# Patient Record
Sex: FEMALE | Race: BLACK OR AFRICAN AMERICAN | NOT HISPANIC OR LATINO | Employment: UNEMPLOYED | URBAN - METROPOLITAN AREA
[De-identification: names, ages, dates, MRNs, and addresses within clinical notes are randomized per-mention and may not be internally consistent; named-entity substitution may affect disease eponyms.]

---

## 2017-10-12 ENCOUNTER — HOSPITAL ENCOUNTER (EMERGENCY)
Facility: HOSPITAL | Age: 37
End: 2017-10-12
Attending: EMERGENCY MEDICINE | Admitting: EMERGENCY MEDICINE
Payer: COMMERCIAL

## 2017-10-12 VITALS
TEMPERATURE: 97.6 F | DIASTOLIC BLOOD PRESSURE: 77 MMHG | OXYGEN SATURATION: 100 % | SYSTOLIC BLOOD PRESSURE: 113 MMHG | RESPIRATION RATE: 16 BRPM | HEART RATE: 105 BPM

## 2017-10-12 DIAGNOSIS — F14.10 COCAINE ABUSE (HCC): Primary | ICD-10-CM

## 2017-10-12 LAB
AMPHETAMINES SERPL QL SCN: NEGATIVE
BARBITURATES UR QL: NEGATIVE
BENZODIAZ UR QL: NEGATIVE
COCAINE UR QL: POSITIVE
ETHANOL EXG-MCNC: 0 MG/DL
EXT PREG TEST URINE: NEGATIVE
METHADONE UR QL: NEGATIVE
OPIATES UR QL SCN: NEGATIVE
PCP UR QL: NEGATIVE
THC UR QL: NEGATIVE

## 2017-10-12 PROCEDURE — 81025 URINE PREGNANCY TEST: CPT | Performed by: EMERGENCY MEDICINE

## 2017-10-12 PROCEDURE — 80307 DRUG TEST PRSMV CHEM ANLYZR: CPT | Performed by: EMERGENCY MEDICINE

## 2017-10-12 PROCEDURE — 99285 EMERGENCY DEPT VISIT HI MDM: CPT

## 2017-10-12 PROCEDURE — 82075 ASSAY OF BREATH ETHANOL: CPT | Performed by: EMERGENCY MEDICINE

## 2017-10-12 RX ORDER — LORAZEPAM 1 MG/1
1 TABLET ORAL ONCE
Status: COMPLETED | OUTPATIENT
Start: 2017-10-12 | End: 2017-10-12

## 2017-10-12 RX ADMIN — LORAZEPAM 1 MG: 1 TABLET ORAL at 17:45

## 2017-10-12 NOTE — LETTER
[facility] [address]  [address] [address]  Dept: [phone]      EMTALA TRANSFER CONSENT    NAME David Reagan                                         1980                              MRN [id number]    I have been informed of my rights regarding examination, treatment, and transfer   by Dr Ricardo Arriaza     Benefits: Specialized equipment and/or services available at the receiving facility (Include comment)________________________    Risks: Potential for delay in receiving treatment      Consent for Transfer:  I acknowledge that my medical condition has been evaluated and explained to me by the emergency department physician or other qualified medical person and/or my attending physician, who has recommended that I be transferred to the service of  Accepting Physician:  Dr Reuben Dutta at [address] [place] [address] Name, [address] : [place], [place]  The above potential benefits of such transfer, the potential risks associated with such transfer, and the probable risks of not being transferred have been explained to me, and I fully understand them  The doctor has explained that, in my case, the benefits of transfer outweigh the risks  I agree to be transferred  I authorize the performance of emergency medical procedures and treatments upon me in both transit and upon arrival at the receiving facility  Additionally, I authorize the release of any and all medical records to the receiving facility and request they be transported with me, if possible  I understand that the safest mode of transportation during a medical emergency is an ambulance and that the Hospital advocates the use of this mode of transport  Risks of traveling to the receiving facility by car, including absence of medical control, life sustaining equipment, such as oxygen, and medical personnel has been explained to me and I fully understand them      ([place] BELOW)  [ X ]  I consent to the stated transfer and to be transported by ambulance/helicopter  [  ]  I consent to the stated transfer, but refuse transportation by ambulance and accept full responsibility for my transportation by car  I understand the risks of non-ambulance transfers and I exonerate the Hospital and its staff from any deterioration in my condition that results from this refusal     X___________________________________________    DATE  10/12/17  TIME________  Signature of patient or legally responsible individual signing on patient behalf           RELATIONSHIP TO PATIENT_________________________          Provider Certification    NAME Harish Amaya                                         1980                              MRN [id number]    A medical screening exam was performed on the above named patient  Based on the examination:    Condition Necessitating Transfer The encounter diagnosis was Cocaine abuse  Patient Condition: The patient has been stabilized such that within reasonable medical probability, no material deterioration of the patient condition or the condition of the unborn child(nasima) is likely to result from the transfer    Reason for Transfer: Level of Care needed not available at this facility    Transfer Requirements: [address] [place] [address]   · Space available and qualified personnel available for treatment as acknowledged by    · Agreed to accept transfer and to provide appropriate medical treatment as acknowledged by        Dr Sawyer Blancas  · Appropriate medical records of the examination and treatment of the patient are provided at the time of transfer   [facility], [address] __NI_____  · Transfer will be performed by qualified personnel from    and appropriate transfer equipment as required, including the use of necessary and appropriate life support measures      Provider Certification: I have examined the patient and explained the following risks and benefits of being transferred/refusing transfer to the patient/family:  General risk, such as traffic hazards, adverse weather conditions, rough terrain or turbulence, possible failure of equipment (including vehicle or aircraft), or consequences of actions of persons outside the control of the transport personnel      Based on these reasonable risks and benefits to the patient and/or the unborn child(nasima), and based upon the information available at the time of the patients examination, I certify that the medical benefits reasonably to be expected from the provision of appropriate medical treatments at another medical facility outweigh the increasing risks, if any, to the individuals medical condition, and in the case of labor to the unborn child, from effecting the transfer      X____________________________________________ DATE 10/12/17        TIME_______      ORIGINAL - SEND TO MEDICAL RECORDS   COPY - SEND WITH PATIENT DURING TRANSFER

## 2017-10-12 NOTE — ED NOTES
Patient changed into blue paper scrubs and all belongings placed in locker  Patient provided urine sample and completed breathalyzer       [address] RN  10/12/17 5087

## 2017-10-12 NOTE — ED NOTES
Patient seems very irritable and is pacing  She continues to repeat that she just wants rehab and no psychiatric treatment  [facility] with a plan and was having auditory hallucinations  Patient is now denying having any hallucinations whatsoever and is being very vague about whether or not she is suicidal  She will only tell me now that she is mad at herself and will give me no further information       Chuyita Bingham RN  10/12/17 9423

## 2017-10-12 NOTE — ED NOTES
Aroused Pt for registration to gather insurance information  Pt states she does have insurance but unable to remember what it is and she left her card at home  Had to be verbally aroused several times during the conversation  Followed commands and able to sit up in bed to talk  Let Pt go back to sleep, lights turned down, 3 warmed blankets given d/t cool temp in room       Fredy Mccracken RN  10/12/17 1930

## 2017-10-12 NOTE — ED PROVIDER NOTES
History  Chief Complaint   Patient presents with    Psychiatric Evaluation     Pt states she needs rehab from "crack"  PT states she last used this am  Pt states she is "extremely suicidal", wtih plan on cutting  States she started yesterday but "was interrupted"  Denies HI at this time but states she sometimes does  PT states she is having auditory and visual hallucinations  Pt states she's here for rehab for crack  "I do a lot "  She states she is angry at herself, but denies SI (however triage note says she's suicidal)  I brought up that she told our nurse she's feeling suicidal and pt denies this and just keeps saying she's angry at herself for doing drugs, and wants to be with her kids  She also is telling me has no AH/VH, however the triage note says pt reported this  History provided by:  Patient   used: No    Psychiatric Evaluation   Presenting symptoms: no agitation, no hallucinations, no self-mutilation, no suicidal thoughts and no suicidal threats    Onset quality:  Unable to specify  Context: drug abuse (Crack)    Relieved by:  None tried  Worsened by:  Nothing  Ineffective treatments:  None tried  Associated symptoms: no anxiety and no headaches    Risk factors: hx of mental illness        None       Past Medical History:   Diagnosis Date    Anemia     [facility] [address] )     Schizo-affective type schizophrenia, chronic state [facility] )        History reviewed  No pertinent surgical history  History reviewed  No pertinent family history  I have reviewed and agree with the history as documented  Social History   Substance Use Topics    Smoking status: Smoker, Current Status Unknown     Packs/day: 1 00    Smokeless tobacco: Never Used    Alcohol use No        Review of Systems   Constitutional: Negative for chills and fever  Eyes: Negative for visual disturbance  Gastrointestinal: Negative for diarrhea, nausea and vomiting     Skin: Negative for rash    Neurological: Negative for tremors, facial asymmetry, speech difficulty, weakness, numbness and headaches  Psychiatric/Behavioral: Negative for agitation, behavioral problems, confusion, decreased concentration, dysphoric mood, hallucinations, self-injury, sleep disturbance and suicidal ideas  The patient is not nervous/anxious and is not hyperactive  All other systems reviewed and are negative  Physical Exam  ED Triage Vitals   Temperature Pulse Respirations Blood Pressure SpO2   10/12/17 1632 10/12/17 1632 10/12/17 1632 10/12/17 1632 10/12/17 1632   97 6 °F (36 4 °C) 103 15 126/82 100 %      Temp Source Heart Rate Source Patient Position - Orthostatic VS BP Location FiO2 (%)   10/12/17 1632 10/12/17 1632 10/12/17 2053 10/12/17 2053 --   Tympanic Monitor Sitting Right arm       Pain Score       10/12/17 1632       No Pain           Physical Exam   Constitutional: She is oriented to person, place, and time  She appears well-developed and well-nourished  No distress  HENT:   Head: Normocephalic  Eyes: EOM are normal    Neck: Normal range of motion  Neck supple  Cardiovascular: Normal rate, regular rhythm, normal heart sounds and intact distal pulses  Exam reveals no gallop and no friction rub  No murmur heard  Pulmonary/Chest: Effort normal and breath sounds normal  No respiratory distress  She has no wheezes  She has no rales  Abdominal: Soft  Bowel sounds are normal  She exhibits no distension  There is no tenderness  There is no rebound and no guarding  Neurological: She is alert and oriented to person, place, and time  Skin: Skin is warm and dry  No pallor  Psychiatric: Her mood appears anxious  She expresses no homicidal and no suicidal ideation  She expresses no suicidal plans and no homicidal plans  Nursing note and vitals reviewed        ED Medications  Medications   LORazepam (ATIVAN) tablet 1 mg (1 mg Oral Given 10/12/17 1745)       Diagnostic Studies  Labs Reviewed RAPID DRUG SCREEN, URINE - Abnormal        Result Value Ref Range Status    Cocaine Urine Positive (*) Negative Final    Amph/Meth UR Negative  Negative Final    Barbiturate Ur Negative  Negative Final    Benzodiazepine Urine Negative  Negative Final    Methadone Urine Negative  Negative Final    Opiate Urine Negative  Negative Final    PCP Ur Negative  Negative Final    THC Urine Negative  Negative Final    Narrative:     Presumptive report  If requested, specimen will be sent to reference lab for confirmation  FOR MEDICAL PURPOSES ONLY  IF CONFIRMATION NEEDED PLEASE CONTACT THE LAB WITHIN 5 DAYS  Drug Screen Cutoff Levels:  AMPHETAMINE/METHAMPHETAMINES  1000 ng/mL  BARBITURATES     200 ng/mL  BENZODIAZEPINES     200 ng/mL  COCAINE      300 ng/mL  METHADONE      300 ng/mL  OPIATES      300 ng/mL  PHENCYCLIDINE     25 ng/mL  THC       50 ng/mL   POCT ALCOHOL BREATH TEST - Normal    EXTBreath Alcohol 0 000   Final   POCT PREGNANCY, URINE - Normal    EXT PREG TEST UR (Ref: Negative) negative   Final       No orders to display       Procedures  Procedures      Phone Contacts  ED Phone Contact    ED Course  ED Course as of Oct 12 2307   Thu Oct 12, 2017   1713 Pt now saying we're locking her up and she's having a panic attack  Will give Ativan  MDM  Number of Diagnoses or Management Options  Diagnosis management comments: Request for rehab - Will c/s crisis         Amount and/or Complexity of Data Reviewed  Tests in the medicine section of CPT®: reviewed and ordered      CritCare Time    Disposition  Final diagnoses:   Cocaine abuse     ED Disposition     ED Disposition Condition Comment    Transfer to [facility] [address] Most Recent Value   Patient Condition  The patient has been stabilized such that within reasonable medical probability, no material deterioration of the patient condition or the condition of the unborn child(nasima) is likely to result from the transfer   Reason for Transfer  Level of Care needed not available at this facility   Benefits of Transfer  Specialized equipment and/or services available at the receiving facility (Include comment)________________________   Risks of Transfer  Potential for delay in receiving treatment   Accepting Physician   Dr Yisel Myers Name, Nathan Crenshaw [place]   Sending MD Dr Judith Maya   Provider Certification  General risk, such as traffic hazards, adverse weather conditions, rough terrain or turbulence, possible failure of equipment (including vehicle or aircraft), or consequences of actions of persons outside the control of the transport personnel      RN Documentation    [address] Gutierrez, Nasim Hall      Follow-up Information    None       There are no discharge medications for this patient  No discharge procedures on file      ED Provider  Electronically Signed by       Ricardo Arriaza 24, DO  10/12/17 4860

## 2017-10-12 NOTE — ED NOTES
[facility]ax[facility] ([phone]) [facility] ([phone])

## 2017-10-12 NOTE — LETTER
Section I - General Information    Name of Patient: Juanpablo Lange                 : 1980    Medicare #:____________________  Transport Date: 10/12/17 (PCS is valid for round trips on this date and for all repetitive trips in the 60-day range as noted below )  Origin: [address] [address]                                                         Destination:___[place] [place] [address] Alyson LANDIS___________  Is the pt's stay covered under Medicare Part A (PPS/DRG)     (_) YES  (X) NO  Closest appropriate facility?  (_) YES  (X) NO  If no, why is transport to more distant facility required?___201_________  If hosp-hosp transfer, describe services needed at 2nd facility not available at 1st facility? _________________________________  If hospice pt, is this transport related to pt's terminal illness? (_) YES (_) NO Describe____________________________________    Section II - Medical Necessity Questionnaire  Ambulance transportation is medically necessary only if other means of transport are contraindicated or would be potentially harmful to the patient  To meet this requirement, the patient must either be "bed confined" or suffer from a condition such that transport by means other than ambulance is contraindicated by the patient's condition   The following questions must be answered by the medical professional signing below for this form to be valid:    1)  Describe the MEDICAL CONDITION (physical and/or mental) of this patient AT [address] [place] [address] that requires the patient to be transported in an ambulance and why transport by other means is contraindicated by the patient's condition:__Behavioral Health Transport_________________________________________________________________    2) Is the patient "bed confined" as defined below?     (_) YES  (X) NO  To be "be confined" the patient must satisfy all three of the following conditions: (1) unable to get up from bed without Assistance; AND (2) unable to ambulate; AND (3) unable to sit in a chair or wheelchair  3) Can this patient safely be transported by car or wheelchair [place] (i e , seated during transport without a medical attendant or monitoring)?   (_) YES  (X) NO    4) In addition to completing questions 1-3 above, please check any of the following conditions that apply*:  *Note: supporting documentation for any boxes checked must be maintained in the patient's medical records  (_)Contractures   (_)Non-Healed Fractures  (_)Patient is confused (_)Patient is comatose (_)Moderate/severe pain on movement (X)Danger to self/others  (_)IV meds/fluids required (_)Patient is combative(_)Need or possible need for restraints (_)DVT requires elevation of lower extremity  (_)Medical attendant required (_)Requires oxygen-unable to self administer (_)Special handling/isolation/infection control precautions required (_)Unable to tolerate seated position for time needed to transport (_)Hemodynamic monitoring required en route (_)Unable to sit in a chair or wheelchair due to decubitus ulcers or other wounds (_)Cardiac monitoring required en route (_)Morbid obesity requires additional personnel/equipment to safely handle patient (_)Orthopedic device (backboard, halo, pins, traction, brace, wedge, etc,) requiring special handling during transport (_)Other(specify)_______________________________________________    Section III - Signature of Physician or Healthcare Professional  I certify that the above information is true and correct based on my evaluation of this patient, and represent that the patient requires transport by ambulance and that other forms of transport are contraindicated   I understand that this information will be used by the Centers for Medicare and Medicaid Services (CMS) to support the determination of medical necessity for ambulance services, and I represent that I have personal knowledge of the patient's condition at time of transport  (_) If this box is checked, I also certify that the patient is physically or mentally incapable of signing the ambulance service's claim and that the institution with which I am affiliated has furnished care, services, or assistance to the patient  My signature below is made on behalf of the patient pursuant to 42 CFR §424 36(b)(4)  In accordance with 42 CFR §424 37, the specific reason(s) that the patient is physically or mentally incapable of signing the claim form is as follows: _________________________________________________________________________________________________________      Signature of Physician* or Healthcare Professional______________________________________________________________  Signature Date 10/12/17 (For scheduled repetitive transports, this form is not valid for transports performed more than 60 days after this date)    Printed Name & Credentials of Physician or Healthcare Professional (MD, DO, RN, etc )________________________________  *Form must be signed by patient's attending physician for scheduled, repetitive transports   For non-repetitive, unscheduled ambulance transports, if unable to obtain the signature of the attending physician, any of the following may sign (choose appropriate option below)  (_) Physician Assistant (_)  Clinical Nurse Specialist (_)  Registered Nurse  (_)  Nurse Practitioner  (_) Discharge Planner

## 2017-10-12 NOTE — ED NOTES
Patient presented to the ED via [facility] ([facility] AT [place] transporting from [facility]  Patient said was told she needed to be treated for her Mental Health "issues" before she can receive rehab services at at [facility]  [employer] called the ED prior to arrival and stated the patient was suicidal with a plan  Upon Crisis Worker intake patient stated she does not have any suicidal thoughts and does not want to hurt anyone else, she is just paranoid  Patient stated that she needs rehab and that will help her mental health  Patient stated she does not want to be locked up because she isn't crazy she just does drugs and wants to stop  Patient did state that she is diagnosed Schizoaffective with Paranoia  Patient stated she felt like others were going to get her earler today and she had a panic attack  Due to her previous diagnosis and history of drug use patient agreed for dual treatment to get the help she is seeking

## 2017-10-13 NOTE — ED NOTES
Pt asleep in bed  Respirations noted  Pt appears comfortable   Two railings up     Brady Morales RN  10/12/17 2015

## 2017-10-13 NOTE — ED NOTES
[facility] will transport to [facility],  [facility] at 10:45pm  Krystle Sylvester from [facility] was notified of the ETA

## 2017-10-13 NOTE — ED NOTES
Faxed updated vitals to [facility], per request of Quiana 0949  Waiting for call back

## 2017-10-13 NOTE — EMTALA/ACUTE CARE TRANSFER
12 [facility]   [address] [place] [address]  Dept: [phone]      EMTALA TRANSFER CONSENT    NAME Maria Del Carmen Knapp                                         1980                              MRN [id number]    I have been informed of my rights regarding examination, treatment, and transfer   by Dr Lainey Zavala DO    Benefits: Specialized equipment and/or services available at the receiving facility (Include comment)________________________    Risks: Potential for delay in receiving treatment      Transfer Request   I acknowledge that my medical condition has been evaluated and explained to me by the emergency department physician or other qualified medical person and/or my attending physician who has recommended and offered to me further medical examination and treatment  I understand the Hospital's obligation with respect to the treatment and stabilization of my emergency medical condition  I nevertheless request to be transferred  I release the Hospital, the doctor, and any other persons caring for me from all responsibility or liability for any injury or ill effects that may result from my transfer and agree to accept all responsibility for the consequences of my choice to transfer, rather than receive stabilizing treatment at the Hospital  I understand that because the transfer is my request, my insurance may not provide reimbursement for the services  The Hospital will assist and direct me and my family in how to make arrangements for transfer, but the hospital is not liable for any fees charged by the transport service  In spite of this understanding, I refuse to consent to further medical examination and treatment which has been offered to me, and request transfer to    I authorize the performance of emergency medical procedures and treatments upon me in both transit and upon arrival at the receiving facility    Additionally, I authorize the release of any and all medical records to the receiving facility and request they be transported with me, if possible  I authorize the performance of emergency medical procedures and treatments upon me in both transit and upon arrival at the receiving facility  Additionally, I authorize the release of any and all medical records to the receiving facility and request they be transported with me, if possible  I understand that the safest mode of transportation during a medical emergency is an ambulance and that the Hospital advocates the use of this mode of transport  Risks of traveling to the receiving facility by car, including absence of medical control, life sustaining equipment, such as oxygen, and medical personnel has been explained to me and I fully understand them  (BERNABE CORRECT BOX BELOW)  [  ]  I consent to the stated transfer and to be transported by ambulance/helicopter  [  ]  I consent to the stated transfer, but refuse transportation by ambulance and accept full responsibility for my transportation by car  I understand the risks of non-ambulance transfers and I exonerate the Hospital and its staff from any deterioration in my condition that results from this refusal     X___________________________________________    DATE  10/12/17  TIME________  Signature of patient or legally responsible individual signing on patient behalf           RELATIONSHIP TO PATIENT_________________________          Provider Certification    NAME Zuleyka Hernandez                                         1980                              MRN [id number]    A medical screening exam was performed on the above named patient  Based on the examination:    Condition Necessitating Transfer The encounter diagnosis was Cocaine abuse      Patient Condition: The patient has been stabilized such that within reasonable medical probability, no material deterioration of the patient condition or the condition of the unborn child(nasima) is likely to result from the transfer    Reason for Transfer: Level of Care needed not available at this facility    Transfer Requirements: Facility     · Space available and qualified personnel available for treatment as acknowledged by    · Agreed to accept transfer and to provide appropriate medical treatment as acknowledged by          · Appropriate medical records of the examination and treatment of the patient are provided at the time of transfer   Conc[address] _______  · Transfer will be performed by qualified personnel from    and appropriate transfer equipment as required, including the use of necessary and appropriate life support measures  Provider Certification: I have examined the patient and explained the following risks and benefits of being transferred/refusing transfer to the patient/family:  General risk, such as traffic hazards, adverse weather conditions, rough terrain or turbulence, possible failure of equipment (including vehicle or aircraft), or consequences of actions of persons outside the control of the transport personnel      Based on these reasonable risks and benefits to the patient and/or the unborn child(nasima), and based upon the information available at the time of the patients examination, I certify that the medical benefits reasonably to be expected from the provision of appropriate medical treatments at another medical facility outweigh the increasing risks, if any, to the individuals medical condition, and in the case of labor to the unborn child, from effecting the transfer      X____________________________________________ DATE 10/12/17        TIME_______      ORIGINAL - SEND TO MEDICAL RECORDS   COPY - SEND WITH PATIENT DURING TRANSFER

## 2017-10-13 NOTE — ED NOTES
Bakari called back with concern for her elevated heart rate  They needed to further discuss with their doctor  Upon waiting for their second call back Becky from [facility] admissions called to accept patient  Accepting doctor is Dr Hermina Mcburney

## 2018-03-11 ENCOUNTER — HOSPITAL ENCOUNTER (EMERGENCY)
Facility: HOSPITAL | Age: 38
Discharge: HOME | End: 2018-03-11
Attending: EMERGENCY MEDICINE
Payer: COMMERCIAL

## 2018-03-11 VITALS
TEMPERATURE: 98 F | WEIGHT: 127 LBS | OXYGEN SATURATION: 100 % | DIASTOLIC BLOOD PRESSURE: 79 MMHG | RESPIRATION RATE: 18 BRPM | SYSTOLIC BLOOD PRESSURE: 128 MMHG | HEART RATE: 96 BPM

## 2018-03-11 DIAGNOSIS — F41.9 ANXIETY: Primary | ICD-10-CM

## 2018-03-11 DIAGNOSIS — F19.10 DRUG ABUSE (HCC): ICD-10-CM

## 2018-03-11 PROCEDURE — 99283 EMERGENCY DEPT VISIT LOW MDM: CPT

## 2018-03-11 NOTE — ED NOTES
After triage complete patient states she is leaving and we cannot make her stay   Security at door throughout triage to assist  Patient is not aggressive at this time but appears to have increased paranoia about staff     Lavena Rubinstein, RN  03/11/18 4149

## 2018-03-11 NOTE — ED NOTES
Pt states she has a safe place to go and that she does not feel unsafe       Mishel Veliz, MARIA ELENA  03/11/18 1600

## 2018-03-11 NOTE — DISCHARGE INSTRUCTIONS
Anxiety   WHAT YOU NEED TO KNOW:   Anxiety is a condition that causes you to feel extremely worried or nervous  The feelings are so strong that they can cause problems with your daily activities or sleep  Anxiety may be triggered by something you fear, or it may happen without a cause  Family or work stress, smoking, caffeine, and alcohol can increase your risk for anxiety  Certain medicines or health conditions can also increase your risk  Anxiety can become a long-term condition if it is not managed or treated  DISCHARGE INSTRUCTIONS:   Call 911 if:   · You have chest pain, tightness, or heaviness that may spread to your shoulders, arms, jaw, neck, or back  · You feel like hurting yourself or someone else  Contact your healthcare provider if:   · Your symptoms get worse or do not get better with treatment  · Your anxiety keeps you from doing your regular daily activities  · You have new symptoms since your last visit  · You have questions or concerns about your condition or care  Medicines:   · Medicines  may be given to help you feel more calm and relaxed, and decrease your symptoms  · Take your medicine as directed  Contact your healthcare provider if you think your medicine is not helping or if you have side effects  Tell him of her if you are allergic to any medicine  Keep a list of the medicines, vitamins, and herbs you take  Include the amounts, and when and why you take them  Bring the list or the pill bottles to follow-up visits  Carry your medicine list with you in case of an emergency  Follow up with your healthcare provider within 2 weeks or as directed:  Write down your questions so you remember to ask them during your visits  Manage anxiety:   · Talk to someone about your anxiety  Your healthcare provider may suggest counseling  Cognitive behavioral therapy can help you understand and change how you react to events that trigger your symptoms   You might feel more comfortable talking with a friend or family member about your anxiety  Choose someone you know will be supportive and encouraging  · Find ways to relax  Activities such as exercise, meditation, or listening to music can help you relax  Spend time with friends, or do things you enjoy  · Practice deep breathing  Deep breathing can help you relax when you feel anxious  Focus on taking slow, deep breaths several times a day, or during an anxiety attack  Breathe in through your nose and out through your mouth  · Create a regular sleep routine  Regular sleep can help you feel calmer during the day  Go to sleep and wake up at the same times every day  Do not watch television or use the computer right before bed  Your room should be comfortable, dark, and quiet  · Eat a variety of healthy foods  Healthy foods include fruits, vegetables, low-fat dairy products, lean meats, fish, whole-grain breads, and cooked beans  Healthy foods can help you feel less anxious and have more energy  · Exercise regularly  Exercise can increase your energy level  Exercise may also lift your mood and help you sleep better  Your healthcare provider can help you create an exercise plan  · Do not smoke  Nicotine and other chemicals in cigarettes and cigars can increase anxiety  Ask your healthcare provider for information if you currently smoke and need help to quit  E-cigarettes or smokeless tobacco still contain nicotine  Talk to your healthcare provider before you use these products  · Do not have caffeine  Caffeine can make your symptoms worse  Do not have foods or drinks that are meant to increase your energy level  · Limit or do not drink alcohol  Ask your healthcare provider if alcohol is safe for you  You may not be able to drink alcohol if you take certain anxiety or depression medicines  Limit alcohol to 1 drink per day if you are a woman  Limit alcohol to 2 drinks per day if you are a man   A drink of alcohol is 12 ounces of beer, 5 ounces of wine, or 1½ ounces of liquor  · Do not use drugs  Drugs can make your anxiety worse  It can also make anxiety hard to manage  Talk to your healthcare provider if you use drugs and want help to quit  © 2017 [address] Information is for End User's use only and may not be sold, redistributed or otherwise used for commercial purposes  All illustrations and images included in CareNotes® are the copyrighted property of A D A M , Denise  or Shravan Palacios  The above information is an  only  It is not intended as medical advice for individual conditions or treatments  Talk to your doctor, nurse or pharmacist before following any medical regimen to see if it is safe and effective for you

## 2018-03-11 NOTE — ED PROVIDER NOTES
History  Chief Complaint   Patient presents with    Psychiatric Evaluation     Patient reports she was at [facility] 1 month ago and D/C'd  Patient has been off of her meds for 1 week  Patient reports taking Abilify  Patient reports taking other meds but not sure what  Patient denies Suicidal ideations/homocidal ideations  Denies AH/VH  Patient keeps stating that she just wants to speak to the crisis worker  Patient is very aggitated in triage and arguing with significant other, stating she is not staying here and "they cannot keep me "     31 y/o female, hx of paranoia, schizoaffective disorder, and drug abuse, presents to the ED for anxiety  Patient states that she had a recent hospitalization at [facility] and was d/c 1 month ago- states that she has been out of her medications for the last week  She reports that she has had increased anxiety over the last week 2/2 argument with her boyfriend, who kicked her out of his house today  She states that she came here for medication refills- does not know the meds that she is on  Denies any SI/HI/hallucinations  States that she does not want to see crisis  States she has a safe place to go- will be staying with her son at his house  Admits to smoking crack but does not want any help with drug addiction  History provided by:  Patient  Psychiatric Evaluation   Presenting symptoms: no aggressive behavior, no agitation, no hallucinations, no homicidal ideas, no suicidal thoughts, no suicidal threats and no suicide attempt    Patient accompanied by: boyfriend  Degree of incapacity (severity): Moderate  Onset quality:  Gradual  Timing:  Constant  Progression:  Unchanged  Chronicity:  New  Context: drug abuse and noncompliance    Context: not alcohol use    Compliance with total regimen: has not been taking for 1 week    Relieved by:  None tried  Worsened by:  Drugs and family interactions  Ineffective treatments:  None tried  Associated symptoms: anxiety and poor judgment    Associated symptoms: no abdominal pain, no chest pain and no headaches    Risk factors: hx of mental illness        None       Past Medical History:   Diagnosis Date    Anemia     Paranoia (psychosis) ([facility] )     Schizo-affective type schizophrenia, chronic state ([facility] )        History reviewed  No pertinent surgical history  History reviewed  No pertinent family history  I have reviewed and agree with the history as documented  Social History   Substance Use Topics    Smoking status: Smoker, Current Status Unknown     Packs/day: 1 00    Smokeless tobacco: Never Used    Alcohol use No        Review of Systems   Constitutional: Negative for chills and fever  HENT: Negative for congestion, ear pain and sore throat  Eyes: Negative for pain and visual disturbance  Respiratory: Negative for cough, shortness of breath and wheezing  Cardiovascular: Negative for chest pain and leg swelling  Gastrointestinal: Negative for abdominal pain, diarrhea, nausea and vomiting  Genitourinary: Negative for dysuria, frequency, hematuria and urgency  Musculoskeletal: Negative for neck pain and neck stiffness  Skin: Negative for rash and wound  Neurological: Negative for weakness, numbness and headaches  Psychiatric/Behavioral: Negative for agitation, confusion, hallucinations, homicidal ideas and suicidal ideas  The patient is nervous/anxious  All other systems reviewed and are negative  Physical Exam  ED Triage Vitals [03/11/18 1521]   Temperature Pulse Respirations Blood Pressure SpO2   98 °F (36 7 °C) 96 18 128/79 100 %      Temp Source Heart Rate Source Patient Position - Orthostatic VS BP Location FiO2 (%)   Oral Monitor Lying Right arm --      Pain Score       No Pain           Orthostatic Vital Signs  Vitals:    03/11/18 1521   BP: 128/79   Pulse: 96   Patient Position - Orthostatic VS: Lying       Physical Exam   Constitutional: She is oriented to person, place, and time  She appears well-developed and well-nourished  HENT:   Head: Normocephalic and atraumatic  Eyes: EOM are normal  Pupils are equal, round, and reactive to light  Neck: Normal range of motion  Neck supple  Cardiovascular: Normal rate and regular rhythm  Pulmonary/Chest: Effort normal and breath sounds normal    Abdominal: Soft  Bowel sounds are normal    Musculoskeletal: Normal range of motion  Neurological: She is alert and oriented to person, place, and time  No focal deficits   Skin: Skin is warm and dry  Psychiatric: Her speech is normal  Judgment and thought content normal  Her mood appears anxious  She is agitated  She is not actively hallucinating  Cognition and memory are normal  She expresses no homicidal and no suicidal ideation  She expresses no suicidal plans and no homicidal plans  She is attentive  Nursing note and vitals reviewed  ED Medications  Medications - No data to display    Diagnostic Studies  Results Reviewed     None                 No orders to display         Procedures  Procedures      Phone Consults  ED Phone Contact    ED Course  ED Course                                MDM  Number of Diagnoses or Management Options  Anxiety: new and requires workup  Drug abuse: new and requires workup  Diagnosis management comments: Patient here for medication refill- unable to fill 2/2 unsure doses and meds  Patient denies SI/HI/hallucinations  Does not want to talk to crisis and would like to leave  Will d/c patient home          Amount and/or Complexity of Data Reviewed  Clinical lab tests: ordered and reviewed  Tests in the radiology section of CPT®: ordered and reviewed  Tests in the medicine section of CPT®: ordered and reviewed  Discussion of test results with the performing providers: yes  Decide to obtain previous medical records or to obtain history from someone other than the patient: yes  Obtain history from someone other than the patient: yes  Review and summarize past medical records: yes  Discuss the patient with other providers: yes  Independent visualization of images, tracings, or specimens: yes    Patient Progress  Patient progress: improved    CritCare Time    Disposition  Final diagnoses:   Anxiety   Drug abuse     Time reflects when diagnosis was documented in both MDM as applicable and the Disposition within this note     Time User Action Codes Description Comment    3/11/2018  3:57 PM Nithin Finnegan Add [F41 9] Anxiety     3/11/2018  3:57 PM Milly Corral Add [F19 10] Drug abuse       ED Disposition     ED Disposition Condition Comment    Discharge  Tye Pacheco discharge to home/self care  Condition at discharge: Good        Follow-up Information     Follow up With Specialties Details Why Contact Info Additional Information    Infolink  Call in 1 day to establish a family doctor within the next 2-3 days if you do not have one of your own  [facility] Emergency Medicine Go to immediately for any new or worsening symptoms  Aki Lezama [address] [facility] ED, [facility] , [facility], [address] [place] [address], [id number]        There are no discharge medications for this patient  No discharge procedures on file  ED Provider  Attending physically available and evaluated Tye Pacheco I managed the patient along with the ED Attending      Electronically Signed by         Alexa White DO  03/14/18 1331

## 2018-03-11 NOTE — ED NOTES
The pt states that her boyfriend is stating she is suicidal because he is controlling  She adamantly denies any SI, HI  Dr Sukumar Jhaveri has spoken to the pt  Pt's boyfriend is being escorted off of the premises       Alex García RN  03/11/18 7525

## 2018-03-11 NOTE — ED NOTES
Pt states that she merely came here for a refill of abilify to treat her anxiety which she acknowledges she has  She states that her boyfriend brought her in and he wants her admitted  She states that they are in the process of breaking up and that she was to be moving in with her son  She denies SI,HI, hallucinations and presents her thoughts clearly  She states that she would just like to leave at this point or simply eat  Resident is currently speaking with her boyfriend       Livia Pantoja RN  03/11/18 4825

## 2018-03-11 NOTE — ED NOTES
The pt's boyfriend is clear of the premises as per security  Pt was escorted to the [address] [place] [address] with security and myself and will be making a phone call to get to home safely       Zoya Del Castillo RN  03/11/18 0077

## 2018-03-19 NOTE — ED ATTENDING ATTESTATION
August Gill MD, saw and evaluated the patient  I have discussed the patient with the resident/non-physician practitioner and agree with the resident's/non-physician practitioner's findings, Plan of Care, and MDM as documented in the resident's/non-physician practitioner's note, except where noted  All available labs and Radiology studies were reviewed  At this point I agree with the current assessment done in the Emergency Department  I have conducted an independent evaluation of this patient a history and physical is as follows:    61-year-old female with history of mental illness and drug abuse presents to the ED for evaluation of anxiety/agitation  The patient was recently admitted to the inpatient psychiatric alex at [address] [place] [address] and was discharged about a month ago  She has been out of her medications for a few days now  She is here primarily at the behest of her significant other, with whom she was seen to be arguing with a in triage  The patient states that she has had some increased anxiety secondary to an argument that the two had about a week ago  Her boyfriend then kicked her out of his house today  The patient states that she would like refills on her medications but does not know what medications she is taking  She denies any suicidal ideations, homicidal ideations, auditory or visual hallucinations  She admits that she does abuse crack cocaine but states she is not interested in rehab her information about outpatient resources  She does not wish to speak to the emergency department crisis worker  She states that she would like to be discharged the and that she will go to live with her son  Physical exam:  GCS is 15, nonfocal   Patient does appear somewhat anxious but answers questions appropriately and is not agitated or aggressive with staff    Sclerae nonicteric, conjunctiva normal  Moist mucous membranes, regular rate and rhythm, clear to auscultation bilaterally, abdomen is soft and nontender  Impression plan:  57-year-old female with anxiety  Patient is seeking refills on her psychiatric medications, but unfortunately she does not know what medication she has been taking  She denies any suicidal or homicidal thoughts  She denies any hallucinations  She is not interested in rehabilitation or inpatient psychiatric treatment  There are no grounds to hold her in the emergency department  She will be discharged      Critical Care Time  CritCare Time    Procedures

## 2018-06-13 ENCOUNTER — HOSPITAL ENCOUNTER (EMERGENCY)
Facility: HOSPITAL | Age: 38
End: 2018-06-13
Attending: EMERGENCY MEDICINE | Admitting: EMERGENCY MEDICINE
Payer: COMMERCIAL

## 2018-06-13 VITALS
SYSTOLIC BLOOD PRESSURE: 121 MMHG | TEMPERATURE: 98.7 F | RESPIRATION RATE: 16 BRPM | OXYGEN SATURATION: 97 % | DIASTOLIC BLOOD PRESSURE: 94 MMHG | HEART RATE: 99 BPM | WEIGHT: 140 LBS

## 2018-06-13 DIAGNOSIS — F41.9 ANXIETY: ICD-10-CM

## 2018-06-13 DIAGNOSIS — F14.10 COCAINE ABUSE (HCC): ICD-10-CM

## 2018-06-13 DIAGNOSIS — F25.9 SCHIZOAFFECTIVE DISORDER (HCC): ICD-10-CM

## 2018-06-13 DIAGNOSIS — F22 PARANOIA (HCC): ICD-10-CM

## 2018-06-13 DIAGNOSIS — F32.A DEPRESSION: Primary | ICD-10-CM

## 2018-06-13 LAB
AMPHETAMINES SERPL QL SCN: NEGATIVE
BACTERIA UR QL AUTO: ABNORMAL /HPF
BARBITURATES UR QL: NEGATIVE
BENZODIAZ UR QL: NEGATIVE
BILIRUB UR QL STRIP: ABNORMAL
CLARITY UR: ABNORMAL
COCAINE UR QL: POSITIVE
COLOR UR: ABNORMAL
ETHANOL EXG-MCNC: 0 MG/DL
EXT PREG TEST URINE: NEGATIVE
GLUCOSE UR STRIP-MCNC: NEGATIVE MG/DL
HGB UR QL STRIP.AUTO: ABNORMAL
KETONES UR STRIP-MCNC: NEGATIVE MG/DL
LEUKOCYTE ESTERASE UR QL STRIP: ABNORMAL
METHADONE UR QL: NEGATIVE
MUCOUS THREADS UR QL AUTO: ABNORMAL
NITRITE UR QL STRIP: NEGATIVE
NON-SQ EPI CELLS URNS QL MICRO: ABNORMAL /HPF
OPIATES UR QL SCN: NEGATIVE
PCP UR QL: NEGATIVE
PH UR STRIP.AUTO: 5.5 [PH] (ref 4.5–8)
PROT UR STRIP-MCNC: ABNORMAL MG/DL
RBC #/AREA URNS AUTO: ABNORMAL /HPF
SP GR UR STRIP.AUTO: >=1.03 (ref 1–1.03)
THC UR QL: NEGATIVE
UROBILINOGEN UR QL STRIP.AUTO: 0.2 E.U./DL
WBC #/AREA URNS AUTO: ABNORMAL /HPF

## 2018-06-13 PROCEDURE — 82075 ASSAY OF BREATH ETHANOL: CPT | Performed by: EMERGENCY MEDICINE

## 2018-06-13 PROCEDURE — 81001 URINALYSIS AUTO W/SCOPE: CPT

## 2018-06-13 PROCEDURE — 81025 URINE PREGNANCY TEST: CPT | Performed by: EMERGENCY MEDICINE

## 2018-06-13 PROCEDURE — 80307 DRUG TEST PRSMV CHEM ANLYZR: CPT | Performed by: EMERGENCY MEDICINE

## 2018-06-13 PROCEDURE — 96372 THER/PROPH/DIAG INJ SC/IM: CPT

## 2018-06-13 PROCEDURE — 99285 EMERGENCY DEPT VISIT HI MDM: CPT

## 2018-06-13 RX ORDER — NICOTINE 21 MG/24HR
14 PATCH, TRANSDERMAL 24 HOURS TRANSDERMAL ONCE
Status: DISCONTINUED | OUTPATIENT
Start: 2018-06-13 | End: 2018-06-13 | Stop reason: HOSPADM

## 2018-06-13 RX ORDER — LORAZEPAM 2 MG/ML
2 INJECTION INTRAMUSCULAR ONCE
Status: COMPLETED | OUTPATIENT
Start: 2018-06-13 | End: 2018-06-13

## 2018-06-13 RX ORDER — ACETAMINOPHEN 325 MG/1
650 TABLET ORAL ONCE
Status: COMPLETED | OUTPATIENT
Start: 2018-06-13 | End: 2018-06-13

## 2018-06-13 RX ADMIN — ACETAMINOPHEN 650 MG: 325 TABLET ORAL at 00:54

## 2018-06-13 RX ADMIN — LORAZEPAM 2 MG: 2 INJECTION INTRAMUSCULAR; INTRAVENOUS at 00:54

## 2018-06-13 RX ADMIN — NICOTINE 14 MG: 14 PATCH TRANSDERMAL at 00:54

## 2018-06-13 NOTE — ED NOTES
Their are no in network beds at this time  CW called [facility], [address], [facility], [facility], and  [facility] at this time  [facility] didn't answer the phone  [facility] an AM bed and is willing to review the pt  CW faxed over the pt chart and 201 for review

## 2018-06-13 NOTE — ED PROVIDER NOTES
History  Chief Complaint   Patient presents with    Psychiatric Evaluation     Pt reports off medications for 2 months, reports " I need my medication to get well, i am not welll emotionally"  Pt reports SI, " I am having thoughts of everything I really need help"  Denies HI  Reports AH, reports "they are yelling at me"  Reports VH- " I see it sometimes not all the time"  Reports paranoid and anxiety has gotten out of hand, " I am really scared"  Pt reports relapse on crack cocaine last used 2 days ago  29 yo female with schizoaffective, anxiety/depression and paranoia history who presents with worsening paranoia, anxiety, auditory hallucinations and depression with suicidal thoughts but no plan which began a few months ago after stopping her antipschotics and have worsened  She used crack cocaine 2 days ago in an attempt to self medicate but did not help  History provided by:  Patient   used: No    Psychiatric Evaluation   Presenting symptoms: bizarre behavior, depression, hallucinations, paranoid behavior and suicidal thoughts    Degree of incapacity (severity): Moderate  Onset quality:  Gradual  Duration:  2 months  Timing:  Constant  Progression:  Worsening  Chronicity:  Recurrent  Context: drug abuse and noncompliance (for > 2 mo with antipsychotics)    Treatment compliance:  Untreated  Time since last psychoactive medication taken:  3 months  Relieved by:  Nothing  Worsened by:  Drugs  Ineffective treatments:  None tried  Associated symptoms: anhedonia, anxiety, feelings of worthlessness, insomnia, irritability and poor judgment    Associated symptoms: no abdominal pain, no appetite change, no chest pain, no fatigue and no headaches    Risk factors: hx of mental illness        Prior to Admission Medications   Prescriptions Last Dose Informant Patient Reported? Taking?    ARIPiprazole (ABILIFY) 9 75 mg/1 3 mL injection   Yes Yes   Sig: Inject into the shoulder, thigh, or buttocks      Facility-Administered Medications: None       Past Medical History:   Diagnosis Date    Anemia     Anxiety     Multiple personalities     [facility]     Schizo-affective type schizophrenia, chronic state ([facility] )        History reviewed  No pertinent surgical history  History reviewed  No pertinent family history  I have reviewed and agree with the history as documented  Social History   Substance Use Topics    Smoking status: Smoker, Current Status Unknown     Packs/day: 1 00    Smokeless tobacco: Never Used    Alcohol use No        Review of Systems   Constitutional: Positive for irritability  Negative for appetite change, chills, fatigue and fever  HENT: Negative for sore throat  Eyes: Negative for visual disturbance  Respiratory: Negative for shortness of breath  Cardiovascular: Negative for chest pain  Gastrointestinal: Negative for abdominal pain, diarrhea, nausea and vomiting  Genitourinary: Negative for dysuria, frequency, vaginal bleeding and vaginal discharge  Musculoskeletal: Negative for back pain, neck pain and neck stiffness  Skin: Negative for pallor and rash  Allergic/Immunologic: Negative for immunocompromised state  Neurological: Negative for light-headedness and headaches  Psychiatric/Behavioral: Positive for behavioral problems, decreased concentration, hallucinations, paranoia and suicidal ideas  Negative for confusion  The patient is nervous/anxious and has insomnia  All other systems reviewed and are negative  Physical Exam  Physical Exam   Constitutional: She is oriented to person, place, and time  She appears well-developed and well-nourished  restless   HENT:   Head: Normocephalic and atraumatic  Mouth/Throat: Oropharynx is clear and moist    Eyes: EOM are normal  Pupils are equal, round, and reactive to light  Neck: Normal range of motion  Neck supple  Cardiovascular: Normal rate and regular rhythm      No murmur heard  Pulmonary/Chest: Effort normal and breath sounds normal  No respiratory distress  Abdominal: Soft  Bowel sounds are normal    Musculoskeletal: Normal range of motion  Neurological: She is alert and oriented to person, place, and time  She displays normal reflexes  Skin: Skin is warm  Capillary refill takes less than 2 seconds  No rash noted  No pallor  Psychiatric:   Anxious, restless, + SI without plan, + AH telling her to run, making her anxious   Nursing note and vitals reviewed  Vital Signs  ED Triage Vitals [06/13/18 0029]   Temperature Pulse Respirations Blood Pressure SpO2   98 3 °F (36 8 °C) 100 18 114/83 98 %      Temp Source Heart Rate Source Patient Position - Orthostatic VS BP Location FiO2 (%)   Oral Monitor Sitting Right arm --      Pain Score       8           Vitals:    06/13/18 0029 06/13/18 0049   BP: 114/83 114/93   Pulse: 100 100   Patient Position - Orthostatic VS: Sitting        Visual Acuity      ED Medications  Medications   nicotine (NICODERM CQ) 14 mg/24hr TD 24 hr patch 14 mg (14 mg Transdermal Medication Applied 6/13/18 0054)   LORazepam (ATIVAN) 2 mg/mL injection 2 mg (2 mg Intramuscular Given 6/13/18 0054)   acetaminophen (TYLENOL) tablet 650 mg (650 mg Oral Given 6/13/18 0054)       Diagnostic Studies  Results Reviewed     Procedure Component Value Units Date/Time    Rapid drug screen, urine [[id number]]  (Abnormal) Collected:  06/13/18 0109    Lab Status:  Final result Specimen:  Urine from Urine, Other Updated:  06/13/18 0127     Amph/Meth UR Negative     Barbiturate Ur Negative     Benzodiazepine Urine Negative     Cocaine Urine Positive (A)     Methadone Urine Negative     Opiate Urine Negative     PCP Ur Negative     THC Urine Negative    Narrative:         Presumptive report  If requested, specimen will be sent to reference lab for confirmation  FOR MEDICAL PURPOSES ONLY  IF CONFIRMATION NEEDED PLEASE CONTACT THE LAB WITHIN 5 DAYS      Drug Screen Cutoff Levels:  AMPHETAMINE/METHAMPHETAMINES  1000 ng/mL  BARBITURATES     200 ng/mL  BENZODIAZEPINES     200 ng/mL  COCAINE      300 ng/mL  METHADONE      300 ng/mL  OPIATES      300 ng/mL  PHENCYCLIDINE     25 ng/mL  THC       50 ng/mL    Urine Microscopic [[id number]]  (Abnormal) Collected:  06/13/18 0112    Lab Status:  Final result Specimen:  Urine from Urine, Other Updated:  06/13/18 0122     RBC, UA 0-1 (A) /hpf      WBC, UA 1-2 (A) /hpf      Epithelial Cells Moderate (A) /hpf      Bacteria, UA Occasional /hpf      MUCOUS THREADS Occasional (A)    POCT pregnancy, urine [[id number]]  (Normal) Resulted:  06/13/18 0111    Lab Status:  Final result Updated:  06/13/18 0111     EXT PREG TEST UR (Ref: Negative) negative    POCT urinalysis dipstick [[id number]]  (Abnormal) Resulted:  06/13/18 0111    Lab Status:  Final result Specimen:  Urine Updated:  06/13/18 0111    ED Urine Macroscopic [[id number]]  (Abnormal) Collected:  06/13/18 0112    Lab Status:  Final result Specimen:  Urine Updated:  06/13/18 0108     Color, UA Sammie     Clarity, UA Cloudy     pH, UA 5 5     Leukocytes, UA Trace (A)     Nitrite, UA Negative     Protein, UA 30 (1+) (A) mg/dl      Glucose, UA Negative mg/dl      Ketones, UA Negative mg/dl      Urobilinogen, UA 0 2 E U /dl      Bilirubin, UA Interference- unable to analyze (A)     Blood, UA Small (A)     Specific Gravity, UA >=1 030    Narrative:       CLINITEK RESULT    POCT alcohol breath test [[id number]]  (Normal) Resulted:  06/13/18 0044    Lab Status:  Final result Updated:  06/13/18 0044     EXTBreath Alcohol 0 000                 No orders to display              Procedures  Procedures       Phone Contacts  ED Phone Contact    ED Course  ED Course as of Jun 13 0312 Wed Jun 13, 2018   0045 Pt seen and examined   31 yo female with schizoaffective, anxiety/depression and paranoia history who presents with worsening paranoia, anxiety, auditory hallucinations and depression with suicidal thoughts but no plan which began a few months ago after stopping her antipschotics and have worsened  She used crack cocaine 2 days ago in an attempt to self medicate but did not help  Will have ED crisis eval, give ativan IM     0059 201 signed, pt given ativan and resting comfortably  MDM  CritCare Time    Disposition  Final diagnoses:   Depression   Anxiety   Schizoaffective disorder ([facility] )   Paranoia ([facility] )   Cocaine abuse     Time reflects when diagnosis was documented in both MDM as applicable and the Disposition within this note     Time User Action Codes Description Comment    6/13/2018 12:59 AM Verlon Gibbons Add [F32 9] Depression     6/13/2018 12:59 AM Verlon Gibbons Add [F41 9] Anxiety     6/13/2018 12:59 AM Raebessy Mg M Add [F25 9] Schizoaffective disorder ([facility] )     6/13/2018  1:00 AM Raebessy EDWARDS Add [F22] Paranoia ([facility] )     6/13/2018  1:00 AM Verlon Gibbons Add [F14 10] Cocaine abuse       ED Disposition     ED Disposition Condition Comment    Transfer to Behavioral Health        MD Documentation      Most Recent Value   Sending MD Dr Wellington      Follow-up Information    None         Patient's Medications   Discharge Prescriptions    No medications on file     No discharge procedures on file      ED Provider  Electronically Signed by           Trey Gordon DO  06/13/18 1949

## 2018-06-13 NOTE — ED NOTES
Crisis worker at bedside     Adriana Velazquez, [address] [facility]  06/13/18 0099

## 2018-06-13 NOTE — ED NOTES
Patient is accepted at [facility]  Patient is accepted by Wendy [facility] per admission Julee Desai  Transportation is arranged with [facility] is scheduled for 800am  Patient may go to the floor once she arrives at the facility         Nurse report is not required prior to patient transfer        Pt bed at [facility] will not be ready before 900AM  The pt can't arrive at their facility before 900AM

## 2018-06-13 NOTE — ED NOTES
p/u time rescheduled for 10am  Call made to [facility] and made aware        Christi Abbott RN  06/13/18 0824

## 2018-06-13 NOTE — EMTALA/ACUTE CARE TRANSFER
[facility] [place] [address]  Dept: [phone]      EMTALA TRANSFER CONSENT    NAME Radha Lemus                                         1980                              MRN [id number]    I have been informed of my rights regarding examination, treatment, and transfer   by Dr Samir Brooks DO    Benefits: Specialized equipment and/or services available at the receiving facility (Include comment)________________________ (psychiatric care)    Risks: Possible worsening of condition or death during transfer, Increased discomfort during transfer      Transfer Request   I acknowledge that my medical condition has been evaluated and explained to me by the emergency department physician or other qualified medical person and/or my attending physician who has recommended and offered to me further medical examination and treatment  I understand the Hospital's obligation with respect to the treatment and stabilization of my emergency medical condition  I nevertheless request to be transferred  I release the Hospital, the doctor, and any other persons caring for me from all responsibility or liability for any injury or ill effects that may result from my transfer and agree to accept all responsibility for the consequences of my choice to transfer, rather than receive stabilizing treatment at the Hospital  I understand that because the transfer is my request, my insurance may not provide reimbursement for the services  The Hospital will assist and direct me and my family in how to make arrangements for transfer, but the hospital is not liable for any fees charged by the transport service  In spite of this understanding, I refuse to consent to further medical examination and treatment which has been offered to me, and request transfer to [address] [place] [address] Rd Name, [facility] : Dilcia Lin   I authorize the performance of emergency medical procedures and treatments upon me in both transit and upon arrival at the receiving facility  Additionally, I authorize the release of any and all medical records to the receiving facility and request they be transported with me, if possible  I authorize the performance of emergency medical procedures and treatments upon me in both transit and upon arrival at the receiving facility  Additionally, I authorize the release of any and all medical records to the receiving facility and request they be transported with me, if possible  I understand that the safest mode of transportation during a medical emergency is an ambulance and that the Hospital advocates the use of this mode of transport  Risks of traveling to the receiving facility by car, including absence of medical control, life sustaining equipment, such as oxygen, and medical personnel has been explained to me and I fully understand them  (BERNABE CORRECT BOX BELOW)  [ x]  I consent to the stated transfer and to be transported by ambulance/helicopter  [  ]  I consent to the stated transfer, but refuse transportation by ambulance and accept full responsibility for my transportation by car  I understand the risks of non-ambulance transfers and I exonerate the Hospital and its staff from any deterioration in my condition that results from this refusal     X___________________________________________    DATE  18  TIME________  Signature of patient or legally responsible individual signing on patient behalf           RELATIONSHIP TO PATIENT_________________________          Provider Certification    NAME Miri Banegas                                        [facility] 1980                              MRN [id number]    A medical screening exam was performed on the above named patient  Based on the examination:    Condition Necessitating Transfer The primary encounter diagnosis was Depression   Diagnoses of Anxiety, Schizoaffective disorder ([address] ), Paranoia ([facility] ), and Cocaine abuse were also pertinent to this visit  Patient Condition: The patient has been stabilized such that within reasonable medical probability, no material deterioration of the patient condition or the condition of the unborn child(nasima) is likely to result from the transfer    Reason for Transfer: Level of Care needed not available at this facility    Transfer Requirements: [address]   · Space available and qualified personnel available for treatment as acknowledged by    · Agreed to accept transfer and to provide appropriate medical treatment as acknowledged by       [place]  · Appropriate medical records of the examination and treatment of the patient are provided at the time of transfer   [address] _______  · Transfer will be performed by qualified personnel from [facility]   and appropriate transfer equipment as required, including the use of necessary and appropriate life support measures  Provider Certification: I have examined the patient and explained the following risks and benefits of being transferred/refusing transfer to the patient/family:  General risk, such as traffic hazards, adverse weather conditions, rough terrain or turbulence, possible failure of equipment (including vehicle or aircraft), or consequences of actions of persons outside the control of the transport personnel      Based on these reasonable risks and benefits to the patient and/or the unborn child(nasima), and based upon the information available at the time of the patients examination, I certify that the medical benefits reasonably to be expected from the provision of appropriate medical treatments at another medical facility outweigh the increasing risks, if any, to the individuals medical condition, and in the case of labor to the unborn child, from effecting the transfer      X____________________________________________ DATE 06/13/18        TIME_______      ORIGINAL - SEND TO MEDICAL RECORDS   COPY - SEND WITH PATIENT DURING TRANSFER

## 2018-06-13 NOTE — ED NOTES
Pt reports has been off her medication for 2 months  Does not remember what medications she is supposed to take just remembers she used to get abilify injections       Chiki Yarbrough RN  06/13/18 6144

## 2018-06-13 NOTE — ED NOTES
Insurance Authorization:Primary   Phone call placed to [facility]  Phone number: [phone]  Spoke to Hua Matias  3 days approved    Level of care: IP [place]   Review on-pending admission  Authorization #-accepting facility needs to call once is accepted to get AUTH#

## 2018-06-13 NOTE — ED NOTES
Called received from Pablo from [facility]  Transportation to be delayed for pt  Will call for another p/u time        Maggie Bar RN  06/13/18 1717

## 2018-06-13 NOTE — ED NOTES
Pt came to the ED  The pt is very manic  The pt states that she has multiply personalities  The pt is not able to sit still and is moving back and forth talking back to her AH  The pt is able to answer questions  The pt states that she has been off her psychotropic medications for the past 2 months  The pt states that she is having AH- that has gotten worse since being off her medications  The pt states that the past month the pt AH have been yelling at her  This is making the pt SI without a plan but the pt states that she feels like giving up because she doesn't want to live like this anymore  The pt relapsed on crack cocaine and xanax about 2 day ago  The pt states that she only used a little bit of crack cocaine and took 1 pill of xanax  The pt states that she was self medicating to make the AH stop  The pt states that it didn't work  The pt hasn't used since she relapsed 2 days ago  The pt is states that she came to the hospital to get help before she hurts herself  The pt denies HI/VH  The pt was agreeable to  Rodríguez Olivares  The pt was offered and signed a 201  Dr Wellington in agreement

## 2018-08-15 ENCOUNTER — HOSPITAL ENCOUNTER (EMERGENCY)
Facility: HOSPITAL | Age: 38
Discharge: HOME | End: 2018-08-15
Attending: EMERGENCY MEDICINE
Payer: COMMERCIAL

## 2018-08-15 VITALS
OXYGEN SATURATION: 96 % | WEIGHT: 136 LBS | RESPIRATION RATE: 16 BRPM | TEMPERATURE: 97.5 F | SYSTOLIC BLOOD PRESSURE: 117 MMHG | DIASTOLIC BLOOD PRESSURE: 83 MMHG | HEART RATE: 101 BPM

## 2018-08-15 DIAGNOSIS — Z76.0 MEDICATION REFILL: Primary | ICD-10-CM

## 2018-08-15 PROCEDURE — 99281 EMR DPT VST MAYX REQ PHY/QHP: CPT

## 2018-08-15 RX ORDER — ARIPIPRAZOLE 10 MG/1
10 TABLET, ORALLY DISINTEGRATING ORAL DAILY
Qty: 7 TABLET | Refills: 0 | Status: SHIPPED | OUTPATIENT
Start: 2018-08-15 | End: 2018-10-16

## 2018-08-15 RX ORDER — GABAPENTIN 300 MG/1
300 CAPSULE ORAL 3 TIMES DAILY
Qty: 21 CAPSULE | Refills: 0 | Status: SHIPPED | OUTPATIENT
Start: 2018-08-15 | End: 2018-10-16

## 2018-08-15 NOTE — ED PROVIDER NOTES
History  Chief Complaint   Patient presents with    Medication Refill     pt requesting refill of abilify and neurontin  last dose 1 week ago     HPI    Prior to Admission Medications   Prescriptions Last Dose Informant Patient Reported? Taking? ARIPiprazole (ABILIFY) 9 75 mg/1 3 mL injection   Yes No   Sig: Inject into the shoulder, thigh, or buttocks      Facility-Administered Medications: None       Past Medical History:   Diagnosis Date    Anemia     Anxiety     Multiple personalities     [facility]     Schizo-affective type schizophrenia, chronic state ([facility] )        Past Surgical History:   Procedure Laterality Date     SECTION         History reviewed  No pertinent family history  I have reviewed and agree with the history as documented      Social History   Substance Use Topics    Smoking status: Current Every Day Smoker     Packs/day: 0 50    Smokeless tobacco: Never Used    Alcohol use No        Review of Systems    Physical Exam  Physical Exam    Vital Signs  ED Triage Vitals [08/15/18 1435]   Temperature Pulse Respirations Blood Pressure SpO2   97 5 °F (36 4 °C) 101 16 117/83 96 %      Temp src Heart Rate Source Patient Position - Orthostatic VS BP Location FiO2 (%)   -- -- -- -- --      Pain Score       --           Vitals:    08/15/18 1435   BP: 117/83   Pulse: 101       Visual Acuity      ED Medications  Medications - No data to display    Diagnostic Studies  Results Reviewed     None                 No orders to display              Procedures  Procedures       Phone Contacts  ED Phone Contact    ED Course                               MDM  CritCare Time    Disposition  Final diagnoses:   Medication refill     Time reflects when diagnosis was documented in both MDM as applicable and the Disposition within this note     Time User Action Codes Description Comment    8/15/2018  2:44 PM Andree Valderrama Add [Z76 0] Medication refill       ED Disposition     ED Disposition Condition Comment    Discharge  Tuyet Sanchez discharge to home/self care  Condition at discharge: Stable        Follow-up Information     Follow up With Specialties Details Why Contact Info    [address]:  [address], Elías Hernandez [address] [facility]             Discharge Medication List as of 8/15/2018  2:46 PM      START taking these medications    Details   ARIPiprazole (ABILIFY DISCMELT) 10 mg dispersible tablet Take 1 tablet (10 mg total) by mouth daily for 7 days, Starting Wed 8/15/2018, Until Wed 8/22/2018, Print      gabapentin (NEURONTIN) 300 mg capsule Take 1 capsule (300 mg total) by mouth 3 (three) times a day for 7 days For post-herpetic neuralgia: Take 1 tablet on day 1,  Then take 2 tablets on day 2, Then take 3 tablets on day 3 and every day after that as instructed by your doctor , Starting Wed  8/15/2018, Until Wed 8/22/2018, Print         CONTINUE these medications which have NOT CHANGED    Details   ARIPiprazole (ABILIFY) 9 75 mg/1 3 mL injection Inject into the shoulder, thigh, or buttocks, Historical Med           No discharge procedures on file      ED Provider  Electronically Signed by           Vanessa Walsh PA-C  08/15/18 3443

## 2018-08-15 NOTE — ED PROVIDER NOTES
History  Chief Complaint   Patient presents with    Medication Refill     pt requesting refill of abilify and neurontin  last dose 1 week ago     Patient is a 51-year-old female with a history of schizophrenia who presents requesting for medication refill x1 week  Patient states that she is out of her medication for about 1 week including Neurontin and Abilify, patient does not know the doses  Patient states that she just cannot the dual diagnosis program about 2 weeks ago and was only a weeks worth of medication  Does not have a psychiatrist or therapist follow up with  States that she was not given any follow-up information from her dual diagnosis program   Patient denies any suicidal homicidal ideations  No hallucinations  Just here for medication  Being without medications makes it worse medications and having the medications make this better  Prior to Admission Medications   Prescriptions Last Dose Informant Patient Reported? Taking? ARIPiprazole (ABILIFY) 9 75 mg/1 3 mL injection   Yes No   Sig: Inject into the shoulder, thigh, or buttocks      Facility-Administered Medications: None       Past Medical History:   Diagnosis Date    Anemia     Anxiety     Multiple personalities     [facility]     Schizo-affective type schizophrenia, chronic state ([facility] )        Past Surgical History:   Procedure Laterality Date     SECTION         History reviewed  No pertinent family history  I have reviewed and agree with the history as documented  Social History   Substance Use Topics    Smoking status: Current Every Day Smoker     Packs/day: 0 50    Smokeless tobacco: Never Used    Alcohol use No        Review of Systems   Constitutional: Negative  HENT: Negative  Eyes: Negative  Respiratory: Negative  Cardiovascular: Negative  Gastrointestinal: Negative  Endocrine: Negative  Genitourinary: Negative  Musculoskeletal: Negative      Skin: Negative  Allergic/Immunologic: Negative  Neurological: Negative  Hematological: Negative  Psychiatric/Behavioral: Positive for agitation  Negative for hallucinations and suicidal ideas  All other systems reviewed and are negative  Physical Exam  Physical Exam   Constitutional: She is oriented to person, place, and time  She appears well-nourished  HENT:   Head: Normocephalic  Eyes: Conjunctivae are normal  Pupils are equal, round, and reactive to light  Neck: Normal range of motion  Neck supple  Cardiovascular: Normal rate, regular rhythm, normal heart sounds and intact distal pulses  Pulmonary/Chest: Effort normal and breath sounds normal    Abdominal: Soft  Bowel sounds are normal    Musculoskeletal: Normal range of motion  Neurological: She is alert and oriented to person, place, and time  Skin: Skin is warm and dry  Psychiatric: She has a normal mood and affect  Her behavior is normal  Judgment and thought content normal    Nursing note and vitals reviewed  Vital Signs  ED Triage Vitals [08/15/18 1435]   Temperature Pulse Respirations Blood Pressure SpO2   97 5 °F (36 4 °C) 101 16 117/83 96 %      Temp src Heart Rate Source Patient Position - Orthostatic VS BP Location FiO2 (%)   -- -- -- -- --      Pain Score       --           Vitals:    08/15/18 1435   BP: 117/83   Pulse: 101       Visual Acuity      ED Medications  Medications - No data to display    Diagnostic Studies  Results Reviewed     None                 No orders to display              Procedures  Procedures       Phone Contacts  ED Phone Contact    ED Course                               MDM  Number of Diagnoses or Management Options  Diagnosis management comments: Patient is a 79-year-old female with the schizophrenic who is here requesting medication refill  Only question Neurontin and Abilify will give 1 weeks worth of medications  On follow-up information with [facility] 0    Return to the ER for any concerns  Amount and/or Complexity of Data Reviewed  Review and summarize past medical records: yes      CritCare Time    Disposition  Final diagnoses:   Medication refill     Time reflects when diagnosis was documented in both MDM as applicable and the Disposition within this note     Time User Action Codes Description Comment    8/15/2018  2:44 PM Gilles Hendricks Add [Z76 0] Medication refill       ED Disposition     ED Disposition Condition Comment    Discharge  [facility] discharge to home/self care  Condition at discharge: Stable        Follow-up Information     Follow up With Specialties Details Why Contact Info    Meredithsandy KRISTEN  [address]   MARYANN:  [address], [place] [address]            Patient's Medications   Discharge Prescriptions    ARIPIPRAZOLE (ABILIFY DISCMELT) 10 MG DISPERSIBLE TABLET    Take 1 tablet (10 mg total) by mouth daily for 7 days       Start Date: 8/15/2018 End Date: 8/22/2018       Order Dose: 10 mg       Quantity: 7 tablet    Refills: 0    GABAPENTIN (NEURONTIN) 300 MG CAPSULE    Take 1 capsule (300 mg total) by mouth 3 (three) times a day for 7 days For post-herpetic neuralgia: Take 1 tablet on day 1,  Then take 2 tablets on day 2, Then take 3 tablets on day 3 and every day after that as instructed by your doctor  Start Date: 8/15/2018 End Date: 8/22/2018       Order Dose: 300 mg       Quantity: 21 capsule    Refills: 0     No discharge procedures on file      ED Provider  Electronically Signed by           King Ector MD  08/15/18 7639

## 2018-08-15 NOTE — ED NOTES
Pt out of room asking for a urine cup  This RN asked pt what she needs a urine cup for bc she is being discharged and there is no urine specimen order  Pt states she has been clean for a month and she wants us to do a urine drug on her so it can be documented in her chart that she is clean and it will look good for child and youth   Dionna Fish made aware of pts request     Dawn Sevilla RN  08/15/18 7086

## 2018-10-16 ENCOUNTER — HOSPITAL ENCOUNTER (INPATIENT)
Facility: HOSPITAL | Age: 38
LOS: 8 days | Discharge: HOME | DRG: 753 | End: 2018-10-24
Attending: EMERGENCY MEDICINE | Admitting: PSYCHIATRY & NEUROLOGY
Payer: COMMERCIAL

## 2018-10-16 DIAGNOSIS — D50.9 IRON DEFICIENCY ANEMIA: ICD-10-CM

## 2018-10-16 DIAGNOSIS — Z79.899 MEDICATION MANAGEMENT: ICD-10-CM

## 2018-10-16 DIAGNOSIS — F41.9 ANXIETY: ICD-10-CM

## 2018-10-16 DIAGNOSIS — F31.4 BIPOLAR 1 DISORDER, DEPRESSED, SEVERE (HCC): Primary | ICD-10-CM

## 2018-10-16 DIAGNOSIS — D50.9 MICROCYTIC ANEMIA: ICD-10-CM

## 2018-10-16 PROBLEM — R82.81 PYURIA: Status: ACTIVE | Noted: 2018-10-16

## 2018-10-16 PROBLEM — Z72.0 TOBACCO ABUSE: Status: ACTIVE | Noted: 2018-10-16

## 2018-10-16 PROBLEM — F32.A DEPRESSION: Status: ACTIVE | Noted: 2018-10-16

## 2018-10-16 PROBLEM — R79.89 ELEVATED PLATELET COUNT: Status: ACTIVE | Noted: 2018-10-16

## 2018-10-16 LAB
ALBUMIN SERPL BCP-MCNC: 4.6 G/DL (ref 3–5.2)
ALP SERPL-CCNC: 40 U/L (ref 43–122)
ALT SERPL W P-5'-P-CCNC: 18 U/L (ref 9–52)
AMPHETAMINES SERPL QL SCN: NEGATIVE
ANION GAP SERPL CALCULATED.3IONS-SCNC: 8 MMOL/L (ref 5–14)
ANISOCYTOSIS BLD QL SMEAR: PRESENT
ANISOCYTOSIS BLD QL SMEAR: PRESENT
APAP SERPL-MCNC: <10 UG/ML (ref 10–30)
AST SERPL W P-5'-P-CCNC: 17 U/L (ref 14–36)
BACTERIA UR QL AUTO: ABNORMAL /HPF
BARBITURATES UR QL: NEGATIVE
BASOPHILS # BLD AUTO: 0 THOUSANDS/ΜL (ref 0–0.1)
BASOPHILS # BLD AUTO: 0.1 THOUSANDS/ΜL (ref 0–0.1)
BASOPHILS NFR BLD AUTO: 1 % (ref 0–1)
BASOPHILS NFR BLD AUTO: 1 % (ref 0–1)
BENZODIAZ UR QL: NEGATIVE
BILIRUB SERPL-MCNC: 0.7 MG/DL
BILIRUB UR QL STRIP: NEGATIVE
BUN SERPL-MCNC: 7 MG/DL (ref 5–25)
CALCIUM SERPL-MCNC: 10.1 MG/DL (ref 8.4–10.2)
CHLORIDE SERPL-SCNC: 102 MMOL/L (ref 97–108)
CLARITY UR: ABNORMAL
CO2 SERPL-SCNC: 30 MMOL/L (ref 22–30)
COCAINE UR QL: NEGATIVE
COLOR UR: ABNORMAL
CREAT SERPL-MCNC: 0.71 MG/DL (ref 0.6–1.2)
EOSINOPHIL # BLD AUTO: 0.1 THOUSAND/ΜL (ref 0–0.4)
EOSINOPHIL # BLD AUTO: 0.1 THOUSAND/ΜL (ref 0–0.4)
EOSINOPHIL NFR BLD AUTO: 1 % (ref 0–6)
EOSINOPHIL NFR BLD AUTO: 1 % (ref 0–6)
ERYTHROCYTE [DISTWIDTH] IN BLOOD BY AUTOMATED COUNT: 21.3 %
ERYTHROCYTE [DISTWIDTH] IN BLOOD BY AUTOMATED COUNT: 21.9 %
ETHANOL SERPL-MCNC: <10 MG/DL (ref 0–10)
EXT PREG TEST URINE: NORMAL
GFR SERPL CREATININE-BSD FRML MDRD: 128 ML/MIN/1.73SQ M
GLUCOSE SERPL-MCNC: 60 MG/DL (ref 70–99)
GLUCOSE SERPL-MCNC: 92 MG/DL (ref 70–99)
GLUCOSE UR STRIP-MCNC: NEGATIVE MG/DL
HCT VFR BLD AUTO: 25.3 % (ref 36–46)
HCT VFR BLD AUTO: 28.5 % (ref 36–46)
HGB BLD-MCNC: 6.8 G/DL (ref 12–16)
HGB BLD-MCNC: 7.6 G/DL (ref 12–16)
HGB UR QL STRIP.AUTO: 10
HYPERCHROMIA BLD QL SMEAR: PRESENT
HYPERCHROMIA BLD QL SMEAR: PRESENT
KETONES UR STRIP-MCNC: NEGATIVE MG/DL
LEUKOCYTE ESTERASE UR QL STRIP: NEGATIVE
LG PLATELETS BLD QL SMEAR: PRESENT
LG PLATELETS BLD QL SMEAR: PRESENT
LYMPHOCYTES # BLD AUTO: 2.9 THOUSANDS/ΜL (ref 0.5–4)
LYMPHOCYTES # BLD AUTO: 2.9 THOUSANDS/ΜL (ref 0.5–4)
LYMPHOCYTES NFR BLD AUTO: 50 % (ref 20–50)
LYMPHOCYTES NFR BLD AUTO: 51 % (ref 20–50)
MAGNESIUM SERPL-MCNC: 2 MG/DL (ref 1.6–2.3)
MCH RBC QN AUTO: 13.8 PG (ref 26–34)
MCH RBC QN AUTO: 14 PG (ref 26–34)
MCHC RBC AUTO-ENTMCNC: 26.7 G/DL (ref 31–36)
MCHC RBC AUTO-ENTMCNC: 26.8 G/DL (ref 31–36)
MCV RBC AUTO: 52 FL (ref 80–100)
MCV RBC AUTO: 52 FL (ref 80–100)
METHADONE UR QL: NEGATIVE
MONOCYTES # BLD AUTO: 0.4 THOUSAND/ΜL (ref 0.2–0.9)
MONOCYTES # BLD AUTO: 0.4 THOUSAND/ΜL (ref 0.2–0.9)
MONOCYTES NFR BLD AUTO: 8 % (ref 1–10)
MONOCYTES NFR BLD AUTO: 8 % (ref 1–10)
NEUTROPHILS # BLD AUTO: 2.3 THOUSANDS/ΜL (ref 1.8–7.8)
NEUTROPHILS # BLD AUTO: 2.3 THOUSANDS/ΜL (ref 1.8–7.8)
NEUTS SEG NFR BLD AUTO: 40 % (ref 45–65)
NEUTS SEG NFR BLD AUTO: 40 % (ref 45–65)
NITRITE UR QL STRIP: NEGATIVE
NON-SQ EPI CELLS URNS QL MICRO: ABNORMAL /HPF
OPIATES UR QL SCN: NEGATIVE
OVALOCYTES BLD QL SMEAR: PRESENT
OVALOCYTES BLD QL SMEAR: PRESENT
PCP UR QL: NEGATIVE
PH UR STRIP.AUTO: 6 [PH] (ref 4.5–8)
PLATELET # BLD AUTO: 490 THOUSANDS/UL (ref 150–450)
PLATELET # BLD AUTO: 545 THOUSANDS/UL (ref 150–450)
PLATELET BLD QL SMEAR: ABNORMAL
PLATELET BLD QL SMEAR: ABNORMAL
PMV BLD AUTO: 8.3 FL (ref 8.9–12.7)
PMV BLD AUTO: 8.3 FL (ref 8.9–12.7)
POIKILOCYTOSIS BLD QL SMEAR: PRESENT
POIKILOCYTOSIS BLD QL SMEAR: PRESENT
POTASSIUM SERPL-SCNC: 4 MMOL/L (ref 3.6–5)
PROT SERPL-MCNC: 7.6 G/DL (ref 5.9–8.4)
PROT UR STRIP-MCNC: >=500 MG/DL
RBC # BLD AUTO: 4.91 MILLION/UL (ref 4–5.2)
RBC # BLD AUTO: 5.47 MILLION/UL (ref 4–5.2)
RBC #/AREA URNS AUTO: ABNORMAL /HPF
RBC MORPH BLD: ABNORMAL
RBC MORPH BLD: ABNORMAL
SALICYLATES SERPL-MCNC: <1 MG/DL (ref 10–30)
SODIUM SERPL-SCNC: 140 MMOL/L (ref 137–147)
SP GR UR STRIP.AUTO: 1.02 (ref 1–1.04)
THC UR QL: NEGATIVE
UROBILINOGEN UA: NEGATIVE MG/DL
WBC # BLD AUTO: 5.7 THOUSAND/UL (ref 4.5–11)
WBC # BLD AUTO: 5.8 THOUSAND/UL (ref 4.5–11)
WBC #/AREA URNS AUTO: ABNORMAL /HPF

## 2018-10-16 PROCEDURE — 36415 COLL VENOUS BLD VENIPUNCTURE: CPT | Performed by: EMERGENCY MEDICINE

## 2018-10-16 PROCEDURE — 80053 COMPREHEN METABOLIC PANEL: CPT | Performed by: EMERGENCY MEDICINE

## 2018-10-16 PROCEDURE — 99285 EMERGENCY DEPT VISIT HI MDM: CPT

## 2018-10-16 PROCEDURE — 82948 REAGENT STRIP/BLOOD GLUCOSE: CPT

## 2018-10-16 PROCEDURE — 81001 URINALYSIS AUTO W/SCOPE: CPT | Performed by: EMERGENCY MEDICINE

## 2018-10-16 PROCEDURE — 83735 ASSAY OF MAGNESIUM: CPT | Performed by: EMERGENCY MEDICINE

## 2018-10-16 PROCEDURE — 99253 IP/OBS CNSLTJ NEW/EST LOW 45: CPT | Performed by: INTERNAL MEDICINE

## 2018-10-16 PROCEDURE — 80320 DRUG SCREEN QUANTALCOHOLS: CPT | Performed by: EMERGENCY MEDICINE

## 2018-10-16 PROCEDURE — 80329 ANALGESICS NON-OPIOID 1 OR 2: CPT | Performed by: EMERGENCY MEDICINE

## 2018-10-16 PROCEDURE — 80307 DRUG TEST PRSMV CHEM ANLYZR: CPT | Performed by: EMERGENCY MEDICINE

## 2018-10-16 PROCEDURE — 85025 COMPLETE CBC W/AUTO DIFF WBC: CPT | Performed by: EMERGENCY MEDICINE

## 2018-10-16 PROCEDURE — 81025 URINE PREGNANCY TEST: CPT | Performed by: EMERGENCY MEDICINE

## 2018-10-16 RX ORDER — ACETAMINOPHEN 325 MG/1
650 TABLET ORAL EVERY 4 HOURS PRN
Status: DISCONTINUED | OUTPATIENT
Start: 2018-10-16 | End: 2018-10-16

## 2018-10-16 RX ORDER — OLANZAPINE 5 MG/1
5 TABLET ORAL EVERY 4 HOURS PRN
Status: DISCONTINUED | OUTPATIENT
Start: 2018-10-16 | End: 2018-10-17

## 2018-10-16 RX ORDER — BENZTROPINE MESYLATE 1 MG/ML
1 INJECTION INTRAMUSCULAR; INTRAVENOUS EVERY 6 HOURS PRN
Status: DISCONTINUED | OUTPATIENT
Start: 2018-10-16 | End: 2018-10-24 | Stop reason: HOSPADM

## 2018-10-16 RX ORDER — LORAZEPAM 2 MG/ML
1 INJECTION INTRAMUSCULAR EVERY 4 HOURS PRN
Status: DISCONTINUED | OUTPATIENT
Start: 2018-10-16 | End: 2018-10-24 | Stop reason: HOSPADM

## 2018-10-16 RX ORDER — ACETAMINOPHEN 325 MG/1
650 TABLET ORAL EVERY 6 HOURS PRN
Status: DISCONTINUED | OUTPATIENT
Start: 2018-10-16 | End: 2018-10-16

## 2018-10-16 RX ORDER — FERROUS SULFATE 325(65) MG
325 TABLET ORAL 2 TIMES DAILY WITH MEALS
Status: DISCONTINUED | OUTPATIENT
Start: 2018-10-16 | End: 2018-10-16

## 2018-10-16 RX ORDER — ACETAMINOPHEN 325 MG/1
650 TABLET ORAL EVERY 6 HOURS PRN
Status: DISCONTINUED | OUTPATIENT
Start: 2018-10-16 | End: 2018-10-24 | Stop reason: HOSPADM

## 2018-10-16 RX ORDER — OLANZAPINE 10 MG/1
5 INJECTION, POWDER, LYOPHILIZED, FOR SOLUTION INTRAMUSCULAR EVERY 4 HOURS PRN
Status: DISCONTINUED | OUTPATIENT
Start: 2018-10-16 | End: 2018-10-17

## 2018-10-16 RX ORDER — FERROUS SULFATE 325(65) MG
325 TABLET ORAL 2 TIMES DAILY WITH MEALS
Status: DISCONTINUED | OUTPATIENT
Start: 2018-10-17 | End: 2018-10-24 | Stop reason: HOSPADM

## 2018-10-16 RX ORDER — BENZTROPINE MESYLATE 1 MG/1
1 TABLET ORAL EVERY 6 HOURS PRN
Status: DISCONTINUED | OUTPATIENT
Start: 2018-10-16 | End: 2018-10-24 | Stop reason: HOSPADM

## 2018-10-16 RX ORDER — LORAZEPAM 0.5 MG/1
2 TABLET ORAL ONCE
Status: COMPLETED | OUTPATIENT
Start: 2018-10-16 | End: 2018-10-16

## 2018-10-16 RX ORDER — LORAZEPAM 0.5 MG/1
TABLET ORAL
Status: COMPLETED
Start: 2018-10-16 | End: 2018-10-16

## 2018-10-16 RX ORDER — LORAZEPAM 1 MG/1
1 TABLET ORAL EVERY 4 HOURS PRN
Status: DISCONTINUED | OUTPATIENT
Start: 2018-10-16 | End: 2018-10-24 | Stop reason: HOSPADM

## 2018-10-16 RX ORDER — IRON POLYSACCHARIDE COMPLEX 150 MG
150 CAPSULE ORAL DAILY
Status: DISCONTINUED | OUTPATIENT
Start: 2018-10-17 | End: 2018-10-16

## 2018-10-16 RX ORDER — MAGNESIUM HYDROXIDE/ALUMINUM HYDROXICE/SIMETHICONE 120; 1200; 1200 MG/30ML; MG/30ML; MG/30ML
30 SUSPENSION ORAL EVERY 4 HOURS PRN
Status: DISCONTINUED | OUTPATIENT
Start: 2018-10-16 | End: 2018-10-24 | Stop reason: HOSPADM

## 2018-10-16 RX ORDER — TRAZODONE HYDROCHLORIDE 50 MG/1
50 TABLET ORAL
Status: DISCONTINUED | OUTPATIENT
Start: 2018-10-16 | End: 2018-10-17

## 2018-10-16 RX ORDER — HYDROXYZINE HYDROCHLORIDE 25 MG/1
25 TABLET, FILM COATED ORAL EVERY 4 HOURS PRN
Status: DISCONTINUED | OUTPATIENT
Start: 2018-10-16 | End: 2018-10-17

## 2018-10-16 RX ADMIN — LORAZEPAM 2 MG: 0.5 TABLET ORAL at 12:49

## 2018-10-16 RX ADMIN — FERROUS SULFATE TAB 325 MG (65 MG ELEMENTAL FE) 325 MG: 325 (65 FE) TAB at 14:29

## 2018-10-16 NOTE — ED PROVIDER NOTES
+  History  Chief Complaint   Patient presents with    Psychiatric Evaluation     Pt reports feeling anxious and having a panic attack, denies SI/HI/VH, reports (+) AH, states "they talk to me all the time"  Pt reports hx of depression, currently untreated  Patient is a 30-year-old female here with family  Patient states he has been out of her medications for several weeks  She states that she has been increasing anxiety and depression  She states that she missed her outpatient appointment house is too depressed night could get out of bed  Patient states she has suicidal thoughts in the past but none currently over the past week to today  He has attempted to kill herself before by overdosing on pills  She denies taking any extra pills today  She has no homicidal ideations, paranoia, auditory or visual hallucinations  Patient states she I hear my kids"   History provided by:  Patient   used: No    Psychiatric Evaluation   Presenting symptoms: depression, hallucinations, suicidal thoughts and suicidal threats    Presenting symptoms: no aggressive behavior, no agitation, no bizarre behavior, no delusions, no disorganized speech, no disorganized thought process, no homicidal ideas, no paranoid behavior, no self-mutilation and no suicide attempt    Patient accompanied by:  Caregiver  Degree of incapacity (severity):   Moderate  Onset quality:  Gradual  Timing:  Constant  Progression:  Unchanged  Chronicity:  Recurrent  Context: noncompliance    Context: not alcohol use, not drug abuse, not medication, not recent medication change and not stressful life event    Treatment compliance:  Untreated  Relieved by:  None tried  Worsened by:  Nothing  Ineffective treatments:  None tried  Associated symptoms: anxiety, insomnia, irritability and poor judgment    Associated symptoms: no abdominal pain, no anhedonia, no appetite change, no chest pain, no decreased need for sleep, not distractible, no euphoric mood, no fatigue, no feelings of worthlessness, no headaches, no hypersomnia, no hyperventilation, no school problems and no weight change    Risk factors: hx of mental illness and hx of suicide attempts    Risk factors: no family hx of mental illness, no family violence, no neurological disease and no recent psychiatric admission        Prior to Admission Medications   Prescriptions Last Dose Informant Patient Reported? Taking? ARIPiprazole (ABILIFY) 9 75 mg/1 3 mL injection Not Taking at Unknown time  Yes No   Sig: Inject into the shoulder, thigh, or buttocks      Facility-Administered Medications: None       Past Medical History:   Diagnosis Date    Anemia     Anxiety     Multiple personalities     [facility]     Schizo-affective type schizophrenia, chronic state ([facility] )        Past Surgical History:   Procedure Laterality Date     SECTION         No family history on file  I have reviewed and agree with the history as documented  Social History   Substance Use Topics    Smoking status: Current Every Day Smoker     Packs/day: 0 50    Smokeless tobacco: Never Used    Alcohol use No        Review of Systems   Constitutional: Positive for irritability  Negative for appetite change, diaphoresis, fatigue and fever  HENT: Negative for ear pain and sore throat  Eyes: Negative for visual disturbance  Respiratory: Negative for chest tightness and shortness of breath  Cardiovascular: Negative for chest pain and palpitations  Gastrointestinal: Negative for abdominal pain, nausea and vomiting  Genitourinary: Negative for difficulty urinating and dysuria  Musculoskeletal: Negative for back pain and neck pain  Skin: Negative for rash  Neurological: Negative for weakness and headaches  Psychiatric/Behavioral: Positive for hallucinations and suicidal ideas  Negative for agitation, confusion, homicidal ideas, paranoia and self-injury   The patient is nervous/anxious and has insomnia  All other systems reviewed and are negative  Physical Exam  Physical Exam   Constitutional: She is oriented to person, place, and time  She appears well-developed and well-nourished  No distress  HENT:   Head: Normocephalic and atraumatic  Mouth/Throat: Oropharynx is clear and moist    Eyes: Pupils are equal, round, and reactive to light  Conjunctivae and EOM are normal    Neck: Normal range of motion  Neck supple  Cardiovascular: Normal rate, regular rhythm, normal heart sounds and intact distal pulses  No murmur heard  Pulmonary/Chest: Effort normal and breath sounds normal  No stridor  No respiratory distress  Abdominal: Soft  Bowel sounds are normal  She exhibits no distension  There is no tenderness  Musculoskeletal: Normal range of motion  She exhibits no edema  Neurological: She is alert and oriented to person, place, and time  No cranial nerve deficit  Skin: Skin is warm  Capillary refill takes less than 2 seconds  She is not diaphoretic  Nursing note and vitals reviewed        Vital Signs  ED Triage Vitals [10/16/18 1132]   Temperature Pulse Respirations Blood Pressure SpO2   98 7 °F (37 1 °C) (!) 118 20 152/76 99 %      Temp Source Heart Rate Source Patient Position - Orthostatic VS BP Location FiO2 (%)   Tympanic Monitor Sitting Left arm --      Pain Score       --           Vitals:    10/16/18 1132   BP: 152/76   Pulse: (!) 118   Patient Position - Orthostatic VS: Sitting       Visual Acuity      ED Medications  Medications   ferrous sulfate tablet 325 mg (not administered)   LORazepam (ATIVAN) tablet 2 mg (2 mg Oral Given 10/16/18 1249)       Diagnostic Studies  Results Reviewed     Procedure Component Value Units Date/Time    CBC and differential [[id number]]  (Abnormal) Collected:  10/16/18 1341    Lab Status:  Final result Specimen:  Blood from Arm, Right Updated:  10/16/18 1355     WBC 5 70 Thousand/uL      RBC 4 91 Million/uL Hemoglobin 6 8 (LL) g/dL      Hematocrit 25 3 (L) %      MCV 52 (L) fL      MCH 13 8 (L) pg      MCHC 26 7 (L) g/dL      RDW 21 9 (H) %      MPV 8 3 (L) fL      Platelets 857 (H) Thousands/uL      Neutrophils Relative 40 (L) %      Lymphocytes Relative 50 %      Monocytes Relative 8 %      Eosinophils Relative 1 %      Basophils Relative 1 %      Neutrophils Absolute 2 30 Thousands/µL      Lymphocytes Absolute 2 90 Thousands/µL      Monocytes Absolute 0 40 Thousand/µL      Eosinophils Absolute 0 10 Thousand/µL      Basophils Absolute 0 10 Thousands/µL     Urine culture [[id number]]     Lab Status:  No result Specimen:  Urine from Urine, Clean Catch     Urine Microscopic [[id number]]  (Abnormal) Collected:  10/16/18 1216    Lab Status:  Final result Specimen:  Urine from Urine, Clean Catch Updated:  10/16/18 1255     RBC, UA 0-1 (A) /hpf      WBC, UA 0-1 (A) /hpf      Epithelial Cells Moderate (A) /hpf      Bacteria, UA Innumerable (A) /hpf     UA w Reflex to Microscopic [[id number]]  (Abnormal) Collected:  10/16/18 1216    Lab Status:  Final result Specimen:  Urine from Urine, Clean Catch Updated:  10/16/18 1240     Color, UA Sammie (A)     Clarity, UA Cloudy (A)     Specific Gravity, UA 1 020     pH, UA 6 0     Leukocytes, UA Negative     Nitrite, UA Negative     Protein, UA >=500 (A) mg/dl      Glucose, UA Negative mg/dl      Ketones, UA Negative mg/dl      Bilirubin, UA Negative     Blood, UA 10 0 (A)     UROBILINOGEN UA Negative mg/dL     Ethanol [[id number]]  (Normal) Collected:  10/16/18 1215    Lab Status:  Final result Specimen:  Blood from Arm, Right Updated:  10/16/18 1239     Ethanol Lvl <56 mg/dL     Salicylate level [[id number]]  (Abnormal) Collected:  10/16/18 1215    Lab Status:  Final result Specimen:  Blood from Arm, Right Updated:  71/56/35 2876     Salicylate Lvl <2 7 (L) mg/dL     Magnesium [[id number]]  (Normal) Collected:  10/16/18 1215    Lab Status:  Final result Specimen:  Blood from Arm, Right Updated: 10/16/18 1239     Magnesium 2 0 mg/dL     Acetaminophen level [[id number]]  (Abnormal) Collected:  10/16/18 1215    Lab Status:  Final result Specimen:  Blood from Arm, Right Updated:  10/16/18 1239     Acetaminophen Level <10 (L) ug/mL     Comprehensive metabolic panel [[id number]]  (Abnormal) Collected:  10/16/18 1215    Lab Status:  Final result Specimen:  Blood from Arm, Right Updated:  10/16/18 1239     Sodium 140 mmol/L      Potassium 4 0 mmol/L      Chloride 102 mmol/L      CO2 30 mmol/L      ANION GAP 8 mmol/L      BUN 7 mg/dL      Creatinine 0 71 mg/dL      Glucose 60 (L) mg/dL      Calcium 10 1 mg/dL      AST 17 U/L      ALT 18 U/L      Alkaline Phosphatase 40 (L) U/L      Total Protein 7 6 g/dL      Albumin 4 6 g/dL      Total Bilirubin 0 70 mg/dL      eGFR 128 ml/min/1 73sq m     Narrative:         National Kidney Disease Education Program recommendations are as follows:  GFR calculation is accurate only with a steady state creatinine  Chronic Kidney disease less than 60 ml/min/1 73 sq  meters  Kidney failure less than 15 ml/min/1 73 sq  meters      CBC and differential [[id number]]  (Abnormal) Collected:  10/16/18 1215    Lab Status:  Final result Specimen:  Blood from Arm, Right Updated:  10/16/18 1229     WBC 5 80 Thousand/uL      RBC 5 47 (H) Million/uL      Hemoglobin 7 6 (L) g/dL      Hematocrit 28 5 (L) %      MCV 52 (L) fL      MCH 14 0 (L) pg      MCHC 26 8 (L) g/dL      RDW 21 3 (H) %      MPV 8 3 (L) fL      Platelets 390 (H) Thousands/uL      Neutrophils Relative 40 (L) %      Lymphocytes Relative 51 (H) %      Monocytes Relative 8 %      Eosinophils Relative 1 %      Basophils Relative 1 %      Neutrophils Absolute 2 30 Thousands/µL      Lymphocytes Absolute 2 90 Thousands/µL      Monocytes Absolute 0 40 Thousand/µL      Eosinophils Absolute 0 10 Thousand/µL      Basophils Absolute 0 00 Thousands/µL     POCT pregnancy, urine [[id number]]  (Normal) Resulted:  10/16/18 1221    Lab Status:  Final result Updated:  10/16/18 1221     EXT PREG TEST UR (Ref: Negative) preg test negative                 No orders to display              Procedures  Procedures       Phone Contacts  ED Phone Contact    ED Course                               MDM  Number of Diagnoses or Management Options  Anxiety:   Iron deficiency anemia:   Diagnosis management comments: Patient is a 16-year-old female coming in today with increased anxiety and depression  Patient appears very anxious on exam   She is rocking back and forth  She states she does not have pain she is just anxious and depressed  Will obtain crisis evaluation as well as labs and urine  12:32 PM  Patient with an hemoglobin of 7 5  On my initial exam patient denied any physical complaints  Will repeat hemoglobin  1:13 PM  Patient resting in bed  She is more, after medication  Discussed with patient regarding her hemoglobin  She states she has chronic anemia but does not take iron or any medication for this  She cannot tell me the last time I had checked  According to [facility] records patient's last hemoglobin was approximately H  However, this was in 2013  She denies any dizziness, weakness, fatigue, hematuria, hematemesis, hemoptysis, melena, bright red blood per rectum or abnormal vaginal bleeding  She states this is her baseline typically  Patient is medically stable at this time for crisis evaluation  2:02 PM  Patient with continued anemia with low MCV  While pending Crisis evaluation, will start Iron    3:45 PM  Patient sleeping  Pending crisis evaluation  Signed out to Dr Jaquan Vickers    Portions of the record may have been created with voice recognition software  Occasional wrong word or "sound a like" substitutions may have occurred due to the inherent limitations of voice recognition software  Read the chart carefully and recognize, using context, where substitutions have occurred           Amount and/or Complexity of Data Reviewed  Clinical lab tests: ordered and reviewed  Tests in the medicine section of CPT®: ordered and reviewed      CritCare Time    Disposition  Final diagnoses:   Anxiety   Iron deficiency anemia     Time reflects when diagnosis was documented in both MDM as applicable and the Disposition within this note     Time User Action Codes Description Comment    10/16/2018  2:02 PM Justice Pierre Add [F41 9] Anxiety     10/16/2018  2:02 PM Carlos Christian Add [D50 9] Iron deficiency anemia       ED Disposition     None      Follow-up Information    None         Patient's Medications   Discharge Prescriptions    No medications on file     No discharge procedures on file      ED Provider  Electronically Signed by           Aiyana Fonseca DO  10/16/18 5654

## 2018-10-16 NOTE — ED NOTES
Insurance Authorization:   Phone call placed to [employer]  Phone number: [phone]  Spoke to [facility]      3 days approved  Level of care: inpatient mental health  LCD/RVW: 10/18/2018  Authorization # *[id number]

## 2018-10-16 NOTE — ED NOTES
Patient is accepted at [facility]  Patient is accepted by Dr Vladimir Gaona per Jennifer Montiel PA-C  Patient may go to the floor at anytime after 6:40pm           Nurse report is to be called to [phone] prior to patient transfer

## 2018-10-16 NOTE — ED NOTES
Patient is resting comfortably  respirations adequate        Davin Santacruz, MARIA ELENA  10/16/18 4423

## 2018-10-16 NOTE — ED NOTES
Patient presented to the emergency department stating that she feels anxious, having panic attacks, and stating "they talk to me all the time!" On approach, patient was irritable, restless, poor eye contact, soft speech, and offering minimal conversation  Patient stated that she has been hospitalized multiple times whether that be inpatient or ED patient would not confirm  Per the system, patient has been in the ED 6/2018, 8/2018, and [facility] ED in 09/2018  Patient admits to stopping antipsychotics as she was previously on Neurontin and Abilify  Patient admits to using cocaine in the past but would not say when she last used  Patient's UDS is still pending  Patient states she has been previously dx with multiple personality disorder and that those are the people that are constantly talking to her  Patient states that the depression started several months again and has been progressively getting worst   Patient states that she has suicidal ideations and if she was home she would "take pills to OD or take a knife to cut" her self  It was noted by staff that she has previous superficial cuts but nothing recent or unhealed  Patient states that she will sign her self into treatment

## 2018-10-17 PROBLEM — F31.4 BIPOLAR 1 DISORDER, DEPRESSED, SEVERE (HCC): Status: ACTIVE | Noted: 2018-10-17

## 2018-10-17 LAB
25(OH)D3 SERPL-MCNC: 11.2 NG/ML (ref 30–100)
ALBUMIN SERPL BCP-MCNC: 4 G/DL (ref 3–5.2)
ALP SERPL-CCNC: 39 U/L (ref 43–122)
ALT SERPL W P-5'-P-CCNC: 16 U/L (ref 9–52)
ANION GAP SERPL CALCULATED.3IONS-SCNC: 6 MMOL/L (ref 5–14)
ANISOCYTOSIS BLD QL SMEAR: PRESENT
AST SERPL W P-5'-P-CCNC: 21 U/L (ref 14–36)
B-HCG SERPL-ACNC: <3 MIU/ML
BILIRUB SERPL-MCNC: 0.4 MG/DL
BUN SERPL-MCNC: 9 MG/DL (ref 5–25)
CALCIUM SERPL-MCNC: 9.1 MG/DL (ref 8.4–10.2)
CHLORIDE SERPL-SCNC: 104 MMOL/L (ref 97–108)
CHOLEST SERPL-MCNC: 144 MG/DL
CK SERPL-CCNC: 40 U/L (ref 30–135)
CO2 SERPL-SCNC: 29 MMOL/L (ref 22–30)
CREAT SERPL-MCNC: 0.65 MG/DL (ref 0.6–1.2)
ERYTHROCYTE [DISTWIDTH] IN BLOOD BY AUTOMATED COUNT: 22.1 %
FERRITIN SERPL-MCNC: 2 NG/ML (ref 8–388)
FOLATE SERPL-MCNC: 7.7 NG/ML (ref 3.1–17.5)
GFR SERPL CREATININE-BSD FRML MDRD: 133 ML/MIN/1.73SQ M
GLUCOSE SERPL-MCNC: 60 MG/DL (ref 70–99)
HCT VFR BLD AUTO: 28.1 % (ref 36–46)
HDLC SERPL-MCNC: 63 MG/DL (ref 40–59)
HGB BLD-MCNC: 7.3 G/DL (ref 12–16)
HYPERCHROMIA BLD QL SMEAR: PRESENT
IRON SATN MFR SERPL: 4 %
IRON SERPL-MCNC: 13 UG/DL (ref 50–170)
LACTATE SERPL-SCNC: 2 MMOL/L (ref 0.7–2)
LDLC SERPL CALC-MCNC: 67 MG/DL
LYMPHOCYTES # BLD AUTO: 1.2 THOUSAND/UL (ref 0.5–4)
LYMPHOCYTES # BLD AUTO: 19 % (ref 20–50)
MAGNESIUM SERPL-MCNC: 1.9 MG/DL (ref 1.6–2.3)
MCH RBC QN AUTO: 13.7 PG (ref 26–34)
MCHC RBC AUTO-ENTMCNC: 26.2 G/DL (ref 31–36)
MCV RBC AUTO: 52 FL (ref 80–100)
MONOCYTES # BLD AUTO: 0.32 THOUSAND/UL (ref 0.2–0.9)
MONOCYTES NFR BLD AUTO: 5 % (ref 1–10)
NEUTS SEG # BLD: 4.79 THOUSAND/UL (ref 1.8–7.8)
NEUTS SEG NFR BLD AUTO: 76 %
NONHDLC SERPL-MCNC: 81 MG/DL
PHOSPHATE SERPL-MCNC: 3.4 MG/DL (ref 2.5–4.8)
PLATELET # BLD AUTO: 491 THOUSANDS/UL (ref 150–450)
PLATELET BLD QL SMEAR: ABNORMAL
PMV BLD AUTO: 8.7 FL (ref 8.9–12.7)
POIKILOCYTOSIS BLD QL SMEAR: PRESENT
POTASSIUM SERPL-SCNC: 4 MMOL/L (ref 3.6–5)
PROT SERPL-MCNC: 6.9 G/DL (ref 5.9–8.4)
RBC # BLD AUTO: 5.37 MILLION/UL (ref 4–5.2)
RBC MORPH BLD: ABNORMAL
SODIUM SERPL-SCNC: 139 MMOL/L (ref 137–147)
TIBC SERPL-MCNC: 342 UG/DL (ref 250–450)
TOTAL CELLS COUNTED SPEC: 100
TRIGL SERPL-MCNC: 68 MG/DL
TSH SERPL DL<=0.05 MIU/L-ACNC: 1.48 UIU/ML (ref 0.47–4.68)
VIT B12 SERPL-MCNC: 738 PG/ML (ref 100–900)
WBC # BLD AUTO: 6.3 THOUSAND/UL (ref 4.5–11)

## 2018-10-17 PROCEDURE — 80061 LIPID PANEL: CPT | Performed by: PHYSICIAN ASSISTANT

## 2018-10-17 PROCEDURE — 83735 ASSAY OF MAGNESIUM: CPT | Performed by: INTERNAL MEDICINE

## 2018-10-17 PROCEDURE — 85007 BL SMEAR W/DIFF WBC COUNT: CPT | Performed by: PHYSICIAN ASSISTANT

## 2018-10-17 PROCEDURE — 82746 ASSAY OF FOLIC ACID SERUM: CPT | Performed by: INTERNAL MEDICINE

## 2018-10-17 PROCEDURE — 82728 ASSAY OF FERRITIN: CPT | Performed by: INTERNAL MEDICINE

## 2018-10-17 PROCEDURE — 80074 ACUTE HEPATITIS PANEL: CPT | Performed by: INTERNAL MEDICINE

## 2018-10-17 PROCEDURE — 85027 COMPLETE CBC AUTOMATED: CPT | Performed by: PHYSICIAN ASSISTANT

## 2018-10-17 PROCEDURE — 82550 ASSAY OF CK (CPK): CPT | Performed by: INTERNAL MEDICINE

## 2018-10-17 PROCEDURE — 82306 VITAMIN D 25 HYDROXY: CPT | Performed by: INTERNAL MEDICINE

## 2018-10-17 PROCEDURE — 84100 ASSAY OF PHOSPHORUS: CPT | Performed by: INTERNAL MEDICINE

## 2018-10-17 PROCEDURE — 83540 ASSAY OF IRON: CPT | Performed by: INTERNAL MEDICINE

## 2018-10-17 PROCEDURE — 80053 COMPREHEN METABOLIC PANEL: CPT | Performed by: INTERNAL MEDICINE

## 2018-10-17 PROCEDURE — 82607 VITAMIN B-12: CPT | Performed by: INTERNAL MEDICINE

## 2018-10-17 PROCEDURE — 87389 HIV-1 AG W/HIV-1&-2 AB AG IA: CPT | Performed by: INTERNAL MEDICINE

## 2018-10-17 PROCEDURE — 83605 ASSAY OF LACTIC ACID: CPT | Performed by: INTERNAL MEDICINE

## 2018-10-17 PROCEDURE — 83550 IRON BINDING TEST: CPT | Performed by: INTERNAL MEDICINE

## 2018-10-17 PROCEDURE — 84443 ASSAY THYROID STIM HORMONE: CPT | Performed by: INTERNAL MEDICINE

## 2018-10-17 PROCEDURE — 84702 CHORIONIC GONADOTROPIN TEST: CPT | Performed by: INTERNAL MEDICINE

## 2018-10-17 RX ORDER — HYDROXYZINE 50 MG/1
50 TABLET, FILM COATED ORAL EVERY 4 HOURS PRN
Status: DISCONTINUED | OUTPATIENT
Start: 2018-10-17 | End: 2018-10-24 | Stop reason: HOSPADM

## 2018-10-17 RX ORDER — OLANZAPINE 10 MG/1
10 INJECTION, POWDER, LYOPHILIZED, FOR SOLUTION INTRAMUSCULAR EVERY 4 HOURS PRN
Status: DISCONTINUED | OUTPATIENT
Start: 2018-10-17 | End: 2018-10-24 | Stop reason: HOSPADM

## 2018-10-17 RX ORDER — ASCORBIC ACID 500 MG
500 TABLET ORAL DAILY
Status: DISCONTINUED | OUTPATIENT
Start: 2018-10-17 | End: 2018-10-24 | Stop reason: HOSPADM

## 2018-10-17 RX ORDER — TRAZODONE HYDROCHLORIDE 100 MG/1
100 TABLET ORAL
Status: DISCONTINUED | OUTPATIENT
Start: 2018-10-17 | End: 2018-10-24 | Stop reason: HOSPADM

## 2018-10-17 RX ORDER — OLANZAPINE 10 MG/1
10 TABLET ORAL EVERY 4 HOURS PRN
Status: DISCONTINUED | OUTPATIENT
Start: 2018-10-17 | End: 2018-10-24 | Stop reason: HOSPADM

## 2018-10-17 RX ORDER — ARIPIPRAZOLE 5 MG/1
5 TABLET ORAL 2 TIMES DAILY
Status: DISCONTINUED | OUTPATIENT
Start: 2018-10-17 | End: 2018-10-21

## 2018-10-17 RX ADMIN — IRON SUCROSE 200 MG: 20 INJECTION, SOLUTION INTRAVENOUS at 14:48

## 2018-10-17 RX ADMIN — FERROUS SULFATE TAB 325 MG (65 MG ELEMENTAL FE) 325 MG: 325 (65 FE) TAB at 17:03

## 2018-10-17 RX ADMIN — ARIPIPRAZOLE 5 MG: 5 TABLET ORAL at 17:03

## 2018-10-17 RX ADMIN — OXYCODONE HYDROCHLORIDE AND ACETAMINOPHEN 500 MG: 500 TABLET ORAL at 11:53

## 2018-10-17 RX ADMIN — ARIPIPRAZOLE 5 MG: 5 TABLET ORAL at 11:40

## 2018-10-17 RX ADMIN — FERROUS SULFATE TAB 325 MG (65 MG ELEMENTAL FE) 325 MG: 325 (65 FE) TAB at 08:43

## 2018-10-17 NOTE — SOCIAL WORK
Worker attempted to meet with patient and complete psycho social assessment  Patient's HB is low and is scheduled to get an infusion  Patient was found in her room, rocking back and forth, and appeared pale  Patient was unable to stay awake for worker to ask assessment questions  Patient is known to the unit as she has previous inpatient psychiatric hospitalizations at  in February 2018 and in 2017  Worker will meet with patient at a later date to complete psycho social assessment

## 2018-10-17 NOTE — PROGRESS NOTES
Patient was seen by TONI Carvajal, who expressed concerns about her Hb level of 6 8, and why she was not sent to medical for a transfusion prior to admission  He ordered a number of tests for the morning, and stated if her level was still below 7, she would need to be transferred to a medical floor for a blood transfusion   Will continue to monitor

## 2018-10-17 NOTE — PROGRESS NOTES
Patient continued on fall precautions, and on q15 rounding for patient safety  Patient slept through the night and did not awaken to request any PRN's, or report any issues or distress  Patient was observed sleeping, with eyes closed, chest movements noted, and breathing heard during sleep period  Will continue to monitor and provide therapeutic support

## 2018-10-17 NOTE — NURSING NOTE
Nursing staff attempted to draw patient's blood this morning for ordered labs, includling STAT labs  After much encouragement, patient continued to refuse to allow labs to be completed  Will continue attempts to complete labs

## 2018-10-17 NOTE — ASSESSMENT & PLAN NOTE
· History of blood transfusions in the past in history of IV iron infusions in the past  · Possibly secondary to a heavy menstrual cycle  · Ferrous sulfate 325 milligrams p o  b i d  for iron supplementation  · Check an iron panel, vitamin B12 level, and folate level  · Recheck CBC in the morning on 10/17/2018  · Transfuse for a hemoglobin less than 7  · Consult Hematology  · Check the patient's stool for occult blood x 3 specimens  · She will need an outpatient Gastroenterology evaluation including a possible EGD and colonoscopy

## 2018-10-17 NOTE — CONSULTS
Consult- Celsa García 1980, 30 y o  female MRN: [id number]    Unit/Bed#: [facility] 374-02 Encounter: [id number]    Primary Care Provider: Ana Walker MD   Date and time admitted to hospital: 10/16/2018 11:26 AM      Consults    * Microcytic anemia   Assessment & Plan    · History of blood transfusions in the past in history of IV iron infusions in the past  · Possibly secondary to a heavy menstrual cycle  · Possible Thalassemia with a low MCV  · Ferrous sulfate 325 milligrams p o  b i d  for iron supplementation  · Check an iron panel, vitamin B12 level, and folate level  · Recheck CBC in the morning on 10/17/2018  · Transfuse for a hemoglobin less than 7  · Consult Hematology  · Check the patient's stool for occult blood x 3 specimens  · She will need an outpatient Gastroenterology evaluation including a possible EGD and colonoscopy     Pyuria   Assessment & Plan    · Check a urine culture     Elevated platelet count   Assessment & Plan    · Likely secondary to iron deficiency anemia  · Follow the platelet count  · Consult Hematology     Tobacco abuse   Assessment & Plan    · The patient declines a nicotine patch at this time  · Smoking cessation counseling     Depression   Assessment & Plan    · Management per Psychiatry           VTE Prophylaxis:  None with the patient being low risk for VTE and in the setting of anemia  / reason for no mechanical VTE prophylaxis Low risk for the VTE     Recommendations for Discharge:  · Outpatient follow-up with her PCP, Gastroenterology, and Hematology for the anemia  · Outpatient blood work to follow the hemoglobin/hematocrit levels and platelet count    Counseling / Coordination of Care Time: 30 minutes  Greater than 50% of total time spent on patient counseling and coordination of care  Collaboration of Care:  Were Recommendations Directly Discussed with Primary Treatment Team? - No     History of Present Illness:    Celsa García is a 30 y o  female who is originally admitted to the Psychiatry service for an inpatient psychiatric hospitalization via a 201 for worsening depression and anxiety  We are consulted for medical management  The patient has been out of her psychiatric medications for several weeks  She has developed worsening anxiety and depression over the last few weeks  She developed increasing depression to the point that she has not been able to leave her home  She admits to suicidal ideations recently  She has a history of anemia requiring blood transfusions in the past as well as IV iron infusions in the past   She admits to heavy menstrual cycles which last for approximately 4-6 days per month  No melena or hematochezia  No other obvious signs of hemorrhage  No chest pain  Review of Systems:    Review of Systems:  Per HPI, all other systems have been reviewed and were negative      Past Medical and Surgical History:     Past Medical History:   Diagnosis Date    Anemia     Anxiety     Multiple personalities     Paranoia (psychosis) ([address] )     Schizo-affective type schizophrenia, chronic state ([facility] )        Past Surgical History:   Procedure Laterality Date     SECTION         Meds/Allergies:    all medications and allergies reviewed    Allergies: No Known Allergies    Social History:     Marital Status: Single    Substance Use History:   History   Alcohol Use No     History   Smoking Status    Current Every Day Smoker    Packs/day: 0 50   Smokeless Tobacco    Never Used     History   Drug Use No     Comment: crack, none x1 month       Family History:    non-contributory    Physical Exam:     Vitals:   Blood Pressure: 91/50 (10/16/18 1910)  Pulse: 65 (10/16/18 1910)  Temperature: 98 8 °F (37 1 °C) (10/16/18 1910)  Temp Source: Temporal (10/16/18 1910)  Respirations: 18 (10/16/18 1910)  Height: 5' 2" (157 5 cm) (10/16/18 1910)  Weight - Scale: 54 4 kg (120 lb) (10/16/18 1910)  SpO2: 97 % (10/16/18 1910)    Physical Exam  General:  NAD, awake, alert, oriented x 3  HEENT:  NC/AT, mucous membranes moist  Neck:  Supple, No JVP elevation  CV:  + S1, + S2, RRR  Pulm:  Lung fields are CTA bilaterally  Abd:  Soft, Non-tender, Non-distended  Ext:  No clubbing/cyanosis/edema  Skin:  No rashes      Additional Data:     Lab Results: I have personally reviewed pertinent reports  Results from last 7 days  Lab Units 10/16/18  1341   WBC Thousand/uL 5 70   HEMOGLOBIN g/dL 6 8*   HEMATOCRIT % 25 3*   PLATELETS Thousands/uL 490*   NEUTROS PCT % 40*   LYMPHS PCT % 50   MONOS PCT % 8   EOS PCT % 1       Results from last 7 days  Lab Units 10/16/18  1215   SODIUM mmol/L 140   POTASSIUM mmol/L 4 0   CHLORIDE mmol/L 102   CO2 mmol/L 30   BUN mg/dL 7   CREATININE mg/dL 0 71   ANION GAP mmol/L 8   CALCIUM mg/dL 10 1   ALBUMIN g/dL 4 6   TOTAL BILIRUBIN mg/dL 0 70   ALK PHOS U/L 40*   ALT U/L 18   AST U/L 17             No results found for: HGBA1C            Imaging: I have personally reviewed pertinent reports  No orders to display         ** Please Note: This note has been constructed using a voice recognition system   **

## 2018-10-17 NOTE — NURSING NOTE
Venofer infusion complete  Tolerated wiithout side effect or incident  Pt IV removed and retured to [facility]U via w/c with MHT

## 2018-10-17 NOTE — PLAN OF CARE
Alteration in Thoughts and Perception     Verbalize thoughts and feelings Not Progressing     Agree to be compliant with medication regime, as prescribed and report medication side effects Not Progressing     Attend and participate in unit activities, including therapeutic, recreational, and educational groups Not Progressing     Complete daily ADLs, including personal hygiene independently, as able Not Progressing        Anxiety     Anxiety is at manageable level Not Progressing        Depression     Treatment Goal: Demonstrate behavioral control of depressive symptoms, verbalize feelings of improved mood/affect, and adopt new coping skills prior to discharge Not [address] Discharge to home or other facility with appropriate resources Not Progressing        Risk for Self Injury/Neglect     Refrain from harming self Not Progressing

## 2018-10-17 NOTE — PROGRESS NOTES
I have seen patient on floor in icu starting venofer infusion  Sitting in chair rocking back and forth just stating she is hungry  Asked if fatigued she states yes, but when asked if dizziness , sob or chest pain just states she is hungry  She does has history of heavy period currently does not have it   Will transfuse venofer on floor x3 days    Check cbc in am keep hb >7 if below will need prbc

## 2018-10-17 NOTE — DISCHARGE INSTR - OTHER ORDERS
If you are experiencing a mental health emergency, you may call the [address] [facility] 24 hours a day, 7 days per week at [phone]  In [facility], call [phone]  When you need someone to listen, the Gudelia Mckay is available for 16 hours a day, 7 days a week, from the time of 7-10am and 2pm-2am   It is not available from the hours of 2am-6am and 10am-2pm  A representative can be reached at [phone]

## 2018-10-17 NOTE — PROGRESS NOTES
Author put in consult for hematology electronically for Dr Ja Ernst  Also called and spoke to answering service ([facility]) for Dr Matias Pace office at [phone], and left pertinent information (pt name, room #, MRN#, labs in question, unit and phone #) for consult today   Dr  will be notified and will call unit today, as per Crystal

## 2018-10-17 NOTE — H&P
Initial Psychiatric Evaluation    Medical Record Number: [id number]  Encounter: [id number]      History:     Gladis Walker is an 30 y o , female, admitted to the psychiatric unit under a 201 status due to depression, anxiety, and auditory hallucinations  Patient presented to the emergency department with her family  Patient been without her medications for several weeks  The patient's family reported that she had missed her outpatient appointment could she was depressed and could not get out of bed  Patient had also been reporting that she was having auditory hallucinations of voices that were talking to her all the time  Family reported that the patient does have a history of suicidality in history suicide attempt by overdose in the past however had not expressed any suicidality prior to being brought to the hospital   Patient was found to have a hemoglobin of 6 8 while in the emergency department  Patient has a history of microcytic anemia and her baseline hemoglobin is 7 0  Communication between the emergency department physician and staff on the unit was that patient takes iron and that nothing else would be further done on a medical admission at this time  The as a result the deemed the patient medically stable for transfer to the adult psychiatric unit for inpatient psychiatric treatment  Patient was alert and oriented x3 during the day as attempt at assessment  Patient was found resting in her hospital bed  Patient was visibly restless  Patient was reporting fatigue  Patient was pale  Patient was refusing to have stat labs drawn this completed however was reporting that when she got some rest she would allow her blood to be taken later  Patient was reporting that she was feeling tired and did not want to speak for the assessment        Past Medical History:   Diagnosis Date    Anemia     Anxiety     Multiple personalities     Paranoia (psychosis) ([address] )     Schizo-affective type schizophrenia, chronic state [facility])        Past surgical history:  Past Surgical History:   Procedure Laterality Date     SECTION         Family history:  No family history on file      Current medications:    Current Facility-Administered Medications:     acetaminophen (TYLENOL) tablet 650 mg, 650 mg, Oral, Q6H PRN, Brandon Ochoa DO    aluminum-magnesium hydroxide-simethicone (MYLANTA) 200-200-20 mg/5 mL oral suspension 30 mL, 30 mL, Oral, Q4H PRN, Meek Mckenzie PA-C    ARIPiprazole (ABILIFY) tablet 5 mg, 5 mg, Oral, BID, Alma Rosa Wasserman PA-C    benztropine (COGENTIN) injection 1 mg, 1 mg, Intramuscular, Q6H PRN, Meek Mckenzie PA-C    benztropine (COGENTIN) tablet 1 mg, 1 mg, Oral, Q6H PRN, Meek Mckenzie PA-C    ferrous sulfate tablet 325 mg, 325 mg, Oral, BID With Meals, Brandon Ochoa DO, 325 mg at 10/17/18 0843    hydrOXYzine HCL (ATARAX) tablet 50 mg, 50 mg, Oral, Q4H PRN, Alma Rosa Wasserman PA-C    influenza vaccine, quadrivalent (FLULAVAL) IM injection 0 5 mL, 0 5 mL, Intramuscular, Prior to discharge, Meek Mckenzie PA-C    LORazepam (ATIVAN) 2 mg/mL injection 1 mg, 1 mg, Intramuscular, Q4H PRN, Meek Mckenzie PA-C    LORazepam (ATIVAN) tablet 1 mg, 1 mg, Oral, Q4H PRN, Meek Mckenzie PA-C    magnesium hydroxide (MILK OF MAGNESIA) 400 mg/5 mL oral suspension 20 mL, 20 mL, Oral, Daily PRN, Meek Mckenzie PA-C    nicotine polacrilex (NICORETTE) gum 2 mg, 2 mg, Oral, Q2H PRN, Meek Mckenzie PA-C    OLANZapine (ZyPREXA) IM injection 10 mg, 10 mg, Intramuscular, Q4H PRN, Alma Rosa Wasserman PA-C    OLANZapine (ZyPREXA) tablet 10 mg, 10 mg, Oral, Q4H PRN, Alma Rosa Wasserman PA-C    traZODone (DESYREL) tablet 100 mg, 100 mg, Oral, HS PRN, Alma Rosa Wasserman PA-C      Allergies:  No Known Allergies    Social History:  Social History     Social History    Marital status: Single     Spouse name: N/A    Number of children: N/A    Years of education: N/A     Occupational History    Not on file  Social History Main Topics    Smoking status: Current Every Day Smoker     Packs/day: 0 50    Smokeless tobacco: Never Used    Alcohol use No    Drug use: No      Comment: crack, none x1 month    Sexual activity: Not on file     Other Topics Concern    Not on file     Social History Narrative    No narrative on file         Physical Examination:     Vital Signs:  Vitals:    10/16/18 1132 10/16/18 1537 10/16/18 1910   BP: 152/76  91/50   BP Location: Left arm  Left arm   Pulse: (!) 118 82 65   Resp: 20 16 18   Temp: 98 7 °F (37 1 °C)  98 8 °F (37 1 °C)   TempSrc: Tympanic  Temporal   SpO2: 99% 100% 97%   Weight: 54 7 kg (120 lb 9 5 oz)  54 4 kg (120 lb)   Height:   5' 2" (1 575 m)         Appearance:  age appropriate, casually dressed and disheveled   Behavior:  restless and fidgety   Speech:  soft   Mood:  depressed and irritable   Affect:  labile   Thought Process:  normal   Thought Content:  normal   Perceptual Disturbances:  Auditory hallucinations without commands   Risk Potential: none   Sensorium:  person, place and time   Cognition:  intact   Consciousness:  alert and awake    Attention: attention span and concentration were age appropriate   Intellect: average   Insight:  limited   Judgment: limited      Motor Activity: no abnormal movements           Diagnostic Studies:     Recent Labs:  Results Reviewed     Procedure Component Value Units Date/Time    Fingerstick Glucose (POCT) [[id number]]  (Normal) Collected:  10/16/18 1306    Lab Status:  Final result Updated:  10/16/18 2303     POC Glucose 92 mg/dl     Rapid drug screen, urine [[id number]]  (Normal) Collected:  10/16/18 1653    Lab Status:  Final result Specimen:  Urine from Urine, Clean Catch Updated:  10/16/18 7006     Amph/Meth UR Negative     Barbiturate Ur Negative     Benzodiazepine Urine Negative     Cocaine Urine Negative     Methadone Urine Negative     Opiate Urine Negative     PCP Ur Negative     THC Urine Negative    Narrative:         FOR MEDICAL PURPOSES ONLY  IF CONFIRMATION NEEDED PLEASE CONTACT THE LAB WITHIN 5 DAYS      Drug Screen Cutoff Levels:  AMPHETAMINE/METHAMPHETAMINES  1000 ng/mL  BARBITURATES     200 ng/mL  BENZODIAZEPINES     200 ng/mL  COCAINE      300 ng/mL  METHADONE      300 ng/mL  OPIATES      300 ng/mL  PHENCYCLIDINE     25 ng/mL  THC       50 ng/mL    Rapid drug screen, urine [[id number]]     Lab Status:  No result Specimen:  Urine     CBC and differential [[id number]]  (Abnormal) Collected:  10/16/18 1341    Lab Status:  Final result Specimen:  Blood from Arm, Right Updated:  10/16/18 1355     WBC 5 70 Thousand/uL      RBC 4 91 Million/uL      Hemoglobin 6 8 (LL) g/dL      Hematocrit 25 3 (L) %      MCV 52 (L) fL      MCH 13 8 (L) pg      MCHC 26 7 (L) g/dL      RDW 21 9 (H) %      MPV 8 3 (L) fL      Platelets 165 (H) Thousands/uL      Neutrophils Relative 40 (L) %      Lymphocytes Relative 50 %      Monocytes Relative 8 %      Eosinophils Relative 1 %      Basophils Relative 1 %      Neutrophils Absolute 2 30 Thousands/µL      Lymphocytes Absolute 2 90 Thousands/µL      Monocytes Absolute 0 40 Thousand/µL      Eosinophils Absolute 0 10 Thousand/µL      Basophils Absolute 0 10 Thousands/µL     Urine Microscopic [[id number]]  (Abnormal) Collected:  10/16/18 1216    Lab Status:  Final result Specimen:  Urine from Urine, Clean Catch Updated:  10/16/18 1255     RBC, UA 0-1 (A) /hpf      WBC, UA 0-1 (A) /hpf      Epithelial Cells Moderate (A) /hpf      Bacteria, UA Innumerable (A) /hpf     UA w Reflex to Microscopic [[id number]]  (Abnormal) Collected:  10/16/18 1216    Lab Status:  Final result Specimen:  Urine from Urine, Clean Catch Updated:  10/16/18 1240     Color, UA Sammie (A)     Clarity, UA Cloudy (A)     Specific Gravity, UA 1 020     pH, UA 6 0     Leukocytes, UA Negative     Nitrite, UA Negative     Protein, UA >=500 (A) mg/dl      Glucose, UA Negative mg/dl      Ketones, UA Negative mg/dl      Bilirubin, UA Negative     Blood, UA 10 0 (A)     UROBILINOGEN UA Negative mg/dL     Ethanol [[id number]]  (Normal) Collected:  10/16/18 1215    Lab Status:  Final result Specimen:  Blood from Arm, Right Updated:  10/16/18 1239     Ethanol Lvl <13 mg/dL     Salicylate level [[id number]]  (Abnormal) Collected:  10/16/18 1215    Lab Status:  Final result Specimen:  Blood from Arm, Right Updated:  99/99/86 7318     Salicylate Lvl <0 8 (L) mg/dL     Magnesium [[id number]]  (Normal) Collected:  10/16/18 1215    Lab Status:  Final result Specimen:  Blood from Arm, Right Updated:  10/16/18 1239     Magnesium 2 0 mg/dL     Acetaminophen level [[id number]]  (Abnormal) Collected:  10/16/18 1215    Lab Status:  Final result Specimen:  Blood from Arm, Right Updated:  10/16/18 1239     Acetaminophen Level <10 (L) ug/mL     Comprehensive metabolic panel [[id number]]  (Abnormal) Collected:  10/16/18 1215    Lab Status:  Final result Specimen:  Blood from Arm, Right Updated:  10/16/18 1239     Sodium 140 mmol/L      Potassium 4 0 mmol/L      Chloride 102 mmol/L      CO2 30 mmol/L      ANION GAP 8 mmol/L      BUN 7 mg/dL      Creatinine 0 71 mg/dL      Glucose 60 (L) mg/dL      Calcium 10 1 mg/dL      AST 17 U/L      ALT 18 U/L      Alkaline Phosphatase 40 (L) U/L      Total Protein 7 6 g/dL      Albumin 4 6 g/dL      Total Bilirubin 0 70 mg/dL      eGFR 128 ml/min/1 73sq m     Narrative:         National Kidney Disease Education Program recommendations are as follows:  GFR calculation is accurate only with a steady state creatinine  Chronic Kidney disease less than 60 ml/min/1 73 sq  meters  Kidney failure less than 15 ml/min/1 73 sq  meters      CBC and differential [[id number]]  (Abnormal) Collected:  10/16/18 1215    Lab Status:  Final result Specimen:  Blood from Arm, Right Updated:  10/16/18 1229     WBC 5 80 Thousand/uL      RBC 5 47 (H) Million/uL      Hemoglobin 7 6 (L) g/dL      Hematocrit 28 5 (L) %      MCV 52 (L) fL      MCH 14 0 (L) pg      MCHC 26 8 (L) g/dL      RDW 21 3 (H) %      MPV 8 3 (L) fL      Platelets 379 (H) Thousands/uL      Neutrophils Relative 40 (L) %      Lymphocytes Relative 51 (H) %      Monocytes Relative 8 %      Eosinophils Relative 1 %      Basophils Relative 1 %      Neutrophils Absolute 2 30 Thousands/µL      Lymphocytes Absolute 2 90 Thousands/µL      Monocytes Absolute 0 40 Thousand/µL      Eosinophils Absolute 0 10 Thousand/µL      Basophils Absolute 0 00 Thousands/µL     POCT pregnancy, urine [[id number]]  (Normal) Resulted:  10/16/18 1221    Lab Status:  Final result Updated:  10/16/18 1221     EXT PREG TEST UR (Ref: Negative) preg test negative          I/O Past 24 hours:  No intake/output data recorded  No intake/output data recorded  Impression / Plan:       Recommended Treatment:      Medications  1) at this time awaiting repeat labs to be completed this morning to further evaluate need for transfer to medical and potential blood transfusion  Hematology consult also pending  reinitiate Abilify 5 mg twice daily  Continue to monitor on suicide and fall precautions  Non-pharmacological treatments  1) Continue with group therapy, milieu therapy and occupational therapy  2) Medical will be consulted to help manage comorbid conditions    Safety  1) Safety/communication plan established targeting dynamic risk factors above  Counseling / Coordination of Care    Total floor / unit time spent today 50 minutes  Greater than 50% of total time was spent with the patient and / or family counseling and / or coordination of care  A description of the counseling / coordination of care  Patient's Rights, confidentiality and exceptions to confidentiality, use of automated medical record, [facility]hav staff access to medical record, and consent to treatment reviewed        Jason Nina PA-C

## 2018-10-17 NOTE — ASSESSMENT & PLAN NOTE
· History of blood transfusions in the past in history of IV iron infusions in the past  · Possibly secondary to a heavy menstrual cycle  · Possible Thalassemia with a low MCV  · Ferrous sulfate 325 milligrams p o  b i d  for iron supplementation  · Check an iron panel, vitamin B12 level, and folate level  · Recheck CBC in the morning on 10/17/2018  · Transfuse for a hemoglobin less than 7  · Consult Hematology  · Check the patient's stool for occult blood x 3 specimens  · She will need an outpatient Gastroenterology evaluation including a possible EGD and colonoscopy

## 2018-10-17 NOTE — PROGRESS NOTES
Admission Note: 30year old  female admitted on 61 51 81 from [facility] ED by wheelchair at 1900  Patient is self admitted for increased anxiety and depression, panic attacks, medication non compliance, and SI with thoughts to OD or cut herself  SLIM/Dr  Susan Torres are physicians  Patient is known to  as she has been a patient previously on this unit (see previous hospital documentation) and has psych history of anxiety, depression and schizoaffective disorder  Pt  is AAOx4, but lethargic and sedated as she was administered 2mg of Ativan in the ED, and was falling asleep on author as he performed the admission process  Patient was cooperative with [facility] and body assessment, and but kept asking to go to her room to sleep  Patient is a poor historian as she could not remember if she had a flu shot yet this year, and it took some prompting for her to remember her boyfriends last name  Patient made poor eye contact as her eyes were closed for much of interview, and has a blunted and depressed affect  Patient is reporting 2/4 anxiety and 6/10 depression  Patient is denying any SI, HI, A/V hallucinations, or pain at this time  Patient has past medical history of microcytic anemia, pyuria, and elevated platelet count  Patient is denying any CP, abdominal pain, SOB, N/V, dizziness, or syncope  Pt  was educated on medication compliance, proper hydration, and when to come to staff if anxiety or depression becomes overwhelming  Allergies (none) and meds have been reviewed and updated, and patient has been seen by medical (Dr Ej Rosen)  Patient is familiar with unit, and stated she did not need to be oriented when asked  Patient is currently sleeping comfortably in her room  Will continue to monitor and provide therapeutic support

## 2018-10-17 NOTE — PROGRESS NOTES
Patient is in her room all day Patient 's h&h is low because of low iron Patient is going to have three days of infusions Patient is also extremely depressed at this time Patient had blood work done today also Will encourage patient to attend groups when she is feeling better

## 2018-10-18 PROBLEM — E55.9 VITAMIN D DEFICIENCY: Status: ACTIVE | Noted: 2018-10-18

## 2018-10-18 LAB
ANISOCYTOSIS BLD QL SMEAR: PRESENT
EOSINOPHIL # BLD AUTO: 0.07 THOUSAND/UL (ref 0–0.4)
EOSINOPHIL NFR BLD MANUAL: 1 % (ref 0–6)
ERYTHROCYTE [DISTWIDTH] IN BLOOD BY AUTOMATED COUNT: 22.1 %
GIANT PLATELETS BLD QL SMEAR: PRESENT
HAV IGM SER QL: NORMAL
HBV CORE IGM SER QL: NORMAL
HBV SURFACE AG SER QL: NORMAL
HCT VFR BLD AUTO: 27.6 % (ref 36–46)
HCV AB SER QL: NORMAL
HGB BLD-MCNC: 7.3 G/DL (ref 12–16)
HYPERCHROMIA BLD QL SMEAR: PRESENT
LYMPHOCYTES # BLD AUTO: 1.72 THOUSAND/UL (ref 0.5–4)
LYMPHOCYTES # BLD AUTO: 26 % (ref 20–50)
MCH RBC QN AUTO: 13.9 PG (ref 26–34)
MCHC RBC AUTO-ENTMCNC: 26.5 G/DL (ref 31–36)
MCV RBC AUTO: 52 FL (ref 80–100)
MONOCYTES # BLD AUTO: 0.66 THOUSAND/UL (ref 0.2–0.9)
MONOCYTES NFR BLD AUTO: 10 % (ref 1–10)
NEUTS SEG # BLD: 4.16 THOUSAND/UL (ref 1.8–7.8)
NEUTS SEG NFR BLD AUTO: 63 %
OVALOCYTES BLD QL SMEAR: PRESENT
PLATELET # BLD AUTO: 415 THOUSANDS/UL (ref 150–450)
PLATELET BLD QL SMEAR: ABNORMAL
PMV BLD AUTO: 8.4 FL (ref 8.9–12.7)
POLYCHROMASIA BLD QL SMEAR: PRESENT
RBC # BLD AUTO: 5.27 MILLION/UL (ref 4–5.2)
RBC MORPH BLD: ABNORMAL
TOTAL CELLS COUNTED SPEC: 100
WBC # BLD AUTO: 6.6 THOUSAND/UL (ref 4.5–11)

## 2018-10-18 PROCEDURE — 85007 BL SMEAR W/DIFF WBC COUNT: CPT | Performed by: INTERNAL MEDICINE

## 2018-10-18 PROCEDURE — 85027 COMPLETE CBC AUTOMATED: CPT | Performed by: INTERNAL MEDICINE

## 2018-10-18 RX ORDER — MELATONIN
2000 DAILY
Status: DISCONTINUED | OUTPATIENT
Start: 2018-10-18 | End: 2018-10-24 | Stop reason: HOSPADM

## 2018-10-18 RX ADMIN — ARIPIPRAZOLE 5 MG: 5 TABLET ORAL at 17:07

## 2018-10-18 RX ADMIN — FERROUS SULFATE TAB 325 MG (65 MG ELEMENTAL FE) 325 MG: 325 (65 FE) TAB at 17:07

## 2018-10-18 RX ADMIN — OXYCODONE HYDROCHLORIDE AND ACETAMINOPHEN 500 MG: 500 TABLET ORAL at 08:02

## 2018-10-18 RX ADMIN — FERROUS SULFATE TAB 325 MG (65 MG ELEMENTAL FE) 325 MG: 325 (65 FE) TAB at 08:01

## 2018-10-18 RX ADMIN — IRON SUCROSE 200 MG: 20 INJECTION, SOLUTION INTRAVENOUS at 20:22

## 2018-10-18 RX ADMIN — ARIPIPRAZOLE 5 MG: 5 TABLET ORAL at 08:02

## 2018-10-18 RX ADMIN — VITAMIN D, TAB 1000IU (100/BT) 2000 UNITS: 25 TAB at 08:02

## 2018-10-18 NOTE — NURSING NOTE
Patient allowed RN to draw blood this am and went back to sleep afterwards   Cooperative and pleasant upon approach  Reports no issues related to sleep  Patient in bed at this time and appears to be sleeping  Eyes closed and chest movements noted  Offered no complaints  Patient did not wake up to request any PRN medications during the night

## 2018-10-18 NOTE — PROGRESS NOTES
Psychiatry Progress Note    Subjective: Interval History     Patient isolative to her room  Patient continues to report that she is feeling fatigued  Patient is still restless and anxious during assessment  Patient with disheveled appearance  Patient continues to report ongoing depression and passive suicidal thoughts  Patient denies any thoughts to harm herself in the hospital at this time  Patient reports that she still continues to hear auditory hallucinations however guarded and evasive about the content  Patient denying any visual hallucinations or homicidal ideations  Patient yesterday had 1st venofer infusion due to low hemoglobin  Patient is scheduled for 2nd infusion today  Plan is for 3 infusions  Repeat CBC is being completed this morning  Also call blood culture and urinalysis are pending      Current medications:    Current Facility-Administered Medications:     acetaminophen (TYLENOL) tablet 650 mg, 650 mg, Oral, Q6H PRN, [employer] DO    aluminum-magnesium hydroxide-simethicone (MYLANTA) 200-200-20 mg/5 mL oral suspension 30 mL, 30 mL, Oral, Q4H PRN, Tracy Ascencio PA-C    ARIPiprazole (ABILIFY) tablet 5 mg, 5 mg, Oral, BID, Lo Moreno PA-C, 5 mg at 10/17/18 1703    ascorbic acid (VITAMIN C) tablet 500 mg, 500 mg, Oral, Daily, Andre Castellanos MD, 500 mg at 10/17/18 1153    benztropine (COGENTIN) injection 1 mg, 1 mg, Intramuscular, Q6H PRN, Tracy Ascencio PA-C    benztropine (COGENTIN) tablet 1 mg, 1 mg, Oral, Q6H PRN, Tracy Ascencio PA-C    cholecalciferol (VITAMIN D3) tablet 2,000 Units, 2,000 Units, Oral, Daily, [employer], DO    ferrous sulfate tablet 325 mg, 325 mg, Oral, BID With Meals, [employer], DO, 325 mg at 10/17/18 1703    hydrOXYzine HCL (ATARAX) tablet 50 mg, 50 mg, Oral, Q4H PRN, Lo Moreno PA-C    influenza vaccine, quadrivalent (FLULAVAL) IM injection 0 5 mL, 0 5 mL, Intramuscular, Prior to discharge, Priscilla Jimenes Dianne Anderson PA-C    iron sucrose (VENOFER) 200 mg in sodium chloride 0 9 % 100 mL IVPB, 200 mg, Intravenous, Daily, Balwinder Torres MD, Last Rate: 110 mL/hr at 10/17/18 1448, 200 mg at 10/17/18 1448    LORazepam (ATIVAN) 2 mg/mL injection 1 mg, 1 mg, Intramuscular, Q4H PRN, Dominique Silvershayith PA-C    LORazepam (ATIVAN) tablet 1 mg, 1 mg, Oral, Q4H PRN, Dominique SilversmSABIHA cabello-ZEYAD    magnesium hydroxide (MILK OF MAGNESIA) 400 mg/5 mL oral suspension 20 mL, 20 mL, Oral, Daily PRN, Dominique SilverSABIHA potts-ZEYAD    nicotine polacrilex (NICORETTE) gum 2 mg, 2 mg, Oral, Q2H PRN, SABIHA Guzmán-C    OLANZapine (ZyPREXA) IM injection 10 mg, 10 mg, Intramuscular, Q4H PRN, Sunita Delaney PA-C    OLANZapine (ZyPREXA) tablet 10 mg, 10 mg, Oral, Q4H PRN, SABIHA Sanchez-ZEYAD    traZODone (DESYREL) tablet 100 mg, 100 mg, Oral, HS PRN, Sunita Delaney PA-C      Objective:     Vital Signs:  Vitals:    10/16/18 1910 10/17/18 1400 10/17/18 1440 10/17/18 1600   BP: 91/50  106/68 110/64   BP Location: Left arm  Left arm Left arm   Pulse: 65  84    Resp: 18  18    Temp: 98 8 °F (37 1 °C) 99 7 °F (37 6 °C) 99 7 °F (37 6 °C)    TempSrc: Temporal  Temporal    SpO2: 97%  97%    Weight: 54 4 kg (120 lb)      Height: 5' 2" (1 575 m)            Appearance:  age appropriate, casually dressed and disheveled   Behavior:  restless and fidgety   Speech:  soft   Mood:  anxious, depressed and irritable   Affect:  flat   Thought Process:  normal   Thought Content:  normal   Perceptual Disturbances:  Auditory hallucinations without commands   Risk Potential: Suicidal Ideations without plan   Sensorium:  person, place, situation and time   Cognition:  intact   Consciousness:  alert and awake    Attention: attention span and concentration were age appropriate   Intellect: average   Insight:  limited   Judgment: limited      Motor Activity: no abnormal movements           Recent Labs:  Results Reviewed     Procedure Component Value Units Date/Time    Fingerstick Glucose (POCT) [[id number]]  (Normal) Collected:  10/16/18 1306    Lab Status:  Final result Updated:  10/16/18 2303     POC Glucose 92 mg/dl     Rapid drug screen, urine [[id number]]  (Normal) Collected:  10/16/18 1653    Lab Status:  Final result Specimen:  Urine from Urine, Clean Catch Updated:  10/16/18 1749     Amph/Meth UR Negative     Barbiturate Ur Negative     Benzodiazepine Urine Negative     Cocaine Urine Negative     Methadone Urine Negative     Opiate Urine Negative     PCP Ur Negative     THC Urine Negative    Narrative:         FOR MEDICAL PURPOSES ONLY  IF CONFIRMATION NEEDED PLEASE CONTACT THE LAB WITHIN 5 DAYS      Drug Screen Cutoff Levels:  AMPHETAMINE/METHAMPHETAMINES  1000 ng/mL  BARBITURATES     200 ng/mL  BENZODIAZEPINES     200 ng/mL  COCAINE      300 ng/mL  METHADONE      300 ng/mL  OPIATES      300 ng/mL  PHENCYCLIDINE     25 ng/mL  THC       50 ng/mL    Rapid drug screen, urine [[id number]]     Lab Status:  No result Specimen:  Urine     CBC and differential [[id number]]  (Abnormal) Collected:  10/16/18 1341    Lab Status:  Final result Specimen:  Blood from Arm, Right Updated:  10/16/18 1355     WBC 5 70 Thousand/uL      RBC 4 91 Million/uL      Hemoglobin 6 8 (LL) g/dL      Hematocrit 25 3 (L) %      MCV 52 (L) fL      MCH 13 8 (L) pg      MCHC 26 7 (L) g/dL      RDW 21 9 (H) %      MPV 8 3 (L) fL      Platelets 871 (H) Thousands/uL      Neutrophils Relative 40 (L) %      Lymphocytes Relative 50 %      Monocytes Relative 8 %      Eosinophils Relative 1 %      Basophils Relative 1 %      Neutrophils Absolute 2 30 Thousands/µL      Lymphocytes Absolute 2 90 Thousands/µL      Monocytes Absolute 0 40 Thousand/µL      Eosinophils Absolute 0 10 Thousand/µL      Basophils Absolute 0 10 Thousands/µL     Urine Microscopic [[id number]]  (Abnormal) Collected:  10/16/18 1216    Lab Status:  Final result Specimen:  Urine from Urine, Clean Catch Updated:  10/16/18 1255     RBC, UA 0-1 (A) /hpf      WBC, UA 0-1 (A) /hpf      Epithelial Cells Moderate (A) /hpf      Bacteria, UA Innumerable (A) /hpf     UA w Reflex to Microscopic [[id number]]  (Abnormal) Collected:  10/16/18 1216    Lab Status:  Final result Specimen:  Urine from Urine, Clean Catch Updated:  10/16/18 1240     Color, UA Sammie (A)     Clarity, UA Cloudy (A)     Specific Gravity, UA 1 020     pH, UA 6 0     Leukocytes, UA Negative     Nitrite, UA Negative     Protein, UA >=500 (A) mg/dl      Glucose, UA Negative mg/dl      Ketones, UA Negative mg/dl      Bilirubin, UA Negative     Blood, UA 10 0 (A)     UROBILINOGEN UA Negative mg/dL     Ethanol [[id number]]  (Normal) Collected:  10/16/18 1215    Lab Status:  Final result Specimen:  Blood from Arm, Right Updated:  10/16/18 1239     Ethanol Lvl <77 mg/dL     Salicylate level [[id number]]  (Abnormal) Collected:  10/16/18 1215    Lab Status:  Final result Specimen:  Blood from Arm, Right Updated:  63/80/51 4244     Salicylate Lvl <0 0 (L) mg/dL     Magnesium [[id number]]  (Normal) Collected:  10/16/18 1215    Lab Status:  Final result Specimen:  Blood from Arm, Right Updated:  10/16/18 1239     Magnesium 2 0 mg/dL     Acetaminophen level [[id number]]  (Abnormal) Collected:  10/16/18 1215    Lab Status:  Final result Specimen:  Blood from Arm, Right Updated:  10/16/18 1239     Acetaminophen Level <10 (L) ug/mL     Comprehensive metabolic panel [[id number]]  (Abnormal) Collected:  10/16/18 1215    Lab Status:  Final result Specimen:  Blood from Arm, Right Updated:  10/16/18 1239     Sodium 140 mmol/L      Potassium 4 0 mmol/L      Chloride 102 mmol/L      CO2 30 mmol/L      ANION GAP 8 mmol/L      BUN 7 mg/dL      Creatinine 0 71 mg/dL      Glucose 60 (L) mg/dL      Calcium 10 1 mg/dL      AST 17 U/L      ALT 18 U/L      Alkaline Phosphatase 40 (L) U/L      Total Protein 7 6 g/dL      Albumin 4 6 g/dL      Total Bilirubin 0 70 mg/dL      eGFR 128 ml/min/1 73sq m     Narrative:         National Kidney Disease Education Program recommendations are as follows:  GFR calculation is accurate only with a steady state creatinine  Chronic Kidney disease less than 60 ml/min/1 73 sq  meters  Kidney failure less than 15 ml/min/1 73 sq  meters  CBC and differential [[id number]]  (Abnormal) Collected:  10/16/18 1215    Lab Status:  Final result Specimen:  Blood from Arm, Right Updated:  10/16/18 1229     WBC 5 80 Thousand/uL      RBC 5 47 (H) Million/uL      Hemoglobin 7 6 (L) g/dL      Hematocrit 28 5 (L) %      MCV 52 (L) fL      MCH 14 0 (L) pg      MCHC 26 8 (L) g/dL      RDW 21 3 (H) %      MPV 8 3 (L) fL      Platelets 477 (H) Thousands/uL      Neutrophils Relative 40 (L) %      Lymphocytes Relative 51 (H) %      Monocytes Relative 8 %      Eosinophils Relative 1 %      Basophils Relative 1 %      Neutrophils Absolute 2 30 Thousands/µL      Lymphocytes Absolute 2 90 Thousands/µL      Monocytes Absolute 0 40 Thousand/µL      Eosinophils Absolute 0 10 Thousand/µL      Basophils Absolute 0 00 Thousands/µL     POCT pregnancy, urine [[id number]]  (Normal) Resulted:  10/16/18 1221    Lab Status:  Final result Updated:  10/16/18 1221     EXT PREG TEST UR (Ref: Negative) preg test negative          I/O Past 24 hours:  No intake/output data recorded  No intake/output data recorded  Assessment / Plan:     Bipolar 1 disorder, depressed, severe ([facility] [address] )    Recommended Treatment:      Medication changes:  1) continue to monitor with initiation of Abilify  Continue suicide precautions  Patient will have 2nd venofer infusion today    Non-pharmacological treatments  1) Continue with group therapy, milieu therapy and occupational therapy  Safety  1) Safety/communication plan established targeting dynamic risk factors above  2) Risks, benefits, and possible side effects of medications explained to patient and patient verbalizes understanding        Counseling / Coordination of Care    Total floor / unit time spent today 20 minutes  Greater than 50% of total time was spent with the patient and / or family counseling and / or coordination of care  A description of the counseling / coordination of care  Patient's Rights, confidentiality and exceptions to confidentiality, use of automated medical record, [facility] staff access to medical record, and consent to treatment reviewed      Bereket Mckeon PA-C

## 2018-10-18 NOTE — PLAN OF CARE
Problem: Alteration in Thoughts and Perception  Goal: Verbalize thoughts and feelings  Interventions:  - Promote a nonjudgmental and trusting relationship with the patient through active listening and therapeutic communication  - Assess patient's level of functioning, behavior and potential for risk  - Engage patient in 1 on 1 interactions for a minimum of 15 minutes each session  - Encourage patient to express fears, feelings, frustrations, and discuss symptoms    - Hull patient to reality, help patient recognize reality-based thinking   - Administer medications as ordered and assess for potential side effects  - Provide the patient education related to the signs and symptoms of the illness and desired effects of prescribed medications   Outcome: Progressing    Goal: Agree to be compliant with medication regime, as prescribed and report medication side effects  Interventions:  - Offer appropriate PRN medication and supervise ingestion; conduct aims, as needed    Outcome: Progressing    Goal: Attend and participate in unit activities, including therapeutic, recreational, and educational groups  Interventions:  - Provide therapeutic and educational activities daily, encourage attendance and participation, and document same in the medical record    Outcome: Not Progressing    Goal: Complete daily ADLs, including personal hygiene independently, as able  Interventions:  - Observe, teach, and assist patient with ADLS  - Monitor and promote a balance of rest/activity, with adequate nutrition and elimination    Outcome: Not Progressing      Problem: Risk for Self Injury/Neglect  Goal: Refrain from harming self  Interventions:  - Monitor patient closely, per order  - Develop a trusting relationship  - Supervise medication ingestion, monitor effects and side effects    Outcome: Progressing      Problem: Depression  Goal: Treatment Goal: Demonstrate behavioral control of depressive symptoms, verbalize feelings of improved mood/affect, and adopt new coping skills prior to discharge  Outcome: Progressing      Comments: Patient uncooperative with morning assessment stating, "I am sleeping" Patient did take medications as ordered without incidence  Med and meal compliant  Will continue to monitor and provide therapeutic support

## 2018-10-18 NOTE — PROGRESS NOTES
Patient is napping at this time  Patient woke up for lunch and went back to bed after eating  Patient was calm and cooperative on unit at this time

## 2018-10-19 LAB
ANISOCYTOSIS BLD QL SMEAR: PRESENT
ATRIAL RATE: 73 BPM
BASOPHILS # BLD AUTO: 0.06 THOUSAND/UL (ref 0–0.1)
BASOPHILS NFR MAR MANUAL: 1 % (ref 0–1)
EOSINOPHIL # BLD AUTO: 0.06 THOUSAND/UL (ref 0–0.4)
EOSINOPHIL NFR BLD MANUAL: 1 % (ref 0–6)
ERYTHROCYTE [DISTWIDTH] IN BLOOD BY AUTOMATED COUNT: 22.2 %
GIANT PLATELETS BLD QL SMEAR: PRESENT
HCT VFR BLD AUTO: 28.1 % (ref 36–46)
HGB BLD-MCNC: 7.4 G/DL (ref 12–16)
HYPERCHROMIA BLD QL SMEAR: PRESENT
LYMPHOCYTES # BLD AUTO: 1.55 THOUSAND/UL (ref 0.5–4)
LYMPHOCYTES # BLD AUTO: 25 % (ref 20–50)
MCH RBC QN AUTO: 14 PG (ref 26–34)
MCHC RBC AUTO-ENTMCNC: 26.3 G/DL (ref 31–36)
MCV RBC AUTO: 54 FL (ref 80–100)
MONOCYTES # BLD AUTO: 0.81 THOUSAND/UL (ref 0.2–0.9)
MONOCYTES NFR BLD AUTO: 13 % (ref 1–10)
NEUTS SEG # BLD: 3.72 THOUSAND/UL (ref 1.8–7.8)
NEUTS SEG NFR BLD AUTO: 60 %
OVALOCYTES BLD QL SMEAR: PRESENT
P AXIS: 26 DEGREES
PLATELET # BLD AUTO: 366 THOUSANDS/UL (ref 150–450)
PLATELET BLD QL SMEAR: ADEQUATE
PMV BLD AUTO: 8.7 FL (ref 8.9–12.7)
POIKILOCYTOSIS BLD QL SMEAR: PRESENT
PR INTERVAL: 150 MS
QRS AXIS: 45 DEGREES
QRSD INTERVAL: 82 MS
QT INTERVAL: 396 MS
QTC INTERVAL: 436 MS
RBC # BLD AUTO: 5.25 MILLION/UL (ref 4–5.2)
RBC MORPH BLD: ABNORMAL
T WAVE AXIS: 56 DEGREES
TOTAL CELLS COUNTED SPEC: 100
VENTRICULAR RATE: 73 BPM
WBC # BLD AUTO: 6.2 THOUSAND/UL (ref 4.5–11)

## 2018-10-19 PROCEDURE — 93005 ELECTROCARDIOGRAM TRACING: CPT

## 2018-10-19 PROCEDURE — 85027 COMPLETE CBC AUTOMATED: CPT | Performed by: INTERNAL MEDICINE

## 2018-10-19 PROCEDURE — 85007 BL SMEAR W/DIFF WBC COUNT: CPT | Performed by: INTERNAL MEDICINE

## 2018-10-19 PROCEDURE — 93010 ELECTROCARDIOGRAM REPORT: CPT | Performed by: INTERNAL MEDICINE

## 2018-10-19 PROCEDURE — 87086 URINE CULTURE/COLONY COUNT: CPT | Performed by: INTERNAL MEDICINE

## 2018-10-19 RX ORDER — DOCUSATE SODIUM 100 MG/1
100 CAPSULE, LIQUID FILLED ORAL 2 TIMES DAILY
Status: DISCONTINUED | OUTPATIENT
Start: 2018-10-19 | End: 2018-10-24 | Stop reason: HOSPADM

## 2018-10-19 RX ORDER — POLYETHYLENE GLYCOL 3350 17 G/17G
17 POWDER, FOR SOLUTION ORAL 2 TIMES DAILY PRN
Status: DISCONTINUED | OUTPATIENT
Start: 2018-10-19 | End: 2018-10-24 | Stop reason: HOSPADM

## 2018-10-19 RX ADMIN — OXYCODONE HYDROCHLORIDE AND ACETAMINOPHEN 500 MG: 500 TABLET ORAL at 09:17

## 2018-10-19 RX ADMIN — TRAZODONE HYDROCHLORIDE 100 MG: 100 TABLET ORAL at 21:13

## 2018-10-19 RX ADMIN — ARIPIPRAZOLE 5 MG: 5 TABLET ORAL at 09:17

## 2018-10-19 RX ADMIN — LORAZEPAM 1 MG: 1 TABLET ORAL at 20:38

## 2018-10-19 RX ADMIN — ARIPIPRAZOLE 5 MG: 5 TABLET ORAL at 18:02

## 2018-10-19 RX ADMIN — VITAMIN D, TAB 1000IU (100/BT) 2000 UNITS: 25 TAB at 09:17

## 2018-10-19 RX ADMIN — IRON SUCROSE 200 MG: 20 INJECTION, SOLUTION INTRAVENOUS at 14:14

## 2018-10-19 RX ADMIN — FERROUS SULFATE TAB 325 MG (65 MG ELEMENTAL FE) 325 MG: 325 (65 FE) TAB at 18:02

## 2018-10-19 RX ADMIN — FERROUS SULFATE TAB 325 MG (65 MG ELEMENTAL FE) 325 MG: 325 (65 FE) TAB at 09:18

## 2018-10-19 RX ADMIN — DOCUSATE SODIUM 100 MG: 100 CAPSULE, LIQUID FILLED ORAL at 09:17

## 2018-10-19 RX ADMIN — DOCUSATE SODIUM 100 MG: 100 CAPSULE, LIQUID FILLED ORAL at 18:02

## 2018-10-19 NOTE — PROGRESS NOTES
Patient continued on fall and suicide precautions, and on q15 rounding for patient safety  Patient slept through the night and did not awaken to request any PRN's, or report any thoughts of suicide or self harm  Patient was observed sleeping, with eyes closed, chest movements noted, and breathing heard during sleep period  Will continue to monitor and provide therapeutic support

## 2018-10-19 NOTE — PROGRESS NOTES
Psychiatry Progress Note    Subjective: Interval History     Pt received second venofer infusion last evening  Scheduled for 3rd infusion this morning  Patient has continued to report ongoing fatigue that is suspected to be secondary to her anemia  Patient still with and disheveled appearance and blunted affect  Patient reports that she continues to have depression and anxiety  Patient expressing passive death wishes during assessment however denies any plan or intent to harm herself in the hospital at this time  Patient reporting decrease in her auditory hallucinations  Patient reporting that the voices are still speaking to her throughout the day however they are not as loud  Patient denying any demanding her derogatory content this morning however this will continue be evaluated as patient remains evasive about her hallucinations  Patient denying any homicidal ideations  Other than fatigue patient with no other somatic complaints today  Patient was not reported to have a bowel movement yesterday  Due to patient receiving iron transfusion and being on oral iron tablets will initiate Colace to ensure appropriate bowel movements and avoid constipation      Current medications:    Current Facility-Administered Medications:     acetaminophen (TYLENOL) tablet 650 mg, 650 mg, Oral, Q6H PRN, Libra Nguyen DO    aluminum-magnesium hydroxide-simethicone (MYLANTA) 200-200-20 mg/5 mL oral suspension 30 mL, 30 mL, Oral, Q4H PRN, Froylan Brar PA-C    ARIPiprazole (ABILIFY) tablet 5 mg, 5 mg, Oral, BID, Megan Dimas PA-C, 5 mg at 10/18/18 1707    ascorbic acid (VITAMIN C) tablet 500 mg, 500 mg, Oral, Daily, Chin Sierra MD, 500 mg at 10/18/18 0802    benztropine (COGENTIN) injection 1 mg, 1 mg, Intramuscular, Q6H PRN, Froylan Brar PA-C    benztropine (COGENTIN) tablet 1 mg, 1 mg, Oral, Q6H PRN, Froylan Brar PA-C    cholecalciferol (VITAMIN D3) tablet 2,000 Units, 2,000 Units, Oral, Daily, Neema Lockhart DO, 2,000 Units at 10/18/18 0802    ferrous sulfate tablet 325 mg, 325 mg, Oral, BID With Meals, Neema Lockhart DO, 325 mg at 10/18/18 1707    hydrOXYzine HCL (ATARAX) tablet 50 mg, 50 mg, Oral, Q4H PRN, Max Milan PA-C    influenza vaccine, quadrivalent (FLULAVAL) IM injection 0 5 mL, 0 5 mL, Intramuscular, Prior to discharge, Quentin Meeks PA-C    iron sucrose (VENOFER) 200 mg in sodium chloride 0 9 % 100 mL IVPB, 200 mg, Intravenous, Daily, Manjeet Bond MD, Stopped at 10/18/18 2129    LORazepam (ATIVAN) 2 mg/mL injection 1 mg, 1 mg, Intramuscular, Q4H PRN, Quentin Meeks PA-C    LORazepam (ATIVAN) tablet 1 mg, 1 mg, Oral, Q4H PRN, Quentin Meeks PA-C    magnesium hydroxide (MILK OF MAGNESIA) 400 mg/5 mL oral suspension 20 mL, 20 mL, Oral, Daily PRN, Quentin Meeks PA-C    nicotine polacrilex (NICORETTE) gum 2 mg, 2 mg, Oral, Q2H PRN, Quentin Meeks PA-C    OLANZapine (ZyPREXA) IM injection 10 mg, 10 mg, Intramuscular, Q4H PRN, Max Milan PA-C    OLANZapine (ZyPREXA) tablet 10 mg, 10 mg, Oral, Q4H PRN, Max Milan PA-C    traZODone (DESYREL) tablet 100 mg, 100 mg, Oral, HS PRN, Max Milan PA-C      Objective:     Vital Signs:  Vitals:    10/17/18 1600 10/18/18 0816 10/18/18 0817 10/18/18 2000   BP: 110/64 112/77 105/76 117/83   BP Location: Left arm Left arm Left arm Left arm   Pulse:  102 (!) 115 105   Resp:  16     Temp:  98 5 °F (36 9 °C)     TempSrc:  Temporal     SpO2:       Weight:       Height:             Appearance:  age appropriate, casually dressed and disheveled   Behavior:  restless and fidgety   Speech:  soft   Mood:  anxious, depressed and irritable   Affect:  flat   Thought Process:  normal   Thought Content:  normal   Perceptual Disturbances:  Auditory hallucinations without commands   Risk Potential: Suicidal Ideations without plan   Sensorium:  person, place, situation and time   Cognition: intact   Consciousness:  alert and awake    Attention: attention span and concentration were age appropriate   Intellect: average   Insight:  limited   Judgment: limited      Motor Activity: no abnormal movements           Recent Labs:  Results Reviewed     Procedure Component Value Units Date/Time    Fingerstick Glucose (POCT) [[id number]]  (Normal) Collected:  10/16/18 1306    Lab Status:  Final result Updated:  10/16/18 2303     POC Glucose 92 mg/dl     Rapid drug screen, urine [[id number]]  (Normal) Collected:  10/16/18 1653    Lab Status:  Final result Specimen:  Urine from Urine, Clean Catch Updated:  10/16/18 1749     Amph/Meth UR Negative     Barbiturate Ur Negative     Benzodiazepine Urine Negative     Cocaine Urine Negative     Methadone Urine Negative     Opiate Urine Negative     PCP Ur Negative     THC Urine Negative    Narrative:         FOR MEDICAL PURPOSES ONLY  IF CONFIRMATION NEEDED PLEASE CONTACT THE LAB WITHIN 5 DAYS      Drug Screen Cutoff Levels:  AMPHETAMINE/METHAMPHETAMINES  1000 ng/mL  BARBITURATES     200 ng/mL  BENZODIAZEPINES     200 ng/mL  COCAINE      300 ng/mL  METHADONE      300 ng/mL  OPIATES      300 ng/mL  PHENCYCLIDINE     25 ng/mL  THC       50 ng/mL    Rapid drug screen, urine [[id number]]     Lab Status:  No result Specimen:  Urine     CBC and differential [[id number]]  (Abnormal) Collected:  10/16/18 1341    Lab Status:  Final result Specimen:  Blood from Arm, Right Updated:  10/16/18 1355     WBC 5 70 Thousand/uL      RBC 4 91 Million/uL      Hemoglobin 6 8 (LL) g/dL      Hematocrit 25 3 (L) %      MCV 52 (L) fL      MCH 13 8 (L) pg      MCHC 26 7 (L) g/dL      RDW 21 9 (H) %      MPV 8 3 (L) fL      Platelets 030 (H) Thousands/uL      Neutrophils Relative 40 (L) %      Lymphocytes Relative 50 %      Monocytes Relative 8 %      Eosinophils Relative 1 %      Basophils Relative 1 %      Neutrophils Absolute 2 30 Thousands/µL      Lymphocytes Absolute 2 90 Thousands/µL      Monocytes Absolute 0 40 Thousand/µL      Eosinophils Absolute 0 10 Thousand/µL      Basophils Absolute 0 10 Thousands/µL     Urine Microscopic [[id number]]  (Abnormal) Collected:  10/16/18 1216    Lab Status:  Final result Specimen:  Urine from Urine, Clean Catch Updated:  10/16/18 1255     RBC, UA 0-1 (A) /hpf      WBC, UA 0-1 (A) /hpf      Epithelial Cells Moderate (A) /hpf      Bacteria, UA Innumerable (A) /hpf     UA w Reflex to Microscopic [[id number]]  (Abnormal) Collected:  10/16/18 1216    Lab Status:  Final result Specimen:  Urine from Urine, Clean Catch Updated:  10/16/18 1240     Color, UA Sammie (A)     Clarity, UA Cloudy (A)     Specific Gravity, UA 1 020     pH, UA 6 0     Leukocytes, UA Negative     Nitrite, UA Negative     Protein, UA >=500 (A) mg/dl      Glucose, UA Negative mg/dl      Ketones, UA Negative mg/dl      Bilirubin, UA Negative     Blood, UA 10 0 (A)     UROBILINOGEN UA Negative mg/dL     Ethanol [[id number]]  (Normal) Collected:  10/16/18 1215    Lab Status:  Final result Specimen:  Blood from Arm, Right Updated:  10/16/18 1239     Ethanol Lvl <34 mg/dL     Salicylate level [[id number]]  (Abnormal) Collected:  10/16/18 1215    Lab Status:  Final result Specimen:  Blood from Arm, Right Updated:  26/56/87 7474     Salicylate Lvl <4 7 (L) mg/dL     Magnesium [[id number]]  (Normal) Collected:  10/16/18 1215    Lab Status:  Final result Specimen:  Blood from Arm, Right Updated:  10/16/18 1239     Magnesium 2 0 mg/dL     Acetaminophen level [[id number]]  (Abnormal) Collected:  10/16/18 1215    Lab Status:  Final result Specimen:  Blood from Arm, Right Updated:  10/16/18 1239     Acetaminophen Level <10 (L) ug/mL     Comprehensive metabolic panel [[id number]]  (Abnormal) Collected:  10/16/18 1215    Lab Status:  Final result Specimen:  Blood from Arm, Right Updated:  10/16/18 1239     Sodium 140 mmol/L      Potassium 4 0 mmol/L      Chloride 102 mmol/L      CO2 30 mmol/L      ANION GAP 8 mmol/L      BUN 7 mg/dL Creatinine 0 71 mg/dL      Glucose 60 (L) mg/dL      Calcium 10 1 mg/dL      AST 17 U/L      ALT 18 U/L      Alkaline Phosphatase 40 (L) U/L      Total Protein 7 6 g/dL      Albumin 4 6 g/dL      Total Bilirubin 0 70 mg/dL      eGFR 128 ml/min/1 73sq m     Narrative:         National Kidney Disease Education Program recommendations are as follows:  GFR calculation is accurate only with a steady state creatinine  Chronic Kidney disease less than 60 ml/min/1 73 sq  meters  Kidney failure less than 15 ml/min/1 73 sq  meters  CBC and differential [[id number]]  (Abnormal) Collected:  10/16/18 1215    Lab Status:  Final result Specimen:  Blood from Arm, Right Updated:  10/16/18 1229     WBC 5 80 Thousand/uL      RBC 5 47 (H) Million/uL      Hemoglobin 7 6 (L) g/dL      Hematocrit 28 5 (L) %      MCV 52 (L) fL      MCH 14 0 (L) pg      MCHC 26 8 (L) g/dL      RDW 21 3 (H) %      MPV 8 3 (L) fL      Platelets 852 (H) Thousands/uL      Neutrophils Relative 40 (L) %      Lymphocytes Relative 51 (H) %      Monocytes Relative 8 %      Eosinophils Relative 1 %      Basophils Relative 1 %      Neutrophils Absolute 2 30 Thousands/µL      Lymphocytes Absolute 2 90 Thousands/µL      Monocytes Absolute 0 40 Thousand/µL      Eosinophils Absolute 0 10 Thousand/µL      Basophils Absolute 0 00 Thousands/µL     POCT pregnancy, urine [[id number]]  (Normal) Resulted:  10/16/18 1221    Lab Status:  Final result Updated:  10/16/18 1221     EXT PREG TEST UR (Ref: Negative) preg test negative          I/O Past 24 hours:  I/O last 3 completed shifts: In: 110 [IV Piggyback:110]  Out: -   No intake/output data recorded  Assessment / Plan:     Bipolar 1 disorder, depressed, severe ([facility] [address] )    Recommended Treatment:      Medication changes:  1) continue Abilify  Initiate Colace  Complete 3rd venofer infusion today  Non-pharmacological treatments  1) Continue with group therapy, milieu therapy and occupational therapy      Safety  1) Safety/communication plan established targeting dynamic risk factors above  2) Risks, benefits, and possible side effects of medications explained to patient and patient verbalizes understanding  Counseling / Coordination of Care    Total floor / unit time spent today 20 minutes  Greater than 50% of total time was spent with the patient and / or family counseling and / or coordination of care  A description of the counseling / coordination of care  Patient's Rights, confidentiality and exceptions to confidentiality, use of automated medical record, [facility] staff access to medical record, and consent to treatment reviewed      Juan Carlos Aaron PA-C

## 2018-10-19 NOTE — NURSING NOTE
Patient remains a SP1,S/F and voices that her depression is 4/10 and no anxiety  Stays in her room due to lack of energy,but did talk about returning to her boyfriends place

## 2018-10-19 NOTE — PLAN OF CARE
Alteration in Thoughts and Perception     Attend and participate in unit activities, including therapeutic, recreational, and educational groups Not Progressing     Complete daily ADLs, including personal hygiene independently, as able Not Progressing          Alteration in Thoughts and Perception     Verbalize thoughts and feelings Progressing     Agree to be compliant with medication regime, as prescribed and report medication side effects Progressing        Depression     Treatment Goal: Demonstrate behavioral control of depressive symptoms, verbalize feelings of improved mood/affect, and adopt new coping skills prior to discharge Progressing        Risk for Self Injury/Neglect     Refrain from harming self Progressing          Client is withdrawn to self/room  Appears to be sleeping in bed  Needs to be prompted for medications  Denies AH/VH/SI/HI and anxiety  6/10 depression  No complaints of constipation this morning  Will continue to monitor

## 2018-10-19 NOTE — NURSING NOTE
Pt to icu room 412 sitting in recliner chair accompanied by NA to stay with pt for venofer infusion

## 2018-10-19 NOTE — NURSING NOTE
Patient in bed stating she was too tires to sit up and talk  All answers were on word to the questions writer asked  Appeared very sleepy

## 2018-10-19 NOTE — NURSING NOTE
Pt has completed Venofer infusion  Tolerated same well  L  Hand IV removed  Pt escorted back to APU  Left 412 accompanied by MHT in NAD

## 2018-10-19 NOTE — PROGRESS NOTES
Patient received on fall and suicide precautions at 1900, and was in bed initially, but was awoken and transported to ICU @ 2000 for an ordered iron transfusion  Author spoke with her briefly when she returned for his [address] assessment, and she returned to sleep almost immediately following  Patient was tired and lethargic, but was pleasant with author  Patient remained isolative to her room and self, and did not come into milieu at all other than transported back and forth by wheelchair to ICU  Interactions with staff were appropriate  Patient denied SI, HI, A/V hallucinations, and pain   Patient reported 2/4 anxiety and 5/10 depression  No negative behaviors or distress noted/observed, and patient was encouraged to attend groups tomorrow  No PRN's were requested  Fall and suicide precautions, and q15 minute rounding, were continued for patient safety  Will continue to monitor and offer therapeutic support

## 2018-10-19 NOTE — NURSING NOTE
Infusion finished  Patient ambulated to wheelchair with no difficulty denied dizziness, no N/V no complaints of pain  Left unit via wheelchair with MHT   No visible distress

## 2018-10-19 NOTE — SOCIAL WORK
Worker spoke with patient regarding admission  Patient still very weak and offered minimal upon assessment  Patient did inform worker that she is not going to any outpatient psychiatric services at this time but did express interest   Patient signed MASOOD for MARY[facility]  Patient reports she is currently residing with her boyfriend at the [facility] address provided on her facesheet  Patient reports she does still have an active case open with [facility], her children were removed from her custody in 2017  Patient reports her only support system is her boyfriend but did not sign MASOOD for him  Patient is legally  but her  is currently incarcerated  Patient's 23year old son was incarcerated but he was reportedly released from intermediate  Patient does have guilt over her son being incarcerated since he was robbing with his stepfather  Patient's has 3 other children that are currently in foster care  Patient has had several inpatient psychiatric hospitalizations including [address] [place] [address] [address] in 2017, February 2018, [facility] in October 2017 and [facility] in June 2018  Patient has a hx of crack cocaine abuse, her UDS was negative upon admission  Patient not forthcoming regarding her use  Patient has been referred to various outpatient rehabs but has not gone  Worker did ask patient about case management services and at this time is agreeable  Worker ended assessment as patient was falling asleep, she is scheduled to get her last iron infusion today  Patient did report feeling better but does remain tired  Worker will complete Wellness Recovery Team referral to [facility] for both mental health and medical issues

## 2018-10-20 LAB
ANISOCYTOSIS BLD QL SMEAR: PRESENT
BACTERIA UR CULT: NORMAL
EOSINOPHIL # BLD AUTO: 0.06 THOUSAND/UL (ref 0–0.4)
EOSINOPHIL NFR BLD MANUAL: 1 % (ref 0–6)
ERYTHROCYTE [DISTWIDTH] IN BLOOD BY AUTOMATED COUNT: 22.2 %
HCT VFR BLD AUTO: 26.8 % (ref 36–46)
HGB BLD-MCNC: 7.1 G/DL (ref 12–16)
HIV 1+2 AB+HIV1 P24 AG SERPL QL IA: NORMAL
HYPERCHROMIA BLD QL SMEAR: PRESENT
LG PLATELETS BLD QL SMEAR: PRESENT
LYMPHOCYTES # BLD AUTO: 2.17 THOUSAND/UL (ref 0.5–4)
LYMPHOCYTES # BLD AUTO: 35 % (ref 20–50)
MCH RBC QN AUTO: 14.5 PG (ref 26–34)
MCHC RBC AUTO-ENTMCNC: 26.5 G/DL (ref 31–36)
MCV RBC AUTO: 55 FL (ref 80–100)
MONOCYTES # BLD AUTO: 0.68 THOUSAND/UL (ref 0.2–0.9)
MONOCYTES NFR BLD AUTO: 11 % (ref 1–10)
NEUTS SEG # BLD: 3.29 THOUSAND/UL (ref 1.8–7.8)
NEUTS SEG NFR BLD AUTO: 53 %
PLATELET # BLD AUTO: 302 THOUSANDS/UL (ref 150–450)
PLATELET BLD QL SMEAR: ADEQUATE
PMV BLD AUTO: 9 FL (ref 8.9–12.7)
POIKILOCYTOSIS BLD QL SMEAR: PRESENT
POLYCHROMASIA BLD QL SMEAR: PRESENT
RBC # BLD AUTO: 4.88 MILLION/UL (ref 4–5.2)
RBC MORPH BLD: ABNORMAL
TOTAL CELLS COUNTED SPEC: 100
WBC # BLD AUTO: 6.2 THOUSAND/UL (ref 4.5–11)

## 2018-10-20 PROCEDURE — 85007 BL SMEAR W/DIFF WBC COUNT: CPT | Performed by: INTERNAL MEDICINE

## 2018-10-20 PROCEDURE — 85027 COMPLETE CBC AUTOMATED: CPT | Performed by: INTERNAL MEDICINE

## 2018-10-20 RX ADMIN — VITAMIN D, TAB 1000IU (100/BT) 2000 UNITS: 25 TAB at 08:29

## 2018-10-20 RX ADMIN — DOCUSATE SODIUM 100 MG: 100 CAPSULE, LIQUID FILLED ORAL at 17:55

## 2018-10-20 RX ADMIN — TRAZODONE HYDROCHLORIDE 100 MG: 100 TABLET ORAL at 22:39

## 2018-10-20 RX ADMIN — DOCUSATE SODIUM 100 MG: 100 CAPSULE, LIQUID FILLED ORAL at 08:29

## 2018-10-20 RX ADMIN — FERROUS SULFATE TAB 325 MG (65 MG ELEMENTAL FE) 325 MG: 325 (65 FE) TAB at 08:29

## 2018-10-20 RX ADMIN — FERROUS SULFATE TAB 325 MG (65 MG ELEMENTAL FE) 325 MG: 325 (65 FE) TAB at 16:18

## 2018-10-20 RX ADMIN — LORAZEPAM 1 MG: 1 TABLET ORAL at 19:18

## 2018-10-20 RX ADMIN — ARIPIPRAZOLE 5 MG: 5 TABLET ORAL at 08:29

## 2018-10-20 RX ADMIN — OXYCODONE HYDROCHLORIDE AND ACETAMINOPHEN 500 MG: 500 TABLET ORAL at 08:29

## 2018-10-20 RX ADMIN — ARIPIPRAZOLE 5 MG: 5 TABLET ORAL at 17:55

## 2018-10-20 NOTE — NURSING NOTE
RN cece blood work this am and patient was very cooperative  Patient received Trazadone 100 mg po prn at 2113  Patient isolative to room and self all evening  Behaviors at controlled   Medication complaint  Reports no issues related to sleep  Patient in bed at this time and appears to be sleeping  Eyes closed and chest movements noted  Offered no complaints  Patient did not wake up to request any PRN medications during the night  Slept well

## 2018-10-20 NOTE — PLAN OF CARE
Problem: Alteration in Thoughts and Perception  Goal: Verbalize thoughts and feelings  Interventions:  - Promote a nonjudgmental and trusting relationship with the patient through active listening and therapeutic communication  - Assess patient's level of functioning, behavior and potential for risk  - Engage patient in 1 on 1 interactions for a minimum of 15 minutes each session  - Encourage patient to express fears, feelings, frustrations, and discuss symptoms    - [place] patient to reality, help patient recognize reality-based thinking   - Administer medications as ordered and assess for potential side effects  - Provide the patient education related to the signs and symptoms of the illness and desired effects of prescribed medications   Outcome: Progressing    Goal: Agree to be compliant with medication regime, as prescribed and report medication side effects  Interventions:  - Offer appropriate PRN medication and supervise ingestion; conduct aims, as needed    Outcome: Progressing    Goal: Attend and participate in unit activities, including therapeutic, recreational, and educational groups  Interventions:  - Provide therapeutic and educational activities daily, encourage attendance and participation, and document same in the medical record    Outcome: Not Progressing    Goal: Complete daily ADLs, including personal hygiene independently, as able  Interventions:  - Observe, teach, and assist patient with ADLS  - Monitor and promote a balance of rest/activity, with adequate nutrition and elimination    Outcome: Progressing      Problem: Risk for Self Injury/Neglect  Goal: Refrain from harming self  Interventions:  - Monitor patient closely, per order  - Develop a trusting relationship  - Supervise medication ingestion, monitor effects and side effects    Outcome: Progressing      Problem: Depression  Goal: Treatment Goal: Demonstrate behavioral control of depressive symptoms, verbalize feelings of improved mood/affect, and adopt new coping skills prior to discharge  Outcome: Progressing      Problem: Anxiety  Goal: Anxiety is at manageable level  Interventions:  - Assess and monitor patient's anxiety level  - Monitor for signs and symptoms of anxiety both physical and emotional (heart palpitations, chest pain, shortness of breath, headaches, nausea, feeling jumpy, restlessness, irritable, apprehensive)  - Collaborate with interdisciplinary team and initiate plan and interventions as ordered  - [place] patient to unit/surroundings  - Explain treatment plan  - Encourage participation in care  - Encourage verbalization of concerns/fears  - Identify coping mechanisms  - Assist in developing anxiety-reducing skills  - Administer/offer alternative therapies  - Limit or eliminate stimulants   Outcome: Progressing      Comments: Pt laying in bed and reported no improvement in depression or anxiety  Pt denies SI's, HI's, AH's, VH's at this time  Pt got out of bed for breakfast although returned afterward and is isolative to room  Pt with Hemoglobin of 7 1  Will page MD if lab falls below 7 0  Pt med/meal compliant  Will continue to monitor and provide therapeutic support

## 2018-10-20 NOTE — PROGRESS NOTES
Psychiatry Progress Note    Subjective: Interval History     Patient continues to be depressed, hopeless and helpless  She is still disheveled in appearance  She is with very low energy  Still with a blunted affect  She continues to endorse passive death wishes however she does not have any specific plan or intent at this time  She is still experiencing auditory hallucinations however reports they are not as intrusive and also less frequent than they were previously    Current medications:    Current Facility-Administered Medications:     acetaminophen (TYLENOL) tablet 650 mg, 650 mg, Oral, Q6H PRN, Honey Cheeks, DO    aluminum-magnesium hydroxide-simethicone (MYLANTA) 200-200-20 mg/5 mL oral suspension 30 mL, 30 mL, Oral, Q4H PRN, Anjana Mccall PA-C    ARIPiprazole (ABILIFY) tablet 5 mg, 5 mg, Oral, BID, Feli Chávez PA-C, 5 mg at 10/20/18 4954    ascorbic acid (VITAMIN C) tablet 500 mg, 500 mg, Oral, Daily, Cece Samayoa MD, 500 mg at 10/20/18 3594    benztropine (COGENTIN) injection 1 mg, 1 mg, Intramuscular, Q6H PRN, Anjana Mccall PA-C    benztropine (COGENTIN) tablet 1 mg, 1 mg, Oral, Q6H PRN, Anjana Mccall PA-C    cholecalciferol (VITAMIN D3) tablet 2,000 Units, 2,000 Units, Oral, Daily, Honey Cheeks, DO, 2,000 Units at 10/20/18 0829    docusate sodium (COLACE) capsule 100 mg, 100 mg, Oral, BID, Feli Chávez PA-C, 100 mg at 10/20/18 2462    ferrous sulfate tablet 325 mg, 325 mg, Oral, BID With Meals, Honey Cheeks, DO, 325 mg at 10/20/18 0354    hydrOXYzine HCL (ATARAX) tablet 50 mg, 50 mg, Oral, Q4H PRN, Feli Chávez PA-C    influenza vaccine, quadrivalent (FLULAVAL) IM injection 0 5 mL, 0 5 mL, Intramuscular, Prior to discharge, Anjana Mccall PA-C    LORazepam (ATIVAN) 2 mg/mL injection 1 mg, 1 mg, Intramuscular, Q4H PRN, Anjana Mccall PA-C    LORazepam (ATIVAN) tablet 1 mg, 1 mg, Oral, Q4H PRN, Anjana Mccall PA-C, 1 mg at 10/19/18 2038    magnesium hydroxide (MILK OF MAGNESIA) 400 mg/5 mL oral suspension 20 mL, 20 mL, Oral, Daily PRN, lUi Prakash PA-C    nicotine polacrilex (NICORETTE) gum 2 mg, 2 mg, Oral, Q2H PRN, Uli Prakash PA-C    OLANZapine (ZyPREXA) IM injection 10 mg, 10 mg, Intramuscular, Q4H PRN, Jaxson Contreras PA-C    OLANZapine (ZyPREXA) tablet 10 mg, 10 mg, Oral, Q4H PRN, Jaxson Contreras PA-C    polyethylene glycol (MIRALAX) packet 17 g, 17 g, Oral, BID PRN, Jaxson Contreras PA-C    traZODone (DESYREL) tablet 100 mg, 100 mg, Oral, HS PRN, Jaxson Contreras PA-C, 100 mg at 10/19/18 2113      Objective:     Vital Signs:  Vitals:    10/19/18 0701 10/19/18 1414 10/20/18 0810 10/20/18 0811   BP: 108/78 101/67 108/75 113/63   BP Location: Left arm Left arm Left arm Left arm   Pulse: 90 92 (!) 117 (!) 120   Resp:  16 16    Temp:  98 9 °F (37 2 °C) 98 4 °F (36 9 °C)    TempSrc:  Temporal Temporal    SpO2:  100%     Weight:       Height:             Appearance:  age appropriate, casually dressed and disheveled   Behavior:  restless and fidgety   Speech:  soft   Mood:  anxious, depressed and irritable   Affect:  flat   Thought Process:  normal   Thought Content:  normal   Perceptual Disturbances:  Auditory hallucinations without commands   Risk Potential: Suicidal Ideations without plan   Sensorium:  person, place, situation and time   Cognition:  intact   Consciousness:  alert and awake    Attention: attention span and concentration were age appropriate   Intellect: average   Insight:  limited   Judgment: limited      Motor Activity: no abnormal movements           Recent Labs:  Results Reviewed     Procedure Component Value Units Date/Time    Fingerstick Glucose (POCT) [[id number]]  (Normal) Collected:  10/16/18 1306    Lab Status:  Final result Updated:  10/16/18 2303     POC Glucose 92 mg/dl     Rapid drug screen, urine [[id number]]  (Normal) Collected:  10/16/18 1653    Lab Status:  Final result Specimen:  Urine from Urine, Clean Catch Updated:  10/16/18 1749     Amph/Meth UR Negative     Barbiturate Ur Negative     Benzodiazepine Urine Negative     Cocaine Urine Negative     Methadone Urine Negative     Opiate Urine Negative     PCP Ur Negative     THC Urine Negative    Narrative:         FOR MEDICAL PURPOSES ONLY  IF CONFIRMATION NEEDED PLEASE CONTACT THE LAB WITHIN 5 DAYS      Drug Screen Cutoff Levels:  AMPHETAMINE/METHAMPHETAMINES  1000 ng/mL  BARBITURATES     200 ng/mL  BENZODIAZEPINES     200 ng/mL  COCAINE      300 ng/mL  METHADONE      300 ng/mL  OPIATES      300 ng/mL  PHENCYCLIDINE     25 ng/mL  THC       50 ng/mL    CBC and differential [[id number]]  (Abnormal) Collected:  10/16/18 1341    Lab Status:  Final result Specimen:  Blood from Arm, Right Updated:  10/16/18 1355     WBC 5 70 Thousand/uL      RBC 4 91 Million/uL      Hemoglobin 6 8 (LL) g/dL      Hematocrit 25 3 (L) %      MCV 52 (L) fL      MCH 13 8 (L) pg      MCHC 26 7 (L) g/dL      RDW 21 9 (H) %      MPV 8 3 (L) fL      Platelets 296 (H) Thousands/uL      Neutrophils Relative 40 (L) %      Lymphocytes Relative 50 %      Monocytes Relative 8 %      Eosinophils Relative 1 %      Basophils Relative 1 %      Neutrophils Absolute 2 30 Thousands/µL      Lymphocytes Absolute 2 90 Thousands/µL      Monocytes Absolute 0 40 Thousand/µL      Eosinophils Absolute 0 10 Thousand/µL      Basophils Absolute 0 10 Thousands/µL     Urine Microscopic [[id number]]  (Abnormal) Collected:  10/16/18 1216    Lab Status:  Final result Specimen:  Urine from Urine, Clean Catch Updated:  10/16/18 1255     RBC, UA 0-1 (A) /hpf      WBC, UA 0-1 (A) /hpf      Epithelial Cells Moderate (A) /hpf      Bacteria, UA Innumerable (A) /hpf     UA w Reflex to Microscopic [[id number]]  (Abnormal) Collected:  10/16/18 1216    Lab Status:  Final result Specimen:  Urine from Urine, Clean Catch Updated:  10/16/18 1240     Color, UA [place] (A)     Clarity, UA Cloudy (A)     Specific [place], UA 1 020     pH, UA 6 0     Leukocytes, UA Negative     Nitrite, UA Negative     Protein, UA >=500 (A) mg/dl      Glucose, UA Negative mg/dl      Ketones, UA Negative mg/dl      Bilirubin, UA Negative     Blood, UA 10 0 (A)     UROBILINOGEN UA Negative mg/dL     Ethanol [[id number]]  (Normal) Collected:  10/16/18 1215    Lab Status:  Final result Specimen:  Blood from Arm, Right Updated:  10/16/18 1239     Ethanol Lvl <31 mg/dL     Salicylate level [[id number]]  (Abnormal) Collected:  10/16/18 1215    Lab Status:  Final result Specimen:  Blood from Arm, Right Updated:  60/20/57 5377     Salicylate Lvl <4 9 (L) mg/dL     Magnesium [[id number]]  (Normal) Collected:  10/16/18 1215    Lab Status:  Final result Specimen:  Blood from Arm, Right Updated:  10/16/18 1239     Magnesium 2 0 mg/dL     Acetaminophen level [[id number]]  (Abnormal) Collected:  10/16/18 1215    Lab Status:  Final result Specimen:  Blood from Arm, Right Updated:  10/16/18 1239     Acetaminophen Level <10 (L) ug/mL     Comprehensive metabolic panel [[id number]]  (Abnormal) Collected:  10/16/18 1215    Lab Status:  Final result Specimen:  Blood from Arm, Right Updated:  10/16/18 1239     Sodium 140 mmol/L      Potassium 4 0 mmol/L      Chloride 102 mmol/L      CO2 30 mmol/L      ANION GAP 8 mmol/L      BUN 7 mg/dL      Creatinine 0 71 mg/dL      Glucose 60 (L) mg/dL      Calcium 10 1 mg/dL      AST 17 U/L      ALT 18 U/L      Alkaline Phosphatase 40 (L) U/L      Total Protein 7 6 g/dL      Albumin 4 6 g/dL      Total Bilirubin 0 70 mg/dL      eGFR 128 ml/min/1 73sq m     Narrative:         National Kidney Disease Education Program recommendations are as follows:  GFR calculation is accurate only with a steady state creatinine  Chronic Kidney disease less than 60 ml/min/1 73 sq  meters  Kidney failure less than 15 ml/min/1 73 sq  meters      CBC and differential [[id number]]  (Abnormal) Collected:  10/16/18 1215    Lab Status:  Final result Specimen: Blood from Arm, Right Updated:  10/16/18 1229     WBC 5 80 Thousand/uL      RBC 5 47 (H) Million/uL      Hemoglobin 7 6 (L) g/dL      Hematocrit 28 5 (L) %      MCV 52 (L) fL      MCH 14 0 (L) pg      MCHC 26 8 (L) g/dL      RDW 21 3 (H) %      MPV 8 3 (L) fL      Platelets 802 (H) Thousands/uL      Neutrophils Relative 40 (L) %      Lymphocytes Relative 51 (H) %      Monocytes Relative 8 %      Eosinophils Relative 1 %      Basophils Relative 1 %      Neutrophils Absolute 2 30 Thousands/µL      Lymphocytes Absolute 2 90 Thousands/µL      Monocytes Absolute 0 40 Thousand/µL      Eosinophils Absolute 0 10 Thousand/µL      Basophils Absolute 0 00 Thousands/µL     POCT pregnancy, urine [[id number]]  (Normal) Resulted:  10/16/18 1221    Lab Status:  Final result Updated:  10/16/18 1221     EXT PREG TEST UR (Ref: Negative) preg test negative          I/O Past 24 hours:  I/O last 3 completed shifts: In: 250 [IV Piggyback:250]  Out: -   No intake/output data recorded  Assessment / Plan:     Bipolar 1 disorder, depressed, severe ([facility] [address] )    Recommended Treatment:      Medication changes:  1) continue current medications    Non-pharmacological treatments  1) Continue with group therapy, milieu therapy and occupational therapy  Safety  1) Safety/communication plan established targeting dynamic risk factors above  2) Risks, benefits, and possible side effects of medications explained to patient and patient verbalizes understanding  Counseling / Coordination of Care    Total floor / unit time spent today 20 minutes  Greater than 50% of total time was spent with the patient and / or family counseling and / or coordination of care  A description of the counseling / coordination of care  Patient's Rights, confidentiality and exceptions to confidentiality, use of automated medical record, [address] [place] [address] staff access to medical record, and consent to treatment reviewed      Hollie Ramirez PA-C

## 2018-10-21 LAB
ATRIAL RATE: 73 BPM
P AXIS: 26 DEGREES
PR INTERVAL: 150 MS
QRS AXIS: 45 DEGREES
QRSD INTERVAL: 82 MS
QT INTERVAL: 396 MS
QTC INTERVAL: 436 MS
T WAVE AXIS: 56 DEGREES
VENTRICULAR RATE: 73 BPM

## 2018-10-21 PROCEDURE — 93010 ELECTROCARDIOGRAM REPORT: CPT | Performed by: INTERNAL MEDICINE

## 2018-10-21 RX ORDER — ARIPIPRAZOLE 10 MG/1
10 TABLET ORAL 2 TIMES DAILY
Status: DISCONTINUED | OUTPATIENT
Start: 2018-10-21 | End: 2018-10-24

## 2018-10-21 RX ADMIN — VITAMIN D, TAB 1000IU (100/BT) 2000 UNITS: 25 TAB at 08:34

## 2018-10-21 RX ADMIN — TRAZODONE HYDROCHLORIDE 100 MG: 100 TABLET ORAL at 21:04

## 2018-10-21 RX ADMIN — FERROUS SULFATE TAB 325 MG (65 MG ELEMENTAL FE) 325 MG: 325 (65 FE) TAB at 08:34

## 2018-10-21 RX ADMIN — OXYCODONE HYDROCHLORIDE AND ACETAMINOPHEN 500 MG: 500 TABLET ORAL at 08:34

## 2018-10-21 RX ADMIN — FERROUS SULFATE TAB 325 MG (65 MG ELEMENTAL FE) 325 MG: 325 (65 FE) TAB at 17:14

## 2018-10-21 RX ADMIN — DOCUSATE SODIUM 100 MG: 100 CAPSULE, LIQUID FILLED ORAL at 17:14

## 2018-10-21 RX ADMIN — DOCUSATE SODIUM 100 MG: 100 CAPSULE, LIQUID FILLED ORAL at 08:34

## 2018-10-21 RX ADMIN — ARIPIPRAZOLE 10 MG: 10 TABLET ORAL at 17:14

## 2018-10-21 RX ADMIN — ARIPIPRAZOLE 5 MG: 5 TABLET ORAL at 08:34

## 2018-10-21 NOTE — NURSING NOTE
Patient requested Ativan 1 mg po prn at 1918 during visiting hours after reporting she had very high anxiety to the point where she felt as if she was having shortness of breath  Vitals taken and B/P was 108/50 and pulse was 87  No signs of distress noted  She reported it was not due to her visitor but took her Ativan and said goodbye and went to bed early  Behaviors controlled and  medication complaint this evening  Reports no issues related to sleep but did take her PRN Trazadone 100 mg at 2239 after she slept for 3 hours  Patient in bed at this time and appears to be sleeping  Eyes closed and chest movements noted  Offered no complaints  Patient did not wake up to request any PRN medications during the night

## 2018-10-21 NOTE — PLAN OF CARE
Problem: Alteration in Thoughts and Perception  Goal: Verbalize thoughts and feelings  Interventions:  - Promote a nonjudgmental and trusting relationship with the patient through active listening and therapeutic communication  - Assess patient's level of functioning, behavior and potential for risk  - Engage patient in 1 on 1 interactions for a minimum of 15 minutes each session  - Encourage patient to express fears, feelings, frustrations, and discuss symptoms    - [place] patient to reality, help patient recognize reality-based thinking   - Administer medications as ordered and assess for potential side effects  - Provide the patient education related to the signs and symptoms of the illness and desired effects of prescribed medications   Outcome: Progressing    Goal: Agree to be compliant with medication regime, as prescribed and report medication side effects  Interventions:  - Offer appropriate PRN medication and supervise ingestion; conduct aims, as needed    Outcome: Progressing    Goal: Attend and participate in unit activities, including therapeutic, recreational, and educational groups  Interventions:  - Provide therapeutic and educational activities daily, encourage attendance and participation, and document same in the medical record    Outcome: Progressing    Goal: Complete daily ADLs, including personal hygiene independently, as able  Interventions:  - Observe, teach, and assist patient with ADLS  - Monitor and promote a balance of rest/activity, with adequate nutrition and elimination    Outcome: Not Progressing      Problem: Risk for Self Injury/Neglect  Goal: Refrain from harming self  Interventions:  - Monitor patient closely, per order  - Develop a trusting relationship  - Supervise medication ingestion, monitor effects and side effects    Outcome: Progressing      Problem: Depression  Goal: Treatment Goal: Demonstrate behavioral control of depressive symptoms, verbalize feelings of improved mood/affect, and adopt new coping skills prior to discharge  Outcome: Not Progressing      Problem: Anxiety  Goal: Anxiety is at manageable level  Interventions:  - Assess and monitor patient's anxiety level  - Monitor for signs and symptoms of anxiety both physical and emotional (heart palpitations, chest pain, shortness of breath, headaches, nausea, feeling jumpy, restlessness, irritable, apprehensive)  - Collaborate with interdisciplinary team and initiate plan and interventions as ordered  - [place] patient to unit/surroundings  - Explain treatment plan  - Encourage participation in care  - Encourage verbalization of concerns/fears  - Identify coping mechanisms  - Assist in developing anxiety-reducing skills  - Administer/offer alternative therapies  - Limit or eliminate stimulants   Outcome: Progressing      Comments: Pt a&o x 4  Pt laying in bed and reporting moderate fatigue  Pt with depressed affect and mood  Pt denies SI's, HI's, AH's, VH's  Pt with disheveled appearance  Pt does come out of room for shower and medication and then goes back to bed  Will continue to monitor and provide therapeutic support

## 2018-10-21 NOTE — PROGRESS NOTES
Psychiatry Progress Note    Subjective: Interval History     Patient continues to be isolative withdrawn to her room  She continues to endorse feelings of depression  She remains disheveled in appearance  She continues to be fatigue and low energy  Her affect is blunted  Despite having low energy and fatigue she is requesting her anxiety medicines be increased  She continues to endorse auditory hallucinations at times    At this time we will increase Abilify to 10 mg daily  Current medications:    Current Facility-Administered Medications:     acetaminophen (TYLENOL) tablet 650 mg, 650 mg, Oral, Q6H PRN, Lelia Rizzo DO    aluminum-magnesium hydroxide-simethicone (MYLANTA) 200-200-20 mg/5 mL oral suspension 30 mL, 30 mL, Oral, Q4H PRN, Mary Carmen Granado PA-C    ARIPiprazole (ABILIFY) tablet 5 mg, 5 mg, Oral, BID, [place] PA-C, 5 mg at 10/21/18 3172    ascorbic acid (VITAMIN C) tablet 500 mg, 500 mg, Oral, Daily, Warren Barrios MD, 500 mg at 10/21/18 7902    benztropine (COGENTIN) injection 1 mg, 1 mg, Intramuscular, Q6H PRN, Mary Carmen Granado PA-C    benztropine (COGENTIN) tablet 1 mg, 1 mg, Oral, Q6H PRN, Mary Carmen Granado PA-C    cholecalciferol (VITAMIN D3) tablet 2,000 Units, 2,000 Units, Oral, Daily, Lelia Rizzo DO, 2,000 Units at 10/21/18 0834    docusate sodium (COLACE) capsule 100 mg, 100 mg, Oral, BID, [place] Park PA-C, 100 mg at 10/21/18 0854    ferrous sulfate tablet 325 mg, 325 mg, Oral, BID With Meals, Lelia Rizzo DO, 325 mg at 10/21/18 9506    hydrOXYzine HCL (ATARAX) tablet 50 mg, 50 mg, Oral, Q4H PRN, [place] Park PA-C    influenza vaccine, quadrivalent (FLULAVAL) IM injection 0 5 mL, 0 5 mL, Intramuscular, Prior to discharge, Mary Carmen Granado PA-C    LORazepam (ATIVAN) 2 mg/mL injection 1 mg, 1 mg, Intramuscular, Q4H PRN, Mary Carmen Granado PA-C    LORazepam (ATIVAN) tablet 1 mg, 1 mg, Oral, Q4H PRN, Mary Carmen Granado PA-C, 1 mg at 10/20/18 1918    magnesium hydroxide (MILK OF MAGNESIA) 400 mg/5 mL oral suspension 20 mL, 20 mL, Oral, Daily PRN, Malika Mcclure PA-C    nicotine polacrilex (NICORETTE) gum 2 mg, 2 mg, Oral, Q2H PRN, Malika Mcclure, JOSÉ MIGUEL    OLANZapine (ZyPREXA) IM injection 10 mg, 10 mg, Intramuscular, Q4H PRN, Edwinna Course, PA-C    OLANZapine (ZyPREXA) tablet 10 mg, 10 mg, Oral, Q4H PRN, Edwinna Course, PA-C    polyethylene glycol (MIRALAX) packet 17 g, 17 g, Oral, BID PRN, Edwinna Course, PA-ZEYAD    traZODone (DESYREL) tablet 100 mg, 100 mg, Oral, HS PRN, Edwinna Course, PA-C, 100 mg at 10/20/18 2232      Objective:     Vital Signs:  Vitals:    10/20/18 0810 10/20/18 0811 10/21/18 0912 10/21/18 0913   BP: 108/75 113/63 101/66 99/72   BP Location: Left arm Left arm Left arm Left arm   Pulse: (!) 117 (!) 120 90 (!) 106   Resp: 16  16    Temp: 98 4 °F (36 9 °C)  99 2 °F (37 3 °C)    TempSrc: Temporal  Temporal    SpO2:       Weight:    58 5 kg (129 lb)   Height:             Appearance:  age appropriate, casually dressed and disheveled   Behavior:  restless and fidgety   Speech:  soft   Mood:  anxious, depressed and irritable   Affect:  flat   Thought Process:  normal   Thought Content:  normal   Perceptual Disturbances:  Auditory hallucinations without commands   Risk Potential: Suicidal Ideations without plan   Sensorium:  person, place, situation and time   Cognition:  intact   Consciousness:  alert and awake    Attention: attention span and concentration were age appropriate   Intellect: average   Insight:  limited   Judgment: limited      Motor Activity: no abnormal movements           Recent Labs:  Results Reviewed     Procedure Component Value Units Date/Time    Fingerstick Glucose (POCT) [[id number]]  (Normal) Collected:  10/16/18 1306    Lab Status:  Final result Updated:  10/16/18 2303     POC Glucose 92 mg/dl     Rapid drug screen, urine [[id number]]  (Normal) Collected:  10/16/18 1653    Lab Status: Final result Specimen:  Urine from Urine, Clean Catch Updated:  10/16/18 1749     Amph/Meth UR Negative     Barbiturate Ur Negative     Benzodiazepine Urine Negative     Cocaine Urine Negative     Methadone Urine Negative     Opiate Urine Negative     PCP Ur Negative     THC Urine Negative    Narrative:         FOR MEDICAL PURPOSES ONLY  IF CONFIRMATION NEEDED PLEASE CONTACT THE LAB WITHIN 5 DAYS      Drug Screen Cutoff Levels:  AMPHETAMINE/METHAMPHETAMINES  1000 ng/mL  BARBITURATES     200 ng/mL  BENZODIAZEPINES     200 ng/mL  COCAINE      300 ng/mL  METHADONE      300 ng/mL  OPIATES      300 ng/mL  PHENCYCLIDINE     25 ng/mL  THC       50 ng/mL    CBC and differential [[id number]]  (Abnormal) Collected:  10/16/18 1341    Lab Status:  Final result Specimen:  Blood from Arm, Right Updated:  10/16/18 1355     WBC 5 70 Thousand/uL      RBC 4 91 Million/uL      Hemoglobin 6 8 (LL) g/dL      Hematocrit 25 3 (L) %      MCV 52 (L) fL      MCH 13 8 (L) pg      MCHC 26 7 (L) g/dL      RDW 21 9 (H) %      MPV 8 3 (L) fL      Platelets 244 (H) Thousands/uL      Neutrophils Relative 40 (L) %      Lymphocytes Relative 50 %      Monocytes Relative 8 %      Eosinophils Relative 1 %      Basophils Relative 1 %      Neutrophils Absolute 2 30 Thousands/µL      Lymphocytes Absolute 2 90 Thousands/µL      Monocytes Absolute 0 40 Thousand/µL      Eosinophils Absolute 0 10 Thousand/µL      Basophils Absolute 0 10 Thousands/µL     Urine Microscopic [[id number]]  (Abnormal) Collected:  10/16/18 1216    Lab Status:  Final result Specimen:  Urine from Urine, Clean Catch Updated:  10/16/18 1255     RBC, UA 0-1 (A) /hpf      WBC, UA 0-1 (A) /hpf      Epithelial Cells Moderate (A) /hpf      Bacteria, UA Innumerable (A) /hpf     UA w Reflex to Microscopic [[id number]]  (Abnormal) Collected:  10/16/18 1216    Lab Status:  Final result Specimen:  Urine from Urine, Clean Catch Updated:  10/16/18 1240     Color, UA Sammie (A)     Clarity, UA Cloudy (A) Specific [place], UA 1 020     pH, UA 6 0     Leukocytes, UA Negative     Nitrite, UA Negative     Protein, UA >=500 (A) mg/dl      Glucose, UA Negative mg/dl      Ketones, UA Negative mg/dl      Bilirubin, UA Negative     Blood, UA 10 0 (A)     UROBILINOGEN UA Negative mg/dL     Ethanol [[id number]]  (Normal) Collected:  10/16/18 1215    Lab Status:  Final result Specimen:  Blood from Arm, Right Updated:  10/16/18 1239     Ethanol Lvl <39 mg/dL     Salicylate level [[id number]]  (Abnormal) Collected:  10/16/18 1215    Lab Status:  Final result Specimen:  Blood from Arm, Right Updated:  90/99/80 3569     Salicylate Lvl <0 1 (L) mg/dL     Magnesium [[id number]]  (Normal) Collected:  10/16/18 1215    Lab Status:  Final result Specimen:  Blood from Arm, Right Updated:  10/16/18 1239     Magnesium 2 0 mg/dL     Acetaminophen level [[id number]]  (Abnormal) Collected:  10/16/18 1215    Lab Status:  Final result Specimen:  Blood from Arm, Right Updated:  10/16/18 1239     Acetaminophen Level <10 (L) ug/mL     Comprehensive metabolic panel [[id number]]  (Abnormal) Collected:  10/16/18 1215    Lab Status:  Final result Specimen:  Blood from Arm, Right Updated:  10/16/18 1239     Sodium 140 mmol/L      Potassium 4 0 mmol/L      Chloride 102 mmol/L      CO2 30 mmol/L      ANION GAP 8 mmol/L      BUN 7 mg/dL      Creatinine 0 71 mg/dL      Glucose 60 (L) mg/dL      Calcium 10 1 mg/dL      AST 17 U/L      ALT 18 U/L      Alkaline Phosphatase 40 (L) U/L      Total Protein 7 6 g/dL      Albumin 4 6 g/dL      Total Bilirubin 0 70 mg/dL      eGFR 128 ml/min/1 73sq m     Narrative:         National Kidney Disease Education Program recommendations are as follows:  GFR calculation is accurate only with a steady state creatinine  Chronic Kidney disease less than 60 ml/min/1 73 sq  meters  Kidney failure less than 15 ml/min/1 73 sq  meters      CBC and differential [[id number]]  (Abnormal) Collected:  10/16/18 1215    Lab Status:  Final result Specimen:  Blood from Arm, Right Updated:  10/16/18 1229     WBC 5 80 Thousand/uL      RBC 5 47 (H) Million/uL      Hemoglobin 7 6 (L) g/dL      Hematocrit 28 5 (L) %      MCV 52 (L) fL      MCH 14 0 (L) pg      MCHC 26 8 (L) g/dL      RDW 21 3 (H) %      MPV 8 3 (L) fL      Platelets 851 (H) Thousands/uL      Neutrophils Relative 40 (L) %      Lymphocytes Relative 51 (H) %      Monocytes Relative 8 %      Eosinophils Relative 1 %      Basophils Relative 1 %      Neutrophils Absolute 2 30 Thousands/µL      Lymphocytes Absolute 2 90 Thousands/µL      Monocytes Absolute 0 40 Thousand/µL      Eosinophils Absolute 0 10 Thousand/µL      Basophils Absolute 0 00 Thousands/µL     POCT pregnancy, urine [[id number]]  (Normal) Resulted:  10/16/18 1221    Lab Status:  Final result Updated:  10/16/18 1221     EXT PREG TEST UR (Ref: Negative) preg test negative          I/O Past 24 hours:  No intake/output data recorded  No intake/output data recorded  Assessment / Plan:     Bipolar 1 disorder, depressed, severe ([facility] [address] )    Recommended Treatment:      Medication changes:  1) increase Abilify to 10 mg daily    Non-pharmacological treatments  1) Continue with group therapy, milieu therapy and occupational therapy  Safety  1) Safety/communication plan established targeting dynamic risk factors above  2) Risks, benefits, and possible side effects of medications explained to patient and patient verbalizes understanding  Counseling / Coordination of Care    Total floor / unit time spent today 20 minutes  Greater than 50% of total time was spent with the patient and / or family counseling and / or coordination of care  A description of the counseling / coordination of care  Patient's Rights, confidentiality and exceptions to confidentiality, use of automated medical record, [facility] staff access to medical record, and consent to treatment reviewed      Azam Kearns PA-C

## 2018-10-22 LAB
ANISOCYTOSIS BLD QL SMEAR: PRESENT
BASOPHILS # BLD AUTO: 0.06 THOUSAND/UL (ref 0–0.1)
BASOPHILS # BLD AUTO: 0.1 THOUSANDS/ΜL (ref 0–0.1)
BASOPHILS NFR BLD AUTO: 1 % (ref 0–1)
BASOPHILS NFR MAR MANUAL: 1 % (ref 0–1)
EOSINOPHIL # BLD AUTO: 0.1 THOUSAND/ΜL (ref 0–0.4)
EOSINOPHIL NFR BLD AUTO: 1 % (ref 0–6)
ERYTHROCYTE [DISTWIDTH] IN BLOOD BY AUTOMATED COUNT: 22.6 %
HCT VFR BLD AUTO: 29.4 % (ref 36–46)
HGB BLD-MCNC: 8.3 G/DL (ref 12–16)
HYPERCHROMIA BLD QL SMEAR: PRESENT
LYMPHOCYTES # BLD AUTO: 1.45 THOUSAND/UL (ref 0.6–4.47)
LYMPHOCYTES # BLD AUTO: 23 %
LYMPHOCYTES # BLD AUTO: 3 THOUSANDS/ΜL (ref 0.5–4)
LYMPHOCYTES NFR BLD AUTO: 48 % (ref 20–50)
MCH RBC QN AUTO: 16.9 PG (ref 26–34)
MCHC RBC AUTO-ENTMCNC: 28.1 G/DL (ref 31–36)
MCV RBC AUTO: 60 FL (ref 80–100)
MICROCYTES BLD QL AUTO: PRESENT
MONOCYTES # BLD AUTO: 0.4 THOUSAND/ΜL (ref 0.2–0.9)
MONOCYTES NFR BLD AUTO: 6 % (ref 1–10)
NEUTROPHILS # BLD AUTO: 2.7 THOUSANDS/ΜL (ref 1.8–7.8)
NEUTS BAND NFR BLD MANUAL: 1 % (ref 0–8)
NEUTS SEG # BLD: 4.79 THOUSAND/UL (ref 1.81–6.82)
NEUTS SEG NFR BLD AUTO: 43 % (ref 45–65)
NEUTS SEG NFR BLD AUTO: 75 %
OVALOCYTES BLD QL SMEAR: PRESENT
PLATELET # BLD AUTO: 216 THOUSANDS/UL (ref 150–450)
PLATELET BLD QL SMEAR: ADEQUATE
PMV BLD AUTO: 8.7 FL (ref 8.9–12.7)
POIKILOCYTOSIS BLD QL SMEAR: PRESENT
POLYCHROMASIA BLD QL SMEAR: PRESENT
RBC # BLD AUTO: 4.88 MILLION/UL (ref 4–5.2)
RBC MORPH BLD: NORMAL
SCHISTOCYTES BLD QL SMEAR: PRESENT
SPHEROCYTES BLD QL SMEAR: PRESENT
TARGETS BLD QL SMEAR: PRESENT
TOTAL CELLS COUNTED SPEC: 100
WBC # BLD AUTO: 6.3 THOUSAND/UL (ref 4.5–11)

## 2018-10-22 PROCEDURE — 85007 BL SMEAR W/DIFF WBC COUNT: CPT | Performed by: PHYSICIAN ASSISTANT

## 2018-10-22 PROCEDURE — 85027 COMPLETE CBC AUTOMATED: CPT | Performed by: PHYSICIAN ASSISTANT

## 2018-10-22 PROCEDURE — 85025 COMPLETE CBC W/AUTO DIFF WBC: CPT | Performed by: PHYSICIAN ASSISTANT

## 2018-10-22 RX ORDER — ESCITALOPRAM OXALATE 10 MG/1
5 TABLET ORAL DAILY
Status: DISCONTINUED | OUTPATIENT
Start: 2018-10-22 | End: 2018-10-24 | Stop reason: HOSPADM

## 2018-10-22 RX ADMIN — FERROUS SULFATE TAB 325 MG (65 MG ELEMENTAL FE) 325 MG: 325 (65 FE) TAB at 08:57

## 2018-10-22 RX ADMIN — ESCITALOPRAM OXALATE 5 MG: 10 TABLET ORAL at 09:00

## 2018-10-22 RX ADMIN — DOCUSATE SODIUM 100 MG: 100 CAPSULE, LIQUID FILLED ORAL at 17:19

## 2018-10-22 RX ADMIN — LORAZEPAM 1 MG: 1 TABLET ORAL at 20:51

## 2018-10-22 RX ADMIN — ARIPIPRAZOLE 10 MG: 10 TABLET ORAL at 17:19

## 2018-10-22 RX ADMIN — VITAMIN D, TAB 1000IU (100/BT) 2000 UNITS: 25 TAB at 08:57

## 2018-10-22 RX ADMIN — OXYCODONE HYDROCHLORIDE AND ACETAMINOPHEN 500 MG: 500 TABLET ORAL at 08:58

## 2018-10-22 RX ADMIN — FERROUS SULFATE TAB 325 MG (65 MG ELEMENTAL FE) 325 MG: 325 (65 FE) TAB at 17:19

## 2018-10-22 RX ADMIN — TRAZODONE HYDROCHLORIDE 100 MG: 100 TABLET ORAL at 20:51

## 2018-10-22 RX ADMIN — DOCUSATE SODIUM 100 MG: 100 CAPSULE, LIQUID FILLED ORAL at 08:57

## 2018-10-22 RX ADMIN — ARIPIPRAZOLE 10 MG: 10 TABLET ORAL at 08:58

## 2018-10-22 NOTE — NURSING NOTE
Patient requested Trazadone 100 mg po prn at 2104  More visible on the phone and on the unit this shift  More pleasant than usual   Less isolative and appears to have much more energy each day  Behaviors controlled and appropriate all evening  Medication complaint  Reports no issues related to sleep  Patient in bed at this time and appears to be sleeping  Eyes closed and chest movements noted  Offered no complaints  Patient did not wake up to request any PRN medications during the night

## 2018-10-22 NOTE — PLAN OF CARE
Patient remains isolative  She comes out for meds and meals  She requires prompting for medication times  She was compliant with meds/meals  She has not made any verbal statements of self harm  She is cooperative but isolative  Staff will continue to monitor for safety and treatment plan responses

## 2018-10-22 NOTE — PROGRESS NOTES
Psychiatry Progress Note    Subjective: Interval History     Patient isolative to her room this morning  Patient reports that she is feeling tired  Patient reports that she is still having depression and anxiety have reporting improvement in her symptoms since her admission  Patient reports that her voices have decreased and that they just sound like they are background noise now  Patient denies derogatory or commanding content  Patient denying any suicidal ideations or homicidal ideations  Patient reports that she slept well last evening  Patient's hemoglobin improving up to 8 4 today  Staff reports the patient was more visible on the unit last evening        Current medications:    Current Facility-Administered Medications:     acetaminophen (TYLENOL) tablet 650 mg, 650 mg, Oral, Q6H PRN, Kari Delcid DO    aluminum-magnesium hydroxide-simethicone (MYLANTA) 200-200-20 mg/5 mL oral suspension 30 mL, 30 mL, Oral, Q4H PRN, Blayne Yuen PA-C    ARIPiprazole (ABILIFY) tablet 10 mg, 10 mg, Oral, BID, Blayne Yuen PA-C, 10 mg at 10/22/18 8743    ascorbic acid (VITAMIN C) tablet 500 mg, 500 mg, Oral, Daily, Roge Gonzalez MD, 500 mg at 10/22/18 0858    benztropine (COGENTIN) injection 1 mg, 1 mg, Intramuscular, Q6H PRN, Blayne Yuen PA-C    benztropine (COGENTIN) tablet 1 mg, 1 mg, Oral, Q6H PRN, Blayne Yuen PA-C    cholecalciferol (VITAMIN D3) tablet 2,000 Units, 2,000 Units, Oral, Daily, Kari Delcid DO, 2,000 Units at 10/22/18 0857    docusate sodium (COLACE) capsule 100 mg, 100 mg, Oral, BID, Coretta Irvin PA-C, 100 mg at 10/22/18 0857    escitalopram (LEXAPRO) tablet 5 mg, 5 mg, Oral, Daily, Coretta SABIHA Bryan-C, 5 mg at 10/22/18 0900    ferrous sulfate tablet 325 mg, 325 mg, Oral, BID With Meals, Kari Delcid DO, 325 mg at 10/22/18 0857    hydrOXYzine HCL (ATARAX) tablet 50 mg, 50 mg, Oral, Q4H PRN, [place] Millin, PA-C    influenza vaccine, quadrivalent (FLULAVAL) IM injection 0 5 mL, 0 5 mL, Intramuscular, Prior to discharge, [place] SABIHA Pandya-ZEYAD    LORazepam (ATIVAN) 2 mg/mL injection 1 mg, 1 mg, Intramuscular, Q4H PRN, [place] LaiSABIHA bojorquez-ZEYAD    LORazepam (ATIVAN) tablet 1 mg, 1 mg, Oral, Q4H PRN, [place] Laimaryellen, PA-C, 1 mg at 10/20/18 1918    magnesium hydroxide (MILK OF MAGNESIA) 400 mg/5 mL oral suspension 20 mL, 20 mL, Oral, Daily PRN, [place] JOSÉ MIGUEL Pandya    nicotine polacrilex (NICORETTE) gum 2 mg, 2 mg, Oral, Q2H PRN, [place] JOSÉ MIGUEL Pandya    OLANZapine (ZyPREXA) IM injection 10 mg, 10 mg, Intramuscular, Q4H PRN, Daily Erwin PA-C    OLANZapine (ZyPREXA) tablet 10 mg, 10 mg, Oral, Q4H PRN, Daily Erwin PA-C    polyethylene glycol (MIRALAX) packet 17 g, 17 g, Oral, BID PRN, Daily Erwin PA-C    traZODone (DESYREL) tablet 100 mg, 100 mg, Oral, HS PRN, Daily Erwin PA-C, 100 mg at 10/21/18 2104      Objective:     Vital Signs:  Vitals:    10/21/18 0912 10/21/18 0913 10/22/18 0816 10/22/18 0818   BP: 101/66 99/72 116/76 103/64   BP Location: Left arm Left arm Left arm Left arm   Pulse: 90 (!) 106 87 104   Resp: 16  16    Temp: 99 2 °F (37 3 °C)  (!) 97 °F (36 1 °C)    TempSrc: Temporal  Temporal    SpO2:   100%    Weight:  58 5 kg (129 lb)     Height:             Appearance:  age appropriate, casually dressed and disheveled   Behavior:  restless and fidgety   Speech:  soft   Mood:  anxious, depressed and irritable   Affect:  flat   Thought Process:  normal   Thought Content:  normal   Perceptual Disturbances:  Auditory hallucinations without commands   Risk Potential: Suicidal Ideations without plan   Sensorium:  person, place, situation and time   Cognition:  intact   Consciousness:  alert and awake    Attention: attention span and concentration were age appropriate   Intellect: average   Insight:  limited   Judgment: limited      Motor Activity: no abnormal movements           Recent Labs:  Results Reviewed     Procedure Component Value Units Date/Time    Fingerstick Glucose (POCT) [[id number]]  (Normal) Collected:  10/16/18 1306    Lab Status:  Final result Updated:  10/16/18 2303     POC Glucose 92 mg/dl     Rapid drug screen, urine [[id number]]  (Normal) Collected:  10/16/18 1653    Lab Status:  Final result Specimen:  Urine from Urine, Clean Catch Updated:  10/16/18 1749     Amph/Meth UR Negative     Barbiturate Ur Negative     Benzodiazepine Urine Negative     Cocaine Urine Negative     Methadone Urine Negative     Opiate Urine Negative     PCP Ur Negative     THC Urine Negative    Narrative:         FOR MEDICAL PURPOSES ONLY  IF CONFIRMATION NEEDED PLEASE CONTACT THE LAB WITHIN 5 DAYS      Drug Screen Cutoff Levels:  AMPHETAMINE/METHAMPHETAMINES  1000 ng/mL  BARBITURATES     200 ng/mL  BENZODIAZEPINES     200 ng/mL  COCAINE      300 ng/mL  METHADONE      300 ng/mL  OPIATES      300 ng/mL  PHENCYCLIDINE     25 ng/mL  THC       50 ng/mL    CBC and differential [[id number]]  (Abnormal) Collected:  10/16/18 1341    Lab Status:  Final result Specimen:  Blood from Arm, Right Updated:  10/16/18 1355     WBC 5 70 Thousand/uL      RBC 4 91 Million/uL      Hemoglobin 6 8 (LL) g/dL      Hematocrit 25 3 (L) %      MCV 52 (L) fL      MCH 13 8 (L) pg      MCHC 26 7 (L) g/dL      RDW 21 9 (H) %      MPV 8 3 (L) fL      Platelets 059 (H) Thousands/uL      Neutrophils Relative 40 (L) %      Lymphocytes Relative 50 %      Monocytes Relative 8 %      Eosinophils Relative 1 %      Basophils Relative 1 %      Neutrophils Absolute 2 30 Thousands/µL      Lymphocytes Absolute 2 90 Thousands/µL      Monocytes Absolute 0 40 Thousand/µL      Eosinophils Absolute 0 10 Thousand/µL      Basophils Absolute 0 10 Thousands/µL     Urine Microscopic [[id number]]  (Abnormal) Collected:  10/16/18 1216    Lab Status:  Final result Specimen:  Urine from Urine, Clean Catch Updated:  10/16/18 1255     RBC, UA 0-1 (A) /hpf      WBC, UA 0-1 (A) /hpf Epithelial Cells Moderate (A) /hpf      Bacteria, UA Innumerable (A) /hpf     UA w Reflex to Microscopic [[id number]]  (Abnormal) Collected:  10/16/18 1216    Lab Status:  Final result Specimen:  Urine from Urine, Clean Catch Updated:  10/16/18 1240     Color, UA Sammie (A)     Clarity, UA Cloudy (A)     Specific Gravity, UA 1 020     pH, UA 6 0     Leukocytes, UA Negative     Nitrite, UA Negative     Protein, UA >=500 (A) mg/dl      Glucose, UA Negative mg/dl      Ketones, UA Negative mg/dl      Bilirubin, UA Negative     Blood, UA 10 0 (A)     UROBILINOGEN UA Negative mg/dL     Ethanol [[id number]]  (Normal) Collected:  10/16/18 1215    Lab Status:  Final result Specimen:  Blood from Arm, Right Updated:  10/16/18 1239     Ethanol Lvl <41 mg/dL     Salicylate level [[id number]]  (Abnormal) Collected:  10/16/18 1215    Lab Status:  Final result Specimen:  Blood from Arm, Right Updated:  91/68/83 3211     Salicylate Lvl <4 3 (L) mg/dL     Magnesium [[id number]]  (Normal) Collected:  10/16/18 1215    Lab Status:  Final result Specimen:  Blood from Arm, Right Updated:  10/16/18 1239     Magnesium 2 0 mg/dL     Acetaminophen level [[id number]]  (Abnormal) Collected:  10/16/18 1215    Lab Status:  Final result Specimen:  Blood from Arm, Right Updated:  10/16/18 1239     Acetaminophen Level <10 (L) ug/mL     Comprehensive metabolic panel [[id number]]  (Abnormal) Collected:  10/16/18 1215    Lab Status:  Final result Specimen:  Blood from Arm, Right Updated:  10/16/18 1239     Sodium 140 mmol/L      Potassium 4 0 mmol/L      Chloride 102 mmol/L      CO2 30 mmol/L      ANION GAP 8 mmol/L      BUN 7 mg/dL      Creatinine 0 71 mg/dL      Glucose 60 (L) mg/dL      Calcium 10 1 mg/dL      AST 17 U/L      ALT 18 U/L      Alkaline Phosphatase 40 (L) U/L      Total Protein 7 6 g/dL      Albumin 4 6 g/dL      Total Bilirubin 0 70 mg/dL      eGFR 128 ml/min/1 73sq m     Narrative:         National Kidney Disease Education Program recommendations are as follows:  GFR calculation is accurate only with a steady state creatinine  Chronic Kidney disease less than 60 ml/min/1 73 sq  meters  Kidney failure less than 15 ml/min/1 73 sq  meters  CBC and differential [[id number]]  (Abnormal) Collected:  10/16/18 1215    Lab Status:  Final result Specimen:  Blood from Arm, Right Updated:  10/16/18 1229     WBC 5 80 Thousand/uL      RBC 5 47 (H) Million/uL      Hemoglobin 7 6 (L) g/dL      Hematocrit 28 5 (L) %      MCV 52 (L) fL      MCH 14 0 (L) pg      MCHC 26 8 (L) g/dL      RDW 21 3 (H) %      MPV 8 3 (L) fL      Platelets 056 (H) Thousands/uL      Neutrophils Relative 40 (L) %      Lymphocytes Relative 51 (H) %      Monocytes Relative 8 %      Eosinophils Relative 1 %      Basophils Relative 1 %      Neutrophils Absolute 2 30 Thousands/µL      Lymphocytes Absolute 2 90 Thousands/µL      Monocytes Absolute 0 40 Thousand/µL      Eosinophils Absolute 0 10 Thousand/µL      Basophils Absolute 0 00 Thousands/µL     POCT pregnancy, urine [[id number]]  (Normal) Resulted:  10/16/18 1221    Lab Status:  Final result Updated:  10/16/18 1221     EXT PREG TEST UR (Ref: Negative) preg test negative          I/O Past 24 hours:  No intake/output data recorded  No intake/output data recorded  Assessment / Plan:     Bipolar 1 disorder, depressed, severe ([facility] [address] )    Recommended Treatment:      Medication changes:  1) continue increased Abilify dosing  Trial addition of Lexapro 5 mg daily  Non-pharmacological treatments  1) Continue with group therapy, milieu therapy and occupational therapy  Safety  1) Safety/communication plan established targeting dynamic risk factors above  2) Risks, benefits, and possible side effects of medications explained to patient and patient verbalizes understanding  Counseling / Coordination of Care    Total floor / unit time spent today 20 minutes   Greater than 50% of total time was spent with the patient and / or family counseling and / or coordination of care  A description of the counseling / coordination of care  Patient's Rights, confidentiality and exceptions to confidentiality, use of automated medical record, [facility] staff access to medical record, and consent to treatment reviewed      V[place]a Park PA-C

## 2018-10-23 RX ADMIN — DOCUSATE SODIUM 100 MG: 100 CAPSULE, LIQUID FILLED ORAL at 08:50

## 2018-10-23 RX ADMIN — ESCITALOPRAM OXALATE 5 MG: 10 TABLET ORAL at 08:51

## 2018-10-23 RX ADMIN — FERROUS SULFATE TAB 325 MG (65 MG ELEMENTAL FE) 325 MG: 325 (65 FE) TAB at 08:50

## 2018-10-23 RX ADMIN — OXYCODONE HYDROCHLORIDE AND ACETAMINOPHEN 500 MG: 500 TABLET ORAL at 08:51

## 2018-10-23 RX ADMIN — ARIPIPRAZOLE 10 MG: 10 TABLET ORAL at 08:50

## 2018-10-23 RX ADMIN — TRAZODONE HYDROCHLORIDE 100 MG: 100 TABLET ORAL at 19:55

## 2018-10-23 RX ADMIN — LORAZEPAM 1 MG: 1 TABLET ORAL at 19:55

## 2018-10-23 RX ADMIN — ARIPIPRAZOLE 10 MG: 10 TABLET ORAL at 17:15

## 2018-10-23 RX ADMIN — FERROUS SULFATE TAB 325 MG (65 MG ELEMENTAL FE) 325 MG: 325 (65 FE) TAB at 17:15

## 2018-10-23 RX ADMIN — VITAMIN D, TAB 1000IU (100/BT) 2000 UNITS: 25 TAB at 08:51

## 2018-10-23 RX ADMIN — DOCUSATE SODIUM 100 MG: 100 CAPSULE, LIQUID FILLED ORAL at 17:15

## 2018-10-23 NOTE — PLAN OF CARE
Alteration in Thoughts and Perception     Verbalize thoughts and feelings Progressing     Agree to be compliant with medication regime, as prescribed and report medication side effects Progressing     Attend and participate in unit activities, including therapeutic, recreational, and educational groups Progressing     Complete daily ADLs, including personal hygiene independently, as able Progressing          Anxiety     Anxiety is at manageable level Progressing          Depression     Treatment Goal: Demonstrate behavioral control of depressive symptoms, verbalize feelings of improved mood/affect, and adopt new coping skills prior to discharge Progressing        Patient remains isolative in her room  She is visible for meals and phone calls only  She requires prompting to take her scheduled medications  She is med/meal compliant  She has not voiced or exhibited any signs of self harm  Staff will continue to monitor for safety and response to treatment plan

## 2018-10-23 NOTE — PROGRESS NOTES
Patient received on fall and suicide precautions, and was pleasant upon approach, but guarded in affect  Patient was cooperative with care and medication compliant  AAOx4  Patient was visible in milieu for short period of time for visitors, and snack pass, but socialization with peers was minimal, if any at all  Interactions with staff were appropriate  Patient denied SI, HI, A/V hallucinations, and pain  Patient reported 3/4 anxiety and 6/10 depression  No negative behaviors or distress noted/observed, and patient was encouraged to attend groups tomorrow  PRN Ativan 1 mg and Trazodone 100 mg were requested at 2051  Fall and suicide precautions, and q15 minute rounding, were continued for patient safety  Will continue to monitor and offer therapeutic support

## 2018-10-23 NOTE — PROGRESS NOTES
Psychiatry Progress Note    Subjective: Interval History     Patient remains isolative to her room throughout the day  Patient intermittently visible on the unit for meals  Patient reporting that she still continues to have fatigue which has been secondary to her anemia  Patient reports that her fatigue is a chronic issue  Patient reports that she still has some depression and anxiety however noting improvement since her admission  Patient reports that she is not having any thoughts of self-harm  Patient reports that she is no longer experiencing any auditory hallucinations  Patient with no mood lability or aggressive behaviors  Patient medication compliant        Current medications:    Current Facility-Administered Medications:     acetaminophen (TYLENOL) tablet 650 mg, 650 mg, Oral, Q6H PRN, Monique GonzalezrDO    aluminum-magnesium hydroxide-simethicone (MYLANTA) 200-200-20 mg/5 mL oral suspension 30 mL, 30 mL, Oral, Q4H PRN, Dominique Mijares PA-C    ARIPiprazole (ABILIFY) tablet 10 mg, 10 mg, Oral, BID, Dominique Silverkatlyn, PA-C, 10 mg at 10/23/18 0850    ascorbic acid (VITAMIN C) tablet 500 mg, 500 mg, Oral, Daily, Balwinder Torres MD, 500 mg at 10/23/18 0851    benztropine (COGENTIN) injection 1 mg, 1 mg, Intramuscular, Q6H PRN, Dominique Mijares PA-C    benztropine (COGENTIN) tablet 1 mg, 1 mg, Oral, Q6H PRN, Dominique Mijares PA-C    cholecalciferol (VITAMIN D3) tablet 2,000 Units, 2,000 Units, Oral, Daily, Monique Abbott DO, 2,000 Units at 10/23/18 0851    docusate sodium (COLACE) capsule 100 mg, 100 mg, Oral, BID, SABIHA Sanchez-ZEYAD, 100 mg at 10/23/18 0850    escitalopram (LEXAPRO) tablet 5 mg, 5 mg, Oral, Daily, SABIHA Sanchez-ZEYAD, 5 mg at 10/23/18 1948    ferrous sulfate tablet 325 mg, 325 mg, Oral, BID With Meals, Monique Abbott DO, 325 mg at 10/23/18 0850    hydrOXYzine HCL (ATARAX) tablet 50 mg, 50 mg, Oral, Q4H PRN, Sunita Delanye PA-C    LORazepam (ATIVAN) 2 mg/mL injection 1 mg, 1 mg, Intramuscular, Q4H PRN, Val Crowder PA-C    LORazepam (ATIVAN) tablet 1 mg, 1 mg, Oral, Q4H PRN, SABIHA Weller-ZEYAD, 1 mg at 10/22/18 2051    magnesium hydroxide (MILK OF MAGNESIA) 400 mg/5 mL oral suspension 20 mL, 20 mL, Oral, Daily PRN, Val Crowder PA-C    nicotine polacrilex (NICORETTE) gum 2 mg, 2 mg, Oral, Q2H PRN, Val Crowder PA-C    OLANZapine (ZyPREXA) IM injection 10 mg, 10 mg, Intramuscular, Q4H PRN, Jese Alcantara PA-C    OLANZapine (ZyPREXA) tablet 10 mg, 10 mg, Oral, Q4H PRN, SABIHA Meyers-ZEYAD    polyethylene glycol (MIRALAX) packet 17 g, 17 g, Oral, BID PRN, Jese Alcantara PA-C    traZODone (DESYREL) tablet 100 mg, 100 mg, Oral, HS PRN, SABIHA Meyers-ZEYAD, 100 mg at 10/22/18 2051      Objective:     Vital Signs:  Vitals:    10/22/18 0816 10/22/18 0818 10/23/18 0754 10/23/18 0755   BP: 116/76 103/64 100/65 101/70   BP Location: Left arm Left arm Left arm Left arm   Pulse: 87 104 102 (!) 108   Resp: 16  16    Temp: (!) 97 °F (36 1 °C)  (!) 97 °F (36 1 °C)    TempSrc: Temporal  Temporal    SpO2: 100%  100%    Weight:       Height:             Appearance:  age appropriate, casually dressed and disheveled   Behavior:  normal   Speech:  soft   Mood:  anxious and depressed   Affect:  flat   Thought Process:  normal   Thought Content:  normal   Perceptual Disturbances: None   Risk Potential: none   Sensorium:  person, place, situation and time   Cognition:  intact   Consciousness:  alert and awake    Attention: attention span and concentration were age appropriate   Intellect: average   Insight:  limited   Judgment: limited      Motor Activity: no abnormal movements           Recent Labs:  Results Reviewed     Procedure Component Value Units Date/Time    Fingerstick Glucose (POCT) [[id number]]  (Normal) Collected:  10/16/18 1306    Lab Status:  Final result Updated:  10/16/18 2308     POC Glucose 92 mg/dl     Rapid drug screen, urine [[id number]]  (Normal) Collected:  10/16/18 1653    Lab Status:  Final result Specimen:  Urine from Urine, Clean Catch Updated:  10/16/18 1749     Amph/Meth UR Negative     Barbiturate Ur Negative     Benzodiazepine Urine Negative     Cocaine Urine Negative     Methadone Urine Negative     Opiate Urine Negative     PCP Ur Negative     THC Urine Negative    Narrative:         FOR MEDICAL PURPOSES ONLY  IF CONFIRMATION NEEDED PLEASE CONTACT THE LAB WITHIN 5 DAYS      Drug Screen Cutoff Levels:  AMPHETAMINE/METHAMPHETAMINES  1000 ng/mL  BARBITURATES     200 ng/mL  BENZODIAZEPINES     200 ng/mL  COCAINE      300 ng/mL  METHADONE      300 ng/mL  OPIATES      300 ng/mL  PHENCYCLIDINE     25 ng/mL  THC       50 ng/mL    CBC and differential [[id number]]  (Abnormal) Collected:  10/16/18 1341    Lab Status:  Final result Specimen:  Blood from Arm, Right Updated:  10/16/18 1355     WBC 5 70 Thousand/uL      RBC 4 91 Million/uL      Hemoglobin 6 8 (LL) g/dL      Hematocrit 25 3 (L) %      MCV 52 (L) fL      MCH 13 8 (L) pg      MCHC 26 7 (L) g/dL      RDW 21 9 (H) %      MPV 8 3 (L) fL      Platelets 964 (H) Thousands/uL      Neutrophils Relative 40 (L) %      Lymphocytes Relative 50 %      Monocytes Relative 8 %      Eosinophils Relative 1 %      Basophils Relative 1 %      Neutrophils Absolute 2 30 Thousands/µL      Lymphocytes Absolute 2 90 Thousands/µL      Monocytes Absolute 0 40 Thousand/µL      Eosinophils Absolute 0 10 Thousand/µL      Basophils Absolute 0 10 Thousands/µL     Urine Microscopic [[id number]]  (Abnormal) Collected:  10/16/18 1216    Lab Status:  Final result Specimen:  Urine from Urine, Clean Catch Updated:  10/16/18 1255     RBC, UA 0-1 (A) /hpf      WBC, UA 0-1 (A) /hpf      Epithelial Cells Moderate (A) /hpf      Bacteria, UA Innumerable (A) /hpf     UA w Reflex to Microscopic [[id number]]  (Abnormal) Collected:  10/16/18 1216    Lab Status:  Final result Specimen:  Urine from Urine, Clean Catch Updated:  10/16/18 1240     Color, UA Samime (A)     Clarity, UA Cloudy (A)     Specific Gravity, UA 1 020     pH, UA 6 0     Leukocytes, UA Negative     Nitrite, UA Negative     Protein, UA >=500 (A) mg/dl      Glucose, UA Negative mg/dl      Ketones, UA Negative mg/dl      Bilirubin, UA Negative     Blood, UA 10 0 (A)     UROBILINOGEN UA Negative mg/dL     Ethanol [[id number]]  (Normal) Collected:  10/16/18 1215    Lab Status:  Final result Specimen:  Blood from Arm, Right Updated:  10/16/18 1239     Ethanol Lvl <25 mg/dL     Salicylate level [[id number]]  (Abnormal) Collected:  10/16/18 1215    Lab Status:  Final result Specimen:  Blood from Arm, Right Updated:  84/65/84 7863     Salicylate Lvl <7 6 (L) mg/dL     Magnesium [[id number]]  (Normal) Collected:  10/16/18 1215    Lab Status:  Final result Specimen:  Blood from Arm, Right Updated:  10/16/18 1239     Magnesium 2 0 mg/dL     Acetaminophen level [[id number]]  (Abnormal) Collected:  10/16/18 1215    Lab Status:  Final result Specimen:  Blood from Arm, Right Updated:  10/16/18 1239     Acetaminophen Level <10 (L) ug/mL     Comprehensive metabolic panel [[id number]]  (Abnormal) Collected:  10/16/18 1215    Lab Status:  Final result Specimen:  Blood from Arm, Right Updated:  10/16/18 1239     Sodium 140 mmol/L      Potassium 4 0 mmol/L      Chloride 102 mmol/L      CO2 30 mmol/L      ANION GAP 8 mmol/L      BUN 7 mg/dL      Creatinine 0 71 mg/dL      Glucose 60 (L) mg/dL      Calcium 10 1 mg/dL      AST 17 U/L      ALT 18 U/L      Alkaline Phosphatase 40 (L) U/L      Total Protein 7 6 g/dL      Albumin 4 6 g/dL      Total Bilirubin 0 70 mg/dL      eGFR 128 ml/min/1 73sq m     Narrative:         National Kidney Disease Education Program recommendations are as follows:  GFR calculation is accurate only with a steady state creatinine  Chronic Kidney disease less than 60 ml/min/1 73 sq  meters  Kidney failure less than 15 ml/min/1 73 sq  meters      CBC and differential [[id number]]  (Abnormal) Collected:  10/16/18 1215    Lab Status:  Final result Specimen:  Blood from Arm, Right Updated:  10/16/18 1229     WBC 5 80 Thousand/uL      RBC 5 47 (H) Million/uL      Hemoglobin 7 6 (L) g/dL      Hematocrit 28 5 (L) %      MCV 52 (L) fL      MCH 14 0 (L) pg      MCHC 26 8 (L) g/dL      RDW 21 3 (H) %      MPV 8 3 (L) fL      Platelets 658 (H) Thousands/uL      Neutrophils Relative 40 (L) %      Lymphocytes Relative 51 (H) %      Monocytes Relative 8 %      Eosinophils Relative 1 %      Basophils Relative 1 %      Neutrophils Absolute 2 30 Thousands/µL      Lymphocytes Absolute 2 90 Thousands/µL      Monocytes Absolute 0 40 Thousand/µL      Eosinophils Absolute 0 10 Thousand/µL      Basophils Absolute 0 00 Thousands/µL     POCT pregnancy, urine [[id number]]  (Normal) Resulted:  10/16/18 1221    Lab Status:  Final result Updated:  10/16/18 1221     EXT PREG TEST UR (Ref: Negative) preg test negative          I/O Past 24 hours:  No intake/output data recorded  No intake/output data recorded  Assessment / Plan:     Bipolar 1 disorder, depressed, severe ([facility] [address] )    Recommended Treatment:      Medication changes:  1) continue to monitor on increased Abilify dosing and initiation of Lexapro  Non-pharmacological treatments  1) Continue with group therapy, milieu therapy and occupational therapy  Safety  1) Safety/communication plan established targeting dynamic risk factors above  2) Risks, benefits, and possible side effects of medications explained to patient and patient verbalizes understanding  Counseling / Coordination of Care    Total floor / unit time spent today 20 minutes  Greater than 50% of total time was spent with the patient and / or family counseling and / or coordination of care  A description of the counseling / coordination of care       Patient's Rights, confidentiality and exceptions to confidentiality, use of automated medical record, [facility]ust[facility] staff access to medical record, and consent to treatment reviewed      Juan Granger PA-C

## 2018-10-24 VITALS
DIASTOLIC BLOOD PRESSURE: 62 MMHG | WEIGHT: 129 LBS | OXYGEN SATURATION: 100 % | BODY MASS INDEX: 23.74 KG/M2 | HEIGHT: 62 IN | HEART RATE: 80 BPM | SYSTOLIC BLOOD PRESSURE: 100 MMHG | TEMPERATURE: 97.8 F | RESPIRATION RATE: 18 BRPM

## 2018-10-24 RX ORDER — TRAZODONE HYDROCHLORIDE 100 MG/1
100 TABLET ORAL
Qty: 15 TABLET | Refills: 1 | OUTPATIENT
Start: 2018-10-24 | End: 2019-03-12

## 2018-10-24 RX ORDER — ARIPIPRAZOLE 20 MG/1
20 TABLET ORAL DAILY
Qty: 15 TABLET | Refills: 1 | OUTPATIENT
Start: 2018-10-24 | End: 2019-03-12

## 2018-10-24 RX ORDER — FERROUS SULFATE 325(65) MG
325 TABLET ORAL 2 TIMES DAILY WITH MEALS
Qty: 30 TABLET | Refills: 1 | OUTPATIENT
Start: 2018-10-24 | End: 2019-03-12

## 2018-10-24 RX ORDER — ARIPIPRAZOLE 10 MG/1
20 TABLET ORAL DAILY
Status: DISCONTINUED | OUTPATIENT
Start: 2018-10-24 | End: 2018-10-24 | Stop reason: HOSPADM

## 2018-10-24 RX ORDER — ESCITALOPRAM OXALATE 5 MG/1
5 TABLET ORAL DAILY
Qty: 15 TABLET | Refills: 1 | OUTPATIENT
Start: 2018-10-24 | End: 2019-03-12

## 2018-10-24 RX ORDER — ASCORBIC ACID 500 MG
500 TABLET ORAL DAILY
Qty: 15 TABLET | Refills: 1 | OUTPATIENT
Start: 2018-10-24 | End: 2019-03-12

## 2018-10-24 RX ADMIN — VITAMIN D, TAB 1000IU (100/BT) 2000 UNITS: 25 TAB at 08:04

## 2018-10-24 RX ADMIN — ARIPIPRAZOLE 20 MG: 10 TABLET ORAL at 08:03

## 2018-10-24 RX ADMIN — FERROUS SULFATE TAB 325 MG (65 MG ELEMENTAL FE) 325 MG: 325 (65 FE) TAB at 08:04

## 2018-10-24 RX ADMIN — ESCITALOPRAM OXALATE 5 MG: 10 TABLET ORAL at 08:04

## 2018-10-24 RX ADMIN — DOCUSATE SODIUM 100 MG: 100 CAPSULE, LIQUID FILLED ORAL at 08:04

## 2018-10-24 RX ADMIN — OXYCODONE HYDROCHLORIDE AND ACETAMINOPHEN 500 MG: 500 TABLET ORAL at 08:04

## 2018-10-24 NOTE — SOCIAL WORK
Worker called MARYANN to schedule outpatient follow-up on 10/29 at 10:00am   Patient was also referred to the Wellness Recovery Team through [facility] AT THE [facility]  Worker contacted [facility] hematology to schedule outpatient follow-up per recommendation by hematologist while inpatient    Patient has an appointment on 11/2 at 11:00am

## 2018-10-24 NOTE — NURSING NOTE
Pt anxious about leaving by 08:30 when bf can pick her up  Pt was notified that her discharge planning was not going to be completed by then but pt would be d/c'd as soon as it was complete  Pt verbalized understanding of same  Writer discussed discharge instructions with patient which included medication dose schedule, discharge instructions, suicide prevention hotline, tobacco cessation hotline  Pt verbalized understanding of d/c instructions  Pt given copy of instructions, was [facility], and given belongings  Pt escorted to exit by [facility] at 09:30

## 2018-10-24 NOTE — PROGRESS NOTES
Patient received on fall and suicide precautions, and was pleasant upon approach, and actually engaged with author for a few minutes, which is an improvement over past encounters with patient  Patient was cooperative with care and medication compliant  AAOx4  Patient was visible in milieu for short period of time for visitors, and snack pass, but patient did not socialize with any peers  Interactions with staff were appropriate  Patient denied SI, HI, A/V  A/V hallucinations, and pain  Patient reported 4/4 anxiety (pt  stated she was anxious due to pending D/C and "drama of this unit", and 6/10 depression  No negative behaviors or distress noted/observed, and patient was encouraged to attend groups tomorrow  PRN Ativan 1 mg and Trazodone 100 mg were requested at 1955  Fall and suicide precautions, and q15 minute rounding, were continued for patient safety  Will continue to monitor and offer therapeutic support

## 2018-10-24 NOTE — DISCHARGE SUMMARY
Psychiatric Discharge Summary    Medical Record Number: [id number]  Encounter: [id number]    Discharge diagnosis:  Bipolar 1 disorder, depressed, severe ([facility] [address] )    Secondary diagnoses:  Problem List     * (Principal)Bipolar 1 disorder, depressed, severe (HCC)    Microcytic anemia    Depression    Tobacco abuse    Elevated platelet count    Pyuria    Vitamin D deficiency          HPI:     Patient is a 63-year-old female with history of bipolar disorder who was admitted to the [address] [place] [address] on a 201 status due to depression and suicidal thoughts and auditory hallucinations  Patient not been receiving any outpatient psychiatric services  Patient was continuing to have exacerbation of her symptoms  Patient was reporting that she was hearing voices talking to her all the time and she can no longer tolerate it  Patient was seen medically stable was then transferred for inpatient psychiatric care  Brief Hospital Course: Following admission to the unit patient was found have a low hemoglobin level secondary to her microcytic anemia  Patient did undergo 3 venforer transfusions during her hospitalization with no complications  Patient was starting reports some decrease in her fatigue as result  Patient was initiated on Abilify which was up titrated to 20 mg daily with no complications  Patient was reporting that her auditory hallucinations had subsided however patient was still some depressive symptoms in isolative to her room  Patient was initiated on Lexapro 5 mg with no adverse effects  Patient's affect was during the brain during assessment and patient was reporting that her mood had improved  Patient was with no suicidal ideations or homicidal ideations  Patient was with good sleep and appetite  Patient signed a 72 hour notice which  on 2018  Patient was evaluated on this date and deemed appropriate for discharge to continue her care on an outpatient basis    Patient was with no report displayed any psychiatric symptoms  Patient is verbalizing readiness willingness for discharge on this date  Patient was assessed and deemed to be a low suicide risk  Patient consented that she had any exacerbation of her symptoms she would merely contact [facility] are return back    Condition on discharge:     Improved    Medications Upon Discharge:       Current Facility-Administered Medications: To the emergency department        ARIPiprazole (ABILIFY) tablet 20 mg, 20 mg, Oral, Daily, Al Mueller PA-C, 20 mg at 10/24/18 0803  JOSÉ MIGUEL freeman    benztropine (COGENTIN) tablet 1 mg, 1 mg, Oral, Q6H PRN, Floydene JOSÉ MIGUEL Lockett    cholecalciferol (VITAMIN D3) tablet 2,000 Units, 2,000 Units, Oral, Daily, Elizabet Lara DO, 2,000 Units at 10/24/18 [id number]    escitalopram (LEXAPRO) tablet 5 mg, 5 mg, Oral, Daily, Al Mueller PA-C, 5 mg at 10/24/18 0804    ferrous sulfate tablet 325 mg, 325 mg, Oral, BID With Meals, Elizabet Lara DO, 325 mg at 10/24/18 0804      Al Mueller PA-C

## 2018-10-24 NOTE — SOCIAL WORK
Patient is being discharged today, she signed a 72 hour notice that expires today  Patient is being escorted home by her boyfriend  Patient has an outpatient follow-up at [facility] THE [facility] on 10/29 at 10:00am   Patient was referred to [facility] Recovery Team   Patient has an outpatient follow-up with Hematology on 11/2 at 11:00am   Patient's prescriptions were filled at [facility]

## 2018-11-02 ENCOUNTER — TELEPHONE (OUTPATIENT)
Dept: HEMATOLOGY ONCOLOGY | Facility: CLINIC | Age: 38
End: 2018-11-02

## 2018-11-02 NOTE — TELEPHONE ENCOUNTER
Called new patient a second time  Patient answered, and rescheduled her apt  Confirmed apt time, date, and address of our location with patient  Patient put all info on her calendar  Patient stated she did not receive new patient paperwork  Told patient I will resend the new patient paperwork, along with an apt card

## 2018-11-02 NOTE — TELEPHONE ENCOUNTER
Patient missed new patient apt  Called patient, and phone went voicemail  Left message for patient to call the office to reschedule missed apt

## 2018-11-14 ENCOUNTER — TELEPHONE (OUTPATIENT)
Dept: HEMATOLOGY ONCOLOGY | Facility: CLINIC | Age: 38
End: 2018-11-14

## 2018-11-14 NOTE — TELEPHONE ENCOUNTER
Called patient a second time about missed apt today  Phone went to message saying subscriber is not in service  Could not leave a message

## 2018-11-14 NOTE — TELEPHONE ENCOUNTER
Called new patient a third time about missed apt today at 3:00 p m  Phone call went to automatic message that subscriber is not in service  Could not leave message  Patient is a no show

## 2018-11-14 NOTE — TELEPHONE ENCOUNTER
New patient did not show for her original apt that was scheduled on Friday 11/02/18, and that apt was rescheduled for today  New patient has missed her apt scheduled at 3:00 p m  Called patient to reschedule, but phone went to automatic message saying subscriber is not in service  Could not leave a message

## 2019-03-12 ENCOUNTER — HOSPITAL ENCOUNTER (EMERGENCY)
Facility: HOSPITAL | Age: 39
Discharge: HOME | End: 2019-03-12
Attending: EMERGENCY MEDICINE | Admitting: EMERGENCY MEDICINE
Payer: COMMERCIAL

## 2019-03-12 VITALS
OXYGEN SATURATION: 100 % | DIASTOLIC BLOOD PRESSURE: 96 MMHG | RESPIRATION RATE: 18 BRPM | TEMPERATURE: 98 F | HEART RATE: 118 BPM | SYSTOLIC BLOOD PRESSURE: 155 MMHG

## 2019-03-12 DIAGNOSIS — F14.90 COCAINE USE: Primary | ICD-10-CM

## 2019-03-12 PROCEDURE — 99282 EMERGENCY DEPT VISIT SF MDM: CPT

## 2019-03-12 NOTE — ED PROVIDER NOTES
History  Chief Complaint   Patient presents with    Psychiatric Evaluation     pt arrives disheveled carrying a large stuffed bear stating that she has been off of her meds since november  states she was on neurontin and abilify  denies SI/HI  when asked about seeing things pt states, "I have a multiple personality disorder " also states she just relapsed on crack cocaine and using about 1g per day  History provided by:  Patient   used: No    Medical Problem - Major   Location:  She states she is here to get placed back on her psychiatric medicines that she has not had since November so she can go to rehab for cocaine relapse  Severity:  Severe  Duration: Relapsed over the last 2 weeks but has not had her medications since November  Timing:  Constant  Progression:  Unchanged  Chronicity:  New  Relieved by:  Nothing  Worsened by:  Cocaine  Ineffective treatments:  Noncompliance with medications  Associated symptoms: no abdominal pain, no chest pain, no fever, no nausea, no shortness of breath and no vomiting     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 Taking?    ARIPiprazole (ABILIFY) 20 MG tablet   No No   Sig: Take 1 tablet (20 mg total) by mouth daily   ascorbic acid (VITAMIN C) 500 MG tablet   No No   Sig: Take 1 tablet (500 mg total) by mouth daily   cholecalciferol 2000 units TABS   No No   Sig: Take 1 tablet (2,000 Units total) by mouth daily   escitalopram (LEXAPRO) 5 mg tablet   No No   Sig: Take 1 tablet (5 mg total) by mouth daily   ferrous sulfate 325 (65 Fe) mg tablet   No No   Sig: Take 1 tablet (325 mg total) by mouth 2 (two) times a day with meals   traZODone (DESYREL) 100 mg tablet   No No   Sig: Take 1 tablet (100 mg total) by mouth daily at bedtime as needed for sleep      Facility-Administered Medications: None       Past Medical History:   Diagnosis Date    Anemia     Anxiety     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[facility] )     Paranoia (psychosis) ([facility] )     Schizo-affective type schizophrenia, chronic state ([place] [address] )        Past Surgical History:   Procedure Laterality Date     SECTION         History reviewed  No pertinent family history  I have reviewed and agree with the history as documented  Social History     Tobacco Use    Smoking status: Current Every Day Smoker     Packs/day: 0 50     Types: Cigarettes    Smokeless tobacco: Never Used   Substance Use Topics    Alcohol use: Yes    Drug use: Yes     Types: Cocaine     Comment: crack        Review of Systems   Constitutional: Negative for fever  Respiratory: Negative for shortness of breath  Cardiovascular: Negative for chest pain  Gastrointestinal: Negative for abdominal pain, nausea and vomiting  Psychiatric/Behavioral: Negative for hallucinations and suicidal ideas  The patient is nervous/anxious  All other systems reviewed and are negative  Physical Exam  Physical Exam   Constitutional: She appears well-developed and well-nourished  She is cooperative  Non-toxic appearance  She does not have a sickly appearance  She does not appear ill  No distress  HENT:   Head: Normocephalic and atraumatic  Right Ear: Hearing normal  No drainage or swelling  Left Ear: Hearing normal  No drainage or swelling  Mouth/Throat: Mucous membranes are normal    Eyes: Conjunctivae and lids are normal  Right eye exhibits no discharge  Left eye exhibits no discharge  Neck: Trachea normal and normal range of motion  No JVD present  Cardiovascular: Normal rate, regular rhythm, normal heart sounds, intact distal pulses and normal pulses  Exam reveals no gallop and no friction rub     No murmur heard   Pulmonary/Chest: Effort normal and breath sounds normal  No stridor  No respiratory distress  She has no wheezes  She has no rales  Abdominal: Soft  Normal appearance  She exhibits no ascites and no mass  There is no hepatosplenomegaly  There is no tenderness  There is no rebound, no guarding and no CVA tenderness  Musculoskeletal: Normal range of motion  She exhibits no edema or tenderness  Lymphadenopathy:        Right: No inguinal adenopathy present  Left: No inguinal adenopathy present  Neurological: She is alert  She has normal strength  No cranial nerve deficit or sensory deficit  She exhibits normal muscle tone  Coordination and gait normal  GCS eye subscore is 4  GCS verbal subscore is 5  GCS motor subscore is 6  Skin: Skin is warm, dry and intact  No rash noted  She is not diaphoretic  No pallor  Psychiatric: She has a normal mood and affect  Her speech is normal and behavior is normal  Judgment and thought content normal  Cognition and memory are normal    Nursing note and vitals reviewed        Vital Signs  ED Triage Vitals [03/12/19 1243]   Temperature Pulse Respirations Blood Pressure SpO2   98 °F (36 7 °C) (!) 118 18 155/96 100 %      Temp Source Heart Rate Source Patient Position - Orthostatic VS BP Location FiO2 (%)   Oral -- -- -- --      Pain Score       No Pain           Vitals:    03/12/19 1243   BP: 155/96   Pulse: (!) 118       qSOFA     Row Name 03/12/19 1243                Altered mental status GCS < 15  --        Respiratory Rate > / =62  0        Systolic BP < / =084  0        Q Sofa Score  0              Visual Acuity      ED Medications  Medications - No data to display    Diagnostic Studies  Results Reviewed     None                 No orders to display              Procedures  Procedures       Phone Contacts  ED Phone Contact    ED Course                               MDM  Number of Diagnoses or Management Options  Cocaine use:   Diagnosis management comments: Case was discussed with crisis to see her for host intervention  She is not suicidal or homicidal   Prior to crisis going to see the patient patient left  She is not suicidal or homicidal and looked to be in no acute distress  Disposition  Final diagnoses:   Cocaine use     Time reflects when diagnosis was documented in both MDM as applicable and the Disposition within this note     Time User Action Codes Description Comment    3/12/2019  1:13 PM Kenzie Mancia Add [F14 90] Cocaine use       ED Disposition     ED Disposition Condition Date/Time Comment    Left from Room after Provider Exam  Tue Mar 12, 2019  1:12 PM       Follow-up Information    None         Discharge Medication List as of 3/12/2019  1:17 PM      STOP taking these medications       ARIPiprazole (ABILIFY) 20 MG tablet Comments:   Reason for Stopping:         ascorbic acid (VITAMIN C) 500 MG tablet Comments:   Reason for Stopping:         cholecalciferol 2000 units TABS Comments:   Reason for Stopping:         escitalopram (LEXAPRO) 5 mg tablet Comments:   Reason for Stopping:         ferrous sulfate 325 (65 Fe) mg tablet Comments:   Reason for Stopping:         traZODone (DESYREL) 100 mg tablet Comments:   Reason for Stopping:             No discharge procedures on file      ED Provider  Electronically Signed by           Tommie Miramontes MD  03/12/19 6379

## 2019-03-18 ENCOUNTER — HOSPITAL ENCOUNTER (EMERGENCY)
Facility: HOSPITAL | Age: 39
End: 2019-03-18
Attending: EMERGENCY MEDICINE | Admitting: EMERGENCY MEDICINE
Payer: COMMERCIAL

## 2019-03-18 VITALS
RESPIRATION RATE: 14 BRPM | SYSTOLIC BLOOD PRESSURE: 119 MMHG | HEART RATE: 93 BPM | BODY MASS INDEX: 21.49 KG/M2 | TEMPERATURE: 99.8 F | WEIGHT: 117.5 LBS | DIASTOLIC BLOOD PRESSURE: 70 MMHG | OXYGEN SATURATION: 100 %

## 2019-03-18 DIAGNOSIS — F19.10 SUBSTANCE ABUSE (HCC): Primary | ICD-10-CM

## 2019-03-18 DIAGNOSIS — R45.851 SUICIDAL IDEATIONS: ICD-10-CM

## 2019-03-18 LAB
AMPHETAMINES SERPL QL SCN: NEGATIVE
BARBITURATES UR QL: NEGATIVE
BENZODIAZ UR QL: POSITIVE
COCAINE UR QL: POSITIVE
ETHANOL EXG-MCNC: 0.07 MG/DL
EXT PREG TEST URINE: NEGATIVE
METHADONE UR QL: NEGATIVE
OPIATES UR QL SCN: NEGATIVE
PCP UR QL: NEGATIVE
THC UR QL: NEGATIVE

## 2019-03-18 PROCEDURE — 81025 URINE PREGNANCY TEST: CPT | Performed by: EMERGENCY MEDICINE

## 2019-03-18 PROCEDURE — 90471 IMMUNIZATION ADMIN: CPT

## 2019-03-18 PROCEDURE — 90715 TDAP VACCINE 7 YRS/> IM: CPT | Performed by: EMERGENCY MEDICINE

## 2019-03-18 PROCEDURE — 82075 ASSAY OF BREATH ETHANOL: CPT | Performed by: EMERGENCY MEDICINE

## 2019-03-18 PROCEDURE — 80307 DRUG TEST PRSMV CHEM ANLYZR: CPT | Performed by: EMERGENCY MEDICINE

## 2019-03-18 PROCEDURE — 99285 EMERGENCY DEPT VISIT HI MDM: CPT

## 2019-03-18 RX ORDER — OLANZAPINE 5 MG/1
5 TABLET ORAL
Status: DISCONTINUED | OUTPATIENT
Start: 2019-03-18 | End: 2019-03-18 | Stop reason: HOSPADM

## 2019-03-18 RX ORDER — LORAZEPAM 0.5 MG/1
1 TABLET ORAL ONCE
Status: COMPLETED | OUTPATIENT
Start: 2019-03-18 | End: 2019-03-18

## 2019-03-18 RX ORDER — OLANZAPINE 5 MG/1
5 TABLET, ORALLY DISINTEGRATING ORAL ONCE
Status: DISCONTINUED | OUTPATIENT
Start: 2019-03-18 | End: 2019-03-18

## 2019-03-18 RX ADMIN — LORAZEPAM 1 MG: 0.5 TABLET ORAL at 02:30

## 2019-03-18 RX ADMIN — OLANZAPINE 5 MG: 5 TABLET, FILM COATED ORAL at 02:46

## 2019-03-18 RX ADMIN — TETANUS TOXOID, REDUCED DIPHTHERIA TOXOID AND ACELLULAR PERTUSSIS VACCINE, ADSORBED 0.5 ML: 5; 2.5; 8; 8; 2.5 SUSPENSION INTRAMUSCULAR at 02:30

## 2019-03-18 NOTE — ED NOTES
Pt resting quietly on stretcher, respirations even and unlabored        Jenn Graves RN  03/18/19 8133

## 2019-03-18 NOTE — ED NOTES
5:20 am - Crisis received a call from [facility]) who told me that [facility] has beds and to fax the clinical information to them  Information faxed to [facility]      Kori Daniels also informed me that [facility] has beds, information was faxed to [facility]

## 2019-03-18 NOTE — ED NOTES
Upon entering room, patient hitting her head with her fists, stating stop it over and over again         Reagan De La Cruz RN  03/18/19 8873

## 2019-03-18 NOTE — LETTER
Section I - General Information    Name of Patient: Dalton Almazan                 : 1980    Medicare #:_____n/a_______________  Transport Date: 19 (PCS is valid for round trips on this date and for all repetitive trips in the 60-day range as noted below )  Origin: [place]                                                         [facility]_____  Is the pt's stay covered under Medicare Part A (PPS/DRG)     x(_) YES  (_) NO  Closest appropriate facility?  (_) YES  (_) NO  If no, why is transport to more distant facility required?________________________  If hosp-hosp transfer, describe services needed at 2nd facility not available at 1st facility? _________________________________  If hospice pt, is this transport related to pt's terminal illness? (_) YES (x_) NO Describe____________________________________    Section II - Medical Necessity Questionnaire  Ambulance transportation is medically necessary only if other means of transport are contraindicated or would be potentially harmful to the patient  To meet this requirement, the patient must either be "bed confined" or suffer from a condition such that transport by means other than ambulance is contraindicated by the patient's condition   The following questions must be answered by the medical professional signing below for this form to be valid:    1)  Describe the MEDICAL CONDITION (physical and/or mental) of this patient AT [address] [place] [address] that requires the patient to be transported in an ambulance and why transport by other means is contraindicated by the patient's condition:_____depression with suicidal thoughts _____________________________________________________________________________________________    2) Is the patient "bed confined" as defined below?     (_) YES  (_x) NO  To be "be confined" the patient must satisfy all three of the following conditions: (1) unable to get up from bed without Assistance; AND (2) unable to ambulate; AND (3) unable to sit in a chair or wheelchair  3) Can this patient safely be transported by car or wheelchair Karishma Banda (i e , seated during transport without a medical attendant or monitoring)?   (_) YES  (x_) NO    4) In addition to completing questions 1-3 above, please check any of the following conditions that apply*:  *Note: supporting documentation for any boxes checked must be maintained in the patient's medical records  (_)Contractures   (_)Non-Healed Fractures  (_)Patient is confused (_)Patient is comatose (_)Moderate/severe pain on movement (_)Danger to self/others  (_)IV meds/fluids required (_)Patient is combative(_)Need or possible need for restraints (_)DVT requires elevation of lower extremity  (_)Medical attendant required (_)Requires oxygen-unable to self administer (_)Special handling/isolation/infection control precautions required (_)Unable to tolerate seated position for time needed to transport (_)Hemodynamic monitoring required en route (_)Unable to sit in a chair or wheelchair due to decubitus ulcers or other wounds (_)Cardiac monitoring required en route (_)Morbid obesity requires additional personnel/equipment to safely handle patient (_)Orthopedic device (backboard, halo, pins, traction, brace, wedge, etc,) requiring special handling during transport (_)Other(specify)_____________patient is on a mental health commitment for depression __________________________________    Section III - Signature of Physician or Healthcare Professional  I certify that the above information is true and correct based on my evaluation of this patient, and represent that the patient requires transport by ambulance and that other forms of transport are contraindicated   I understand that this information will be used by the Centers for Medicare and Medicaid Services (CMS) to support the determination of medical necessity for ambulance services, and I represent that I have personal knowledge of the patient's condition at time of transport  (_) If this box is checked, I also certify that the patient is physically or mentally incapable of signing the ambulance service's claim and that the institution with which I am affiliated has furnished care, services, or assistance to the patient  My signature below is made on behalf of the patient pursuant to 42 CFR §424 36(b)(4)  In accordance with 42 CFR §424 37, the specific reason(s) that the patient is physically or mentally incapable of signing the claim form is as follows: _________________________________________________________________________________________________________      Signature of Physician* or Healthcare Professional______________________________________________________________  Signature Date 03/18/19 (For scheduled repetitive transports, this form is not valid for transports performed more than 60 days after this date)    Printed Name & Credentials of Physician or Healthcare Professional (MD, DO, RN, etc )________________________________  *Form must be signed by patient's attending physician for scheduled, repetitive transports   For non-repetitive, unscheduled ambulance transports, if unable to obtain the signature of the attending physician, any of the following may sign (choose appropriate option below)  (_) Physician Assistant (_)  Clinical Nurse Specialist (_)  Registered Nurse  (_)  Nurse Practitioner  (_) Discharge Planner

## 2019-03-18 NOTE — ED NOTES
Patient presents to the emergency room with increased depression and anxiety along with suicidal ideation  Patient has not been on any medications since her hospitalization in November at this hospital   Patient also has relapsed on crack cocaine and has been taking xanax that has been given to her by a friend  Patient feels she needs to be in the hospital and getting help  Doctor is in agreement with this and patient signed a 12

## 2019-03-18 NOTE — ED NOTES
Patient is accepted at [facility]   Patient is accepted by Dr Deshaun Martinez is arranged with [facility]       Transportation is scheduled for 1845  Transfer forms completed

## 2019-03-18 NOTE — ED NOTES
Insurance Authorization: [facility]  Phone call placed to [facility]  Phone number: [phone]  Spoke to Kiara Mendoza  3 days approved  Level of care: inpatient psych  Review on 3/20/19  Authorization # upon admission  EVS (Eligibility Verification System) called - [phone]  Automated system indicates: Patient is eligible with both physical and behavioral managed by managed care  Patient's recipient number is [id number]

## 2019-03-18 NOTE — ED NOTES
Pt sleeping at this time, easily awakens to sound  Pt denies complaints at this time  Pt stable and in no distress        Tiffanie Fournier RN  03/18/19 9580

## 2019-03-18 NOTE — ED NOTES
Mercy from [facility] called and wanted the pregnancy test, the UDS, and the most recent vitals faxed to her  Labs were obtained and faxed to [facility]  Awaiting word as to whether they accept patient

## 2019-03-18 NOTE — ED PROVIDER NOTES
History  Chief Complaint   Patient presents with    Psychiatric Evaluation     Patient complains of needing her "psych meds" and to be placed in a "psych facility"  74-year-old female past medical history of polysubstance abuse  Schizophrenia, anxiety, depression presents with suicidal ideation, auditory hallucinations, severe anxiety attacks  Patient states she has been off of her medication for the last few months as she has not been to see her psychiatrist   She states that her anxiety as well as her hallucinations have been really bad causing her to relapse this morning and snort crack cocaine  She also states she has been self-medicated with xanny bars, last use this morning  She states that today she got so sick of her crippling anxiety as well as the continued hallucinations she tried cutting herself on the right wrist    On chart review it appears that the patient had come to the emergency room twice in the last 2 weeks with similar complaints and has been off of her medications since approximately November  She left a prior to official discharge on both of those occasions  At that time she again denies suicidal homicidal ideation          None       Past Medical History:   Diagnosis Date    Anemia     Anxiety     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[facility] )     Paranoia (psychosis) ([facility] )     Schizo-affective type schizophrenia, chronic state ([facility] )        Past Surgical History:   Procedure Laterality Date     SECTION         History reviewed  No pertinent family history  I have reviewed and agree with the history as documented  Social History     Tobacco Use    Smoking status: Current Every Day Smoker     Packs/day: 0 50     Types: Cigarettes    Smokeless tobacco: Never Used   Substance Use Topics    Alcohol use: Yes    Drug use: Yes     Types: Cocaine     Comment: crack        Review of Systems   Constitutional: Negative for appetite change and fever     HENT: Negative for rhinorrhea and sore throat  Eyes: Negative for photophobia and visual disturbance  Respiratory: Negative for cough, chest tightness and wheezing  Cardiovascular: Negative for chest pain, palpitations and leg swelling  Gastrointestinal: Negative for abdominal distention, abdominal pain, blood in stool, constipation and diarrhea  Genitourinary: Negative for dysuria, flank pain, frequency, hematuria and urgency  Musculoskeletal: Negative for back pain  Skin: Negative for rash  Neurological: Negative for dizziness, weakness and headaches  Psychiatric/Behavioral: Positive for agitation, decreased concentration, dysphoric mood, self-injury and suicidal ideas  The patient is nervous/anxious and is hyperactive  All other systems reviewed and are negative  Physical Exam  Physical Exam   Constitutional: She is oriented to person, place, and time  She appears well-developed and well-nourished  Agitated few tearful female, moving around in bed, speaking to her son on the phone   HENT:   Head: Normocephalic and atraumatic  Eyes: Pupils are equal, round, and reactive to light  EOM are normal    No nystagmus   Neck: Normal range of motion  Neck supple  Cardiovascular: Normal rate and regular rhythm  Exam reveals no gallop and no friction rub  No murmur heard  Pulmonary/Chest: Effort normal  She has no wheezes  She has no rales  She exhibits no tenderness  Abdominal: Soft  She exhibits no distension and no mass  There is no rebound and no guarding  Neurological: She is alert and oriented to person, place, and time  No cranial nerve deficit  No rigidity or clonus   Skin: Skin is warm and dry  Capillary refill takes less than 2 seconds  Psychiatric: She has a normal mood and affect  Nursing note and vitals reviewed        Vital Signs  ED Triage Vitals [03/18/19 0116]   Temperature Pulse Respirations Blood Pressure SpO2   (!) 97 4 °F (36 3 °C) (!) 117 20 114/90 100 %      Temp Source Heart Rate Source Patient Position - Orthostatic VS BP Location FiO2 (%)   Tympanic Monitor Sitting Left arm --      Pain Score       No Pain           Vitals:    03/18/19 0116 03/18/19 0604 03/18/19 1334 03/18/19 1829   BP: 114/90 124/72 121/79 119/70   Pulse: (!) 117 97 101 93   Patient Position - Orthostatic VS: Sitting Sitting Sitting Sitting       qSOFA     Row Name 03/18/19 1829 03/18/19 1334 03/18/19 0604 03/18/19 0139 03/18/19 0116    Altered mental status GCS < 15  --  --  --  0  --    Respiratory Rate > / =22  0  0  0  --  0    Systolic BP < / =126  0  0  0  --  0    Q Sofa Score  0  0  0  0  0          Visual Acuity      ED Medications  Medications   LORazepam (ATIVAN) tablet 1 mg (1 mg Oral Given 3/18/19 0230)   tetanus-diphtheria-acellular pertussis (BOOSTRIX) IM injection 0 5 mL (0 5 mL Intramuscular Given 3/18/19 0230)       Diagnostic Studies  Results Reviewed     Procedure Component Value Units Date/Time    Rapid drug screen, urine [[id number]]  (Abnormal) Collected:  03/18/19 0558    Lab Status:  Final result Specimen:  Urine, Clean Catch Updated:  03/18/19 0631     Amph/Meth UR Negative     Barbiturate Ur Negative     Benzodiazepine Urine Positive     Cocaine Urine Positive     Methadone Urine Negative     Opiate Urine Negative     PCP Ur Negative     THC Urine Negative    Narrative:       Presumptive report  If requested, specimen will be sent to reference lab for confirmation  FOR MEDICAL PURPOSES ONLY  IF CONFIRMATION NEEDED PLEASE CONTACT THE LAB WITHIN 5 DAYS    Drug Screen Cutoff Levels:  AMPHETAMINE/METHAMPHETAMINES  1000 ng/mL  BARBITURATES     200 ng/mL  BENZODIAZEPINES     200 ng/mL  COCAINE      300 ng/mL  METHADONE      300 ng/mL  OPIATES      300 ng/mL  PHENCYCLIDINE     25 ng/mL  THC       50 ng/mL    POCT pregnancy, urine [[id number]]  (Normal) Resulted:  03/18/19 0601    Lab Status:  Final result Updated:  03/18/19 0601     EXT PREG TEST UR (Ref: Negative) Negative    POCT alcohol breath test [[id number]]  (Normal) Resulted:  03/18/19 0135    Lab Status:  Edited Result - FINAL Updated:  03/18/19 0138     EXTBreath Alcohol 0 07                 No orders to display              Procedures  Procedures       Phone Contacts  ED Phone Contact    ED Course  ED Course as of Mar 18 2207   Mon Mar 18, 2019   0144 Patient here double      [phone] signed      [phone] Accepted at [facility]                                  MDM  Number of Diagnoses or Management Options  Diagnosis management comments: 41 year female presents requesting psychiatric help as well as help with her drug use  Will update tetanus, left crisis speak to patient for evaluation      Disposition  Final diagnoses:   Substance abuse ([facility] )   Suicidal ideations     Time reflects when diagnosis was documented in both MDM as applicable and the Disposition within this note     Time User Action Codes Description Comment    3/18/2019  8:02 AM Electa Belt Add [F19 10] Substance abuse ([address] )     3/18/2019  8:02 AM Electa Belt Add [R49 851] Suicidal ideations       ED Disposition     ED Disposition Condition Date/Time Comment    Transfer to [facility] Mar 18, 2019  8:02 AM [facility] should be transferred out to [facility] and has been medically cleared  Follow-up Information    None         There are no discharge medications for this patient  No discharge procedures on file      ED Provider  Electronically Signed by           Jose Choudhary MD  03/18/19 2207

## 2019-03-18 NOTE — LETTER
[facility] [address] EMERGENCY DEPARTMENT  [address] [facility] [id number]  [phone]  Dept: [facility] TRANSFER CONSENT    NAME Michelle Cox DOB 1980                              MRN [id number]    I have been informed of my rights regarding examination, treatment, and transfer   by Dr Leah Bennett MD    Benefits:    Admission to psychiatric hospital   Risks:    Delay in care     { ED EMTALA TRANSFER CHOICES:[id number]}    I authorize the performance of emergency medical procedures and treatments upon me in both transit and upon arrival at the receiving facility  Additionally, I authorize the release of any and all medical records to the receiving facility and request they be transported with me, if possible  I understand that the safest mode of transportation during a medical emergency is an ambulance and that the Hospital advocates the use of this mode of transport  Risks of traveling to the receiving facility by car, including absence of medical control, life sustaining equipment, such as oxygen, and medical personnel has been explained to me and I fully understand them  (BERNABE CORRECT BOX BELOW)  [  x]  I consent to the stated transfer and to be transported by ambulance/helicopter  [  ]  I consent to the stated transfer, but refuse transportation by ambulance and accept full responsibility for my transportation by car    I understand the risks of non-ambulance transfers and I exonerate the Hospital and its staff from any deterioration in my condition that results from this refusal     X___________________________________________    DATE  19  TIME________  Signature of patient or legally responsible individual signing on patient behalf           RELATIONSHIP TO PATIENT  Self _________________________          Provider Certification    NAME Michelle Cox                                        [place] 1980 MRN [id number]    A medical screening exam was performed on the above named patient  Based on the examination:    Condition Necessitating Transfer There were no encounter diagnoses  Patient Condition:  depression with suicidal thoughts     Reason for Transfer:  patient in need of inpatient psychiatric admission     Transfer Requirements: [address] [facility]   · Space available and qualified personnel available for treatment as acknowledged by  ap  · Agreed to accept transfer and to provide appropriate medical treatment as acknowledged by        ap  · Appropriate medical records of the examination and treatment of the patient are provided at the time of transfer   [address] ______ap_  · Transfer will be performed by qualified personnel from  [facility]  and appropriate transfer equipment as required, including the use of necessary and appropriate life support measures  Provider Certification: I have examined the patient and explained the following risks and benefits of being transferred/refusing transfer to the patient/family:     In need of mental health treatment for depression     Based on these reasonable risks and benefits to the patient and/or the unborn child(nasima), and based upon the information available at the time of the patients examination, I certify that the medical benefits reasonably to be expected from the provision of appropriate medical treatments at another medical facility outweigh the increasing risks, if any, to the individuals medical condition, and in the case of labor to the unborn child, from effecting the transfer      X____________________________________________ DATE 03/18/19        TIME_______      ORIGINAL - SEND TO MEDICAL RECORDS   COPY - SEND WITH PATIENT DURING TRANSFER

## 2019-03-18 NOTE — ED NOTES
Crisis worker called [facility] as patient was in there in late October and spoke with Isabel Bonilla did not know if there are any discharges scheduled for the morning but we can call back then

## 2019-05-12 ENCOUNTER — HOSPITAL ENCOUNTER (EMERGENCY)
Facility: HOSPITAL | Age: 39
Discharge: HOME | End: 2019-05-12
Attending: EMERGENCY MEDICINE | Admitting: EMERGENCY MEDICINE
Payer: COMMERCIAL

## 2019-05-12 VITALS
OXYGEN SATURATION: 100 % | WEIGHT: 138.1 LBS | TEMPERATURE: 98.3 F | SYSTOLIC BLOOD PRESSURE: 130 MMHG | RESPIRATION RATE: 16 BRPM | DIASTOLIC BLOOD PRESSURE: 72 MMHG | BODY MASS INDEX: 25.26 KG/M2 | HEART RATE: 100 BPM

## 2019-05-12 DIAGNOSIS — F41.9 ANXIETY: Primary | ICD-10-CM

## 2019-05-12 DIAGNOSIS — F19.10 SUBSTANCE ABUSE (HCC): ICD-10-CM

## 2019-05-12 DIAGNOSIS — D50.9 IRON DEFICIENCY ANEMIA: ICD-10-CM

## 2019-05-12 LAB
ALBUMIN SERPL BCP-MCNC: 4.4 G/DL (ref 3–5.2)
ALP SERPL-CCNC: 78 U/L (ref 43–122)
ALT SERPL W P-5'-P-CCNC: 7 U/L (ref 9–52)
AMPHETAMINES SERPL QL SCN: NEGATIVE
ANION GAP SERPL CALCULATED.3IONS-SCNC: 9 MMOL/L (ref 5–14)
ANISOCYTOSIS BLD QL SMEAR: PRESENT
AST SERPL W P-5'-P-CCNC: 20 U/L (ref 14–36)
BARBITURATES UR QL: POSITIVE
BENZODIAZ UR QL: NEGATIVE
BILIRUB SERPL-MCNC: 0.7 MG/DL
BUN SERPL-MCNC: 10 MG/DL (ref 5–25)
CALCIUM SERPL-MCNC: 9.5 MG/DL (ref 8.4–10.2)
CHLORIDE SERPL-SCNC: 100 MMOL/L (ref 97–108)
CO2 SERPL-SCNC: 30 MMOL/L (ref 22–30)
COCAINE UR QL: POSITIVE
CREAT SERPL-MCNC: 0.59 MG/DL (ref 0.6–1.2)
EOSINOPHIL # BLD AUTO: 0.12 THOUSAND/UL (ref 0–0.4)
EOSINOPHIL NFR BLD MANUAL: 2 % (ref 0–6)
ERYTHROCYTE [DISTWIDTH] IN BLOOD BY AUTOMATED COUNT: 21.6 %
ETHANOL EXG-MCNC: 0 MG/DL
GFR SERPL CREATININE-BSD FRML MDRD: 118 ML/MIN/1.73SQ M
GLUCOSE SERPL-MCNC: 106 MG/DL (ref 70–99)
HCT VFR BLD AUTO: 29.6 % (ref 36–46)
HGB BLD-MCNC: 8.8 G/DL (ref 12–16)
HYPERCHROMIA BLD QL SMEAR: PRESENT
LYMPHOCYTES # BLD AUTO: 0.9 THOUSAND/UL (ref 0.5–4)
LYMPHOCYTES # BLD AUTO: 15 % (ref 25–45)
MCH RBC QN AUTO: 17.7 PG (ref 26–34)
MCHC RBC AUTO-ENTMCNC: 29.6 G/DL (ref 31–36)
MCV RBC AUTO: 60 FL (ref 80–100)
METHADONE UR QL: NEGATIVE
MICROCYTES BLD QL AUTO: PRESENT
MONOCYTES # BLD AUTO: 0.84 THOUSAND/UL (ref 0.2–0.9)
MONOCYTES NFR BLD AUTO: 14 % (ref 1–10)
NEUTS SEG # BLD: 4.14 THOUSAND/UL (ref 1.8–7.8)
NEUTS SEG NFR BLD AUTO: 69 %
OPIATES UR QL SCN: NEGATIVE
PCP UR QL: NEGATIVE
PLATELET # BLD AUTO: 276 THOUSANDS/UL (ref 150–450)
PLATELET BLD QL SMEAR: ADEQUATE
PMV BLD AUTO: 8.4 FL (ref 8.9–12.7)
POTASSIUM SERPL-SCNC: 3.3 MMOL/L (ref 3.6–5)
PROT SERPL-MCNC: 7.6 G/DL (ref 5.9–8.4)
RBC # BLD AUTO: 4.95 MILLION/UL (ref 4–5.2)
RBC MORPH BLD: ABNORMAL
SODIUM SERPL-SCNC: 139 MMOL/L (ref 137–147)
THC UR QL: NEGATIVE
TOTAL CELLS COUNTED SPEC: 100
WBC # BLD AUTO: 6 THOUSAND/UL (ref 4.5–11)

## 2019-05-12 PROCEDURE — 96372 THER/PROPH/DIAG INJ SC/IM: CPT

## 2019-05-12 PROCEDURE — 82075 ASSAY OF BREATH ETHANOL: CPT | Performed by: PHYSICIAN ASSISTANT

## 2019-05-12 PROCEDURE — 85007 BL SMEAR W/DIFF WBC COUNT: CPT | Performed by: PHYSICIAN ASSISTANT

## 2019-05-12 PROCEDURE — 99285 EMERGENCY DEPT VISIT HI MDM: CPT | Performed by: PHYSICIAN ASSISTANT

## 2019-05-12 PROCEDURE — 85027 COMPLETE CBC AUTOMATED: CPT | Performed by: PHYSICIAN ASSISTANT

## 2019-05-12 PROCEDURE — 80307 DRUG TEST PRSMV CHEM ANLYZR: CPT | Performed by: PHYSICIAN ASSISTANT

## 2019-05-12 PROCEDURE — 99284 EMERGENCY DEPT VISIT MOD MDM: CPT

## 2019-05-12 PROCEDURE — 36415 COLL VENOUS BLD VENIPUNCTURE: CPT | Performed by: PHYSICIAN ASSISTANT

## 2019-05-12 PROCEDURE — 80053 COMPREHEN METABOLIC PANEL: CPT | Performed by: PHYSICIAN ASSISTANT

## 2019-05-12 PROCEDURE — 93005 ELECTROCARDIOGRAM TRACING: CPT

## 2019-05-12 RX ORDER — LORAZEPAM 2 MG/ML
1 INJECTION INTRAMUSCULAR ONCE
Status: COMPLETED | OUTPATIENT
Start: 2019-05-12 | End: 2019-05-12

## 2019-05-12 RX ORDER — CHLORPROMAZINE HYDROCHLORIDE 10 MG/1
TABLET, FILM COATED ORAL
COMMUNITY
End: 2019-06-20 | Stop reason: HOSPADM

## 2019-05-12 RX ORDER — BENZTROPINE MESYLATE 1 MG/1
TABLET ORAL
COMMUNITY
End: 2019-06-20 | Stop reason: HOSPADM

## 2019-05-12 RX ORDER — RISPERIDONE 0.25 MG/1
0.25 TABLET, FILM COATED ORAL ONCE
Status: COMPLETED | OUTPATIENT
Start: 2019-05-12 | End: 2019-05-12

## 2019-05-12 RX ORDER — CHLORPROMAZINE HYDROCHLORIDE 10 MG/1
10 TABLET, FILM COATED ORAL ONCE
Status: DISCONTINUED | OUTPATIENT
Start: 2019-05-12 | End: 2019-05-12

## 2019-05-12 RX ORDER — CLONAZEPAM 0.5 MG/1
TABLET ORAL
COMMUNITY
End: 2019-06-20 | Stop reason: HOSPADM

## 2019-05-12 RX ORDER — BENZTROPINE MESYLATE 0.5 MG/1
1 TABLET ORAL ONCE
Status: COMPLETED | OUTPATIENT
Start: 2019-05-12 | End: 2019-05-12

## 2019-05-12 RX ORDER — BENZTROPINE MESYLATE 1 MG/1
1 TABLET ORAL 2 TIMES DAILY
Qty: 14 TABLET | Refills: 0 | Status: SHIPPED | OUTPATIENT
Start: 2019-05-12 | End: 2019-06-20 | Stop reason: HOSPADM

## 2019-05-12 RX ORDER — LORAZEPAM 2 MG/ML
1 INJECTION INTRAMUSCULAR ONCE
Status: DISCONTINUED | OUTPATIENT
Start: 2019-05-12 | End: 2019-05-12

## 2019-05-12 RX ORDER — HYDROXYZINE HYDROCHLORIDE 25 MG/1
25 TABLET, FILM COATED ORAL EVERY 6 HOURS
Qty: 30 TABLET | Refills: 0 | Status: SHIPPED | OUTPATIENT
Start: 2019-05-12 | End: 2019-06-20 | Stop reason: HOSPADM

## 2019-05-12 RX ORDER — RISPERIDONE 0.25 MG/1
0.25 TABLET, FILM COATED ORAL 2 TIMES DAILY
Qty: 14 TABLET | Refills: 0 | Status: SHIPPED | OUTPATIENT
Start: 2019-05-12 | End: 2019-06-20 | Stop reason: HOSPADM

## 2019-05-12 RX ORDER — RISPERIDONE 0.25 MG/1
TABLET, FILM COATED ORAL
COMMUNITY
End: 2019-06-20 | Stop reason: HOSPADM

## 2019-05-12 RX ADMIN — RISPERIDONE 0.25 MG: 0.25 TABLET ORAL at 15:11

## 2019-05-12 RX ADMIN — LORAZEPAM 1 MG: 2 INJECTION INTRAMUSCULAR; INTRAVENOUS at 13:45

## 2019-05-12 RX ADMIN — BENZTROPINE MESYLATE 1 MG: 0.5 TABLET ORAL at 15:11

## 2019-05-13 LAB
ATRIAL RATE: 80 BPM
P AXIS: 70 DEGREES
PR INTERVAL: 144 MS
QRS AXIS: 55 DEGREES
QRSD INTERVAL: 82 MS
QT INTERVAL: 378 MS
QTC INTERVAL: 435 MS
T WAVE AXIS: 53 DEGREES
VENTRICULAR RATE: 80 BPM

## 2019-05-13 PROCEDURE — 93010 ELECTROCARDIOGRAM REPORT: CPT | Performed by: INTERNAL MEDICINE

## 2019-05-14 ENCOUNTER — HOSPITAL ENCOUNTER (EMERGENCY)
Facility: HOSPITAL | Age: 39
End: 2019-05-14
Attending: EMERGENCY MEDICINE | Admitting: EMERGENCY MEDICINE
Payer: COMMERCIAL

## 2019-05-14 ENCOUNTER — HOSPITAL ENCOUNTER (INPATIENT)
Facility: HOSPITAL | Age: 39
LOS: 6 days | Discharge: HOME | DRG: 753 | End: 2019-05-20
Attending: PSYCHIATRY & NEUROLOGY | Admitting: PSYCHIATRY & NEUROLOGY
Payer: COMMERCIAL

## 2019-05-14 VITALS
OXYGEN SATURATION: 98 % | HEART RATE: 85 BPM | WEIGHT: 140 LBS | SYSTOLIC BLOOD PRESSURE: 130 MMHG | RESPIRATION RATE: 18 BRPM | HEIGHT: 61 IN | TEMPERATURE: 97.7 F | DIASTOLIC BLOOD PRESSURE: 88 MMHG | BODY MASS INDEX: 26.43 KG/M2

## 2019-05-14 DIAGNOSIS — R45.851 DEPRESSION WITH SUICIDAL IDEATION: ICD-10-CM

## 2019-05-14 DIAGNOSIS — F39 MOOD DISORDER (HCC): Primary | ICD-10-CM

## 2019-05-14 DIAGNOSIS — Z00.8 MEDICAL CLEARANCE FOR PSYCHIATRIC ADMISSION: Primary | ICD-10-CM

## 2019-05-14 DIAGNOSIS — F19.90 SUBSTANCE USE DISORDER: ICD-10-CM

## 2019-05-14 DIAGNOSIS — F32.A DEPRESSION WITH SUICIDAL IDEATION: ICD-10-CM

## 2019-05-14 DIAGNOSIS — F29 PSYCHOSES (HCC): ICD-10-CM

## 2019-05-14 LAB
AMPHETAMINES SERPL QL SCN: NEGATIVE
APAP SERPL-MCNC: <10 UG/ML (ref 10–20)
BARBITURATES UR QL: POSITIVE
BENZODIAZ UR QL: NEGATIVE
COCAINE UR QL: POSITIVE
ETHANOL EXG-MCNC: 0 MG/DL
EXT PREG TEST URINE: NORMAL
METHADONE UR QL: NEGATIVE
OPIATES UR QL SCN: NEGATIVE
PCP UR QL: NEGATIVE
SALICYLATES SERPL-MCNC: <1 MG/DL (ref 10–30)
THC UR QL: POSITIVE

## 2019-05-14 PROCEDURE — 80307 DRUG TEST PRSMV CHEM ANLYZR: CPT | Performed by: EMERGENCY MEDICINE

## 2019-05-14 PROCEDURE — 80329 ANALGESICS NON-OPIOID 1 OR 2: CPT | Performed by: EMERGENCY MEDICINE

## 2019-05-14 PROCEDURE — 81025 URINE PREGNANCY TEST: CPT | Performed by: EMERGENCY MEDICINE

## 2019-05-14 PROCEDURE — 99284 EMERGENCY DEPT VISIT MOD MDM: CPT | Performed by: EMERGENCY MEDICINE

## 2019-05-14 PROCEDURE — 36415 COLL VENOUS BLD VENIPUNCTURE: CPT | Performed by: EMERGENCY MEDICINE

## 2019-05-14 PROCEDURE — 99285 EMERGENCY DEPT VISIT HI MDM: CPT

## 2019-05-14 PROCEDURE — 82075 ASSAY OF BREATH ETHANOL: CPT | Performed by: EMERGENCY MEDICINE

## 2019-05-14 RX ORDER — ACETAMINOPHEN 325 MG/1
650 TABLET ORAL EVERY 6 HOURS PRN
Status: CANCELLED | OUTPATIENT
Start: 2019-05-14

## 2019-05-14 RX ORDER — ACETAMINOPHEN 325 MG/1
975 TABLET ORAL EVERY 6 HOURS PRN
Status: DISCONTINUED | OUTPATIENT
Start: 2019-05-14 | End: 2019-05-20 | Stop reason: HOSPADM

## 2019-05-14 RX ORDER — OLANZAPINE 10 MG/1
10 TABLET ORAL EVERY 8 HOURS PRN
Status: DISCONTINUED | OUTPATIENT
Start: 2019-05-14 | End: 2019-05-20 | Stop reason: HOSPADM

## 2019-05-14 RX ORDER — HYDROXYZINE 50 MG/1
50 TABLET, FILM COATED ORAL EVERY 4 HOURS PRN
Status: CANCELLED | OUTPATIENT
Start: 2019-05-14

## 2019-05-14 RX ORDER — HALOPERIDOL 5 MG/ML
5 INJECTION INTRAMUSCULAR EVERY 6 HOURS PRN
Status: CANCELLED | OUTPATIENT
Start: 2019-05-14

## 2019-05-14 RX ORDER — CHLORDIAZEPOXIDE HYDROCHLORIDE 25 MG/1
50 CAPSULE, GELATIN COATED ORAL EVERY 4 HOURS PRN
Status: DISCONTINUED | OUTPATIENT
Start: 2019-05-14 | End: 2019-05-20 | Stop reason: HOSPADM

## 2019-05-14 RX ORDER — MAGNESIUM HYDROXIDE/ALUMINUM HYDROXICE/SIMETHICONE 120; 1200; 1200 MG/30ML; MG/30ML; MG/30ML
30 SUSPENSION ORAL EVERY 4 HOURS PRN
Status: CANCELLED | OUTPATIENT
Start: 2019-05-14

## 2019-05-14 RX ORDER — OLANZAPINE 10 MG/1
10 INJECTION, POWDER, LYOPHILIZED, FOR SOLUTION INTRAMUSCULAR EVERY 8 HOURS PRN
Status: CANCELLED | OUTPATIENT
Start: 2019-05-14

## 2019-05-14 RX ORDER — BENZTROPINE MESYLATE 1 MG/ML
1 INJECTION INTRAMUSCULAR; INTRAVENOUS EVERY 6 HOURS PRN
Status: CANCELLED | OUTPATIENT
Start: 2019-05-14

## 2019-05-14 RX ORDER — BENZTROPINE MESYLATE 0.5 MG/1
1 TABLET ORAL EVERY 6 HOURS PRN
Status: CANCELLED | OUTPATIENT
Start: 2019-05-14

## 2019-05-14 RX ORDER — ACETAMINOPHEN 325 MG/1
975 TABLET ORAL EVERY 6 HOURS PRN
Status: CANCELLED | OUTPATIENT
Start: 2019-05-14

## 2019-05-14 RX ORDER — IBUPROFEN 600 MG/1
600 TABLET ORAL EVERY 8 HOURS PRN
Status: DISCONTINUED | OUTPATIENT
Start: 2019-05-14 | End: 2019-05-20 | Stop reason: HOSPADM

## 2019-05-14 RX ORDER — MAGNESIUM HYDROXIDE/ALUMINUM HYDROXICE/SIMETHICONE 120; 1200; 1200 MG/30ML; MG/30ML; MG/30ML
30 SUSPENSION ORAL EVERY 4 HOURS PRN
Status: DISCONTINUED | OUTPATIENT
Start: 2019-05-14 | End: 2019-05-20 | Stop reason: HOSPADM

## 2019-05-14 RX ORDER — ACETAMINOPHEN 325 MG/1
650 TABLET ORAL EVERY 4 HOURS PRN
Status: CANCELLED | OUTPATIENT
Start: 2019-05-14

## 2019-05-14 RX ORDER — CHLORDIAZEPOXIDE HYDROCHLORIDE 25 MG/1
50 CAPSULE, GELATIN COATED ORAL EVERY 4 HOURS PRN
Status: CANCELLED | OUTPATIENT
Start: 2019-05-14

## 2019-05-14 RX ORDER — OLANZAPINE 10 MG/1
10 INJECTION, POWDER, LYOPHILIZED, FOR SOLUTION INTRAMUSCULAR EVERY 8 HOURS PRN
Status: DISCONTINUED | OUTPATIENT
Start: 2019-05-14 | End: 2019-05-20 | Stop reason: HOSPADM

## 2019-05-14 RX ORDER — TRAZODONE HYDROCHLORIDE 100 MG/1
50 TABLET ORAL
Status: CANCELLED | OUTPATIENT
Start: 2019-05-14

## 2019-05-14 RX ORDER — HALOPERIDOL 5 MG/ML
5 INJECTION INTRAMUSCULAR EVERY 6 HOURS PRN
Status: DISCONTINUED | OUTPATIENT
Start: 2019-05-14 | End: 2019-05-20 | Stop reason: HOSPADM

## 2019-05-14 RX ORDER — IBUPROFEN 400 MG/1
800 TABLET ORAL EVERY 8 HOURS PRN
Status: CANCELLED | OUTPATIENT
Start: 2019-05-14

## 2019-05-14 RX ORDER — BENZTROPINE MESYLATE 1 MG/ML
1 INJECTION INTRAMUSCULAR; INTRAVENOUS
Status: CANCELLED | OUTPATIENT
Start: 2019-05-14

## 2019-05-14 RX ORDER — RISPERIDONE 1 MG/1
1 TABLET, ORALLY DISINTEGRATING ORAL
Status: DISCONTINUED | OUTPATIENT
Start: 2019-05-14 | End: 2019-05-20 | Stop reason: HOSPADM

## 2019-05-14 RX ORDER — ACETAMINOPHEN 325 MG/1
650 TABLET ORAL EVERY 6 HOURS PRN
Status: DISCONTINUED | OUTPATIENT
Start: 2019-05-14 | End: 2019-05-20 | Stop reason: HOSPADM

## 2019-05-14 RX ORDER — BENZTROPINE MESYLATE 1 MG/1
1 TABLET ORAL
Status: CANCELLED | OUTPATIENT
Start: 2019-05-14

## 2019-05-14 RX ORDER — OLANZAPINE 10 MG/1
10 TABLET ORAL EVERY 8 HOURS PRN
Status: CANCELLED | OUTPATIENT
Start: 2019-05-14

## 2019-05-14 RX ORDER — NICOTINE 21 MG/24HR
1 PATCH, TRANSDERMAL 24 HOURS TRANSDERMAL DAILY
Status: CANCELLED | OUTPATIENT
Start: 2019-05-15

## 2019-05-14 RX ORDER — BENZTROPINE MESYLATE 1 MG/1
1 TABLET ORAL EVERY 6 HOURS PRN
Status: DISCONTINUED | OUTPATIENT
Start: 2019-05-14 | End: 2019-05-20 | Stop reason: HOSPADM

## 2019-05-14 RX ORDER — TRAZODONE HYDROCHLORIDE 50 MG/1
50 TABLET ORAL
Status: DISCONTINUED | OUTPATIENT
Start: 2019-05-14 | End: 2019-05-20 | Stop reason: HOSPADM

## 2019-05-14 RX ORDER — HYDROXYZINE HYDROCHLORIDE 25 MG/1
25 TABLET, FILM COATED ORAL EVERY 6 HOURS PRN
Status: DISCONTINUED | OUTPATIENT
Start: 2019-05-14 | End: 2019-05-20 | Stop reason: HOSPADM

## 2019-05-14 RX ORDER — RISPERIDONE 1 MG/1
1 TABLET, ORALLY DISINTEGRATING ORAL
Status: CANCELLED | OUTPATIENT
Start: 2019-05-14

## 2019-05-14 RX ORDER — HYDROXYZINE HYDROCHLORIDE 25 MG/1
50 TABLET, FILM COATED ORAL EVERY 6 HOURS PRN
Status: CANCELLED | OUTPATIENT
Start: 2019-05-14

## 2019-05-14 RX ORDER — HALOPERIDOL 5 MG
5 TABLET ORAL EVERY 6 HOURS PRN
Status: CANCELLED | OUTPATIENT
Start: 2019-05-14

## 2019-05-14 RX ORDER — HYDROXYZINE HYDROCHLORIDE 25 MG/1
75 TABLET, FILM COATED ORAL EVERY 6 HOURS PRN
Status: CANCELLED | OUTPATIENT
Start: 2019-05-14

## 2019-05-14 RX ORDER — HALOPERIDOL 5 MG
5 TABLET ORAL EVERY 6 HOURS PRN
Status: DISCONTINUED | OUTPATIENT
Start: 2019-05-14 | End: 2019-05-20 | Stop reason: HOSPADM

## 2019-05-14 RX ORDER — OLANZAPINE 10 MG/1
5 INJECTION, POWDER, LYOPHILIZED, FOR SOLUTION INTRAMUSCULAR EVERY 6 HOURS PRN
Status: CANCELLED | OUTPATIENT
Start: 2019-05-14

## 2019-05-14 RX ORDER — LORAZEPAM 2 MG/ML
2 INJECTION INTRAMUSCULAR EVERY 4 HOURS PRN
Status: CANCELLED | OUTPATIENT
Start: 2019-05-14

## 2019-05-14 RX ORDER — HYDROXYZINE 50 MG/1
50 TABLET, FILM COATED ORAL EVERY 6 HOURS PRN
Status: DISCONTINUED | OUTPATIENT
Start: 2019-05-14 | End: 2019-05-20 | Stop reason: HOSPADM

## 2019-05-14 RX ORDER — IBUPROFEN 600 MG/1
600 TABLET ORAL EVERY 8 HOURS PRN
Status: CANCELLED | OUTPATIENT
Start: 2019-05-14

## 2019-05-14 RX ORDER — BENZTROPINE MESYLATE 1 MG/ML
1 INJECTION INTRAMUSCULAR; INTRAVENOUS EVERY 6 HOURS PRN
Status: DISCONTINUED | OUTPATIENT
Start: 2019-05-14 | End: 2019-05-20 | Stop reason: HOSPADM

## 2019-05-14 RX ORDER — HYDROXYZINE HYDROCHLORIDE 25 MG/1
25 TABLET, FILM COATED ORAL EVERY 6 HOURS PRN
Status: CANCELLED | OUTPATIENT
Start: 2019-05-14

## 2019-05-14 RX ORDER — LORAZEPAM 0.5 MG/1
1 TABLET ORAL ONCE
Status: COMPLETED | OUTPATIENT
Start: 2019-05-14 | End: 2019-05-14

## 2019-05-14 RX ORDER — TRAZODONE HYDROCHLORIDE 50 MG/1
50 TABLET ORAL
Status: CANCELLED | OUTPATIENT
Start: 2019-05-14

## 2019-05-14 RX ADMIN — BENZTROPINE MESYLATE 1 MG: 1 TABLET ORAL at 22:36

## 2019-05-14 RX ADMIN — LORAZEPAM 1 MG: 0.5 TABLET ORAL at 09:47

## 2019-05-14 RX ADMIN — CHLORDIAZEPOXIDE HYDROCHLORIDE 50 MG: 25 CAPSULE ORAL at 22:35

## 2019-05-15 PROBLEM — F39 MOOD DISORDER (HCC): Status: ACTIVE | Noted: 2019-05-15

## 2019-05-15 PROBLEM — F10.20 ALCOHOL USE DISORDER, MODERATE, DEPENDENCE (HCC): Status: ACTIVE | Noted: 2019-05-15

## 2019-05-15 PROBLEM — F14.10 COCAINE ABUSE (HCC): Status: ACTIVE | Noted: 2019-05-15

## 2019-05-15 LAB
ALBUMIN SERPL BCP-MCNC: 2.8 G/DL (ref 3.5–5)
ALP SERPL-CCNC: 66 U/L (ref 46–116)
ALT SERPL W P-5'-P-CCNC: 14 U/L (ref 12–78)
ANION GAP SERPL CALCULATED.3IONS-SCNC: 9 MMOL/L (ref 4–13)
AST SERPL W P-5'-P-CCNC: 11 U/L (ref 5–45)
BASOPHILS # BLD AUTO: 0.03 THOUSANDS/ΜL (ref 0–0.1)
BASOPHILS NFR BLD AUTO: 1 % (ref 0–1)
BILIRUB SERPL-MCNC: 0.4 MG/DL (ref 0.2–1)
BUN SERPL-MCNC: 6 MG/DL (ref 5–25)
CALCIUM SERPL-MCNC: 8.4 MG/DL (ref 8.3–10.1)
CHLORIDE SERPL-SCNC: 105 MMOL/L (ref 100–108)
CHOLEST SERPL-MCNC: 135 MG/DL (ref 50–200)
CO2 SERPL-SCNC: 28 MMOL/L (ref 21–32)
CREAT SERPL-MCNC: 0.65 MG/DL (ref 0.6–1.3)
EOSINOPHIL # BLD AUTO: 0.05 THOUSAND/ΜL (ref 0–0.61)
EOSINOPHIL NFR BLD AUTO: 1 % (ref 0–6)
ERYTHROCYTE [DISTWIDTH] IN BLOOD BY AUTOMATED COUNT: 20.9 % (ref 11.6–15.1)
EST. AVERAGE GLUCOSE BLD GHB EST-MCNC: 82 MG/DL
GFR SERPL CREATININE-BSD FRML MDRD: 132 ML/MIN/1.73SQ M
GLUCOSE SERPL-MCNC: 93 MG/DL (ref 65–140)
HBA1C MFR BLD: 4.5 % (ref 4.2–6.3)
HCG SERPL QL: NEGATIVE
HCT VFR BLD AUTO: 29.9 % (ref 34.8–46.1)
HDLC SERPL-MCNC: 73 MG/DL (ref 40–60)
HGB BLD-MCNC: 7.9 G/DL (ref 11.5–15.4)
IMM GRANULOCYTES # BLD AUTO: 0.02 THOUSAND/UL (ref 0–0.2)
IMM GRANULOCYTES NFR BLD AUTO: 0 % (ref 0–2)
LDLC SERPL CALC-MCNC: 53 MG/DL (ref 0–100)
LYMPHOCYTES # BLD AUTO: 1.79 THOUSANDS/ΜL (ref 0.6–4.47)
LYMPHOCYTES NFR BLD AUTO: 33 % (ref 14–44)
MCH RBC QN AUTO: 17 PG (ref 26.8–34.3)
MCHC RBC AUTO-ENTMCNC: 26.4 G/DL (ref 31.4–37.4)
MCV RBC AUTO: 64 FL (ref 82–98)
MONOCYTES # BLD AUTO: 0.74 THOUSAND/ΜL (ref 0.17–1.22)
MONOCYTES NFR BLD AUTO: 14 % (ref 4–12)
NEUTROPHILS # BLD AUTO: 2.87 THOUSANDS/ΜL (ref 1.85–7.62)
NEUTS SEG NFR BLD AUTO: 51 % (ref 43–75)
NONHDLC SERPL-MCNC: 62 MG/DL
NRBC BLD AUTO-RTO: 0 /100 WBCS
PLATELET # BLD AUTO: 274 THOUSANDS/UL (ref 149–390)
POTASSIUM SERPL-SCNC: 3.5 MMOL/L (ref 3.5–5.3)
PROT SERPL-MCNC: 6.2 G/DL (ref 6.4–8.2)
RBC # BLD AUTO: 4.66 MILLION/UL (ref 3.81–5.12)
RPR SER QL: NORMAL
SODIUM SERPL-SCNC: 142 MMOL/L (ref 136–145)
TRIGL SERPL-MCNC: 46 MG/DL
TSH SERPL DL<=0.05 MIU/L-ACNC: 1.18 UIU/ML (ref 0.36–3.74)
WBC # BLD AUTO: 5.5 THOUSAND/UL (ref 4.31–10.16)

## 2019-05-15 PROCEDURE — 83036 HEMOGLOBIN GLYCOSYLATED A1C: CPT | Performed by: PSYCHIATRY & NEUROLOGY

## 2019-05-15 PROCEDURE — 85025 COMPLETE CBC W/AUTO DIFF WBC: CPT | Performed by: PHYSICIAN ASSISTANT

## 2019-05-15 PROCEDURE — 86592 SYPHILIS TEST NON-TREP QUAL: CPT | Performed by: PHYSICIAN ASSISTANT

## 2019-05-15 PROCEDURE — 84443 ASSAY THYROID STIM HORMONE: CPT | Performed by: PHYSICIAN ASSISTANT

## 2019-05-15 PROCEDURE — 80061 LIPID PANEL: CPT | Performed by: PHYSICIAN ASSISTANT

## 2019-05-15 PROCEDURE — 80053 COMPREHEN METABOLIC PANEL: CPT | Performed by: PHYSICIAN ASSISTANT

## 2019-05-15 PROCEDURE — 99222 1ST HOSP IP/OBS MODERATE 55: CPT | Performed by: PSYCHIATRY & NEUROLOGY

## 2019-05-15 PROCEDURE — 84703 CHORIONIC GONADOTROPIN ASSAY: CPT | Performed by: PHYSICIAN ASSISTANT

## 2019-05-15 PROCEDURE — 99253 IP/OBS CNSLTJ NEW/EST LOW 45: CPT | Performed by: PHYSICIAN ASSISTANT

## 2019-05-15 RX ORDER — LORAZEPAM 1 MG/1
1 TABLET ORAL 3 TIMES DAILY
Status: DISCONTINUED | OUTPATIENT
Start: 2019-05-15 | End: 2019-05-17

## 2019-05-15 RX ORDER — BENZTROPINE MESYLATE 0.5 MG/1
0.5 TABLET ORAL 2 TIMES DAILY
Status: DISCONTINUED | OUTPATIENT
Start: 2019-05-15 | End: 2019-05-20 | Stop reason: HOSPADM

## 2019-05-15 RX ORDER — RISPERIDONE 1 MG/1
1 TABLET, FILM COATED ORAL DAILY
Status: DISCONTINUED | OUTPATIENT
Start: 2019-05-15 | End: 2019-05-20 | Stop reason: HOSPADM

## 2019-05-15 RX ORDER — RISPERIDONE 2 MG/1
2 TABLET, FILM COATED ORAL
Status: DISCONTINUED | OUTPATIENT
Start: 2019-05-15 | End: 2019-05-20 | Stop reason: HOSPADM

## 2019-05-15 RX ADMIN — LORAZEPAM 1 MG: 1 TABLET ORAL at 21:26

## 2019-05-15 RX ADMIN — LORAZEPAM 1 MG: 1 TABLET ORAL at 16:42

## 2019-05-15 RX ADMIN — RISPERIDONE 1 MG: 1 TABLET ORAL at 12:55

## 2019-05-15 RX ADMIN — RISPERIDONE 2 MG: 2 TABLET ORAL at 18:52

## 2019-05-15 RX ADMIN — LORAZEPAM 1 MG: 1 TABLET ORAL at 12:55

## 2019-05-15 RX ADMIN — BENZTROPINE MESYLATE 0.5 MG: 0.5 TABLET ORAL at 12:55

## 2019-05-15 RX ADMIN — BENZTROPINE MESYLATE 0.5 MG: 0.5 TABLET ORAL at 18:52

## 2019-05-16 PROCEDURE — 99232 SBSQ HOSP IP/OBS MODERATE 35: CPT | Performed by: PSYCHIATRY & NEUROLOGY

## 2019-05-16 RX ADMIN — LORAZEPAM 1 MG: 1 TABLET ORAL at 21:11

## 2019-05-16 RX ADMIN — TRAZODONE HYDROCHLORIDE 50 MG: 50 TABLET ORAL at 21:44

## 2019-05-16 RX ADMIN — LORAZEPAM 1 MG: 1 TABLET ORAL at 10:37

## 2019-05-16 RX ADMIN — BENZTROPINE MESYLATE 0.5 MG: 0.5 TABLET ORAL at 10:36

## 2019-05-16 RX ADMIN — RISPERIDONE 2 MG: 2 TABLET ORAL at 16:49

## 2019-05-16 RX ADMIN — BENZTROPINE MESYLATE 0.5 MG: 0.5 TABLET ORAL at 16:50

## 2019-05-16 RX ADMIN — LORAZEPAM 1 MG: 1 TABLET ORAL at 16:49

## 2019-05-16 RX ADMIN — RISPERIDONE 1 MG: 1 TABLET ORAL at 10:37

## 2019-05-17 LAB
BASOPHILS # BLD AUTO: 0.03 THOUSANDS/ΜL (ref 0–0.1)
BASOPHILS NFR BLD AUTO: 1 % (ref 0–1)
EOSINOPHIL # BLD AUTO: 0.01 THOUSAND/ΜL (ref 0–0.61)
EOSINOPHIL NFR BLD AUTO: 0 % (ref 0–6)
ERYTHROCYTE [DISTWIDTH] IN BLOOD BY AUTOMATED COUNT: 21 % (ref 11.6–15.1)
HCT VFR BLD AUTO: 30.9 % (ref 34.8–46.1)
HGB BLD-MCNC: 8.3 G/DL (ref 11.5–15.4)
IMM GRANULOCYTES # BLD AUTO: 0.01 THOUSAND/UL (ref 0–0.2)
IMM GRANULOCYTES NFR BLD AUTO: 0 % (ref 0–2)
LYMPHOCYTES # BLD AUTO: 1.85 THOUSANDS/ΜL (ref 0.6–4.47)
LYMPHOCYTES NFR BLD AUTO: 35 % (ref 14–44)
MCH RBC QN AUTO: 17.4 PG (ref 26.8–34.3)
MCHC RBC AUTO-ENTMCNC: 26.9 G/DL (ref 31.4–37.4)
MCV RBC AUTO: 65 FL (ref 82–98)
MONOCYTES # BLD AUTO: 0.58 THOUSAND/ΜL (ref 0.17–1.22)
MONOCYTES NFR BLD AUTO: 11 % (ref 4–12)
NEUTROPHILS # BLD AUTO: 2.87 THOUSANDS/ΜL (ref 1.85–7.62)
NEUTS SEG NFR BLD AUTO: 53 % (ref 43–75)
NRBC BLD AUTO-RTO: 0 /100 WBCS
PLATELET # BLD AUTO: 307 THOUSANDS/UL (ref 149–390)
RBC # BLD AUTO: 4.78 MILLION/UL (ref 3.81–5.12)
WBC # BLD AUTO: 5.35 THOUSAND/UL (ref 4.31–10.16)

## 2019-05-17 PROCEDURE — 85025 COMPLETE CBC W/AUTO DIFF WBC: CPT | Performed by: PSYCHIATRY & NEUROLOGY

## 2019-05-17 PROCEDURE — 99232 SBSQ HOSP IP/OBS MODERATE 35: CPT | Performed by: PSYCHIATRY & NEUROLOGY

## 2019-05-17 RX ORDER — RISPERIDONE 2 MG/1
2 TABLET, ORALLY DISINTEGRATING ORAL ONCE
Status: COMPLETED | OUTPATIENT
Start: 2019-05-17 | End: 2019-05-17

## 2019-05-17 RX ORDER — LORAZEPAM 1 MG/1
2 TABLET ORAL ONCE
Status: COMPLETED | OUTPATIENT
Start: 2019-05-17 | End: 2019-05-17

## 2019-05-17 RX ORDER — LORAZEPAM 1 MG/1
1 TABLET ORAL 2 TIMES DAILY
Status: COMPLETED | OUTPATIENT
Start: 2019-05-17 | End: 2019-05-18

## 2019-05-17 RX ADMIN — LORAZEPAM 2 MG: 1 TABLET ORAL at 01:50

## 2019-05-17 RX ADMIN — TRAZODONE HYDROCHLORIDE 50 MG: 50 TABLET ORAL at 23:34

## 2019-05-17 RX ADMIN — HYDROXYZINE HYDROCHLORIDE 50 MG: 50 TABLET ORAL at 00:25

## 2019-05-17 RX ADMIN — RISPERIDONE 2 MG: 2 TABLET ORAL at 18:09

## 2019-05-17 RX ADMIN — LORAZEPAM 1 MG: 1 TABLET ORAL at 18:09

## 2019-05-17 RX ADMIN — BENZTROPINE MESYLATE 0.5 MG: 0.5 TABLET ORAL at 11:58

## 2019-05-17 RX ADMIN — RISPERIDONE 1 MG: 1 TABLET ORAL at 11:57

## 2019-05-17 RX ADMIN — RISPERIDONE 2 MG: 2 TABLET, ORALLY DISINTEGRATING ORAL at 01:50

## 2019-05-17 RX ADMIN — BENZTROPINE MESYLATE 0.5 MG: 0.5 TABLET ORAL at 18:09

## 2019-05-18 PROCEDURE — 99231 SBSQ HOSP IP/OBS SF/LOW 25: CPT | Performed by: STUDENT IN AN ORGANIZED HEALTH CARE EDUCATION/TRAINING PROGRAM

## 2019-05-18 RX ADMIN — LORAZEPAM 1 MG: 1 TABLET ORAL at 17:37

## 2019-05-18 RX ADMIN — HYDROXYZINE HYDROCHLORIDE 50 MG: 50 TABLET ORAL at 23:47

## 2019-05-18 RX ADMIN — RISPERIDONE 2 MG: 2 TABLET ORAL at 17:37

## 2019-05-18 RX ADMIN — HYDROXYZINE HYDROCHLORIDE 50 MG: 50 TABLET ORAL at 01:13

## 2019-05-18 RX ADMIN — LORAZEPAM 1 MG: 1 TABLET ORAL at 09:53

## 2019-05-18 RX ADMIN — TRAZODONE HYDROCHLORIDE 50 MG: 50 TABLET ORAL at 22:29

## 2019-05-18 RX ADMIN — BENZTROPINE MESYLATE 0.5 MG: 0.5 TABLET ORAL at 17:37

## 2019-05-18 RX ADMIN — BENZTROPINE MESYLATE 0.5 MG: 0.5 TABLET ORAL at 09:53

## 2019-05-18 RX ADMIN — RISPERIDONE 1 MG: 1 TABLET ORAL at 09:53

## 2019-05-19 PROCEDURE — 99231 SBSQ HOSP IP/OBS SF/LOW 25: CPT | Performed by: STUDENT IN AN ORGANIZED HEALTH CARE EDUCATION/TRAINING PROGRAM

## 2019-05-19 RX ADMIN — HALOPERIDOL 5 MG: 5 TABLET ORAL at 23:42

## 2019-05-19 RX ADMIN — BENZTROPINE MESYLATE 0.5 MG: 0.5 TABLET ORAL at 09:10

## 2019-05-19 RX ADMIN — TRAZODONE HYDROCHLORIDE 50 MG: 50 TABLET ORAL at 21:04

## 2019-05-19 RX ADMIN — HALOPERIDOL 5 MG: 5 TABLET ORAL at 01:22

## 2019-05-19 RX ADMIN — BENZTROPINE MESYLATE 1 MG: 1 TABLET ORAL at 01:22

## 2019-05-19 RX ADMIN — HYDROXYZINE HYDROCHLORIDE 50 MG: 50 TABLET ORAL at 22:14

## 2019-05-19 RX ADMIN — BENZTROPINE MESYLATE 0.5 MG: 0.5 TABLET ORAL at 17:42

## 2019-05-19 RX ADMIN — RISPERIDONE 1 MG: 1 TABLET ORAL at 09:10

## 2019-05-19 RX ADMIN — RISPERIDONE 2 MG: 2 TABLET ORAL at 17:42

## 2019-05-19 RX ADMIN — BENZTROPINE MESYLATE 1 MG: 1 TABLET ORAL at 23:41

## 2019-05-19 RX ADMIN — HYDROXYZINE HYDROCHLORIDE 50 MG: 50 TABLET ORAL at 16:20

## 2019-05-20 VITALS
HEART RATE: 64 BPM | OXYGEN SATURATION: 98 % | SYSTOLIC BLOOD PRESSURE: 92 MMHG | DIASTOLIC BLOOD PRESSURE: 53 MMHG | RESPIRATION RATE: 16 BRPM | TEMPERATURE: 97.2 F | WEIGHT: 137.2 LBS | BODY MASS INDEX: 25.9 KG/M2 | HEIGHT: 61 IN

## 2019-05-20 PROCEDURE — 99238 HOSP IP/OBS DSCHRG MGMT 30/<: CPT | Performed by: PSYCHIATRY & NEUROLOGY

## 2019-05-20 RX ORDER — TRAZODONE HYDROCHLORIDE 50 MG/1
50 TABLET ORAL
Qty: 30 TABLET | Refills: 1 | Status: SHIPPED | OUTPATIENT
Start: 2019-05-20 | End: 2019-06-20 | Stop reason: HOSPADM

## 2019-05-20 RX ORDER — RISPERIDONE 2 MG/1
2 TABLET, FILM COATED ORAL
Qty: 30 TABLET | Refills: 1 | Status: SHIPPED | OUTPATIENT
Start: 2019-05-20 | End: 2019-06-20 | Stop reason: HOSPADM

## 2019-05-20 RX ORDER — BENZTROPINE MESYLATE 0.5 MG/1
0.5 TABLET ORAL 2 TIMES DAILY
Qty: 60 TABLET | Refills: 1 | Status: SHIPPED | OUTPATIENT
Start: 2019-05-20 | End: 2019-06-20 | Stop reason: HOSPADM

## 2019-05-20 RX ORDER — RISPERIDONE 1 MG/1
1 TABLET, FILM COATED ORAL DAILY
Qty: 30 TABLET | Refills: 1 | Status: SHIPPED | OUTPATIENT
Start: 2019-05-21 | End: 2019-06-20 | Stop reason: HOSPADM

## 2019-05-20 RX ADMIN — BENZTROPINE MESYLATE 0.5 MG: 0.5 TABLET ORAL at 08:47

## 2019-05-20 RX ADMIN — RISPERIDONE 1 MG: 1 TABLET ORAL at 08:47

## 2019-06-13 ENCOUNTER — HOSPITAL ENCOUNTER (INPATIENT)
Facility: HOSPITAL | Age: 39
LOS: 3 days | DRG: 812 | End: 2019-06-17
Attending: EMERGENCY MEDICINE | Admitting: HOSPITALIST
Payer: COMMERCIAL

## 2019-06-13 DIAGNOSIS — T50.901A ACCIDENTAL DRUG OVERDOSE, INITIAL ENCOUNTER: ICD-10-CM

## 2019-06-13 DIAGNOSIS — T50.901A OVERDOSE: Primary | ICD-10-CM

## 2019-06-13 DIAGNOSIS — R45.89 SUICIDAL BEHAVIOR WITHOUT ATTEMPTED SELF-INJURY: ICD-10-CM

## 2019-06-13 DIAGNOSIS — F31.4 BIPOLAR 1 DISORDER, DEPRESSED, SEVERE (HCC): ICD-10-CM

## 2019-06-13 PROBLEM — E87.6 HYPOKALEMIA: Status: ACTIVE | Noted: 2019-06-13

## 2019-06-13 LAB
ALBUMIN SERPL BCP-MCNC: 3.6 G/DL (ref 3.5–5)
ALP SERPL-CCNC: 60 U/L (ref 46–116)
ALT SERPL W P-5'-P-CCNC: 9 U/L (ref 12–78)
ANION GAP SERPL CALCULATED.3IONS-SCNC: 14 MMOL/L (ref 4–13)
APAP SERPL-MCNC: <2 UG/ML (ref 10–20)
APTT PPP: 32 SECONDS (ref 26–38)
AST SERPL W P-5'-P-CCNC: 23 U/L (ref 5–45)
ATRIAL RATE: 85 BPM
BASOPHILS # BLD AUTO: 0.04 THOUSANDS/ΜL (ref 0–0.1)
BASOPHILS NFR BLD AUTO: 1 % (ref 0–1)
BILIRUB SERPL-MCNC: 0.53 MG/DL (ref 0.2–1)
BUN SERPL-MCNC: 7 MG/DL (ref 5–25)
CALCIUM SERPL-MCNC: 9 MG/DL (ref 8.3–10.1)
CHLORIDE SERPL-SCNC: 104 MMOL/L (ref 100–108)
CK MB SERPL-MCNC: 4.1 NG/ML (ref 0–5)
CK MB SERPL-MCNC: <1 % (ref 0–2.5)
CK SERPL-CCNC: 554 U/L (ref 26–192)
CO2 SERPL-SCNC: 26 MMOL/L (ref 21–32)
CREAT SERPL-MCNC: 0.66 MG/DL (ref 0.6–1.3)
EOSINOPHIL # BLD AUTO: 0.01 THOUSAND/ΜL (ref 0–0.61)
EOSINOPHIL NFR BLD AUTO: 0 % (ref 0–6)
ERYTHROCYTE [DISTWIDTH] IN BLOOD BY AUTOMATED COUNT: 20.8 % (ref 11.6–15.1)
ETHANOL SERPL-MCNC: <3 MG/DL (ref 0–3)
FERRITIN SERPL-MCNC: 2 NG/ML (ref 8–388)
GFR SERPL CREATININE-BSD FRML MDRD: 131 ML/MIN/1.73SQ M
GLUCOSE SERPL-MCNC: 75 MG/DL (ref 65–140)
HCT VFR BLD AUTO: 28 % (ref 34.8–46.1)
HGB BLD-MCNC: 7.4 G/DL (ref 11.5–15.4)
IMM GRANULOCYTES # BLD AUTO: 0.01 THOUSAND/UL (ref 0–0.2)
IMM GRANULOCYTES NFR BLD AUTO: 0 % (ref 0–2)
INR PPP: 1.09 (ref 0.86–1.17)
IRON SATN MFR SERPL: 3 %
IRON SERPL-MCNC: 10 UG/DL (ref 50–170)
LYMPHOCYTES # BLD AUTO: 0.82 THOUSANDS/ΜL (ref 0.6–4.47)
LYMPHOCYTES NFR BLD AUTO: 12 % (ref 14–44)
MCH RBC QN AUTO: 16.5 PG (ref 26.8–34.3)
MCHC RBC AUTO-ENTMCNC: 26.4 G/DL (ref 31.4–37.4)
MCV RBC AUTO: 62 FL (ref 82–98)
MONOCYTES # BLD AUTO: 0.65 THOUSAND/ΜL (ref 0.17–1.22)
MONOCYTES NFR BLD AUTO: 9 % (ref 4–12)
NEUTROPHILS # BLD AUTO: 5.48 THOUSANDS/ΜL (ref 1.85–7.62)
NEUTS SEG NFR BLD AUTO: 78 % (ref 43–75)
NRBC BLD AUTO-RTO: 0 /100 WBCS
P AXIS: 75 DEGREES
PLATELET # BLD AUTO: 292 THOUSANDS/UL (ref 149–390)
POTASSIUM SERPL-SCNC: 2.9 MMOL/L (ref 3.5–5.3)
PR INTERVAL: 140 MS
PROT SERPL-MCNC: 6.9 G/DL (ref 6.4–8.2)
PROTHROMBIN TIME: 14.2 SECONDS (ref 11.8–14.2)
QRS AXIS: 45 DEGREES
QRSD INTERVAL: 82 MS
QT INTERVAL: 390 MS
QTC INTERVAL: 464 MS
RBC # BLD AUTO: 4.49 MILLION/UL (ref 3.81–5.12)
SALICYLATES SERPL-MCNC: 5.5 MG/DL (ref 3–20)
SODIUM SERPL-SCNC: 144 MMOL/L (ref 136–145)
T WAVE AXIS: 54 DEGREES
TIBC SERPL-MCNC: 339 UG/DL (ref 250–450)
VENTRICULAR RATE: 85 BPM
WBC # BLD AUTO: 7.01 THOUSAND/UL (ref 4.31–10.16)

## 2019-06-13 PROCEDURE — 85730 THROMBOPLASTIN TIME PARTIAL: CPT | Performed by: EMERGENCY MEDICINE

## 2019-06-13 PROCEDURE — 93010 ELECTROCARDIOGRAM REPORT: CPT | Performed by: INTERNAL MEDICINE

## 2019-06-13 PROCEDURE — 83550 IRON BINDING TEST: CPT | Performed by: HOSPITALIST

## 2019-06-13 PROCEDURE — 80329 ANALGESICS NON-OPIOID 1 OR 2: CPT | Performed by: EMERGENCY MEDICINE

## 2019-06-13 PROCEDURE — 85025 COMPLETE CBC W/AUTO DIFF WBC: CPT | Performed by: EMERGENCY MEDICINE

## 2019-06-13 PROCEDURE — 93005 ELECTROCARDIOGRAM TRACING: CPT

## 2019-06-13 PROCEDURE — 36415 COLL VENOUS BLD VENIPUNCTURE: CPT | Performed by: EMERGENCY MEDICINE

## 2019-06-13 PROCEDURE — 99220 PR INITIAL OBSERVATION CARE/DAY 70 MINUTES: CPT | Performed by: HOSPITALIST

## 2019-06-13 PROCEDURE — 99285 EMERGENCY DEPT VISIT HI MDM: CPT

## 2019-06-13 PROCEDURE — 99284 EMERGENCY DEPT VISIT MOD MDM: CPT | Performed by: EMERGENCY MEDICINE

## 2019-06-13 PROCEDURE — 82728 ASSAY OF FERRITIN: CPT | Performed by: HOSPITALIST

## 2019-06-13 PROCEDURE — 82550 ASSAY OF CK (CPK): CPT | Performed by: EMERGENCY MEDICINE

## 2019-06-13 PROCEDURE — 80053 COMPREHEN METABOLIC PANEL: CPT | Performed by: EMERGENCY MEDICINE

## 2019-06-13 PROCEDURE — 85610 PROTHROMBIN TIME: CPT | Performed by: EMERGENCY MEDICINE

## 2019-06-13 PROCEDURE — 80320 DRUG SCREEN QUANTALCOHOLS: CPT | Performed by: EMERGENCY MEDICINE

## 2019-06-13 PROCEDURE — 83540 ASSAY OF IRON: CPT | Performed by: HOSPITALIST

## 2019-06-13 PROCEDURE — 82553 CREATINE MB FRACTION: CPT | Performed by: EMERGENCY MEDICINE

## 2019-06-13 PROCEDURE — 96360 HYDRATION IV INFUSION INIT: CPT

## 2019-06-13 RX ORDER — POTASSIUM CHLORIDE 14.9 MG/ML
20 INJECTION INTRAVENOUS ONCE
Status: COMPLETED | OUTPATIENT
Start: 2019-06-13 | End: 2019-06-14

## 2019-06-13 RX ORDER — POTASSIUM CHLORIDE 29.8 MG/ML
40 INJECTION INTRAVENOUS ONCE
Status: DISCONTINUED | OUTPATIENT
Start: 2019-06-13 | End: 2019-06-13 | Stop reason: SDUPTHER

## 2019-06-13 RX ORDER — SODIUM CHLORIDE 9 MG/ML
100 INJECTION, SOLUTION INTRAVENOUS CONTINUOUS
Status: DISCONTINUED | OUTPATIENT
Start: 2019-06-13 | End: 2019-06-13

## 2019-06-13 RX ORDER — NICOTINE 21 MG/24HR
1 PATCH, TRANSDERMAL 24 HOURS TRANSDERMAL DAILY
Status: DISCONTINUED | OUTPATIENT
Start: 2019-06-14 | End: 2019-06-17 | Stop reason: HOSPADM

## 2019-06-13 RX ORDER — SODIUM CHLORIDE 9 MG/ML
75 INJECTION, SOLUTION INTRAVENOUS CONTINUOUS
Status: DISCONTINUED | OUTPATIENT
Start: 2019-06-13 | End: 2019-06-14

## 2019-06-13 RX ORDER — ZIPRASIDONE MESYLATE 20 MG/ML
2.5 INJECTION, POWDER, LYOPHILIZED, FOR SOLUTION INTRAMUSCULAR EVERY 4 HOURS PRN
Status: DISCONTINUED | OUTPATIENT
Start: 2019-06-13 | End: 2019-06-17 | Stop reason: HOSPADM

## 2019-06-13 RX ORDER — SENNOSIDES 8.6 MG
1 TABLET ORAL DAILY
Status: DISCONTINUED | OUTPATIENT
Start: 2019-06-14 | End: 2019-06-17 | Stop reason: HOSPADM

## 2019-06-13 RX ORDER — BENZTROPINE MESYLATE 1 MG/1
0.5 TABLET ORAL 2 TIMES DAILY
Status: DISCONTINUED | OUTPATIENT
Start: 2019-06-13 | End: 2019-06-13

## 2019-06-13 RX ADMIN — POTASSIUM CHLORIDE 20 MEQ: 200 INJECTION, SOLUTION INTRAVENOUS at 19:00

## 2019-06-13 RX ADMIN — POTASSIUM CHLORIDE 20 MEQ: 200 INJECTION, SOLUTION INTRAVENOUS at 17:42

## 2019-06-13 RX ADMIN — SODIUM CHLORIDE 125 ML/HR: 0.9 INJECTION, SOLUTION INTRAVENOUS at 19:00

## 2019-06-13 RX ADMIN — SODIUM CHLORIDE 1000 ML: 0.9 INJECTION, SOLUTION INTRAVENOUS at 15:27

## 2019-06-13 RX ADMIN — SODIUM CHLORIDE 100 ML/HR: 0.9 INJECTION, SOLUTION INTRAVENOUS at 17:35

## 2019-06-14 LAB
AMPHETAMINES SERPL QL SCN: POSITIVE
ANION GAP SERPL CALCULATED.3IONS-SCNC: 18 MMOL/L (ref 4–13)
BACTERIA UR QL AUTO: ABNORMAL /HPF
BARBITURATES UR QL: NEGATIVE
BENZODIAZ UR QL: NEGATIVE
BILIRUB UR QL STRIP: NEGATIVE
BUN SERPL-MCNC: 7 MG/DL (ref 5–25)
CALCIUM SERPL-MCNC: 8.7 MG/DL (ref 8.3–10.1)
CHLORIDE SERPL-SCNC: 107 MMOL/L (ref 100–108)
CLARITY UR: ABNORMAL
CO2 SERPL-SCNC: 18 MMOL/L (ref 21–32)
COCAINE UR QL: POSITIVE
COLOR UR: YELLOW
CREAT SERPL-MCNC: 0.65 MG/DL (ref 0.6–1.3)
GFR SERPL CREATININE-BSD FRML MDRD: 132 ML/MIN/1.73SQ M
GLUCOSE P FAST SERPL-MCNC: 42 MG/DL (ref 65–99)
GLUCOSE SERPL-MCNC: 42 MG/DL (ref 65–140)
GLUCOSE UR STRIP-MCNC: NEGATIVE MG/DL
HGB UR QL STRIP.AUTO: ABNORMAL
KETONES UR STRIP-MCNC: ABNORMAL MG/DL
LEUKOCYTE ESTERASE UR QL STRIP: NEGATIVE
METHADONE UR QL: NEGATIVE
MUCOUS THREADS UR QL AUTO: ABNORMAL
NITRITE UR QL STRIP: POSITIVE
NON-SQ EPI CELLS URNS QL MICRO: ABNORMAL /HPF
OPIATES UR QL SCN: NEGATIVE
PCP UR QL: NEGATIVE
PH UR STRIP.AUTO: 6 [PH]
POTASSIUM SERPL-SCNC: 3.2 MMOL/L (ref 3.5–5.3)
PROT UR STRIP-MCNC: ABNORMAL MG/DL
RBC #/AREA URNS AUTO: ABNORMAL /HPF
SODIUM SERPL-SCNC: 143 MMOL/L (ref 136–145)
SP GR UR STRIP.AUTO: >1.03 (ref 1–1.03)
THC UR QL: POSITIVE
UROBILINOGEN UR QL STRIP.AUTO: 0.2 E.U./DL
WBC #/AREA URNS AUTO: ABNORMAL /HPF

## 2019-06-14 PROCEDURE — 80307 DRUG TEST PRSMV CHEM ANLYZR: CPT | Performed by: HOSPITALIST

## 2019-06-14 PROCEDURE — 81001 URINALYSIS AUTO W/SCOPE: CPT | Performed by: HOSPITALIST

## 2019-06-14 PROCEDURE — 99232 SBSQ HOSP IP/OBS MODERATE 35: CPT | Performed by: HOSPITALIST

## 2019-06-14 PROCEDURE — 80048 BASIC METABOLIC PNL TOTAL CA: CPT | Performed by: HOSPITALIST

## 2019-06-14 RX ORDER — POTASSIUM CHLORIDE 20 MEQ/1
40 TABLET, EXTENDED RELEASE ORAL ONCE
Status: COMPLETED | OUTPATIENT
Start: 2019-06-14 | End: 2019-06-14

## 2019-06-14 RX ADMIN — POTASSIUM CHLORIDE 40 MEQ: 1500 TABLET, EXTENDED RELEASE ORAL at 11:05

## 2019-06-14 RX ADMIN — NICOTINE 1 PATCH: 14 PATCH TRANSDERMAL at 08:49

## 2019-06-14 RX ADMIN — ZIPRASIDONE MESYLATE 2.5 MG: 20 INJECTION, POWDER, LYOPHILIZED, FOR SOLUTION INTRAMUSCULAR at 23:01

## 2019-06-14 RX ADMIN — IRON SUCROSE 200 MG: 20 INJECTION, SOLUTION INTRAVENOUS at 11:03

## 2019-06-15 LAB
ANION GAP SERPL CALCULATED.3IONS-SCNC: 12 MMOL/L (ref 4–13)
BUN SERPL-MCNC: 3 MG/DL (ref 5–25)
CALCIUM SERPL-MCNC: 8.8 MG/DL (ref 8.3–10.1)
CHLORIDE SERPL-SCNC: 108 MMOL/L (ref 100–108)
CK MB SERPL-MCNC: 1.2 NG/ML (ref 0–5)
CK MB SERPL-MCNC: <1 % (ref 0–2.5)
CK SERPL-CCNC: 265 U/L (ref 26–192)
CO2 SERPL-SCNC: 23 MMOL/L (ref 21–32)
CREAT SERPL-MCNC: 0.58 MG/DL (ref 0.6–1.3)
GFR SERPL CREATININE-BSD FRML MDRD: 137 ML/MIN/1.73SQ M
GLUCOSE SERPL-MCNC: 98 MG/DL (ref 65–140)
HCT VFR BLD AUTO: 29.5 % (ref 34.8–46.1)
HGB BLD-MCNC: 7.7 G/DL (ref 11.5–15.4)
POTASSIUM SERPL-SCNC: 3.4 MMOL/L (ref 3.5–5.3)
SODIUM SERPL-SCNC: 143 MMOL/L (ref 136–145)

## 2019-06-15 PROCEDURE — 99232 SBSQ HOSP IP/OBS MODERATE 35: CPT | Performed by: HOSPITALIST

## 2019-06-15 PROCEDURE — 85018 HEMOGLOBIN: CPT | Performed by: HOSPITALIST

## 2019-06-15 PROCEDURE — 82550 ASSAY OF CK (CPK): CPT | Performed by: HOSPITALIST

## 2019-06-15 PROCEDURE — 82553 CREATINE MB FRACTION: CPT | Performed by: HOSPITALIST

## 2019-06-15 PROCEDURE — 80048 BASIC METABOLIC PNL TOTAL CA: CPT | Performed by: HOSPITALIST

## 2019-06-15 PROCEDURE — 85014 HEMATOCRIT: CPT | Performed by: HOSPITALIST

## 2019-06-15 RX ORDER — HYDROXYZINE HYDROCHLORIDE 25 MG/1
50 TABLET, FILM COATED ORAL EVERY 6 HOURS PRN
Status: DISCONTINUED | OUTPATIENT
Start: 2019-06-15 | End: 2019-06-17 | Stop reason: HOSPADM

## 2019-06-15 RX ORDER — TRAZODONE HYDROCHLORIDE 50 MG/1
50 TABLET ORAL
Status: DISCONTINUED | OUTPATIENT
Start: 2019-06-15 | End: 2019-06-17 | Stop reason: HOSPADM

## 2019-06-15 RX ORDER — POTASSIUM CHLORIDE 20 MEQ/1
40 TABLET, EXTENDED RELEASE ORAL ONCE
Status: COMPLETED | OUTPATIENT
Start: 2019-06-15 | End: 2019-06-15

## 2019-06-15 RX ORDER — BENZTROPINE MESYLATE 1 MG/1
0.5 TABLET ORAL 2 TIMES DAILY
Status: DISCONTINUED | OUTPATIENT
Start: 2019-06-15 | End: 2019-06-17 | Stop reason: HOSPADM

## 2019-06-15 RX ADMIN — HYDROXYZINE HYDROCHLORIDE 50 MG: 25 TABLET ORAL at 17:47

## 2019-06-15 RX ADMIN — HYDROXYZINE HYDROCHLORIDE 50 MG: 25 TABLET ORAL at 23:57

## 2019-06-15 RX ADMIN — IRON SUCROSE 200 MG: 20 INJECTION, SOLUTION INTRAVENOUS at 12:41

## 2019-06-15 RX ADMIN — HYDROXYZINE HYDROCHLORIDE 50 MG: 25 TABLET ORAL at 00:11

## 2019-06-15 RX ADMIN — BENZTROPINE MESYLATE 0.5 MG: 1 TABLET ORAL at 17:21

## 2019-06-15 RX ADMIN — NICOTINE 1 PATCH: 14 PATCH TRANSDERMAL at 08:35

## 2019-06-15 RX ADMIN — TRAZODONE HYDROCHLORIDE 50 MG: 50 TABLET ORAL at 21:04

## 2019-06-15 RX ADMIN — POTASSIUM CHLORIDE 40 MEQ: 1500 TABLET, EXTENDED RELEASE ORAL at 12:41

## 2019-06-16 LAB
ANION GAP SERPL CALCULATED.3IONS-SCNC: 7 MMOL/L (ref 4–13)
BUN SERPL-MCNC: 3 MG/DL (ref 5–25)
CALCIUM SERPL-MCNC: 9 MG/DL (ref 8.3–10.1)
CHLORIDE SERPL-SCNC: 107 MMOL/L (ref 100–108)
CO2 SERPL-SCNC: 28 MMOL/L (ref 21–32)
CREAT SERPL-MCNC: 0.59 MG/DL (ref 0.6–1.3)
GFR SERPL CREATININE-BSD FRML MDRD: 136 ML/MIN/1.73SQ M
GLUCOSE SERPL-MCNC: 103 MG/DL (ref 65–140)
POTASSIUM SERPL-SCNC: 3.5 MMOL/L (ref 3.5–5.3)
SODIUM SERPL-SCNC: 142 MMOL/L (ref 136–145)

## 2019-06-16 PROCEDURE — 99232 SBSQ HOSP IP/OBS MODERATE 35: CPT | Performed by: HOSPITALIST

## 2019-06-16 PROCEDURE — 80048 BASIC METABOLIC PNL TOTAL CA: CPT | Performed by: HOSPITALIST

## 2019-06-16 RX ORDER — OLANZAPINE 10 MG/1
5 INJECTION, POWDER, LYOPHILIZED, FOR SOLUTION INTRAMUSCULAR ONCE
Status: COMPLETED | OUTPATIENT
Start: 2019-06-16 | End: 2019-06-16

## 2019-06-16 RX ADMIN — BENZTROPINE MESYLATE 0.5 MG: 1 TABLET ORAL at 08:24

## 2019-06-16 RX ADMIN — TRAZODONE HYDROCHLORIDE 50 MG: 50 TABLET ORAL at 22:31

## 2019-06-16 RX ADMIN — NICOTINE 1 PATCH: 14 PATCH TRANSDERMAL at 08:25

## 2019-06-16 RX ADMIN — IRON SUCROSE 200 MG: 20 INJECTION, SOLUTION INTRAVENOUS at 14:40

## 2019-06-16 RX ADMIN — OLANZAPINE 5 MG: 10 INJECTION, POWDER, FOR SOLUTION INTRAMUSCULAR at 01:02

## 2019-06-16 RX ADMIN — HYDROXYZINE HYDROCHLORIDE 50 MG: 25 TABLET ORAL at 06:00

## 2019-06-16 RX ADMIN — BENZTROPINE MESYLATE 0.5 MG: 1 TABLET ORAL at 17:25

## 2019-06-17 ENCOUNTER — HOSPITAL ENCOUNTER (INPATIENT)
Facility: HOSPITAL | Age: 39
LOS: 3 days | Discharge: HOME | DRG: 750 | End: 2019-06-20
Attending: PSYCHIATRY & NEUROLOGY | Admitting: PSYCHIATRY & NEUROLOGY
Payer: COMMERCIAL

## 2019-06-17 VITALS
WEIGHT: 136.69 LBS | SYSTOLIC BLOOD PRESSURE: 96 MMHG | BODY MASS INDEX: 25.83 KG/M2 | DIASTOLIC BLOOD PRESSURE: 71 MMHG | TEMPERATURE: 97.5 F | HEART RATE: 75 BPM | RESPIRATION RATE: 18 BRPM | OXYGEN SATURATION: 99 %

## 2019-06-17 DIAGNOSIS — G47.00 INSOMNIA: Primary | ICD-10-CM

## 2019-06-17 DIAGNOSIS — F25.9 SCHIZO-AFFECTIVE TYPE SCHIZOPHRENIA, CHRONIC STATE (HCC): ICD-10-CM

## 2019-06-17 DIAGNOSIS — F31.4 BIPOLAR 1 DISORDER, DEPRESSED, SEVERE (HCC): ICD-10-CM

## 2019-06-17 PROBLEM — M62.82 RHABDOMYOLYSIS: Status: ACTIVE | Noted: 2019-06-17

## 2019-06-17 LAB
CK MB SERPL-MCNC: 0.7 NG/ML (ref 0–5)
CK MB SERPL-MCNC: <1 % (ref 0–2.5)
CK SERPL-CCNC: 192 U/L (ref 26–192)
HCT VFR BLD AUTO: 31.9 % (ref 34.8–46.1)
HGB BLD-MCNC: 8.4 G/DL (ref 11.5–15.4)
POTASSIUM SERPL-SCNC: 4.5 MMOL/L (ref 3.5–5.3)

## 2019-06-17 PROCEDURE — 85014 HEMATOCRIT: CPT | Performed by: HOSPITALIST

## 2019-06-17 PROCEDURE — 99253 IP/OBS CNSLTJ NEW/EST LOW 45: CPT | Performed by: NURSE PRACTITIONER

## 2019-06-17 PROCEDURE — 82553 CREATINE MB FRACTION: CPT | Performed by: HOSPITALIST

## 2019-06-17 PROCEDURE — 82550 ASSAY OF CK (CPK): CPT | Performed by: HOSPITALIST

## 2019-06-17 PROCEDURE — 99239 HOSP IP/OBS DSCHRG MGMT >30: CPT | Performed by: INTERNAL MEDICINE

## 2019-06-17 PROCEDURE — 84132 ASSAY OF SERUM POTASSIUM: CPT | Performed by: HOSPITALIST

## 2019-06-17 PROCEDURE — 85018 HEMOGLOBIN: CPT | Performed by: HOSPITALIST

## 2019-06-17 RX ORDER — HYDROXYZINE HYDROCHLORIDE 25 MG/1
25 TABLET, FILM COATED ORAL EVERY 6 HOURS PRN
Status: DISCONTINUED | OUTPATIENT
Start: 2019-06-17 | End: 2019-06-20 | Stop reason: HOSPADM

## 2019-06-17 RX ORDER — ACETAMINOPHEN 325 MG/1
650 TABLET ORAL EVERY 6 HOURS PRN
Status: CANCELLED | OUTPATIENT
Start: 2019-06-17

## 2019-06-17 RX ORDER — IBUPROFEN 600 MG/1
600 TABLET ORAL EVERY 8 HOURS PRN
Status: CANCELLED | OUTPATIENT
Start: 2019-06-17

## 2019-06-17 RX ORDER — RISPERIDONE 1 MG/1
1 TABLET, ORALLY DISINTEGRATING ORAL
Status: DISCONTINUED | OUTPATIENT
Start: 2019-06-17 | End: 2019-06-20 | Stop reason: HOSPADM

## 2019-06-17 RX ORDER — OLANZAPINE 10 MG/1
10 TABLET ORAL EVERY 8 HOURS PRN
Status: DISCONTINUED | OUTPATIENT
Start: 2019-06-17 | End: 2019-06-20 | Stop reason: HOSPADM

## 2019-06-17 RX ORDER — BENZTROPINE MESYLATE 1 MG/1
1 TABLET ORAL EVERY 6 HOURS PRN
Status: DISCONTINUED | OUTPATIENT
Start: 2019-06-17 | End: 2019-06-20 | Stop reason: HOSPADM

## 2019-06-17 RX ORDER — OLANZAPINE 5 MG/1
10 TABLET ORAL EVERY 8 HOURS PRN
Status: CANCELLED | OUTPATIENT
Start: 2019-06-17

## 2019-06-17 RX ORDER — TRAZODONE HYDROCHLORIDE 50 MG/1
50 TABLET ORAL
Status: DISCONTINUED | OUTPATIENT
Start: 2019-06-17 | End: 2019-06-18

## 2019-06-17 RX ORDER — OLANZAPINE 10 MG/1
10 INJECTION, POWDER, LYOPHILIZED, FOR SOLUTION INTRAMUSCULAR EVERY 8 HOURS PRN
Status: CANCELLED | OUTPATIENT
Start: 2019-06-17

## 2019-06-17 RX ORDER — MAGNESIUM HYDROXIDE/ALUMINUM HYDROXICE/SIMETHICONE 120; 1200; 1200 MG/30ML; MG/30ML; MG/30ML
30 SUSPENSION ORAL EVERY 4 HOURS PRN
Status: CANCELLED | OUTPATIENT
Start: 2019-06-17

## 2019-06-17 RX ORDER — OLANZAPINE 10 MG/1
10 INJECTION, POWDER, LYOPHILIZED, FOR SOLUTION INTRAMUSCULAR EVERY 8 HOURS PRN
Status: DISCONTINUED | OUTPATIENT
Start: 2019-06-17 | End: 2019-06-20 | Stop reason: HOSPADM

## 2019-06-17 RX ORDER — IBUPROFEN 600 MG/1
600 TABLET ORAL EVERY 8 HOURS PRN
Status: DISCONTINUED | OUTPATIENT
Start: 2019-06-17 | End: 2019-06-20 | Stop reason: HOSPADM

## 2019-06-17 RX ORDER — ACETAMINOPHEN 325 MG/1
975 TABLET ORAL EVERY 6 HOURS PRN
Status: DISCONTINUED | OUTPATIENT
Start: 2019-06-17 | End: 2019-06-20 | Stop reason: HOSPADM

## 2019-06-17 RX ORDER — SENNOSIDES 8.6 MG
1 TABLET ORAL DAILY
Status: DISCONTINUED | OUTPATIENT
Start: 2019-06-18 | End: 2019-06-20 | Stop reason: HOSPADM

## 2019-06-17 RX ORDER — HYDROXYZINE HYDROCHLORIDE 25 MG/1
50 TABLET, FILM COATED ORAL EVERY 6 HOURS PRN
Status: CANCELLED | OUTPATIENT
Start: 2019-06-17

## 2019-06-17 RX ORDER — BENZTROPINE MESYLATE 1 MG/1
1 TABLET ORAL EVERY 6 HOURS PRN
Status: CANCELLED | OUTPATIENT
Start: 2019-06-17

## 2019-06-17 RX ORDER — TRAZODONE HYDROCHLORIDE 50 MG/1
50 TABLET ORAL
Status: CANCELLED | OUTPATIENT
Start: 2019-06-17

## 2019-06-17 RX ORDER — ACETAMINOPHEN 325 MG/1
975 TABLET ORAL EVERY 6 HOURS PRN
Status: CANCELLED | OUTPATIENT
Start: 2019-06-17

## 2019-06-17 RX ORDER — ACETAMINOPHEN 325 MG/1
650 TABLET ORAL EVERY 6 HOURS PRN
Status: DISCONTINUED | OUTPATIENT
Start: 2019-06-17 | End: 2019-06-20 | Stop reason: HOSPADM

## 2019-06-17 RX ORDER — RISPERIDONE 1 MG/1
1 TABLET, ORALLY DISINTEGRATING ORAL
Status: CANCELLED | OUTPATIENT
Start: 2019-06-17

## 2019-06-17 RX ORDER — HYDROXYZINE 50 MG/1
50 TABLET, FILM COATED ORAL EVERY 6 HOURS PRN
Status: DISCONTINUED | OUTPATIENT
Start: 2019-06-17 | End: 2019-06-20 | Stop reason: HOSPADM

## 2019-06-17 RX ORDER — SENNOSIDES 8.6 MG
1 TABLET ORAL DAILY
Status: CANCELLED | OUTPATIENT
Start: 2019-06-18

## 2019-06-17 RX ORDER — BENZTROPINE MESYLATE 1 MG/ML
1 INJECTION INTRAMUSCULAR; INTRAVENOUS EVERY 6 HOURS PRN
Status: CANCELLED | OUTPATIENT
Start: 2019-06-17

## 2019-06-17 RX ORDER — HYDROXYZINE HYDROCHLORIDE 25 MG/1
75 TABLET, FILM COATED ORAL EVERY 6 HOURS PRN
Status: CANCELLED | OUTPATIENT
Start: 2019-06-17

## 2019-06-17 RX ORDER — HYDROXYZINE HYDROCHLORIDE 25 MG/1
25 TABLET, FILM COATED ORAL EVERY 6 HOURS PRN
Status: CANCELLED | OUTPATIENT
Start: 2019-06-17

## 2019-06-17 RX ORDER — MAGNESIUM HYDROXIDE/ALUMINUM HYDROXICE/SIMETHICONE 120; 1200; 1200 MG/30ML; MG/30ML; MG/30ML
30 SUSPENSION ORAL EVERY 4 HOURS PRN
Status: DISCONTINUED | OUTPATIENT
Start: 2019-06-17 | End: 2019-06-20 | Stop reason: HOSPADM

## 2019-06-17 RX ORDER — HALOPERIDOL 5 MG/ML
5 INJECTION INTRAMUSCULAR EVERY 6 HOURS PRN
Status: DISCONTINUED | OUTPATIENT
Start: 2019-06-17 | End: 2019-06-20 | Stop reason: HOSPADM

## 2019-06-17 RX ORDER — HALOPERIDOL 5 MG
5 TABLET ORAL EVERY 6 HOURS PRN
Status: DISCONTINUED | OUTPATIENT
Start: 2019-06-17 | End: 2019-06-20 | Stop reason: HOSPADM

## 2019-06-17 RX ORDER — NICOTINE 21 MG/24HR
1 PATCH, TRANSDERMAL 24 HOURS TRANSDERMAL DAILY
Status: CANCELLED | OUTPATIENT
Start: 2019-06-18

## 2019-06-17 RX ORDER — LORAZEPAM 2 MG/ML
0.5 INJECTION INTRAMUSCULAR ONCE
Status: COMPLETED | OUTPATIENT
Start: 2019-06-17 | End: 2019-06-17

## 2019-06-17 RX ORDER — HALOPERIDOL 5 MG
5 TABLET ORAL EVERY 6 HOURS PRN
Status: CANCELLED | OUTPATIENT
Start: 2019-06-17

## 2019-06-17 RX ORDER — NICOTINE 21 MG/24HR
1 PATCH, TRANSDERMAL 24 HOURS TRANSDERMAL DAILY
Status: DISCONTINUED | OUTPATIENT
Start: 2019-06-18 | End: 2019-06-20 | Stop reason: HOSPADM

## 2019-06-17 RX ORDER — BENZTROPINE MESYLATE 1 MG/ML
1 INJECTION INTRAMUSCULAR; INTRAVENOUS EVERY 6 HOURS PRN
Status: DISCONTINUED | OUTPATIENT
Start: 2019-06-17 | End: 2019-06-20 | Stop reason: HOSPADM

## 2019-06-17 RX ORDER — HALOPERIDOL 5 MG/ML
5 INJECTION INTRAMUSCULAR EVERY 6 HOURS PRN
Status: CANCELLED | OUTPATIENT
Start: 2019-06-17

## 2019-06-17 RX ADMIN — TRAZODONE HYDROCHLORIDE 50 MG: 50 TABLET ORAL at 22:41

## 2019-06-17 RX ADMIN — NICOTINE 1 PATCH: 14 PATCH TRANSDERMAL at 08:58

## 2019-06-17 RX ADMIN — LORAZEPAM 0.5 MG: 2 INJECTION INTRAMUSCULAR; INTRAVENOUS at 03:25

## 2019-06-17 RX ADMIN — BENZTROPINE MESYLATE 0.5 MG: 1 TABLET ORAL at 08:56

## 2019-06-17 RX ADMIN — HYDROXYZINE HYDROCHLORIDE 75 MG: 25 TABLET ORAL at 22:42

## 2019-06-17 RX ADMIN — HYDROXYZINE HYDROCHLORIDE 50 MG: 25 TABLET ORAL at 20:41

## 2019-06-17 RX ADMIN — HYDROXYZINE HYDROCHLORIDE 50 MG: 25 TABLET ORAL at 09:40

## 2019-06-17 RX ADMIN — SENNOSIDES 8.6 MG: 8.6 TABLET, FILM COATED ORAL at 08:56

## 2019-06-17 RX ADMIN — HYDROXYZINE HYDROCHLORIDE 50 MG: 25 TABLET ORAL at 00:59

## 2019-06-17 RX ADMIN — BENZTROPINE MESYLATE 0.5 MG: 1 TABLET ORAL at 17:34

## 2019-06-18 ENCOUNTER — TELEPHONE (OUTPATIENT)
Dept: FAMILY MEDICINE CLINIC | Facility: CLINIC | Age: 39
End: 2019-06-18

## 2019-06-18 LAB
ERYTHROCYTE [DISTWIDTH] IN BLOOD BY AUTOMATED COUNT: 24.3 % (ref 11.6–15.1)
HCG SERPL QL: NEGATIVE
HCT VFR BLD AUTO: 32.9 % (ref 34.8–46.1)
HGB BLD-MCNC: 9 G/DL (ref 11.5–15.4)
MCH RBC QN AUTO: 17.2 PG (ref 26.8–34.3)
MCHC RBC AUTO-ENTMCNC: 27.4 G/DL (ref 31.4–37.4)
MCV RBC AUTO: 63 FL (ref 82–98)
PLATELET # BLD AUTO: 206 THOUSANDS/UL (ref 149–390)
RBC # BLD AUTO: 5.23 MILLION/UL (ref 3.81–5.12)
WBC # BLD AUTO: 5.97 THOUSAND/UL (ref 4.31–10.16)

## 2019-06-18 PROCEDURE — 84703 CHORIONIC GONADOTROPIN ASSAY: CPT | Performed by: PHYSICIAN ASSISTANT

## 2019-06-18 PROCEDURE — 99231 SBSQ HOSP IP/OBS SF/LOW 25: CPT | Performed by: PHYSICIAN ASSISTANT

## 2019-06-18 PROCEDURE — 99222 1ST HOSP IP/OBS MODERATE 55: CPT | Performed by: PSYCHIATRY & NEUROLOGY

## 2019-06-18 PROCEDURE — 85027 COMPLETE CBC AUTOMATED: CPT | Performed by: PHYSICIAN ASSISTANT

## 2019-06-18 RX ORDER — ZOLPIDEM TARTRATE 5 MG/1
5 TABLET ORAL
Status: DISCONTINUED | OUTPATIENT
Start: 2019-06-18 | End: 2019-06-19

## 2019-06-18 RX ORDER — RISPERIDONE 1 MG/1
1 TABLET, FILM COATED ORAL 2 TIMES DAILY
Status: DISCONTINUED | OUTPATIENT
Start: 2019-06-18 | End: 2019-06-20 | Stop reason: HOSPADM

## 2019-06-18 RX ADMIN — RISPERIDONE 1 MG: 1 TABLET ORAL at 10:11

## 2019-06-18 RX ADMIN — TRAZODONE HYDROCHLORIDE 50 MG: 50 TABLET ORAL at 01:44

## 2019-06-18 RX ADMIN — RISPERIDONE 1 MG: 1 TABLET ORAL at 18:13

## 2019-06-18 RX ADMIN — STANDARDIZED SENNA CONCENTRATE 8.6 MG: 8.6 TABLET ORAL at 08:14

## 2019-06-18 RX ADMIN — HYDROXYZINE HYDROCHLORIDE 50 MG: 50 TABLET ORAL at 03:34

## 2019-06-18 RX ADMIN — NICOTINE 1 PATCH: 14 PATCH TRANSDERMAL at 08:14

## 2019-06-18 RX ADMIN — ZOLPIDEM TARTRATE 5 MG: 5 TABLET, COATED ORAL at 21:58

## 2019-06-18 RX ADMIN — HYDROXYZINE HYDROCHLORIDE 50 MG: 50 TABLET ORAL at 18:48

## 2019-06-18 RX ADMIN — BENZTROPINE MESYLATE 1 MG: 1 TABLET ORAL at 01:44

## 2019-06-18 RX ADMIN — RISPERIDONE 1 MG: 1 TABLET, ORALLY DISINTEGRATING ORAL at 03:34

## 2019-06-19 PROCEDURE — 99232 SBSQ HOSP IP/OBS MODERATE 35: CPT | Performed by: PSYCHIATRY & NEUROLOGY

## 2019-06-19 RX ORDER — ZOLPIDEM TARTRATE 5 MG/1
10 TABLET ORAL
Status: DISCONTINUED | OUTPATIENT
Start: 2019-06-19 | End: 2019-06-20 | Stop reason: HOSPADM

## 2019-06-19 RX ORDER — TRAZODONE HYDROCHLORIDE 150 MG/1
75 TABLET ORAL
Status: DISCONTINUED | OUTPATIENT
Start: 2019-06-19 | End: 2019-06-20 | Stop reason: HOSPADM

## 2019-06-19 RX ADMIN — STANDARDIZED SENNA CONCENTRATE 8.6 MG: 8.6 TABLET ORAL at 09:51

## 2019-06-19 RX ADMIN — RISPERIDONE 1 MG: 1 TABLET ORAL at 18:08

## 2019-06-19 RX ADMIN — RISPERIDONE 1 MG: 1 TABLET ORAL at 09:51

## 2019-06-19 RX ADMIN — HYDROXYZINE HYDROCHLORIDE 75 MG: 25 TABLET ORAL at 06:31

## 2019-06-19 RX ADMIN — ZOLPIDEM TARTRATE 5 MG: 5 TABLET, COATED ORAL at 03:07

## 2019-06-20 VITALS
RESPIRATION RATE: 16 BRPM | TEMPERATURE: 96.9 F | HEIGHT: 64 IN | BODY MASS INDEX: 23.22 KG/M2 | SYSTOLIC BLOOD PRESSURE: 115 MMHG | OXYGEN SATURATION: 99 % | DIASTOLIC BLOOD PRESSURE: 59 MMHG | WEIGHT: 136 LBS | HEART RATE: 73 BPM

## 2019-06-20 PROCEDURE — 99239 HOSP IP/OBS DSCHRG MGMT >30: CPT | Performed by: PSYCHIATRY & NEUROLOGY

## 2019-06-20 RX ORDER — RISPERIDONE 1 MG/1
1 TABLET, FILM COATED ORAL 2 TIMES DAILY
Qty: 60 TABLET | Refills: 1 | Status: SHIPPED | OUTPATIENT
Start: 2019-06-20 | End: 2019-09-17 | Stop reason: HOSPADM

## 2019-06-20 RX ORDER — ZOLPIDEM TARTRATE 10 MG/1
10 TABLET ORAL
Qty: 30 TABLET | Refills: 1 | Status: SHIPPED | OUTPATIENT
Start: 2019-06-20 | End: 2019-09-17 | Stop reason: HOSPADM

## 2019-06-20 RX ADMIN — RISPERIDONE 1 MG: 1 TABLET ORAL at 08:29

## 2019-06-20 RX ADMIN — NICOTINE 1 PATCH: 14 PATCH TRANSDERMAL at 08:29

## 2019-06-20 RX ADMIN — HYDROXYZINE HYDROCHLORIDE 75 MG: 25 TABLET ORAL at 04:16

## 2019-06-20 RX ADMIN — STANDARDIZED SENNA CONCENTRATE 8.6 MG: 8.6 TABLET ORAL at 08:29

## 2019-09-06 ENCOUNTER — HOSPITAL ENCOUNTER (INPATIENT)
Facility: HOSPITAL | Age: 39
LOS: 10 days | Discharge: HOME | DRG: 753 | End: 2019-09-17
Attending: EMERGENCY MEDICINE | Admitting: PSYCHIATRY & NEUROLOGY
Payer: COMMERCIAL

## 2019-09-06 DIAGNOSIS — R45.89 SUICIDAL BEHAVIOR: ICD-10-CM

## 2019-09-06 DIAGNOSIS — F32.A DEPRESSION, UNSPECIFIED DEPRESSION TYPE: ICD-10-CM

## 2019-09-06 DIAGNOSIS — F31.4 BIPOLAR 1 DISORDER, DEPRESSED, SEVERE (HCC): Primary | ICD-10-CM

## 2019-09-06 DIAGNOSIS — R45.851 SUICIDAL IDEATION: ICD-10-CM

## 2019-09-06 DIAGNOSIS — D50.9 MICROCYTIC ANEMIA: ICD-10-CM

## 2019-09-06 DIAGNOSIS — F19.10 DRUG ABUSE (HCC): ICD-10-CM

## 2019-09-06 LAB
AMPHETAMINES SERPL QL SCN: NEGATIVE
BARBITURATES UR QL: NEGATIVE
BENZODIAZ UR QL: NEGATIVE
CK SERPL-CCNC: 68 U/L (ref 30–135)
COCAINE UR QL: POSITIVE
ETHANOL EXG-MCNC: 0 MG/DL
EXT PREG TEST URINE: NORMAL
EXT. CONTROL ED NAV: NORMAL
METHADONE UR QL: NEGATIVE
OPIATES UR QL SCN: NEGATIVE
PCP UR QL: NEGATIVE
THC UR QL: NEGATIVE

## 2019-09-06 PROCEDURE — 99284 EMERGENCY DEPT VISIT MOD MDM: CPT

## 2019-09-06 PROCEDURE — 82550 ASSAY OF CK (CPK): CPT | Performed by: EMERGENCY MEDICINE

## 2019-09-06 PROCEDURE — 80307 DRUG TEST PRSMV CHEM ANLYZR: CPT | Performed by: EMERGENCY MEDICINE

## 2019-09-06 PROCEDURE — 82075 ASSAY OF BREATH ETHANOL: CPT | Performed by: EMERGENCY MEDICINE

## 2019-09-06 PROCEDURE — 99285 EMERGENCY DEPT VISIT HI MDM: CPT | Performed by: EMERGENCY MEDICINE

## 2019-09-06 PROCEDURE — 81025 URINE PREGNANCY TEST: CPT | Performed by: EMERGENCY MEDICINE

## 2019-09-06 PROCEDURE — 36415 COLL VENOUS BLD VENIPUNCTURE: CPT | Performed by: EMERGENCY MEDICINE

## 2019-09-06 RX ORDER — LORAZEPAM 0.5 MG/1
1 TABLET ORAL ONCE
Status: COMPLETED | OUTPATIENT
Start: 2019-09-06 | End: 2019-09-06

## 2019-09-06 RX ADMIN — LORAZEPAM 1 MG: 0.5 TABLET ORAL at 22:48

## 2019-09-07 PROBLEM — D64.9 ANEMIA: Status: ACTIVE | Noted: 2018-10-16

## 2019-09-07 LAB
ANISOCYTOSIS BLD QL SMEAR: PRESENT
BASOPHILS # BLD AUTO: 0.1 THOUSANDS/ΜL (ref 0–0.1)
BASOPHILS NFR BLD AUTO: 1 % (ref 0–1)
EOSINOPHIL # BLD AUTO: 0.3 THOUSAND/ΜL (ref 0–0.4)
EOSINOPHIL NFR BLD AUTO: 5 % (ref 0–6)
ERYTHROCYTE [DISTWIDTH] IN BLOOD BY AUTOMATED COUNT: 25.3 %
HCT VFR BLD AUTO: 29.2 % (ref 36–46)
HGB BLD-MCNC: 9 G/DL (ref 12–16)
HYPERCHROMIA BLD QL SMEAR: PRESENT
LYMPHOCYTES # BLD AUTO: 2.5 THOUSANDS/ΜL (ref 0.5–4)
LYMPHOCYTES NFR BLD AUTO: 40 % (ref 25–45)
MCH RBC QN AUTO: 20.2 PG (ref 26–34)
MCHC RBC AUTO-ENTMCNC: 31 G/DL (ref 31–36)
MCV RBC AUTO: 65 FL (ref 80–100)
MONOCYTES # BLD AUTO: 0.5 THOUSAND/ΜL (ref 0.2–0.9)
MONOCYTES NFR BLD AUTO: 7 % (ref 1–10)
NEUTROPHILS # BLD AUTO: 3 THOUSANDS/ΜL (ref 1.8–7.8)
NEUTS SEG NFR BLD AUTO: 47 % (ref 45–65)
PLATELET # BLD AUTO: 247 THOUSANDS/UL (ref 150–450)
PLATELET BLD QL SMEAR: ADEQUATE
PMV BLD AUTO: 9 FL (ref 8.9–12.7)
RBC # BLD AUTO: 4.48 MILLION/UL (ref 4–5.2)
RBC MORPH BLD: NORMAL
WBC # BLD AUTO: 6.3 THOUSAND/UL (ref 4.5–11)

## 2019-09-07 PROCEDURE — 99223 1ST HOSP IP/OBS HIGH 75: CPT | Performed by: PSYCHIATRY & NEUROLOGY

## 2019-09-07 PROCEDURE — 99254 IP/OBS CNSLTJ NEW/EST MOD 60: CPT | Performed by: PHYSICIAN ASSISTANT

## 2019-09-07 PROCEDURE — 85025 COMPLETE CBC W/AUTO DIFF WBC: CPT | Performed by: PSYCHIATRY & NEUROLOGY

## 2019-09-07 RX ORDER — LORAZEPAM 1 MG/1
1 TABLET ORAL EVERY 6 HOURS PRN
Status: DISCONTINUED | OUTPATIENT
Start: 2019-09-07 | End: 2019-09-17 | Stop reason: HOSPADM

## 2019-09-07 RX ORDER — BENZTROPINE MESYLATE 1 MG/ML
1 INJECTION INTRAMUSCULAR; INTRAVENOUS EVERY 6 HOURS PRN
Status: DISCONTINUED | OUTPATIENT
Start: 2019-09-07 | End: 2019-09-17 | Stop reason: HOSPADM

## 2019-09-07 RX ORDER — HYDROXYZINE 50 MG/1
50 TABLET, FILM COATED ORAL 3 TIMES DAILY
Status: DISCONTINUED | OUTPATIENT
Start: 2019-09-07 | End: 2019-09-08

## 2019-09-07 RX ORDER — LORAZEPAM 2 MG/ML
1 INJECTION INTRAMUSCULAR EVERY 6 HOURS PRN
Status: DISCONTINUED | OUTPATIENT
Start: 2019-09-07 | End: 2019-09-17 | Stop reason: HOSPADM

## 2019-09-07 RX ORDER — HALOPERIDOL 5 MG/ML
5 INJECTION INTRAMUSCULAR EVERY 6 HOURS PRN
Status: DISCONTINUED | OUTPATIENT
Start: 2019-09-07 | End: 2019-09-17 | Stop reason: HOSPADM

## 2019-09-07 RX ORDER — GABAPENTIN 100 MG/1
100 CAPSULE ORAL 3 TIMES DAILY
Status: DISCONTINUED | OUTPATIENT
Start: 2019-09-07 | End: 2019-09-08

## 2019-09-07 RX ORDER — FERROUS SULFATE 325(65) MG
325 TABLET ORAL
Status: DISCONTINUED | OUTPATIENT
Start: 2019-09-08 | End: 2019-09-17 | Stop reason: HOSPADM

## 2019-09-07 RX ORDER — HALOPERIDOL 5 MG
5 TABLET ORAL EVERY 6 HOURS PRN
Status: DISCONTINUED | OUTPATIENT
Start: 2019-09-07 | End: 2019-09-17 | Stop reason: HOSPADM

## 2019-09-07 RX ORDER — BENZTROPINE MESYLATE 1 MG/1
1 TABLET ORAL EVERY 6 HOURS PRN
Status: DISCONTINUED | OUTPATIENT
Start: 2019-09-07 | End: 2019-09-17 | Stop reason: HOSPADM

## 2019-09-07 RX ORDER — OLANZAPINE 5 MG/1
5 TABLET, ORALLY DISINTEGRATING ORAL DAILY
Status: DISCONTINUED | OUTPATIENT
Start: 2019-09-07 | End: 2019-09-10

## 2019-09-07 RX ORDER — NICOTINE 21 MG/24HR
1 PATCH, TRANSDERMAL 24 HOURS TRANSDERMAL DAILY
Status: DISCONTINUED | OUTPATIENT
Start: 2019-09-07 | End: 2019-09-17 | Stop reason: HOSPADM

## 2019-09-07 RX ORDER — OLANZAPINE 5 MG/1
5 TABLET, ORALLY DISINTEGRATING ORAL
Status: DISCONTINUED | OUTPATIENT
Start: 2019-09-07 | End: 2019-09-08

## 2019-09-07 RX ORDER — ACETAMINOPHEN 325 MG/1
650 TABLET ORAL EVERY 6 HOURS PRN
Status: DISCONTINUED | OUTPATIENT
Start: 2019-09-07 | End: 2019-09-17 | Stop reason: HOSPADM

## 2019-09-07 RX ORDER — MAGNESIUM HYDROXIDE/ALUMINUM HYDROXICE/SIMETHICONE 120; 1200; 1200 MG/30ML; MG/30ML; MG/30ML
30 SUSPENSION ORAL EVERY 4 HOURS PRN
Status: DISCONTINUED | OUTPATIENT
Start: 2019-09-07 | End: 2019-09-17 | Stop reason: HOSPADM

## 2019-09-07 RX ORDER — TRAZODONE HYDROCHLORIDE 50 MG/1
50 TABLET ORAL
Status: DISCONTINUED | OUTPATIENT
Start: 2019-09-07 | End: 2019-09-17 | Stop reason: HOSPADM

## 2019-09-07 RX ORDER — RISPERIDONE 1 MG/1
1 TABLET, FILM COATED ORAL 2 TIMES DAILY
Status: DISCONTINUED | OUTPATIENT
Start: 2019-09-07 | End: 2019-09-07

## 2019-09-07 RX ADMIN — GABAPENTIN 100 MG: 100 CAPSULE ORAL at 21:27

## 2019-09-07 RX ADMIN — HYDROXYZINE HYDROCHLORIDE 50 MG: 50 TABLET ORAL at 16:12

## 2019-09-07 RX ADMIN — GABAPENTIN 100 MG: 100 CAPSULE ORAL at 16:12

## 2019-09-07 RX ADMIN — TRAZODONE HYDROCHLORIDE 50 MG: 50 TABLET ORAL at 21:27

## 2019-09-07 RX ADMIN — RISPERIDONE 1 MG: 1 TABLET ORAL at 09:11

## 2019-09-07 RX ADMIN — OLANZAPINE 5 MG: 5 TABLET, ORALLY DISINTEGRATING ORAL at 16:12

## 2019-09-07 RX ADMIN — HYDROXYZINE HYDROCHLORIDE 50 MG: 50 TABLET ORAL at 21:27

## 2019-09-07 RX ADMIN — OLANZAPINE 5 MG: 5 TABLET, ORALLY DISINTEGRATING ORAL at 21:27

## 2019-09-07 NOTE — ED NOTES
Patient is accepted at [facility]  Patient is accepted by Dr Elisabeth West per insight  Patient may go to the floor at any time  Nurse report is to be called to [phone] prior to patient transfer

## 2019-09-07 NOTE — ED NOTES
Insurance Authorization for admission:   Phone call placed to [facility]  Phone number: [phone]     Spoke to Andrei Vanessa     3 days approved    Level of care: Inpatient Mental Health 201  Review on 9/9/19  Authorization #:  [id number]

## 2019-09-07 NOTE — NURSING NOTE
Pt is med/meal compliant , Pt isolative to her room after breakfast   Pt laying in bed  At this time with eye closed , refused to answer [address] assessment    Will continue to monitor

## 2019-09-07 NOTE — CONSULTS
Consult- Daniel Mars 1980, 41 y o  female MRN: [id number]  Unit/Bed#: [facility] 379-02 Encounter: [id number]  Primary Care Provider: Lyn Reynolds MD   Date and time admitted to hospital: 9/6/2019  8:22 PM    Inpatient consult for Medical Clearance for [facility] patient  Consult performed by: Rebekah Kaye PA-C  Consult ordered by: Sri Cox MD          * Depression  Assessment & Plan  Management per primary  Patient is 89  We are consulted for medical management  Patient with past medical history of anemia requiring transfusions in the past       Suicidal behavior  Assessment & Plan  Maintain suicide precautions  Management per primary    Bipolar 1 disorder, depressed, severe ([address] )  Assessment & Plan  Management per primary    Microcytic anemia  Assessment & Plan  · Chronic, microcytic anemia  Question thalassemia  · Has been requested to see Hematology in the past   · Has required transfusions in the past  · Maintain on iron supplementation  · Hemoglobin currently stable at 9 0    Overdose of drug  Assessment & Plan  UDS positive for cocaine  Tobacco abuse  Assessment & Plan  Nicotine patch at patient request          VTE Prophylaxis: Reason for no pharmacologic prophylaxis Low risk  / reason for no mechanical VTE prophylaxis Patient ambulatory     Recommendations for Discharge:  · Follow-up Family Practice    Counseling / Coordination of Care Time: 30 minutes  Greater than 50% of total time spent on patient counseling and coordination of care  Collaboration of Care: Were Recommendations Directly Discussed with Primary Treatment Team? - No     History of Present Illness:    Daniel Mars is a 41 y o  female who is originally admitted to the [facility] service due to increased anxiety with suicidal ideation and auditory hallucinations  We are consulted for medical management  Patient with history of microcytic anemia  Denies other medical conditions or complaints      Review of Systems:    Review of Systems   Constitutional: Negative for activity change, appetite change, chills, diaphoresis, fatigue, fever and unexpected weight change  HENT: Negative for congestion, dental problem, facial swelling, hearing loss, rhinorrhea, sinus pressure, sneezing and voice change  Eyes: Negative for pain, discharge, redness, itching and visual disturbance  Respiratory: Negative for apnea, cough, choking, chest tightness, shortness of breath, wheezing and stridor  Cardiovascular: Negative for chest pain, palpitations and leg swelling  Gastrointestinal: Negative for abdominal distention, abdominal pain, anal bleeding, blood in stool, constipation, diarrhea, nausea and vomiting  Endocrine: Negative for cold intolerance and heat intolerance  Genitourinary: Negative for difficulty urinating, dysuria and flank pain  Musculoskeletal: Negative for arthralgias, back pain, joint swelling and neck stiffness  Allergic/Immunologic: Negative for environmental allergies and food allergies  Neurological: Negative for dizziness, tremors, seizures, syncope, facial asymmetry, speech difficulty, weakness, light-headedness, numbness and headaches  Hematological: Negative for adenopathy  Does not bruise/bleed easily  Psychiatric/Behavioral: Negative for agitation         Past Medical and Surgical History:     Past Medical History:   Diagnosis Date    Addiction [facility] drug ([facility] )     Anemia     Anxiety     Depression     Drug abuse ([place] [address] )     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[place] [address] )     Paranoia (psychosis) ([place] [address] )     Psychiatric illness     PTSD (post-traumatic stress disorder)     Schizo-affective type schizophrenia, chronic state ([facility] )     Self-injurious behavior     Suicidal behavior     Withdrawal symptoms, drug or narcotic ([place] [address] )        Past Surgical History:   Procedure Laterality Date     SECTION         Meds/Allergies:    all medications and allergies reviewed    Allergies: No Known Allergies    Social History:     Marital Status: Single    Substance Use History:   Social History     Substance and Sexual Activity   Alcohol Use Yes    Frequency: 2-4 times a month    Drinks per session: 3 or 4    Binge frequency: Less than monthly    Comment: history stated  vodka pt denies states occasional     Social History     Tobacco Use   Smoking Status Current Every Day Smoker    Packs/day: 0 50    Years: 15 00    Pack years: 7 50    Types: Cigarettes   Smokeless Tobacco Never Used     Social History     Substance and Sexual Activity   Drug Use Yes    Types: Cocaine    Comment: crack  snorts       Family History:    non-contributory    Physical Exam:     Vitals:   Blood Pressure: 100/71 (09/07/19 0836)  Pulse: 95 (09/07/19 0836)  Temperature: 98 2 °F (36 8 °C) (09/07/19 0835)  Temp Source: Temporal (09/07/19 0835)  Respirations: 16 (09/07/19 0835)  Height: 5' 4" (162 6 cm) (09/07/19 0300)  Weight - Scale: 59 4 kg (130 lb 15 3 oz) (09/07/19 0300)  SpO2: 100 % (09/06/19 2302)    Physical Exam   Constitutional: She appears well-developed and well-nourished  No distress  Patient withdrawn; occasional bobbing, writhing movement with head   HENT:   Head: Normocephalic and atraumatic  Eyes: Right eye exhibits no discharge  Left eye exhibits no discharge  No scleral icterus  Cardiovascular: Normal rate and regular rhythm  Exam reveals no gallop and no friction rub  No murmur heard  Pulmonary/Chest: Effort normal and breath sounds normal  No respiratory distress  She has no wheezes  She has no rales  Abdominal: Soft  Bowel sounds are normal  She exhibits no distension  There is no tenderness  Neurological: She is alert  No cranial nerve deficit (Nonfocal neurological exam)  Skin: Skin is warm and dry  Nursing note and vitals reviewed  Additional Data:     Lab Results: I have personally reviewed pertinent reports        Results from last 7 days   Lab Units 09/07/19  0831   WBC Thousand/uL 6 30   HEMOGLOBIN g/dL 9 0*   HEMATOCRIT % 29 2*   PLATELETS Thousands/uL 247   NEUTROS PCT % 47   LYMPHS PCT % 40   MONOS PCT % 7   EOS PCT % 5                 Lab Results   Component Value Date/Time    HGBA1C 4 5 05/15/2019 08:30 AM               Imaging: I have personally reviewed pertinent reports  No orders to display       EKG, Pathology, and Other Studies Reviewed on Admission:   · EKG: ordered    ** Please Note: This note has been constructed using a voice recognition system   **

## 2019-09-07 NOTE — ED NOTES
Patient met with crisis worker briefly  Patient psychically appears to be unstable, under the influence of some substance at this time  Patient denied drug used today, stating her last use was a week ago  Patient rocking back and forth, kicking both her legs as she speaks to crisis, and wiping her nose and sniffling repeatedly  Patient states this her just her anxiety and continues to deny drug use  Doctor made aware of observation and CK level will be drawn  Crisis will follow up once patient appears to be more stable

## 2019-09-07 NOTE — H&P
Psychiatric Evaluation - Behavioral Health     Identification Elisabeth Heck 41 y o  female MRN: [id number]  Unit/Bed#: [facility] 598-31 Encounter: [id number]    Chief Complaint: "I was hearing voices telling me to hurt myself", "I thought I was losing my mind", "I don't want to go on living like this", depression, suicidal ideation, unstable mood, acute psychosis, psychotic symptoms and auditory hallucinations    History of Present Illness     David Reagan is a 41 y o  female with a history of bipolar disorder versus schizoaffective disorder who was admitted to the inpatient psychiatric unit on a voluntary 201 commitment basis due to depression, mixed symptoms of bipolar disorder and unstable mood  Symptoms prior to admission included worsening depression, depression, feeling depressed, suicidal ideation, hopelessness, helplessness, poor concentration, difficulty sleeping, auditory hallucinations and paranoid ideation  Onset of symptoms was gradual starting several days ago with progressively worsening course since that time  Stressors preceding admission included drug use problems  On initial evaluation after admission to the inpatient psychiatric unit the patient  Guarded, anxious, restless, fidgeting, state and she is very anxious, and enforcing symptoms of auditory hallucinations, she stated that she had negative voices, but she does not want to kill herself today  Patient was recently treated in [facility] twice, she had similar presentation but can improved as a combination of antipsychotics and antianxiety medications was provided  The patient stated that risperidone did not decrease her voices, she agreed to start Zyprexa  Was started Neurontin to address the patient restlessness, and Atarax was started for anxiety, available record indicated that at [place] help the patient with anxiety during her previous admissions  Rebekah Gallardo         Psychiatric Review Of Systems:    sleep changes: decreased  appetite changes: decreased  energy/anergy: decreased  anxiety/panic: yes  quentin: past mixed episodes  self injurious behavior/risky behavior: not recently  Suicidal ideation: yes  Homicidal ideation: no  Auditory hallucinations: yes, auditory hallucinations  Visual hallucinations: no  Delusional thinking: paranoid thoughts      Historical Information     Past Psychiatric History:     Past Inpatient Psychiatric Treatment:   Several past dual diagnosis inpatient admissions  Past Outpatient Psychiatric Treatment:    Was in outpatient psychiatric treatment in the past with a psychiatrist   Past Suicide Attempts:    yes, by overdose on medications  Past Psychiatric Medication Trials:    multiple psychiatric medication trials     Substance Abuse History:  Social History     Tobacco History     Smoking Status  Current Every Day Smoker Smoking Frequency  0 5 packs/day for 15 years (7 5 pk yrs) Smoking Tobacco Type  Cigarettes    Smokeless Tobacco Use  Never Used          Alcohol History     Alcohol Use Status  Yes Comment  history stated  vodka pt denies states occasional          Drug Use     Drug Use Status  Yes Types  Cocaine Comment  crack  snorts          Sexual Activity     Sexually Active  Not Asked          Activities of Daily Living    Not Asked               Additional Substance Use Detail     Questions Responses    Substance Use Assessment Substance use within the past 12 months    Alcohol Use Frequency Experimented    Cannabis frequency Past rare use    Comment: Past rare use on 9/7/2019     Heroin Frequency Denies use in past 12 months    Cannabis method     Comment: Other on 5/14/2019 Other ->"" on 9/7/2019     Cocaine frequency Past regular use    Comment: Past regular use on 9/7/2019     Crack Cocaine Frequency Prior dependence    Methamphetamine Frequency Denies use in past 12 months    Cocaine method Smoke    Comment: Smoke on 9/7/2019     Crack Cocaine Method Smoke    Narcotic Frequency Denies use in past 12 months    Benzodiazepine Frequency Denies use in past 12 months    Amphetamine frequency Denies use in past 12 months    Barbituate Frequency Denies use use in past 12 months    Inhalant frequency Never used    Comment: Never used on 2019     Hallucinogen frequency Never used    Comment: Never used on 2019     Ecstasy frequency Never used    Comment: Never used on 2019     Other drug frequency Never used    Comment: Never used on 2019     Hallucinogen Longest Abstinence unknown    Opiate frequency Denies use in past 12 months    Opiate last use     Comment: unknown     Not reviewed          I have assessed this patient for substance use within the past 12 months    History of Inpatient/Outpatient rehabilitation program: no  Smoking history: occasionally    Family Psychiatric History:     Psychiatric Illness:  unknown  Substance Abuse:  patient denies  Suicide Attempts:  unknown    Social History:    Education: high school diploma/GED  Marital History: single  Occupational History: currently unemployed          Traumatic History:     Abuse: not willing to provide details    Past Medical History:    History of Seizures: no    Past Medical History:   Diagnosis Date    Addiction to drug ([facility] )     Anemia     Anxiety     Depression     Drug abuse ([facility] )     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[facility] )     [facility]     Psychiatric illness     PTSD (post-traumatic stress disorder)     Schizo-affective type schizophrenia, chronic state ([facility] )     Self-injurious behavior     Suicidal behavior     Withdrawal symptoms, drug or narcotic ([facility] )      Past Surgical History:   Procedure Laterality Date     SECTION         Medical Review Of Systems:    EFO Review Of Systems: Constitutional: positive for fatigue  Respiratory: negative  Cardiovascular: negative  Gastrointestinal: negative  Musculoskeletal:negative  Neurological: positive for weakness and   Restlessness  Endocrine: negative  Allergic/Immunologic: negative    Allergies:    No Known Allergies    Medications: All current active medications have been reviewed  Objective     Vital signs in last 24 hours:    Temp:  [98 2 °F (36 8 °C)] 98 2 °F (36 8 °C)  HR:  [76-95] 95  Resp:  [16] 16  BP: (100-139)/(58-90) 100/71    No intake or output data in the 24 hours ending 09/07/19 1601     Mental Status Evaluation:      Appearance:  dressed appropriately, casually dressed   Behavior:  cooperative, guarded, restless and fidgety   Mood:  depressed, anxious   Affect: constricted    Speech:  decreased rate, slow   Language: appropriate   Thought Process:  concrete   Associations: concrete associations   Thought Content:  paranoid ideation, negative thinking   Perceptual Disturbances: auditory hallucinations   Risk Potential: Suicidal ideation - Yes, without plan  Homicidal ideation - None  Potential for aggression - No   Sensorium:  oriented to person, place and time   Memory:  recent and remote memory grossly intact   Consciousness:  alert and awake   Attention: decreased concentration   Fund of Knowledge: past history: limited   Insight:  impaired due to psychosis   Judgment: impaired due to psychosis   Muscle Tone: normal   Gait/Station: normal gait/station and normal balance   Motor Activity: no abnormal movements               Laboratory Results:   I have personally reviewed all pertinent laboratory/tests results    Most Recent Labs:   Lab Results   Component Value Date    WBC 6 30 09/07/2019    RBC 4 48 09/07/2019    HGB 9 0 (L) 09/07/2019    HCT 29 2 (L) 09/07/2019     09/07/2019    RDW 25 3 (H) 09/07/2019    NEUTROABS 3 00 09/07/2019    SODIUM 142 06/16/2019    K 4 5 06/17/2019     06/16/2019    CO2 28 06/16/2019    BUN 3 (L) 06/16/2019    CREATININE 0 59 (L) 06/16/2019    GLUC 103 06/16/2019    GLUF 42 (L) 06/14/2019    CALCIUM 9 0 06/16/2019    AST 23 06/13/2019    ALT 9 (L) 06/13/2019    ALKPHOS 60 06/13/2019    TP 6 9 06/13/2019    ALB 3 6 06/13/2019    TBILI 0 53 06/13/2019    CHOLESTEROL 135 05/15/2019    HDL 73 (H) 05/15/2019    TRIG 46 05/15/2019    LDLCALC 53 05/15/2019    NONHDLC 62 05/15/2019    JBX5JWLWPZSN 1 184 05/15/2019    PREGSERUM Negative 06/18/2019    HCGQUANT <3 10/17/2018    RPR Non-Reactive 05/15/2019    HGBA1C 4 5 05/15/2019    EAG 82 05/15/2019       Imaging Studies: No results found  Code Status: Prior    Assessment/Plan   Principal Problem:    Depression  Active Problems:    Microcytic anemia    Tobacco abuse    Bipolar 1 disorder, depressed, severe ([facility])    Suicidal behavior    Overdose of drug      Treatment Plan: Zyprexa 5 mg bid, neurontin 100 mgtid, Atarax 50 mg tid    Planned Treatment and Medication Changes: All current active medications have been reviewed  Encourage group therapy, milieu therapy and occupational therapy  Behavioral Health checks every 7 minutes  Start  Zyprexa to treat psychotic disorder  The Zyprexa was selected that is stronger than risperidone when risperidone is not providing resolution or even improvement of psychotic symptoms  Neurontin was selected as a medication that help anxiety and restlessness, and Atarax was selected because it helped the patient to deal with anxiety during her previous admissions      Current Facility-Administered Medications:  acetaminophen 650 mg Oral Q6H PRN Judge Jian MD   aluminum-magnesium hydroxide-simethicone 30 mL Oral Q4H PRN Judge Jian MD   benztropine 1 mg Intramuscular Q6H PRN Judge Jian MD   benztropine 1 mg Oral Q6H PRN Judge Jian MD   [START ON 9/8/2019] ferrous sulfate 325 mg Oral Daily With Breakfast Mary Cuevas PA-C   gabapentin 100 mg Oral TID Yas Crenshaw MD   haloperidol 5 mg Oral Q6H PRN Judge Jian MD   haloperidol lactate 5 mg Intramuscular Q6H PRN Judge Jian MD   hydrOXYzine HCL 50 mg Oral TID Yas Crenshaw MD   LORazepam 1 mg Intramuscular Q6H PRN Judge Jian MD   LORazepam 1 mg Oral Q6H PRN Evelin Burns MD   magnesium hydroxide 30 mL Oral Daily PRN Evelin Burns MD   nicotine 1 patch Transdermal Daily Evelin Burns MD   OLANZapine 5 mg Oral Daily Jaquan Will MD   OLANZapine 5 mg Oral HS Jaquan Will MD   traZODone 50 mg Oral HS PRN Evelin Burns MD   traZODone 50 mg Oral HS Jaquan Will MD       Risks / Benefits of Treatment:    Risks, benefits, and possible side effects of medications explained to patient and patient verbalizes understanding and agreement for treatment  Counseling / Coordination of Care:    Patient's presentation on admission and proposed treatment plan discussed with treatment team   Diagnosis, medication changes and treatment plan reviewed with patient  Inpatient Psychiatric Certification:    Estimated length of stay: 7 midnights    Based upon physical, mental and social evaluations, I certify that inpatient psychiatric services are medically necessary for this patient for a duration of 7 midnights for the treatment of Depression    Available alternative community resources do not meet the patient's mental health care needs  I further attest that an established written individualized plan of care has been implemented and is outlined in the patient's medical records  ** Please Note: This note has been constructed using a voice recognition system   **

## 2019-09-07 NOTE — ED PROVIDER NOTES
History  Chief Complaint   Patient presents with    Psychiatric Evaluation     Anxiety, off meds, hearing voices  Patient has plan to kill herself by letting the car run and let the fumes take her  17-year-old female presents for psychiatric evaluation  She reports that after her last discharge she never had any follow-up and has since run out of her medications  She has had increasing symptoms of depression and anxiety  She is currently having thoughts of sitting in a car that was running and along the fumes to kill her  She is admitting to being quite anxious and depressed but denies any thoughts of harming others or hallucinations  She denies any recent drug abuse or alcohol abuse  Psychiatric Evaluation   Presenting symptoms: depression, suicidal thoughts and suicidal threats    Degree of incapacity (severity):  Severe  Onset quality:  Gradual  Timing:  Constant  Progression:  Worsening  Chronicity:  Recurrent  Context: noncompliance    Treatment compliance:  Untreated  Relieved by:  Nothing  Worsened by:  Nothing  Ineffective treatments:  None tried  Associated symptoms: anxiety    Associated symptoms: no abdominal pain, no appetite change, no chest pain, not distractible, no fatigue, no feelings of worthlessness, no headaches, no hypersomnia, no hyperventilation, no insomnia and no irritability        Prior to Admission Medications   Prescriptions Last Dose Informant Patient Reported? Taking?    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[address] )     Paranoia (psychosis) ([facility] )     Psychiatric illness     PTSD (post-traumatic stress disorder)     Schizo-affective type schizophrenia, chronic state ([facility] )     Self-injurious behavior     Suicidal behavior     Withdrawal symptoms, drug or narcotic ([facility] )        Past Surgical History:   Procedure Laterality Date     SECTION         History reviewed  No pertinent family history  I have reviewed and agree with the history as documented  Social History     Tobacco Use    Smoking status: Current Every Day Smoker     Packs/day: 0 50     Years: 15 00     Pack years: 7 50     Types: Cigarettes    Smokeless tobacco: Never Used   Substance Use Topics    Alcohol use: Yes     Frequency: 2-4 times a month     Drinks per session: 3 or 4     Binge frequency: Less than monthly     Comment: history stated  vodka pt denies states occasional    Drug use: Yes     Types: Cocaine     Comment: crack  snorts        Review of Systems   Constitutional: Negative for appetite change, chills, fatigue, fever and irritability  HENT: Negative for postnasal drip, sinus pain and trouble swallowing  Eyes: Negative for redness and itching  Respiratory: Negative for chest tightness, shortness of breath and wheezing  Cardiovascular: Negative for chest pain and leg swelling  Gastrointestinal: Negative for abdominal pain, constipation, diarrhea, nausea and vomiting  Endocrine: Negative  Genitourinary: Negative for difficulty urinating and dysuria  Musculoskeletal: Negative for back pain and myalgias  Skin: Negative for rash  Allergic/Immunologic: Negative  Neurological: Negative for dizziness, numbness and headaches  Hematological: Negative  Psychiatric/Behavioral: Positive for suicidal ideas  The patient is nervous/anxious  The patient does not have insomnia  Physical Exam  Physical Exam   Constitutional: She is oriented to person, place, and time  She appears well-developed and well-nourished  HENT:   Head: Normocephalic and atraumatic     Nose: Nose normal    Mouth/Throat: Oropharynx is clear and moist    Eyes: Pupils are equal, round, and reactive to light  Conjunctivae and EOM are normal    Neck: Normal range of motion  Neck supple  Cardiovascular: Normal rate, regular rhythm and normal heart sounds  Pulmonary/Chest: Effort normal and breath sounds normal  No respiratory distress  She has no wheezes  Abdominal: Soft  Bowel sounds are normal  There is no tenderness  There is no guarding  Musculoskeletal: She exhibits no edema, tenderness or deformity  Neurological: She is alert and oriented to person, place, and time  Skin: Skin is warm and dry  Capillary refill takes less than 2 seconds  No rash noted  Psychiatric: Her mood appears anxious  Her speech is rapid and/or pressured  She is agitated  Cognition and memory are normal  She expresses suicidal ideation  She expresses suicidal plans  Nursing note and vitals reviewed        Vital Signs  ED Triage Vitals [09/06/19 2020]   Temperature Pulse Respirations Blood Pressure SpO2   98 2 °F (36 8 °C) 80 16 139/90 100 %      Temp Source Heart Rate Source Patient Position - Orthostatic VS BP Location FiO2 (%)   Tympanic Monitor Sitting Left arm --      Pain Score       No Pain           Vitals:    09/06/19 2020 09/06/19 2302   BP: 139/90 113/73   Pulse: 80 76   Patient Position - Orthostatic VS: Sitting Lying         Visual Acuity      ED Medications  Medications   LORazepam (ATIVAN) tablet 1 mg (1 mg Oral Given 9/6/19 2248)       Diagnostic Studies  Results Reviewed     Procedure Component Value Units Date/Time    CK (with reflex to MB) [[id number]]  (Normal) Collected:  09/06/19 2208    Lab Status:  Final result Specimen:  Blood from Arm, Left Updated:  09/06/19 2225     Total CK 68 U/L     Rapid drug screen, urine [[id number]]  (Abnormal) Collected:  09/06/19 2120    Lab Status:  Final result Specimen:  Urine Updated:  09/06/19 2133     Amph/Meth UR Negative     Barbiturate Ur Negative Benzodiazepine Urine Negative     Cocaine Urine Positive     Methadone Urine Negative     Opiate Urine Negative     PCP Ur Negative     THC Urine Negative    Narrative:       Presumptive report  If requested, specimen will be sent to reference lab for confirmation  FOR MEDICAL PURPOSES ONLY  IF CONFIRMATION NEEDED PLEASE CONTACT THE LAB WITHIN 5 DAYS  Drug Screen Cutoff Levels:  AMPHETAMINE/METHAMPHETAMINES  1000 ng/mL  BARBITURATES     200 ng/mL  BENZODIAZEPINES     200 ng/mL  COCAINE      300 ng/mL  METHADONE      300 ng/mL  OPIATES      300 ng/mL  PHENCYCLIDINE     25 ng/mL  THC       50 ng/mL      POCT pregnancy, urine [[id number]]  (Normal) Resulted:  09/06/19 2107    Lab Status:  Final result Updated:  09/06/19 2107     EXT PREG TEST UR (Ref: Negative) neg  Control negative  POCT alcohol breath test [[id number]]  (Normal) Resulted:  09/06/19 2053    Lab Status:  Final result Updated:  09/06/19 2053     EXTBreath Alcohol 0 000                 No orders to display              Procedures  Procedures       ED Course                               MDM    Disposition  Final diagnoses:   Depression, unspecified depression type   Drug abuse ([place] [address] )   Suicidal ideation     Time reflects when diagnosis was documented in both MDM as applicable and the Disposition within this note     Time User Action Codes Description Comment    9/6/2019 11:06 PM Tenisharobert Cortes Add [F32 9] Depression, unspecified depression type     9/6/2019 11:06 PM Tenisha Sophia Add [F19 10] Drug abuse ([facility] )     9/6/2019 11:06 PM Tenisha Sophia Add [R45 851] Suicidal ideation       ED Disposition     None      Follow-up Information    None         Patient's Medications   Discharge Prescriptions    No medications on file     No discharge procedures on file      ED Provider  Electronically Signed by           Ranjeet Mari DO  09/07/19 0007
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
## 2019-09-07 NOTE — ED NOTES
EVS (Eligibility Verification System) called - [phone]    Automated system indicates:   PROMISe: [facility] [place]

## 2019-09-07 NOTE — ASSESSMENT & PLAN NOTE
Management per primary  Patient is 89    We are consulted for medical management  Patient with past medical history of anemia requiring transfusions in the past

## 2019-09-07 NOTE — PROGRESS NOTES
09/07/19 1600   Team Meeting   Meeting Type Daily Rounds   Initial Conference Date 09/07/19   Team Members Present   Team Members Present Physician;Nurse   Physician Team Member Dr Lawrence Jaramillo Team Member Christiano Dietrich   Patient/Family Present   Patient Present No   Patient's Family Present No   New admission  Low hemoglobin and SLIM aware  Medical, labs and medications reviewed by team  Discharge to be determined by attending  Medication changes to be assessed

## 2019-09-07 NOTE — ED NOTES
[facility] [place] has not called back to follow up about ICM status  Transfer center did not have a list of current patients, only staff  On-call was paged at 10p but no return call occurred  Patient made aware that the attempt was made and crisis gets ahold of anyone she will update patient on why they have not followed up with her

## 2019-09-07 NOTE — PLAN OF CARE
Problem: Risk for Self Injury/Neglect  Goal: Verbalize thoughts and feelings  Description  Interventions:  - Assess and re-assess patient's lethality and potential for self-injury  - Engage patient in 1:1 interactions, daily, for a minimum of 15 minutes  - Encourage patient to express feelings, fears, frustrations, hopes  - Establish rapport/trust with patient   Outcome: Not Progressing  Goal: Refrain from harming self  Description  Interventions:  - Monitor patient closely, per order  - Develop a trusting relationship  - Supervise medication ingestion, monitor effects and side effects   Outcome: Progressing  Goal: Attend and participate in unit activities, including therapeutic, recreational, and educational groups  Description  Interventions:  - Provide therapeutic and educational activities daily, encourage attendance and participation, and document same in the medical record  - Obtain collateral information, encourage visitation and family involvement in care   Outcome: Not Progressing  Goal: Complete daily ADLs, including personal hygiene independently, as able  Description  Interventions:  - Observe, teach, and assist patient with ADLS  - Monitor and promote a balance of rest/activity, with adequate nutrition and elimination  Outcome: Not Progressing

## 2019-09-07 NOTE — PROGRESS NOTES
Staff attempted to perform blood draw on patient for ordered blood work  Patient refused, stating "they cece blood from me last night  I don't need any now"  Will pass onto next shift to try to convince patient once she is awake that this blood work is important and needs to be completed

## 2019-09-07 NOTE — ED NOTES
[facility] [place] contacted to check patient status with ICM  Patient stated she called after discharge in June but gave no further information
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
## 2019-09-07 NOTE — ASSESSMENT & PLAN NOTE
· Chronic, microcytic anemia  Question thalassemia    · Has been requested to see Hematology in the past   · Has required transfusions in the past  · Maintain on iron supplementation  · Hemoglobin currently stable at 9 0

## 2019-09-08 PROCEDURE — 99232 SBSQ HOSP IP/OBS MODERATE 35: CPT | Performed by: PSYCHIATRY & NEUROLOGY

## 2019-09-08 RX ORDER — OLANZAPINE 5 MG/1
7.5 TABLET, ORALLY DISINTEGRATING ORAL
Status: DISCONTINUED | OUTPATIENT
Start: 2019-09-08 | End: 2019-09-09

## 2019-09-08 RX ORDER — GABAPENTIN 300 MG/1
300 CAPSULE ORAL 3 TIMES DAILY
Status: DISCONTINUED | OUTPATIENT
Start: 2019-09-08 | End: 2019-09-10

## 2019-09-08 RX ADMIN — GABAPENTIN 100 MG: 100 CAPSULE ORAL at 08:33

## 2019-09-08 RX ADMIN — HYDROXYZINE HYDROCHLORIDE 50 MG: 50 TABLET ORAL at 08:33

## 2019-09-08 RX ADMIN — GABAPENTIN 300 MG: 300 CAPSULE ORAL at 15:47

## 2019-09-08 RX ADMIN — LORAZEPAM 1 MG: 1 TABLET ORAL at 17:53

## 2019-09-08 RX ADMIN — HALOPERIDOL 5 MG: 5 TABLET ORAL at 17:53

## 2019-09-08 RX ADMIN — TRAZODONE HYDROCHLORIDE 50 MG: 50 TABLET ORAL at 21:15

## 2019-09-08 RX ADMIN — OLANZAPINE 7.5 MG: 5 TABLET, ORALLY DISINTEGRATING ORAL at 21:15

## 2019-09-08 RX ADMIN — HYDROXYZINE HYDROCHLORIDE 75 MG: 50 TABLET, FILM COATED ORAL at 15:47

## 2019-09-08 RX ADMIN — GABAPENTIN 300 MG: 300 CAPSULE ORAL at 21:16

## 2019-09-08 RX ADMIN — OLANZAPINE 5 MG: 5 TABLET, ORALLY DISINTEGRATING ORAL at 08:33

## 2019-09-08 RX ADMIN — HYDROXYZINE HYDROCHLORIDE 75 MG: 50 TABLET, FILM COATED ORAL at 21:15

## 2019-09-08 RX ADMIN — BENZTROPINE MESYLATE 1 MG: 1 TABLET ORAL at 17:53

## 2019-09-08 RX ADMIN — FERROUS SULFATE TAB 325 MG (65 MG ELEMENTAL FE) 325 MG: 325 (65 FE) TAB at 08:33

## 2019-09-08 NOTE — PROGRESS NOTES
Nereyda Hidalgo  was seen for continuing care and reviewed with staff  Progress Note - Behavioral Health   Nereyda Hidalgo 41 y o  female MRN: @MRN   Unit/Bed#: [facility] 655-18 Encounter: [id number]       Report from staff regarding this patient received and discussed, and records reviewed prior to seeing this patient   Patient continued to be restless, fidgety and anxious  The she however stated that she no longer hear voices  Sleep: improved  Appetite: normal    Medication side effects:no complaints    Mental Status Evaluation:    Appearance:  dressed in hospital attire   Behavior:  cooperative, restless and fidgety   Mood:  depressed, dysphoric, anxious   Affect: constricted    Speech:  decreased rate, slow   Thought Process:  concrete   Thought Content:  paranoid ideation   Perceptual Disturbances: denies auditory hallucinations when asked, does not appear responding to internal stimuli   Risk Potential: Suicidal ideation - None at present, contracts for safety on the unit  Homicidal ideation - None  Potential for aggression - No   Sensorium:  oriented to person, place and time   Memory:  recent and remote memory grossly intact   Consciousness:  alert and awake   Insight:  significantly impaired   Judgment: impaired   Motor Activity: no abnormal movements         Laboratory results:  I have personally reviewed all pertinent laboratory results  BMP: No results for input(s): NA, K, CL, CO2, BUN in the last 72 hours  Invalid input(s): CREA, GLU  Vitals:    09/08/19 0731   BP: 98/72   Pulse: 100   Resp:    Temp:    SpO2:         Assessment/Plan   Principal Problem:    Depression  Active Problems:    Microcytic anemia    Tobacco abuse    Bipolar 1 disorder, depressed, severe (HCC)    Suicidal behavior    Overdose of drug      Recommended Treatment: increase Neurontin to 300 mg tid for restlessness, and Atarax to 75 mg tid for anxiety, and Zyprexa to 7 5 for psychotic depression      Planned medication and treatment changes: All current active medications have been reviewed  Continue treatment with group therapy, milieu therapy, occupational therapy and medication management  Risks / Benefits of Treatment:    Risks, benefits, and possible side effects of medications explained to patient and patient verbalizes understanding and agreement for treatment  Planned medication and treatment changes: All current active medications have been reviewed  Continue treatment with group therapy, milieu therapy, occupational therapy and medication management  Counseling / Coordination of Care:    Patient's progress reviewed with nursing staff  ** Please Note: This note has been constructed using a voice recognition system   **      ----------------------------------------------------------------------------------------------------------------

## 2019-09-08 NOTE — NURSING NOTE
Pt isolative to room most of the shift  Pt is constantly rocking , reported anxiety and depression, but denies SI,HI   Will continue to monitor

## 2019-09-08 NOTE — NURSING NOTE
Patient isolative to room at start of shift  Observed resting in bed  Patient was easy to wake  Denied any unmet needs  HS medication compliant and no PRNs needed  Patient requested and received snack  Will monitor

## 2019-09-08 NOTE — TREATMENT PLAN
TREATMENT PLAN REVIEW - Behavioral Health Ksenia Kauffman 41 y o  1980 female MRN: [id number]    [address] Room / Bed: [facility] Encounter: [id number]          Admit Date/Time:  9/6/2019  8:22 PM    Treatment Team: Attending Provider: Anne Funez MD; Consulting Physician: Dessa Hashimoto, MD; Physician Assistant: Adriana Gill PA-C; Licensed Practical Nurse: Paulo Lucas LPN; Patient Care Assistant: Jeremy Whittaker;  Patient Care Assistant: Aaron Ramon; Patient Care Technician: Estrella Merino; Registered Nurse: Natasha Childs RN    Diagnosis: Principal Problem:    Depression  Active Problems:    Microcytic anemia    Tobacco abuse    Bipolar 1 disorder, depressed, severe ([facility] )    Suicidal behavior    Overdose of drug    Patient Strengths: negotiates basic needs, patient is on a voluntary commitment     Patient Barriers: limited support system    Short Term Goals: decrease in depressive symptoms, decrease in psychotic symptoms, decrease in suicidal thoughts    Long Term Goals: improvement in anxiety, resolution of depressive symptoms, stabilization of mood, free of suicidal thoughts, resolution of psychotic symptoms    Progress Towards Goals: starting psychitric medications as prescribed, continue psychiatric medications as prescribed    Recommended Treatment: medication management, patient medication education, group therapy, milieu therapy, continued Behavioral Health psychiatric evaluation/assessment process     Treatment Frequency: daily medication monitoring, group and milieu therapy daily, monitoring through interdisciplinary rounds, monitoring through weekly patient care conferences    Expected Discharge Date:  6-8 days    Discharge Plan: referral for outpatient medication management with a psychiatrist, referral for outpatient psychotherapy    Treatment Plan Created/Updated By: Anne Funez MD

## 2019-09-08 NOTE — PLAN OF CARE
Problem: Risk for Self Injury/Neglect  Goal: Verbalize thoughts and feelings  Description  Interventions:  - Assess and re-assess patient's lethality and potential for self-injury  - Engage patient in 1:1 interactions, daily, for a minimum of 15 minutes  - Encourage patient to express feelings, fears, frustrations, hopes  - Establish rapport/trust with patient   9/8/2019 1125 by Cheryl Christian LPN  Outcome: Not Progressing  9/8/2019 1124 by Cheryl Christian LPN  Outcome: Not Progressing  Goal: Refrain from harming self  Description  Interventions:  - Monitor patient closely, per order  - Develop a trusting relationship  - Supervise medication ingestion, monitor effects and side effects   9/8/2019 1125 by Cheryl Christian LPN  Outcome: Progressing  9/8/2019 1124 by Cheryl Christian LPN  Outcome: Progressing  Goal: Attend and participate in unit activities, including therapeutic, recreational, and educational groups  Description  Interventions:  - Provide therapeutic and educational activities daily, encourage attendance and participation, and document same in the medical record  - Obtain collateral information, encourage visitation and family involvement in care   9/8/2019 1125 by Cheryl Christian LPN  Outcome: Not Progressing  9/8/2019 1124 by Cheryl Christian LPN  Outcome: Not Progressing  Goal: Complete daily ADLs, including personal hygiene independently, as able  Description  Interventions:  - Observe, teach, and assist patient with ADLS  - Monitor and promote a balance of rest/activity, with adequate nutrition and elimination  9/8/2019 1125 by Cheryl Christian LPN  Outcome: Not Progressing  9/8/2019 1124 by Cheryl Christian LPN  Outcome: Not Progressing

## 2019-09-08 NOTE — NURSING NOTE
Pt had phone call from who appeared to be boyfriend   Who was breaking up with her through the phone   Pt was Screaming and crying ,"please don't leave me!" pt was very anxious and agitated and when the writer tried to clam the pt down, the pt began screaming at the writer " shut the fuck up!" ativan, and haldol given for anxiety,and agitation  Pt back to room lying in bed, and crying," I have no one now"     Will continue to monitor

## 2019-09-08 NOTE — NURSING NOTE
Patient refused EKG when staff asked her to do so  Patient in her room and presented herself to be irritable at that time

## 2019-09-08 NOTE — PROGRESS NOTES
09/08/19 1500   Team Meeting   Meeting Type Daily Rounds   Initial Conference Date 09/08/19   Team Members Present   Team Members Present Physician;Nurse   Physician Team Member Dr Juan Mott Team Member Shawanda Esqueda   Patient/Family Present   Patient Present No   Patient's Family Present No   Medical, labs and medications reviewed by team  Discharge to be determined by attending  Medication changes to be assessed

## 2019-09-09 PROCEDURE — 99232 SBSQ HOSP IP/OBS MODERATE 35: CPT | Performed by: NURSE PRACTITIONER

## 2019-09-09 RX ORDER — OLANZAPINE 10 MG/1
10 TABLET, ORALLY DISINTEGRATING ORAL
Status: DISCONTINUED | OUTPATIENT
Start: 2019-09-09 | End: 2019-09-17

## 2019-09-09 RX ADMIN — LORAZEPAM 1 MG: 1 TABLET ORAL at 18:37

## 2019-09-09 RX ADMIN — HYDROXYZINE HYDROCHLORIDE 75 MG: 50 TABLET, FILM COATED ORAL at 16:55

## 2019-09-09 RX ADMIN — GABAPENTIN 300 MG: 300 CAPSULE ORAL at 21:04

## 2019-09-09 RX ADMIN — GABAPENTIN 300 MG: 300 CAPSULE ORAL at 16:55

## 2019-09-09 RX ADMIN — TRAZODONE HYDROCHLORIDE 50 MG: 50 TABLET ORAL at 22:58

## 2019-09-09 RX ADMIN — TRAZODONE HYDROCHLORIDE 50 MG: 50 TABLET ORAL at 21:04

## 2019-09-09 RX ADMIN — HYDROXYZINE HYDROCHLORIDE 75 MG: 50 TABLET, FILM COATED ORAL at 08:31

## 2019-09-09 RX ADMIN — GABAPENTIN 300 MG: 300 CAPSULE ORAL at 08:30

## 2019-09-09 RX ADMIN — HYDROXYZINE HYDROCHLORIDE 75 MG: 50 TABLET, FILM COATED ORAL at 21:04

## 2019-09-09 RX ADMIN — FERROUS SULFATE TAB 325 MG (65 MG ELEMENTAL FE) 325 MG: 325 (65 FE) TAB at 08:30

## 2019-09-09 RX ADMIN — OLANZAPINE 10 MG: 10 TABLET, ORALLY DISINTEGRATING ORAL at 21:04

## 2019-09-09 RX ADMIN — OLANZAPINE 5 MG: 5 TABLET, ORALLY DISINTEGRATING ORAL at 08:30

## 2019-09-09 NOTE — SOCIAL WORK
Patient is a 41year old  female  Patient is oriented x3  Patient's thought process is delayed  Patient's affect is flat and mood is depressed  Patient's appearance is disheveled  Patient's speech is soft  Patient maintained minimal eye contact throughout assessment  Patient did not disclose specific trigger for admission  Patient is currently  with 4 children, 3 are currently in foster care  Patient's  is currently incarcerated  Patient receives SSI but the amount is unknown  Patient reports she is currently renting a room at [address] [place] [address]  Patient denies any legal issues  Patient has had multiple inpatient psychiatric hospitalizations including [facility] in May and June 2019  Patient has no current outpatient psychiatric provider as she never followed up with Ann [facility] Quevedo or Andrew for dual diagnosis treatment upon discharge from [facility]  Patient was previously at [facility] in 2016 and was referred several times but did not go to intake appointments therefore she cannot return there  Patient was referred for ICM services through [facility] but she never followed up and therefore her referral was shredded  [facility] did leave a message for worker to verify this and that a new referral can be completed  Patient is in agreement with a new ICM referral   Patient's UDS was positive for cocaine  Patient has an extensive hx of abusing crack cocaine  Patient did not disclose how much she was using  Patient denying inpatient rehab at this time  Patient reports being to various inpatient rehabs in the past   Patient is in agreement with outpatient drug and alcohol to [facility]  Patient signed MAS[facility] for [facility], [facility]

## 2019-09-09 NOTE — PROGRESS NOTES
09/09/19 1544   Team Meeting   Meeting Type Tx Team Meeting   Initial Conference Date 09/09/19   Team Members Present   Team Members Present Physician;Nurse;   Physician Team Member Dr Jake Pickard Management Team Member Ayse Srinivasan   Patient/Family Present   Patient Present Yes   Patient's Family Present No   Patient guarded and vague regarding reason for admission  Patient with irritable edge and did not wish to engage much with the team   Treatment plan was review and discussed with  privately  Patient signed plan

## 2019-09-09 NOTE — PLAN OF CARE
Pt isolative to room and self  Pt laying in bed for most of morning, with disheveled appearance  Pt med compliant  Pt appears to try to limit conversation with writer  Pt refused EKG  Will continue to monitor and provide therapeutic support  Problem: Risk for Self Injury/Neglect  Goal: Treatment Goal: Remain safe during length of stay, learn and adopt new coping skills, and be free of self-injurious ideation, impulses and acts at the time of discharge  Outcome: Progressing  Goal: Refrain from harming self  Description  Interventions:  - Monitor patient closely, per order  - Develop a trusting relationship  - Supervise medication ingestion, monitor effects and side effects   Outcome: Progressing     Problem: Depression  Goal: Refrain from harming self  Description  Interventions:  - Monitor patient closely, per order   - Supervise medication ingestion, monitor effects and side effects   Outcome: Progressing  Goal: Complete daily ADLs, including personal hygiene independently, as able  Description  Interventions:  - Observe, teach, and assist patient with ADLS  -  Monitor and promote a balance of rest/activity, with adequate nutrition and elimination   Outcome: Progressing     Problem: Anxiety  Goal: Anxiety is at manageable level  Description  Interventions:  - Assess and monitor patient's anxiety level  - Monitor for signs and symptoms (heart palpitations, chest pain, shortness of breath, headaches, nausea, feeling jumpy, restlessness, irritable, apprehensive)  - Collaborate with interdisciplinary team and initiate plan and interventions as ordered    - [place] patient to unit/surroundings  - Explain treatment plan  - Encourage participation in care  - Encourage verbalization of concerns/fears  - Identify coping mechanisms  - Assist in developing anxiety-reducing skills  - Administer/offer alternative therapies  - Limit or eliminate stimulants  Outcome: Progressing     Problem: Risk for Self Injury/Neglect  Goal: Verbalize thoughts and feelings  Description  Interventions:  - Assess and re-assess patient's lethality and potential for self-injury  - Engage patient in 1:1 interactions, daily, for a minimum of 15 minutes  - Encourage patient to express feelings, fears, frustrations, hopes  - Establish rapport/trust with patient   Outcome: Not Progressing  Goal: Attend and participate in unit activities, including therapeutic, recreational, and educational groups  Description  Interventions:  - Provide therapeutic and educational activities daily, encourage attendance and participation, and document same in the medical record  - Obtain collateral information, encourage visitation and family involvement in care   Outcome: Not Progressing  Goal: Recognize maladaptive responses and adopt new coping mechanisms  Outcome: Not Progressing  Goal: Complete daily ADLs, including personal hygiene independently, as able  Description  Interventions:  - Observe, teach, and assist patient with ADLS  - Monitor and promote a balance of rest/activity, with adequate nutrition and elimination  Outcome: Not Progressing     Problem: Depression  Goal: Treatment Goal: Demonstrate behavioral control of depressive symptoms, verbalize feelings of improved mood/affect, and adopt new coping skills prior to discharge  Outcome: Not Progressing  Goal: Verbalize thoughts and feelings  Description  Interventions:  - Assess and re-assess patient's level of risk   - Engage patient in 1:1 interactions, daily, for a minimum of 15 minutes   - Encourage patient to express feelings, fears, frustrations, hopes   Outcome: Not Progressing  Goal: Refrain from isolation  Description  Interventions:  - Develop a trusting relationship   - Encourage socialization   Outcome: Not Progressing  Goal: Refrain from self-neglect  Outcome: Not Progressing  Goal: Attend and participate in unit activities, including therapeutic, recreational, and educational groups  Description  Interventions:  - Provide therapeutic and educational activities daily, encourage attendance and participation, and document same in the medical record   Outcome: Not Progressing

## 2019-09-09 NOTE — PROGRESS NOTES
Progress Note - Behavioral Health   Juanpablo Lange 41 y o  female MRN: [id number]  Unit/Bed#: [facility] 814-76 Encounter: [id number]    The patient was seen for continuing care and reviewed with treatment team  Staff reports that the patient remains irritable, self isolating to room, and superficial with care  Not attending groups  Remains medication compliant  During assessment the patient was irritable and agitated  Refusing to participate in conversation  "Get out of my room! I am sleeping! No more questions  I don't want to talk to you!" Superficial and refuses to elaborate regarding her issues  Denies any current auditory or visual hallucinations  Denies any depression or anxiety  It was reported that the patient received a phone call last night and was screaming and crying, "Don't leave me!" When the staff attempted to redirect the patient, she became verbally aggressive towards staff  Ativan and haldol were given for symptom management  The patient remains restless and constantly moving   Continues to appear to be responding to internal stimuli despite denial   Appears paranoid    Mental Status Evaluation:  Appearance:  Marginal/poor hygiene   Behavior:  guarded, Superficial, Dismissive and Uncooperative   Fund of knowledge  Not assessed   Speech:   Language: Sparse and Loud  No overt abnormality   Mood:  angry and irritable   Affect:   Associations: labile  Intact   Thought Process:  Circumstantial   Thought Content:  Paranoid and mistrustful and Obsessive ruminations   Perceptual Disturbances: Appears to be responding to internal stimuli   Risk Potential: No suicidal or homicidal ideation   Orientation  Oriented x 3   Memory Not tested   Attention/Concentration attention span appeared shorter than expected for age   Insight:  No insight   Judgment: Good judgment and Poor judgment   Gait/Station: normal gait/station and normal balance   Motor Activity: Ongoing restlessness; possible drug withdrawal Progress Toward Goals: unchanged    Assessment/Plan    Principal Problem:    Depression  Active Problems:    Microcytic anemia    Tobacco abuse    Bipolar 1 disorder, depressed, severe (HCC)    Suicidal behavior    Overdose of drug      Recommended Treatment: Continue with pharmacotherapy, group therapy, milieu therapy and occupational therapy  The patient will be maintained on the following medications:    Current Facility-Administered Medications:  acetaminophen 650 mg Oral Q6H PRN Azra Jacobsen MD   aluminum-magnesium hydroxide-simethicone 30 mL Oral Q4H PRN Azra Jacobsen MD   benztropine 1 mg Intramuscular Q6H PRN Azra Jacobsen MD   benztropine 1 mg Oral Q6H PRN Azra Jacobsen MD   ferrous sulfate 325 mg Oral Daily With Breakfast Mary Cuevas PA-C   gabapentin 300 mg Oral TID Edda Lockhart MD   haloperidol 5 mg Oral Q6H PRN Azra Jacobsen MD   haloperidol lactate 5 mg Intramuscular Q6H PRN Azra Jacobsen MD   hydrOXYzine HCL 75 mg Oral TID Edda Lockhart MD   LORazepam 1 mg Intramuscular Q6H PRN Azra Jacobsen MD   LORazepam 1 mg Oral Q6H PRN Azra Jacobsen MD   magnesium hydroxide 30 mL Oral Daily PRN Azra Jacobsen MD   nicotine 1 patch Transdermal Daily Azra Jacobsen MD   OLANZapine 10 mg Oral HS ALLAN Mills   OLANZapine 5 mg Oral Daily Edda Lockhart MD   traZODone 50 mg Oral HS PRN Azra Jacobsen MD   traZODone 50 mg Oral HS Edda Lockhart MD       Risks, benefits and possible side effects of Medications:   Risks, benefits, and possible side effects of medications explained to patient and patient verbalizes understanding

## 2019-09-09 NOTE — PROGRESS NOTES
09/09/19 0902   Team Meeting   Meeting Type Daily Rounds   Initial Conference Date 09/09/19   Team Members Present   Team Members Present Physician;Nurse;   Physician Team Member Dr Santa Lowe Team Member [facility] Management Team Member Berdine Kanner, Sabino Eis   Patient/Family Present   Patient Present No   Patient's Family Present No   Chart and labs were reviewed  Medications to be monitored, has been refusing meds  Patient pleasant and cooperative  Discharge date to be determined

## 2019-09-09 NOTE — NURSING NOTE
Patient isolative to room resting in bed at start of shift  Pleasant and cooperative with unit routine  Upon approach patient denied any unmet needs  Made eye contact  No tearfulness noted  Thinking clear and organized  Denied SI/HI  Patient ate snack and was HS medication compliant  Will monitor

## 2019-09-09 NOTE — SOCIAL WORK
Worker attempted to meet with patient to complete psycho social assessment, she refused to get up and meet with worker  Worker will attempt at a later time

## 2019-09-10 PROCEDURE — 99233 SBSQ HOSP IP/OBS HIGH 50: CPT | Performed by: NURSE PRACTITIONER

## 2019-09-10 RX ORDER — OLANZAPINE 10 MG/1
10 TABLET, ORALLY DISINTEGRATING ORAL DAILY
Status: DISCONTINUED | OUTPATIENT
Start: 2019-09-11 | End: 2019-09-17

## 2019-09-10 RX ORDER — GABAPENTIN 400 MG/1
400 CAPSULE ORAL 3 TIMES DAILY
Status: DISCONTINUED | OUTPATIENT
Start: 2019-09-10 | End: 2019-09-11

## 2019-09-10 RX ORDER — FLUOXETINE 10 MG/1
10 CAPSULE ORAL DAILY
Status: DISCONTINUED | OUTPATIENT
Start: 2019-09-10 | End: 2019-09-11

## 2019-09-10 RX ADMIN — FERROUS SULFATE TAB 325 MG (65 MG ELEMENTAL FE) 325 MG: 325 (65 FE) TAB at 08:09

## 2019-09-10 RX ADMIN — OLANZAPINE 10 MG: 10 TABLET, ORALLY DISINTEGRATING ORAL at 21:00

## 2019-09-10 RX ADMIN — FLUOXETINE 10 MG: 10 CAPSULE ORAL at 11:51

## 2019-09-10 RX ADMIN — HYDROXYZINE HYDROCHLORIDE 75 MG: 50 TABLET, FILM COATED ORAL at 21:00

## 2019-09-10 RX ADMIN — HYDROXYZINE HYDROCHLORIDE 75 MG: 50 TABLET, FILM COATED ORAL at 08:09

## 2019-09-10 RX ADMIN — GABAPENTIN 400 MG: 400 CAPSULE ORAL at 17:30

## 2019-09-10 RX ADMIN — GABAPENTIN 400 MG: 400 CAPSULE ORAL at 21:00

## 2019-09-10 RX ADMIN — OLANZAPINE 5 MG: 5 TABLET, ORALLY DISINTEGRATING ORAL at 08:09

## 2019-09-10 RX ADMIN — TRAZODONE HYDROCHLORIDE 50 MG: 50 TABLET ORAL at 21:04

## 2019-09-10 RX ADMIN — HYDROXYZINE HYDROCHLORIDE 75 MG: 50 TABLET, FILM COATED ORAL at 17:29

## 2019-09-10 RX ADMIN — GABAPENTIN 300 MG: 300 CAPSULE ORAL at 08:09

## 2019-09-10 NOTE — NURSING NOTE
Pt isolative to room   Only comes out for meal  Pt is med compliant   Denies,anxiety , depression, Si,HI   Will continue to monitor

## 2019-09-10 NOTE — NURSING NOTE
Patient reported difficulty falling asleep  Patient was administered PRN Trazadone 50 mg at 2258  Patient has appeared to sleep for the remainder of the night  Will continue to monitor closely

## 2019-09-10 NOTE — PROGRESS NOTES
09/10/19 0833   Team Meeting   Meeting Type Daily Rounds   Initial Conference Date 09/10/19   Team Members Present   Team Members Present Physician;Nurse;   Physician Team Member Dr Juel Prader Team Member [facility] Management Team Member Luz Garza   Patient/Family Present   Patient Present No   Patient's Family Present No   Labs and chart reviewed  72 hour notice expires today, psychiatrist will request pt to rescind  Pt refused EKG x2  Pt responding to internal stim  Pt still presents with psychosis  Pt pleasant, cooperative, but not social  4/4 anxiety, 5/10 depression  Meds to be monitored  Discharge date to be determined  Neurontin increased to 300mg

## 2019-09-10 NOTE — PROGRESS NOTES
Progress Note - Behavioral Health   Gino House 41 y o  female MRN: [id number]  Unit/Bed#: [facility] 555-18 Encounter: [id number]    The patient was seen for continuing care and reviewed with treatment team  Staff reports that the patient remains self isolating to room, minimally social with staff and peers, and is compliant with medications  Not currently attending groups  During assessment the patient reports increased depression and is crying intermittently during interview  Discussed the benefits of staying in the hospital for medication management and patient agreed to rescind 72 hour notice  Denies any current suicidal ideations but reports that her depression is an 8 out of 10  Continues to appear restless, but patient denies any feelings of discomfort  Will agree to EKG  Will order double portions per patient request  Remains labile and periodically agitated  Improved engagement in conversation  Denies any auditory or visual hallucinations  However, continues to be responding to internal stimuli  Remains paranoid and mistrustful  Remains in compliance with medication changes  Will continue to increase Neurontin and Zyprexa for symptom management  Will order Prozac for depressive symptoms       Mental Status Evaluation:  Appearance:  Marginal/poor hygiene   Behavior:  guarded, Superficial and Dismissive   Fund of knowledge  Not assessed   Speech:   Language: Normal rate and Normal volume  No overt abnormality   Mood:  irritable, anxious and depressed   Affect:   Associations: constricted  Intact   Thought Process:  Circumstantial   Thought Content:  Paranoid and mistrustful and Obsessive ruminations   Perceptual Disturbances: Appears to be responding to internal stimuli   Risk Potential: No suicidal or homicidal ideation   Orientation  Oriented x 3   Memory Not tested   Attention/Concentration attention span appeared shorter than expected for age   Insight:  No insight   Judgment: Poor judgment Gait/Station: normal gait/station and normal balance   Motor Activity: No abnormal movement noted     Progress Toward Goals: slightly improved    Assessment/Plan    Principal Problem:    Depression  Active Problems:    Microcytic anemia    Tobacco abuse    Bipolar 1 disorder, depressed, severe (HCC)    Suicidal behavior    Overdose of drug      Recommended Treatment: Continue with pharmacotherapy, group therapy, milieu therapy and occupational therapy  The patient will be maintained on the following medications:    Current Facility-Administered Medications:  acetaminophen 650 mg Oral Q6H PRN Jose Guadalupe Cat MD   aluminum-magnesium hydroxide-simethicone 30 mL Oral Q4H PRN Jose Guadalupe aCt MD   benztropine 1 mg Intramuscular Q6H PRN Jose Guadalupe Cat MD   benztropine 1 mg Oral Q6H PRN Jose Guadalupe Cat MD   ferrous sulfate 325 mg Oral Daily With Breakfast Mary Cuevas PA-C   FLUoxetine 10 mg Oral Daily ALLAN Mcnair   gabapentin 400 mg Oral TID ALLAN Mcnair   haloperidol 5 mg Oral Q6H PRN Jose Guadalupe Cat MD   haloperidol lactate 5 mg Intramuscular Q6H PRN Jose Guadalupe Cat MD   hydrOXYzine HCL 75 mg Oral TID Anali Auguste MD   LORazepam 1 mg Intramuscular Q6H PRN Jose Guadalupe Cat MD   LORazepam 1 mg Oral Q6H PRN Jose Guadalupe Cat MD   magnesium hydroxide 30 mL Oral Daily PRN Jose Guadalupe Cat MD   nicotine 1 patch Transdermal Daily Jose Guadalupe Cat MD   OLANZapine 10 mg Oral HS ALLAN Mcnair   [START ON 9/11/2019] OLANZapine 10 mg Oral Daily ALLAN Mcnair   traZODone 50 mg Oral HS PRN Jose Guadalupe Cat MD   traZODone 50 mg Oral HS Anali Auguste MD       Risks, benefits and possible side effects of Medications:   Risks, benefits, and possible side effects of medications explained to patient and patient verbalizes understanding

## 2019-09-10 NOTE — PROGRESS NOTES
Met with patient to complete a co-occurring assessment but she was too irritable and did not want to talk about anything she was asked  She is depressed and only wanted to talk about when she was getting discharged  When she was informed that she was positive for cocaine she did not remember  She did say she smoked THC a few days ago and it did not help  She also stated "I am not an addict"  "I do not need drug and alcohol counseling"  "I am F--- up in the head"  She was not able to identify the triggers that brought her into the hospital other than everything  She does not have her children, her boyfriend is incarcerated  Patient is in denial and does not take responsibility for her life situation nor does she recognize that she has the power or influence to change things  She is depressed  Therapist will continue to offer support

## 2019-09-10 NOTE — PROGRESS NOTES
Pt is pleasant and cooperative, visible but not too social  Pt continues to refuse her EKG  Pt continues to report that she is starving  Pt says that she believes her meds are causing her to feel really hungry  Pt reports a 4/4 anxiety and 5/10 depression but denies any SI, HI, AH, or VH  Pt comes up to the med room at 1830 reporting moderate anxiety and was observed visibly anxious  Pt was very shaking and on edge  Pt was given Ativan 1 mg PRN at 1837  Will continue to monitor

## 2019-09-10 NOTE — PLAN OF CARE
Patient appears disheveled and restless at the medication room  Patient keeps repeating, "I am really depressed, I am really depressed " Patient reports 10/20 depression, 4/4 anxiety and denies S/HI,A/VH and pain  Patient med and meal compliant  Isolative and not social at this time  Patient reports "having flashbacks" but would not elaborate on same  Will continue to monitor and provide therapeutic support

## 2019-09-11 PROCEDURE — 99233 SBSQ HOSP IP/OBS HIGH 50: CPT | Performed by: PSYCHIATRY & NEUROLOGY

## 2019-09-11 RX ORDER — FLUOXETINE HYDROCHLORIDE 20 MG/1
20 CAPSULE ORAL DAILY
Status: DISCONTINUED | OUTPATIENT
Start: 2019-09-12 | End: 2019-09-17 | Stop reason: HOSPADM

## 2019-09-11 RX ORDER — GABAPENTIN 300 MG/1
600 CAPSULE ORAL 3 TIMES DAILY
Status: DISCONTINUED | OUTPATIENT
Start: 2019-09-11 | End: 2019-09-17 | Stop reason: HOSPADM

## 2019-09-11 RX ADMIN — GABAPENTIN 400 MG: 400 CAPSULE ORAL at 08:57

## 2019-09-11 RX ADMIN — FERROUS SULFATE TAB 325 MG (65 MG ELEMENTAL FE) 325 MG: 325 (65 FE) TAB at 08:57

## 2019-09-11 RX ADMIN — FLUOXETINE 10 MG: 10 CAPSULE ORAL at 08:58

## 2019-09-11 RX ADMIN — HYDROXYZINE HYDROCHLORIDE 75 MG: 50 TABLET, FILM COATED ORAL at 21:20

## 2019-09-11 RX ADMIN — GABAPENTIN 600 MG: 300 CAPSULE ORAL at 17:20

## 2019-09-11 RX ADMIN — OLANZAPINE 10 MG: 10 TABLET, ORALLY DISINTEGRATING ORAL at 08:57

## 2019-09-11 RX ADMIN — TRAZODONE HYDROCHLORIDE 50 MG: 50 TABLET ORAL at 21:20

## 2019-09-11 RX ADMIN — HYDROXYZINE HYDROCHLORIDE 75 MG: 50 TABLET, FILM COATED ORAL at 08:57

## 2019-09-11 RX ADMIN — LORAZEPAM 1 MG: 1 TABLET ORAL at 19:11

## 2019-09-11 RX ADMIN — GABAPENTIN 600 MG: 300 CAPSULE ORAL at 21:21

## 2019-09-11 RX ADMIN — OLANZAPINE 10 MG: 10 TABLET, ORALLY DISINTEGRATING ORAL at 21:20

## 2019-09-11 RX ADMIN — HYDROXYZINE HYDROCHLORIDE 75 MG: 50 TABLET, FILM COATED ORAL at 17:19

## 2019-09-11 NOTE — NURSING NOTE
Received patient at 2300  Patient observed in her room sleeping  Symmetrical non-labored breathing noted  Will continue to monitor

## 2019-09-11 NOTE — PROGRESS NOTES
Progress Note - Behavioral Health   Juanpablo Lange 41 y o  female MRN: [id number]  Unit/Bed#: [facility] 327-22 Encounter: [id number]    The patient was seen for continuing care and reviewed with treatment team  Staff reports that the patient remains self isolating to room  Remains cooperative with care and is compliant with medications  Refusing groups  During assessment the patient no longer appears internally preoccupied and engages more appropriately in conversation  Reports that her depressive symptoms are significantly improved  Denies any thoughts to hurt herself or others  Denies any auditory or visual hallucinations  Reports that she has not heard voices for over 24 hours  Reports that her appetite and sleep have improved  Patient continues to appear restless on approach  She reports that "she does it on purpose for comfort due to her anxiety " Unable to determine  Will continue to increase Neurontin to 600 mg po TID for symptom management  Will increase Prozac to 20 mg po daily for anxiety and depressive symptoms       Mental Status Evaluation:  Appearance:  Adequate hygiene and grooming   Behavior:  cooperative, guarded and Superficial   Fund of knowledge  aware of current events and Aware of past history   Speech:   Language: Normal rate and Normal volume  No overt abnormality   Mood:  improving   Affect:   Associations: blunted  Intact   Thought Process:  Goal directed and coherent   Thought Content:  Does not verbalize delusional material   Perceptual Disturbances: Denies hallucinations and does not appear to be responding to internal stimuli   Risk Potential: No suicidal or homicidal ideation   Orientation  Oriented x 3   Memory No deficits   Attention/Concentration attention span appeared shorter than expected for age   Insight:  limited; improving   Judgment: Limited; improving   Gait/Station: normal gait/station and normal balance   Motor Activity: No abnormal movement noted     Progress Toward Goals: improving  Assessment/Plan    Principal Problem:    Depression  Active Problems:    Microcytic anemia    Tobacco abuse    Bipolar 1 disorder, depressed, severe (HCC)    Suicidal behavior    Overdose of drug      Recommended Treatment: Continue with pharmacotherapy, group therapy, milieu therapy and occupational therapy  The patient will be maintained on the following medications:    Current Facility-Administered Medications:  acetaminophen 650 mg Oral Q6H PRN Kathy Rodriguez MD   aluminum-magnesium hydroxide-simethicone 30 mL Oral Q4H PRN Kathy Rodriguez MD   benztropine 1 mg Intramuscular Q6H PRN Kathy Rodriguez MD   benztropine 1 mg Oral Q6H PRN Kathy Rodriguez MD   ferrous sulfate 325 mg Oral Daily With Breakfast Mary Spain PA-C   [START ON 9/12/2019] FLUoxetine 20 mg Oral Daily ALLAN Knowles   gabapentin 600 mg Oral TID ALLAN Knowles   haloperidol 5 mg Oral Q6H PRN Kathy Rodriguez MD   haloperidol lactate 5 mg Intramuscular Q6H PRN Kathy Rodriguez MD   hydrOXYzine HCL 75 mg Oral TID Marsha Norris MD   LORazepam 1 mg Intramuscular Q6H PRN Kathy Rodriguez MD   LORazepam 1 mg Oral Q6H PRN Kathy Rodriguez MD   magnesium hydroxide 30 mL Oral Daily PRN Kathy Rodriguez MD   nicotine 1 patch Transdermal Daily Kathy Rodriguez MD   OLANZapine 10 mg Oral HS ALLAN Knowles   OLANZapine 10 mg Oral Daily ALLAN Knowles   traZODone 50 mg Oral HS PRN Kathy Rodriguez MD   traZODone 50 mg Oral HS Marsha Norris MD       Risks, benefits and possible side effects of Medications:   Risks, benefits, and possible side effects of medications explained to patient and patient verbalizes understanding

## 2019-09-11 NOTE — NURSING NOTE
Pt isolative to her room , denies anxiety, depression, SI,HI  Pt stat "NO NO NO"    Pt sleeping at this time, only out for med/meal   Will continue to monitor

## 2019-09-11 NOTE — PROGRESS NOTES
09/11/19 0810   Team Meeting   Meeting Type Daily Rounds   Initial Conference Date 09/11/19   Team Members Present   Team Members Present Physician;Nurse;   Physician Team Member Dr Daria Molina Management Team Member Nataly Sanchez   Patient/Family Present   Patient Present No   Patient's Family Present No   Chart and labs were reviewed  Medications were adjusted on 9/10  Patient isolates to her room, has an irritable edge  Patient was heard on the phone with her boyfriend begging him not to leave her  Discharge date to be determined

## 2019-09-12 PROCEDURE — 99232 SBSQ HOSP IP/OBS MODERATE 35: CPT | Performed by: NURSE PRACTITIONER

## 2019-09-12 RX ORDER — PALIPERIDONE 3 MG/1
3 TABLET, EXTENDED RELEASE ORAL
Status: DISCONTINUED | OUTPATIENT
Start: 2019-09-12 | End: 2019-09-13

## 2019-09-12 RX ADMIN — OLANZAPINE 10 MG: 10 TABLET, ORALLY DISINTEGRATING ORAL at 21:29

## 2019-09-12 RX ADMIN — GABAPENTIN 600 MG: 300 CAPSULE ORAL at 16:58

## 2019-09-12 RX ADMIN — OLANZAPINE 10 MG: 10 TABLET, ORALLY DISINTEGRATING ORAL at 09:12

## 2019-09-12 RX ADMIN — HYDROXYZINE HYDROCHLORIDE 75 MG: 50 TABLET, FILM COATED ORAL at 16:58

## 2019-09-12 RX ADMIN — HYDROXYZINE HYDROCHLORIDE 75 MG: 50 TABLET, FILM COATED ORAL at 21:29

## 2019-09-12 RX ADMIN — FLUOXETINE 20 MG: 20 CAPSULE ORAL at 09:12

## 2019-09-12 RX ADMIN — FERROUS SULFATE TAB 325 MG (65 MG ELEMENTAL FE) 325 MG: 325 (65 FE) TAB at 09:12

## 2019-09-12 RX ADMIN — PALIPERIDONE 3 MG: 3 TABLET, EXTENDED RELEASE ORAL at 21:29

## 2019-09-12 RX ADMIN — TRAZODONE HYDROCHLORIDE 50 MG: 50 TABLET ORAL at 21:29

## 2019-09-12 RX ADMIN — LORAZEPAM 1 MG: 1 TABLET ORAL at 14:42

## 2019-09-12 RX ADMIN — HYDROXYZINE HYDROCHLORIDE 75 MG: 50 TABLET, FILM COATED ORAL at 09:12

## 2019-09-12 RX ADMIN — GABAPENTIN 600 MG: 300 CAPSULE ORAL at 21:29

## 2019-09-12 RX ADMIN — GABAPENTIN 600 MG: 300 CAPSULE ORAL at 09:12

## 2019-09-12 NOTE — DISCHARGE INSTR - OTHER ORDERS
Crisis Information  If you are experiencing a mental health emergency, you may call the [address] 24 hours a day, 7 days per week at [phone]  In [facility], call [phone]  When you need someone to listen, the Mary Jo Fauzia is available for 16 hours a day, 7 days a week, from the time of 7-10am and 2pm-2am   It is not available from the hours of 2am-6am and 10am-2pm  A representative can be reached at [phone]  The [facility] Family-to-Family Education Program is a free 12-week (2 1/2 hours/week) course for families of individuals with severe brain disorders (mental illnesses)  The classes are taught by trained family members  All course materials are furnished at no cost to you  Below are some details  To register, e-mail [email or web address] or call [phone]  The curriculum focuses on schizophrenia, bipolar disorder (manic depression), clinical depression, panic disorders and obsessive-compulsive disorder (OCD)  The course discusses the clinical treatment of these illnesses and teaches the knowledge and skills that family members need to cope more effectively    Topics Include:  Learning about feelings, learning about facts   Schizophrenia, major depression and quentin: diagnosis and dealing with critical periods   Subtypes of depression and bipolar disorder, panic disorder and OCD; diagnosis and causes; sharing our stories   The biology of the brain/new research   Problem solving workshops   Medication review   Empathy workshop - what its like to have a brain disorder   Communication skills workshop   Self-care and relative groups   Rehabilitation, services available   Advocacy: fighting stigma   Review and certification ceremony    Nsdx-al-Yfbq Education Course  The [employer] Education class is a ten week - two hours per week - experiential education course on the topic of recovery for any person with serious mental illness who is interested in establishing and maintaining wellness  The course uses a combination of lecture, interactive exercises, and structural group processes  The diversity of experience among participants affords for a lively dynamic that moves the course along  [employer]s Yubo-nq-Foox Education class is offered free of charge to people who experience mental illness  You do not need to be a member of [employer] to take the course  Courses are taught by teams of trained mentors/peer-teachers who are themselves experienced at living well with mental illness  Below are some details  To register, call [phone] or e-mail [email or web address]  Sign up today! 225 [facility] group is for family members, caregivers, and loved ones of individuals living with the everyday challenges of mental illness  The leaders are family members in the same situation  Sessions take place in an intimate, confidential setting to allow families to share openly with each other  These support groups allow participants to learn from the experiences of other group members, share coping strategies, and offer each other encouragement and understanding  Kristan Gernoimo know that you are not alone  Drop in--no registration is necessary  Here are the times and locations  [facility]  Monthly: 3rd Monday, 7:00-8:30 pm  [facility]  [facility], [place]  Monthly: 4th Tuesday, 7:00-8:30 pm  [address] [facility]  Monthly: 1st Monday, 7:00-8:30 pm  [address], [address] [place] [address]         Monthly Support for Persons with Mental Illness  The Peer Support Group is a monthly meeting for individuals facing the challenges of recovering from severe and persistent mental illness   Depression, manic depression, schizophrenia, and general anxiety disorder are only a few of the diagnoses of individuals who have found a supportive place at our meetings  Our [place]  We are a fellowship of individuals who share a common goal of recovery and the ability to maintain mental and emotional stability  We help others and ourselves through sharing our experiences, strength and hope with each other  No matter how traumatic our past or how despairing our present may be, there is hope for a new day  Sessions take place in an intimate, confidential setting to allow individuals to share openly with each other  Robert Delaney know that you are not alone  Drop in--no registration is necessary  Here are the times and locations  FRANCISCO JAVIER  Monthly: 1st Monday, 7:00-8:30 pm  [facility]  [address] [place], [place]   YSJKOKHWK  Monthly: 3rd Monday, 7:00-8:30 pm  [address], [address]:  If you or someone you know has a drug or alcohol problem, there is help:  [address]: [address] [facility] [address]: [phone]  An assessment is the first step  In addition to those listed there are other programs available in the area but assessment is best to determine an appropriate level of care  If you DO NOT have Medical Assistance ([place]) or [employer], an assessment can be scheduled at one of these providers:  [facility] [address], [address]  [phone]   101 [facility] [address] , [address], [address]  3314 [facility] [address]   [facility] [address] [facility]   Step by 8012 [facility] [address] , [address], 98 [facility] [address] 1320 [facility] , [address], 98 [facility]  2000 [facility],[address] [facility] [address] [place] , [address], [address], [address]     If you HAVE Medical Assistance, an assessment can be scheduled at one of these providers:  [place] [facility]  [address] , [place], 98 [facility]  [address]  [address], [address]  [address]  D&A Intake Unit 1001 [facility] [address] , 1st Floor, [place], [address]  [address], 300 [facility],6Th Floor, [place], [address] [place] [address] , [place], [address]  3314 [facility] [address]  [place], 122 [facility]) [address], [address] [place], [address]   Step by 8012 [facility] [address] , [place], 98 [facility] [address] 1320 [facility] , [place], 98 [facility]  2000 [facility],[address] , [address], [place], [address] Anh Pereira [phone]     If you W W  [employer], an assessment can be scheduled at one of these providers  Please contact these Providers to determine if they are in your network plan:  [facility] D&A Intake Unit  620 [facility] [address] , 1st Floor, [place], [address]  [address] , [place], [address]  3314 [facility] [address]  [place], [address], [address] 75 [facility] [address] [place] [address] 800 [facility] [address] , [address], [place], [address] Anh Pereira [phone]     You are due to receive your second [place] IM on 9/24, please take your script and go to one of the pharmacies listed below to receive it:     [facility]: [address], [address]   Staci E Stanley  Pharmacy: [facility] 73, [address]  [phone]

## 2019-09-12 NOTE — SOCIAL WORK
Worker spoke with Lelo Rodas from [employer] [facility] regarding intake, he reported they have walk in hours Monday-Friday from 8:30am-1:30pm

## 2019-09-12 NOTE — SOCIAL WORK
Worker spoke with patient regarding discharge planning  Patient reports that she will return home at [facility]   Worker discussed outpatient drug and alcohol with patient, she stated "whichever place is easier to get into" referencing either [facility] or [facility]  Worker informed her that [facility] moved to [address]  Patient in agreement to go to [employer]  Worker asked about outpatient mental health services, she was in agreement to go to [facility] on [address]   Patient signed MASOOD for [facility]  Worker called [facility] to schedule intake, they took patient's demographics and workers contact information to give a call back as the intake department needs to schedule the appointment  Worker left message for [facility] to schedule intake

## 2019-09-12 NOTE — PROGRESS NOTES
Progress Note - Behavioral Health   Juanpablo Lange 41 y o  female MRN: [id number]  Unit/Bed#: Dalton Bui 096-93 Encounter: [id number]    The patient was seen for continuing care and reviewed with treatment team  Staff reports that the patient remains visible on the unit, cooperative with care, and compliant with medications  Continues to refuse groups  During assessment the patient was pleasant during interaction and appropriate during conversation  Denies any current feelings of depression or anxiety  Denies any thoughts to hurt herself or others  Decreased restlessness is noted  Denies any side effects from medications  Reports that she is eating and sleeping adequately  Remains minimally social with peers and staff  Continues to self isolate to room  Discussed the possibility of a MCWILLIAMS and patient is in compliance with medication  Will start Invega 3 mg po at bedtime       Mental Status Evaluation:  Appearance:  Marginal/poor hygiene; improving   Behavior:  cooperative, guarded and Superficial   Fund of knowledge  Not assessed   Speech:   Language: Normal rate and Normal volume  No overt abnormality   Mood:  improving   Affect:   Associations: blunted  Intact   Thought Process:  linear   Thought Content:  Does not verbalize delusional material   Perceptual Disturbances: Denies hallucinations and does not appear to be responding to internal stimuli   Risk Potential: No suicidal or homicidal ideation   Orientation  Oriented x 3   Memory No deficits   Attention/Concentration attention span appeared shorter than expected for age   Insight:  limited   Judgment: Limited   Gait/Station: normal gait/station and normal balance   Motor Activity: No abnormal movement noted     Progress Toward Goals: improving    Assessment/Plan    Principal Problem:    Depression  Active Problems:    Microcytic anemia    Tobacco abuse    Bipolar 1 disorder, depressed, severe (HCC)    Suicidal behavior    Overdose of drug      Recommended Treatment: Continue with pharmacotherapy, group therapy, milieu therapy and occupational therapy  The patient will be maintained on the following medications:    Current Facility-Administered Medications:  acetaminophen 650 mg Oral Q6H PRN Kathy Rodriguez MD   aluminum-magnesium hydroxide-simethicone 30 mL Oral Q4H PRN Kathy Rodriguez MD   benztropine 1 mg Intramuscular Q6H PRN Kathy Rodriguez MD   benztropine 1 mg Oral Q6H PRN Kathy Rodriguez MD   ferrous sulfate 325 mg Oral Daily With Breakfast Mary Cuevas PA-C   FLUoxetine 20 mg Oral Daily ALLAN Knowles   gabapentin 600 mg Oral TID ALLAN Knowles   haloperidol 5 mg Oral Q6H PRN Kathy Rodriguez MD   haloperidol lactate 5 mg Intramuscular Q6H PRN Kathy Rodriguez MD   hydrOXYzine HCL 75 mg Oral TID Marsha Norris MD   LORazepam 1 mg Intramuscular Q6H PRN Kathy Rodriguez MD   LORazepam 1 mg Oral Q6H PRN Kathy Rodriguez MD   magnesium hydroxide 30 mL Oral Daily PRN Kathy Rodriguez MD   nicotine 1 patch Transdermal Daily Kathy Rodriguez MD   OLANZapine 10 mg Oral HS ALLAN Knowles   OLANZapine 10 mg Oral Daily ALLAN Knowles   paliperidone 3 mg Oral HS ALLAN Knowles   traZODone 50 mg Oral HS PRN Kathy Rodriguez MD   traZODone 50 mg Oral HS Marsha Norris MD       Risks, benefits and possible side effects of Medications:   Risks, benefits, and possible side effects of medications explained to patient and patient verbalizes understanding

## 2019-09-12 NOTE — PROGRESS NOTES
Patient received at 2300 in bed and sound asleep  Patient slept through the night and did not awaken to report any problems or distress, or request any PRN's  Patient slept for entire shift with no issues, and was observed sleeping, with eyes closed, chest movements noted, and breathing heard  Q7 minute rounding continued for patient safety  Will continue to monitor

## 2019-09-12 NOTE — PLAN OF CARE
Problem: Risk for Self Injury/Neglect  Goal: Treatment Goal: Remain safe during length of stay, learn and adopt new coping skills, and be free of self-injurious ideation, impulses and acts at the time of discharge  Outcome: Progressing  Goal: Refrain from harming self  Description  Interventions:  - Monitor patient closely, per order  - Develop a trusting relationship  - Supervise medication ingestion, monitor effects and side effects   Outcome: Progressing  Goal: Complete daily ADLs, including personal hygiene independently, as able  Description  Interventions:  - Observe, teach, and assist patient with ADLS  - Monitor and promote a balance of rest/activity, with adequate nutrition and elimination  Outcome: Progressing     Problem: Depression  Goal: Treatment Goal: Demonstrate behavioral control of depressive symptoms, verbalize feelings of improved mood/affect, and adopt new coping skills prior to discharge  Outcome: Progressing  Goal: Refrain from harming self  Description  Interventions:  - Monitor patient closely, per order   - Supervise medication ingestion, monitor effects and side effects   Outcome: Progressing  Goal: Refrain from self-neglect  Outcome: Progressing  Goal: Complete daily ADLs, including personal hygiene independently, as able  Description  Interventions:  - Observe, teach, and assist patient with ADLS  -  Monitor and promote a balance of rest/activity, with adequate nutrition and elimination   Outcome: Progressing     Problem: Anxiety  Goal: Anxiety is at manageable level  Description  Interventions:  - Assess and monitor patient's anxiety level  - Monitor for signs and symptoms (heart palpitations, chest pain, shortness of breath, headaches, nausea, feeling jumpy, restlessness, irritable, apprehensive)  - Collaborate with interdisciplinary team and initiate plan and interventions as ordered    - [place] patient to unit/surroundings  - Explain treatment plan  - Encourage participation in care  - Encourage verbalization of concerns/fears  - Identify coping mechanisms  - Assist in developing anxiety-reducing skills  - Administer/offer alternative therapies  - Limit or eliminate stimulants  Outcome: Progressing     Problem: Risk for Self Injury/Neglect  Goal: Verbalize thoughts and feelings  Description  Interventions:  - Assess and re-assess patient's lethality and potential for self-injury  - Engage patient in 1:1 interactions, daily, for a minimum of 15 minutes  - Encourage patient to express feelings, fears, frustrations, hopes  - Establish rapport/trust with patient   Outcome: Not Progressing  Goal: Attend and participate in unit activities, including therapeutic, recreational, and educational groups  Description  Interventions:  - Provide therapeutic and educational activities daily, encourage attendance and participation, and document same in the medical record  - Obtain collateral information, encourage visitation and family involvement in care   Outcome: Not Progressing  Goal: Recognize maladaptive responses and adopt new coping mechanisms  Outcome: Not Progressing     Problem: Depression  Goal: Verbalize thoughts and feelings  Description  Interventions:  - Assess and re-assess patient's level of risk   - Engage patient in 1:1 interactions, daily, for a minimum of 15 minutes   - Encourage patient to express feelings, fears, frustrations, hopes   Outcome: Not Progressing  Goal: Refrain from isolation  Description  Interventions:  - Develop a trusting relationship   - Encourage socialization   Outcome: Not Progressing  Goal: Attend and participate in unit activities, including therapeutic, recreational, and educational groups  Description  Interventions:  - Provide therapeutic and educational activities daily, encourage attendance and participation, and document same in the medical record   Outcome: Not Progressing

## 2019-09-12 NOTE — PROGRESS NOTES
Pt isolative to self  Denies AH,VH,HI,SI, depression and anxiety to this writer  Irritable edge  Medication and meal compliant  Will monitor

## 2019-09-12 NOTE — PROGRESS NOTES
09/12/19 0818   Team Meeting   Meeting Type Daily Rounds   Initial Conference Date 09/12/19   Team Members Present   Team Members Present Physician;Nurse;   Physician Team Member Dr Alfonso Mejia Team Member [facility] Management Team Member Ayse Kaur   Patient/Family Present   Patient Present No   Patient's Family Present No   Labs and chart reviewed  Pt denying all symptoms  Pt does seem to be improving, still with some anxiety and still restless  Meds to be monitored  Discharge date to be determined  MCWILLIAMS being considered

## 2019-09-12 NOTE — PROGRESS NOTES
Ativan given for moderate anxiety  Pt states she is worried about being discharged tomorrow  Will monitor

## 2019-09-13 PROCEDURE — 99232 SBSQ HOSP IP/OBS MODERATE 35: CPT | Performed by: PSYCHIATRY & NEUROLOGY

## 2019-09-13 RX ORDER — PALIPERIDONE 6 MG/1
6 TABLET, EXTENDED RELEASE ORAL
Status: DISCONTINUED | OUTPATIENT
Start: 2019-09-13 | End: 2019-09-16

## 2019-09-13 RX ADMIN — GABAPENTIN 600 MG: 300 CAPSULE ORAL at 21:02

## 2019-09-13 RX ADMIN — LORAZEPAM 1 MG: 1 TABLET ORAL at 13:19

## 2019-09-13 RX ADMIN — FLUOXETINE 20 MG: 20 CAPSULE ORAL at 08:23

## 2019-09-13 RX ADMIN — OLANZAPINE 10 MG: 10 TABLET, ORALLY DISINTEGRATING ORAL at 21:02

## 2019-09-13 RX ADMIN — GABAPENTIN 600 MG: 300 CAPSULE ORAL at 16:31

## 2019-09-13 RX ADMIN — HYDROXYZINE HYDROCHLORIDE 75 MG: 50 TABLET, FILM COATED ORAL at 16:31

## 2019-09-13 RX ADMIN — HYDROXYZINE HYDROCHLORIDE 75 MG: 50 TABLET, FILM COATED ORAL at 21:02

## 2019-09-13 RX ADMIN — TRAZODONE HYDROCHLORIDE 50 MG: 50 TABLET ORAL at 21:02

## 2019-09-13 RX ADMIN — PALIPERIDONE 6 MG: 6 TABLET, EXTENDED RELEASE ORAL at 21:02

## 2019-09-13 RX ADMIN — HYDROXYZINE HYDROCHLORIDE 75 MG: 50 TABLET, FILM COATED ORAL at 08:22

## 2019-09-13 RX ADMIN — GABAPENTIN 600 MG: 300 CAPSULE ORAL at 08:22

## 2019-09-13 RX ADMIN — FERROUS SULFATE TAB 325 MG (65 MG ELEMENTAL FE) 325 MG: 325 (65 FE) TAB at 08:23

## 2019-09-13 RX ADMIN — OLANZAPINE 10 MG: 10 TABLET, ORALLY DISINTEGRATING ORAL at 08:23

## 2019-09-13 NOTE — PROGRESS NOTES
09/13/19 0859   Team Meeting   Meeting Type Daily Rounds   Initial Conference Date 09/13/19   Team Members Present   Team Members Present Physician;Nurse;   Physician Team Member Dr Pamela Mckeon Management Team Member Rc Miles   Patient/Family Present   Patient Present No   Patient's Family Present No   Chart and labs were reviewed  Patient was started on Invega on 9/12, to receive [place] prior to discharge  Discharge date pending for early next week

## 2019-09-13 NOTE — PLAN OF CARE
Patient pleasant upon approach  Reports 3/4 anxiety and denies depression, S/HI,A/VH and pain  Med and meal compliant  Will continue to monitor and proide therapeutic support  ,

## 2019-09-13 NOTE — PROGRESS NOTES
Patient received at 1900, and was pleasant upon approach, almost elated in mood  Patient was cooperative with care and medication compliant  AAOx4  Patient was isolative to self in milieu as she was observed ambulating around unit, but with no socializing observed with peers  Interactions with staff were appropriate  Patient denied SI, HI, A/V hallucinations, pain, anxiety, and depression  No negative behaviors or distress noted/observed, and patient was encouraged to attend groups tomorrow  No PRN's were requested  Q7 minute rounding was continued for patient safety  Will continue to monitor and offer therapeutic support

## 2019-09-13 NOTE — PROGRESS NOTES
Patient requesting "something to put me to sleep " Patient advised that it was too early for sleeping medications  Patient escorted to her room and reported she was upset and depressed because she can't go home  Patient tearful at this time  Offered prn and pt opted to wait for her ordered medications   Will monitor

## 2019-09-14 PROCEDURE — 99231 SBSQ HOSP IP/OBS SF/LOW 25: CPT | Performed by: PHYSICIAN ASSISTANT

## 2019-09-14 RX ORDER — BENZTROPINE MESYLATE 1 MG/1
1 TABLET ORAL 2 TIMES DAILY
Status: DISCONTINUED | OUTPATIENT
Start: 2019-09-14 | End: 2019-09-17 | Stop reason: HOSPADM

## 2019-09-14 RX ADMIN — HYDROXYZINE HYDROCHLORIDE 75 MG: 50 TABLET, FILM COATED ORAL at 17:12

## 2019-09-14 RX ADMIN — TRAZODONE HYDROCHLORIDE 50 MG: 50 TABLET ORAL at 22:09

## 2019-09-14 RX ADMIN — LORAZEPAM 1 MG: 1 TABLET ORAL at 11:28

## 2019-09-14 RX ADMIN — NICOTINE 1 PATCH: 21 PATCH, EXTENDED RELEASE TRANSDERMAL at 09:00

## 2019-09-14 RX ADMIN — PALIPERIDONE 6 MG: 6 TABLET, EXTENDED RELEASE ORAL at 21:10

## 2019-09-14 RX ADMIN — FLUOXETINE 20 MG: 20 CAPSULE ORAL at 08:59

## 2019-09-14 RX ADMIN — HYDROXYZINE HYDROCHLORIDE 75 MG: 50 TABLET, FILM COATED ORAL at 20:39

## 2019-09-14 RX ADMIN — OLANZAPINE 10 MG: 10 TABLET, ORALLY DISINTEGRATING ORAL at 08:59

## 2019-09-14 RX ADMIN — BENZTROPINE MESYLATE 1 MG: 1 TABLET ORAL at 14:11

## 2019-09-14 RX ADMIN — FERROUS SULFATE TAB 325 MG (65 MG ELEMENTAL FE) 325 MG: 325 (65 FE) TAB at 08:59

## 2019-09-14 RX ADMIN — OLANZAPINE 10 MG: 10 TABLET, ORALLY DISINTEGRATING ORAL at 21:09

## 2019-09-14 RX ADMIN — GABAPENTIN 600 MG: 300 CAPSULE ORAL at 17:11

## 2019-09-14 RX ADMIN — HYDROXYZINE HYDROCHLORIDE 75 MG: 50 TABLET, FILM COATED ORAL at 08:59

## 2019-09-14 RX ADMIN — GABAPENTIN 600 MG: 300 CAPSULE ORAL at 20:40

## 2019-09-14 RX ADMIN — TRAZODONE HYDROCHLORIDE 50 MG: 50 TABLET ORAL at 21:10

## 2019-09-14 RX ADMIN — HALOPERIDOL 5 MG: 5 TABLET ORAL at 23:34

## 2019-09-14 RX ADMIN — BENZTROPINE MESYLATE 1 MG: 1 TABLET ORAL at 17:11

## 2019-09-14 RX ADMIN — GABAPENTIN 600 MG: 300 CAPSULE ORAL at 08:59

## 2019-09-14 NOTE — NURSING NOTE
Patient was cooperative and intermittently visible on unit during the evening  Patient appeared to be eager to take her HS medications and go to bed  Patient stated "Just knock me out please " Patient reported feeling anxious and disappointed that she was not discharged today  Patient denied suicidal thoughts and pain  Patient was compliant with medications  Patient has appeared to be sleeping  Will continue to monitor closely

## 2019-09-14 NOTE — PROGRESS NOTES
Progress Note - Behavioral Health   Skip Bertrand 41 y o  female MRN: [id number]  Unit/Bed#: [facility] 379-02 Encounter: [id number]    Assessment/Plan   Principal Problem:    Depression  Active Problems:    Microcytic anemia    Tobacco abuse    Bipolar 1 disorder, depressed, severe (HCC)    Suicidal behavior    Overdose of drug      Subjective:  Patient somewhat irritable this morning due to not getting discharged  Appeared restless, had facial asymmetry, lip puckering, and was constantly moving throughout conversation  Agreed to trial Cogentin  Did report increased anxiety due to remaining in hospital   Appears to have limited insight and poor future planning  Did deny hallucinations today  Reports of possible internal preoccupation  Possible paranoia and appeared somewhat suspicious towards interviewer  Was overall cooperative during conversation  Denied depressive symptoms and denied suicidal thoughts  Does not endorse criteria for quentin  Medication compliant  Denied additional somatic complaints or side effects      Current Medications:  Current Facility-Administered Medications   Medication Dose Route Frequency    acetaminophen (TYLENOL) tablet 650 mg  650 mg Oral Q6H PRN    aluminum-magnesium hydroxide-simethicone (MYLANTA) 200-200-20 mg/5 mL oral suspension 30 mL  30 mL Oral Q4H PRN    benztropine (COGENTIN) injection 1 mg  1 mg Intramuscular Q6H PRN    benztropine (COGENTIN) tablet 1 mg  1 mg Oral Q6H PRN    benztropine (COGENTIN) tablet 1 mg  1 mg Oral BID    ferrous sulfate tablet 325 mg  325 mg Oral Daily With Breakfast    FLUoxetine (PROzac) capsule 20 mg  20 mg Oral Daily    gabapentin (NEURONTIN) capsule 600 mg  600 mg Oral TID    haloperidol (HALDOL) tablet 5 mg  5 mg Oral Q6H PRN    haloperidol lactate (HALDOL) injection 5 mg  5 mg Intramuscular Q6H PRN    hydrOXYzine HCL (ATARAX) tablet 75 mg  75 mg Oral TID    LORazepam (ATIVAN) 2 mg/mL injection 1 mg  1 mg Intramuscular Q6H PRN  LORazepam (ATIVAN) tablet 1 mg  1 mg Oral Q6H PRN    magnesium hydroxide (MILK OF MAGNESIA) 400 mg/5 mL oral suspension 30 mL  30 mL Oral Daily PRN    nicotine (NICODERM CQ) 21 mg/24 hr TD 24 hr patch 1 patch  1 patch Transdermal Daily    OLANZapine (ZyPREXA ZYDIS) dispersible tablet 10 mg  10 mg Oral HS    OLANZapine (ZyPREXA ZYDIS) dispersible tablet 10 mg  10 mg Oral Daily    paliperidone (INVEGA) 24 hr tablet 6 mg  6 mg Oral HS    traZODone (DESYREL) tablet 50 mg  50 mg Oral HS PRN    traZODone (DESYREL) tablet 50 mg  50 mg Oral HS       Behavioral Health Medications: all current active meds have been reviewed and continue current psychiatric medications  Vitals:  Vitals:    09/14/19 0743   BP: 110/76   Pulse: 80   Resp:    Temp:    SpO2:        Laboratory results:    I have personally reviewed all pertinent laboratory/tests results    Most Recent Labs:   Lab Results   Component Value Date    WBC 6 30 09/07/2019    RBC 4 48 09/07/2019    HGB 9 0 (L) 09/07/2019    HCT 29 2 (L) 09/07/2019     09/07/2019    RDW 25 3 (H) 09/07/2019    NEUTROABS 3 00 09/07/2019    SODIUM 142 06/16/2019    K 4 5 06/17/2019     06/16/2019    CO2 28 06/16/2019    BUN 3 (L) 06/16/2019    CREATININE 0 59 (L) 06/16/2019    GLUC 103 06/16/2019    GLUF 42 (L) 06/14/2019    CALCIUM 9 0 06/16/2019    AST 23 06/13/2019    ALT 9 (L) 06/13/2019    ALKPHOS 60 06/13/2019    TP 6 9 06/13/2019    ALB 3 6 06/13/2019    TBILI 0 53 06/13/2019    CHOLESTEROL 135 05/15/2019    HDL 73 (H) 05/15/2019    TRIG 46 05/15/2019    LDLCALC 53 05/15/2019    NONHDLC 62 05/15/2019    [id number] 1 184 05/15/2019    PREGSERUM Negative 06/18/2019    HCGQUANT <3 10/17/2018    RPR Non-Reactive 05/15/2019    HGBA1C 4 5 05/15/2019    EAG 82 05/15/2019       Psychiatric Review of Systems:  Behavior over the last 24 hours:  unchanged  Sleep: normal  Appetite: normal  Medication side effects: Yes, possible akasthisia and EPS  ROS: no complaints    Mental Status Evaluation:  Appearance:  casually dressed   Behavior:  restless and fidgety   Speech:  soft   Mood:  dysthymic   Affect:  constricted   Language sparse   Thought Process:  goal directed and linear   Thought Content:  denied delusions  obsessions   Perceptual Disturbances: None  Possible internal preoccupation   Risk Potential: Denied SI/HI  Potential for aggression: no   Sensorium:  person, place and time/date   Cognition:  grossly intact   Consciousness:  alert and awake    Recent and Remote Memory fair   Attention: attention span appeared shorter than expected for age   Insight:  limited   Judgment: limited   Gait/Station: normal gait/station and normal balance   Motor Activity: facial movements, restlessness     Progress Toward Goals: progressing slowly    Recommended Treatment: Continue with group therapy, milieu therapy and occupational therapy  1   Add Cogentin 1mg BID for abnormal movements and restlessness  2  Continue other medications  3  Disposition planning     Risks, benefits and possible side effects of Medications:   Risks, benefits, and possible side effects of medications explained to patient and patient verbalizes understanding        Reed Roblero PA-C

## 2019-09-14 NOTE — PROGRESS NOTES
Patient complains of breakout on chin stating that it is spreading  She had tissue on her chin to stop bleeding  SABIHA Vang contacted and informed

## 2019-09-14 NOTE — PROGRESS NOTES
09/14/19 1700   Team Meeting   Meeting Type Daily Rounds   Initial Conference Date 09/14/19   Team Members Present   Team Members Present Physician;Nurse   Physician Team Member Alyson Aguilar 371 Team Member Maren Baer   Patient/Family Present   Patient Present No   Patient's Family Present No   Medical, labs and medications reviewed by team  Discharge to be determined by attending   Medication changes to be assessed

## 2019-09-14 NOTE — PROGRESS NOTES
Geronimo Mendez  was seen for continuing care and reviewed with staff  Progress Note - Behavioral Health   Geronimo Mendez 41 y o  female MRN: @MRN   Unit/Bed#: [facility]-05 Encounter: [id number]       Report from staff regarding this patient received and discussed, and records reviewed prior to seeing this patient   After some improvement in the last 2 days, the patient condition deteriorated, she was more agitated, anxious, demanding her immediate discharge without any reasons, had more disorganization of her thoughts,  and poor insight  Sleep: decreased  Appetite: normal    Medication side effects:no complaints    Mental Status Evaluation:    Appearance:  dressed in hospital attire   Behavior:  guarded   Mood:  dysphoric, irritable, angry   Affect: constricted, labile    Speech:  pressured, hypertalkative   Thought Process:  disorganized and illogical   Thought Content:  obsessions, negative thinking   Perceptual Disturbances: does not appear responding to internal stimuli   Risk Potential: Suicidal ideation - unable to assess  Homicidal ideation - does not answer  Potential for aggression - Yes, due to acute psychosis   Sensorium:  unable to assess   Memory:  recent and remote memory: unable to assess due to lack of cooperation   Consciousness:  alert and awake   Insight:  impaired due to psychosis   Judgment: significantly impaired   Motor Activity: no abnormal movements         Laboratory results:  I have personally reviewed all pertinent laboratory results  BMP: No results for input(s): NA, K, CL, CO2, BUN in the last 72 hours      Invalid input(s): CREA, GLU  Vitals:    09/13/19 1616   BP: 116/83   Pulse: 104   Resp: 16   Temp: 99 5 °F (37 5 °C)   SpO2:         Assessment/Plan   Principal Problem:    Depression  Active Problems:    Microcytic anemia    Tobacco abuse    Bipolar 1 disorder, depressed, severe (HCC)    Suicidal behavior    Overdose of drug      Recommended Treatment:    Will increase patient's Invega to 6 mg, [place] was selected because this medication can be provided as long acting injectable for patient's was not adherence to her medications on outpatient basis  Will continue the rest of her medications  Planned medication and treatment changes: All current active medications have been reviewed  Continue treatment with group therapy, milieu therapy, occupational therapy and medication management  Risks / Benefits of Treatment:    Risks, benefits, and possible side effects of medications explained to patient  Patient has limited understanding of risks and benefits of treatment at this time, but agrees to take medications as prescribed  Planned medication and treatment changes: All current active medications have been reviewed  Continue treatment with group therapy, milieu therapy, occupational therapy and medication management  Counseling / Coordination of Care:    Patient's progress discussed with staff in treatment team meeting  Medication changes reviewed with staff in treatment team meeting  ** Please Note: This note has been constructed using a voice recognition system   **      ---------------------------------------------------------------------------------------------------------------

## 2019-09-14 NOTE — PLAN OF CARE
Problem: Risk for Self Injury/Neglect  Goal: Refrain from harming self  Description  Interventions:  - Monitor patient closely, per order  - Develop a trusting relationship  - Supervise medication ingestion, monitor effects and side effects   Outcome: Progressing     Problem: Depression  Goal: Refrain from isolation  Description  Interventions:  - Develop a trusting relationship   - Encourage socialization   Outcome: Progressing     Problem: Anxiety  Goal: Anxiety is at manageable level  Description  Interventions:  - Assess and monitor patient's anxiety level  - Monitor for signs and symptoms (heart palpitations, chest pain, shortness of breath, headaches, nausea, feeling jumpy, restlessness, irritable, apprehensive)  - Collaborate with interdisciplinary team and initiate plan and interventions as ordered  - [place] patient to unit/surroundings  - Explain treatment plan  - Encourage participation in care  - Encourage verbalization of concerns/fears  - Identify coping mechanisms  - Assist in developing anxiety-reducing skills  - Administer/offer alternative therapies  - Limit or eliminate stimulants  Outcome: Not Progressing   Patient is visible on unit intermittently  Med and meal compliant  Patient presents with a blunted affect and anxiety  Patient maintained control and was given a PRN as ordered  She did not elaborate on how she was feeling took the PRN and left medication area  After taking PRN patient has remained controled and is in the [place] intermittently  No complaints of SI, HI, audio or visual hallucinations

## 2019-09-15 RX ADMIN — PALIPERIDONE 6 MG: 6 TABLET, EXTENDED RELEASE ORAL at 21:25

## 2019-09-15 RX ADMIN — TRAZODONE HYDROCHLORIDE 50 MG: 50 TABLET ORAL at 22:06

## 2019-09-15 RX ADMIN — FLUOXETINE 20 MG: 20 CAPSULE ORAL at 09:23

## 2019-09-15 RX ADMIN — BENZTROPINE MESYLATE 1 MG: 1 TABLET ORAL at 17:20

## 2019-09-15 RX ADMIN — GABAPENTIN 600 MG: 300 CAPSULE ORAL at 17:20

## 2019-09-15 RX ADMIN — BENZTROPINE MESYLATE 1 MG: 1 TABLET ORAL at 09:22

## 2019-09-15 RX ADMIN — HYDROXYZINE HYDROCHLORIDE 75 MG: 50 TABLET, FILM COATED ORAL at 20:43

## 2019-09-15 RX ADMIN — HYDROXYZINE HYDROCHLORIDE 75 MG: 50 TABLET, FILM COATED ORAL at 17:19

## 2019-09-15 RX ADMIN — GABAPENTIN 600 MG: 300 CAPSULE ORAL at 20:43

## 2019-09-15 RX ADMIN — FERROUS SULFATE TAB 325 MG (65 MG ELEMENTAL FE) 325 MG: 325 (65 FE) TAB at 09:23

## 2019-09-15 RX ADMIN — HYDROXYZINE HYDROCHLORIDE 75 MG: 50 TABLET, FILM COATED ORAL at 09:22

## 2019-09-15 RX ADMIN — GABAPENTIN 600 MG: 300 CAPSULE ORAL at 09:23

## 2019-09-15 RX ADMIN — OLANZAPINE 10 MG: 10 TABLET, ORALLY DISINTEGRATING ORAL at 09:22

## 2019-09-15 RX ADMIN — NICOTINE 1 PATCH: 21 PATCH, EXTENDED RELEASE TRANSDERMAL at 09:23

## 2019-09-15 RX ADMIN — TRAZODONE HYDROCHLORIDE 50 MG: 50 TABLET ORAL at 21:25

## 2019-09-15 RX ADMIN — OLANZAPINE 10 MG: 10 TABLET, ORALLY DISINTEGRATING ORAL at 21:25

## 2019-09-15 NOTE — PROGRESS NOTES
09/14/19 1015   Activity/Group Checklist   Group Other (Comment)  (Art Therapy Process Group/Quotes, Discussion)   Attendance Attended   Attendance Duration (min) 46-60   Interactions Interacted appropriately   Affect/Mood Appropriate   Goals Achieved Identified feelings; Discussed coping strategies; Able to listen to others; Able to engage in interactions; Able to reflect/comment on own behavior;Able to manage/cope with feelings; Able to recieve feedback; Able to give feedback to another  (Able to engage art materials; full participation)

## 2019-09-15 NOTE — PROGRESS NOTES
Pt has been pleasant and cooperative, visible/social  Pt is med/meal compliant  Pt denies any anxiety, depression, SI, HI, AH, or VH  Pt given Trazodone 50 mg PRN at 2209  Will continue to monitor

## 2019-09-15 NOTE — PROGRESS NOTES
Progress Note - Behavioral Health   Maria G Revels 41 y o  female MRN: [id number]  Unit/Bed#: [facility] 379-02 Encounter: [id number]    Assessment/Plan   Principal Problem:    Depression  Active Problems:    Microcytic anemia    Tobacco abuse    Bipolar 1 disorder, depressed, severe (HCC)    Suicidal behavior    Overdose of drug      Subjective:  Reports from nursing that patient got Haldol last night due to being agitated  Unclear if she was agitated or just feeling restless  Appears restlessness has been going on for long period of time  Probable tardive dyskinesia  Will not order Klonopin due to drug abuse  [place] not on formulary and will require prior authorization  Defer this medication for outpatient  Was called at prior rehab "[facility]," by other patients  Not much benefit with Cogentin  Will leave medication on scheduled medications  Reports he is anxious in regards to when she is leaving hospital   Focused on discharge  Denied all forms of hallucinations  Did not appear paranoid today  Less internally preoccupied  Not irritable with interviewer and was overall cooperative  States depressive symptoms are slowly subsiding and denied suicidal thoughts  Denied homicidal thoughts  Medication compliant  Denied additional somatic complaints      Current Medications:  Current Facility-Administered Medications   Medication Dose Route Frequency    acetaminophen (TYLENOL) tablet 650 mg  650 mg Oral Q6H PRN    aluminum-magnesium hydroxide-simethicone (MYLANTA) 200-200-20 mg/5 mL oral suspension 30 mL  30 mL Oral Q4H PRN    benztropine (COGENTIN) injection 1 mg  1 mg Intramuscular Q6H PRN    benztropine (COGENTIN) tablet 1 mg  1 mg Oral Q6H PRN    benztropine (COGENTIN) tablet 1 mg  1 mg Oral BID    ferrous sulfate tablet 325 mg  325 mg Oral Daily With Breakfast    FLUoxetine (PROzac) capsule 20 mg  20 mg Oral Daily    gabapentin (NEURONTIN) capsule 600 mg  600 mg Oral TID    haloperidol (HALDOL) tablet 5 mg  5 mg Oral Q6H PRN    haloperidol lactate (HALDOL) injection 5 mg  5 mg Intramuscular Q6H PRN    hydrOXYzine HCL (ATARAX) tablet 75 mg  75 mg Oral TID    LORazepam (ATIVAN) 2 mg/mL injection 1 mg  1 mg Intramuscular Q6H PRN    LORazepam (ATIVAN) tablet 1 mg  1 mg Oral Q6H PRN    magnesium hydroxide (MILK OF MAGNESIA) 400 mg/5 mL oral suspension 30 mL  30 mL Oral Daily PRN    nicotine (NICODERM CQ) 21 mg/24 hr TD 24 hr patch 1 patch  1 patch Transdermal Daily    OLANZapine (ZyPREXA ZYDIS) dispersible tablet 10 mg  10 mg Oral HS    OLANZapine (ZyPREXA ZYDIS) dispersible tablet 10 mg  10 mg Oral Daily    paliperidone (INVEGA) 24 hr tablet 6 mg  6 mg Oral HS    traZODone (DESYREL) tablet 50 mg  50 mg Oral HS PRN    traZODone (DESYREL) tablet 50 mg  50 mg Oral HS       Behavioral Health Medications: all current active meds have been reviewed and continue current psychiatric medications  Vitals:  Vitals:    09/15/19 0806   BP: 113/74   Pulse: 102   Resp: 16   Temp: (!) 97 °F (36 1 °C)   SpO2: 97%       Laboratory results:    I have personally reviewed all pertinent laboratory/tests results    Most Recent Labs:   Lab Results   Component Value Date    WBC 6 30 09/07/2019    RBC 4 48 09/07/2019    HGB 9 0 (L) 09/07/2019    HCT 29 2 (L) 09/07/2019     09/07/2019    RDW 25 3 (H) 09/07/2019    NEUTROABS 3 00 09/07/2019    SODIUM 142 06/16/2019    K 4 5 06/17/2019     06/16/2019    CO2 28 06/16/2019    BUN 3 (L) 06/16/2019    CREATININE 0 59 (L) 06/16/2019    GLUC 103 06/16/2019    GLUF 42 (L) 06/14/2019    CALCIUM 9 0 06/16/2019    AST 23 06/13/2019    ALT 9 (L) 06/13/2019    ALKPHOS 60 06/13/2019    TP 6 9 06/13/2019    ALB 3 6 06/13/2019    TBILI 0 53 06/13/2019    CHOLESTEROL 135 05/15/2019    HDL 73 (H) 05/15/2019    TRIG 46 05/15/2019    LDLCALC 53 05/15/2019    [place] 62 05/15/2019    [id number] 1 184 05/15/2019    PREGSERUM Negative 06/18/2019    HCGQUANT <3 10/17/2018 RPR Non-Reactive 05/15/2019    HGBA1C 4 5 05/15/2019    EAG 82 05/15/2019       Psychiatric Review of Systems:  Behavior over the last 24 hours:  unchanged  Sleep: poor  Appetite: normal  Medication side effects: Yes, abnormal movements, restlessness  ROS: no complaints    Mental Status Evaluation:  Appearance:  casually dressed   Behavior:  guarded   Speech:  normal pitch and normal volume   Mood:  anxious   Affect:  constricted   Language sparse   Thought Process:  goal directed and linear   Thought Content:  obsessions   Perceptual Disturbances: None  Possible internal preoccupation   Risk Potential: Denied SI/HI  Potential for aggression: no   Sensorium:  person, place and time/date   Cognition:  grossly intact   Consciousness:  alert and awake    Recent and Remote Memory fair   Attention: attention span appeared shorter than expected for age   Insight:  limited   Judgment: improving   Gait/Station: normal gait/station and normal balance   Motor Activity: no abnormal movements     Progress Toward Goals: progressing slowly    Recommended Treatment: Continue with group therapy, milieu therapy and occupational therapy  1   Continue current medications  2  Disposition planning    Risks, benefits and possible side effects of Medications:   Risks, benefits, and possible side effects of medications explained to patient and patient verbalizes understanding        Angelita Calvert PA-C

## 2019-09-15 NOTE — NURSING NOTE
Patient slept in intervals throughout the night when observed on rounds  Patient c/o being hungry, crackers and drink given  No further complaints noted  Will monitor

## 2019-09-15 NOTE — PLAN OF CARE
Problem: Risk for Self Injury/Neglect  Goal: Verbalize thoughts and feelings  Description  Interventions:  - Assess and re-assess patient's lethality and potential for self-injury  - Engage patient in 1:1 interactions, daily, for a minimum of 15 minutes  - Encourage patient to express feelings, fears, frustrations, hopes  - Establish rapport/trust with patient   Outcome: Progressing  Goal: Refrain from harming self  Description  Interventions:  - Monitor patient closely, per order  - Develop a trusting relationship  - Supervise medication ingestion, monitor effects and side effects   Outcome: Progressing     Problem: Anxiety  Goal: Anxiety is at manageable level  Description  Interventions:  - Assess and monitor patient's anxiety level  - Monitor for signs and symptoms (heart palpitations, chest pain, shortness of breath, headaches, nausea, feeling jumpy, restlessness, irritable, apprehensive)  - Collaborate with interdisciplinary team and initiate plan and interventions as ordered  - [place] patient to unit/surroundings  - Explain treatment plan  - Encourage participation in care  - Encourage verbalization of concerns/fears  - Identify coping mechanisms  - Assist in developing anxiety-reducing skills  - Administer/offer alternative therapies  - Limit or eliminate stimulants  Outcome: Progressing  Patient visible on unit, med and meal compliant  Patient is out of room for meals, meds were taken to patient in her room  Patient has remained controlled, no behavioral outbursts  Patient has no complaints of Si, Hi, audio or visual hallucinations, anxiety or depression  Will continue to monitor for changes in current status

## 2019-09-15 NOTE — NURSING NOTE
Received patient at [phone]  Patient observed pacing halls  Increase in irritability  Patient continued to c/o of inability to relax and sleep  PRN haldol given  Patient did retreat to her room for sleep, once medication took effect  Will continue to monitor

## 2019-09-15 NOTE — PROGRESS NOTES
09/15/19 1600   Team Meeting   Meeting Type Daily Rounds   Initial Conference Date 09/15/19   Team Members Present   Team Members Present Physician;Nurse   Physician Team Member Angelita Calvert   Nursing Team Member Rosie Perry   Patient/Family Present   Patient Present No   Patient's Family Present No   Medical, labs and medications reviewed by team  Discharge to be determined by attending   Medication changes to be assessed

## 2019-09-15 NOTE — PROGRESS NOTES
Patient has been visible intermittently, her emotions have remained controlled, affect is softer, no behavioral outbursts

## 2019-09-16 PROCEDURE — 99233 SBSQ HOSP IP/OBS HIGH 50: CPT | Performed by: NURSE PRACTITIONER

## 2019-09-16 RX ADMIN — GABAPENTIN 600 MG: 300 CAPSULE ORAL at 17:00

## 2019-09-16 RX ADMIN — HYDROXYZINE HYDROCHLORIDE 75 MG: 50 TABLET, FILM COATED ORAL at 20:55

## 2019-09-16 RX ADMIN — TRAZODONE HYDROCHLORIDE 50 MG: 50 TABLET ORAL at 20:52

## 2019-09-16 RX ADMIN — OLANZAPINE 10 MG: 10 TABLET, ORALLY DISINTEGRATING ORAL at 21:18

## 2019-09-16 RX ADMIN — GABAPENTIN 600 MG: 300 CAPSULE ORAL at 08:47

## 2019-09-16 RX ADMIN — BENZTROPINE MESYLATE 1 MG: 1 TABLET ORAL at 08:47

## 2019-09-16 RX ADMIN — GABAPENTIN 600 MG: 300 CAPSULE ORAL at 20:55

## 2019-09-16 RX ADMIN — PALIPERIDONE 9 MG: 3 TABLET, EXTENDED RELEASE ORAL at 21:18

## 2019-09-16 RX ADMIN — FERROUS SULFATE TAB 325 MG (65 MG ELEMENTAL FE) 325 MG: 325 (65 FE) TAB at 08:47

## 2019-09-16 RX ADMIN — NICOTINE 1 PATCH: 21 PATCH, EXTENDED RELEASE TRANSDERMAL at 08:47

## 2019-09-16 RX ADMIN — HYDROXYZINE HYDROCHLORIDE 75 MG: 50 TABLET, FILM COATED ORAL at 17:00

## 2019-09-16 RX ADMIN — HYDROXYZINE HYDROCHLORIDE 75 MG: 50 TABLET, FILM COATED ORAL at 08:47

## 2019-09-16 RX ADMIN — BENZTROPINE MESYLATE 1 MG: 1 TABLET ORAL at 17:00

## 2019-09-16 RX ADMIN — OLANZAPINE 10 MG: 10 TABLET, ORALLY DISINTEGRATING ORAL at 08:47

## 2019-09-16 RX ADMIN — TRAZODONE HYDROCHLORIDE 50 MG: 50 TABLET ORAL at 21:19

## 2019-09-16 RX ADMIN — FLUOXETINE 20 MG: 20 CAPSULE ORAL at 08:47

## 2019-09-16 NOTE — PROGRESS NOTES
09/16/19 0818   Team Meeting   Meeting Type Daily Rounds   Initial Conference Date 09/16/19   Team Members Present   Team Members Present Physician;Nurse;   Physician Team Member Dr Nancy Meek Team Member Odalys Saucedo   Care Management Team Member Marian Maurice   Patient/Family Present   Patient Present No   Patient's Family Present No   Chart and labs were reviewed  Patient was started on Invega PO with goal to initiate long acting IM  Patient more brighter and social   Patient focused on discharge  Patient still with restlessness but less movements  Patient pending discharge for this week

## 2019-09-16 NOTE — PROGRESS NOTES
Progress Note - Behavioral Health   Callie Zavala 41 y o  female MRN: [id number]  Unit/Bed#: [facility] 505-89 Encounter: [id number]    The patient was seen for continuing care and reviewed with treatment team  Staff reports that the patient has been more visible on the unit, cooperative with care, and compliant with medications  Attending groups and improved social interaction with staff and peers  During assessment the patient remains appropriate and remains focused on discharge  Denies any thoughts to hurt herself or others  Denies any psychotic symptoms and does not appear to be responding to internal stimuli  Denies any adverse side effects to medications and decreased restlessness and akathisia is noted  Mood appears stable and thought process is linear  Patient remains OOB for meals and reports that her appetite and sleep are adequate  Patient remains compliant with [facility], but patient needs follow up outpatient provider to administer medication  Will order [facility] tomorrow  Invega was increased to 9 mg at bedtime tonight      Mental Status Evaluation:  Appearance:  Marginal/poor hygiene   Behavior:  cooperative   Fund of knowledge  aware of current events and Aware of past history   Speech:   Language: Normal rate and Normal volume  No overt abnormality   Mood:  improving   Affect:   Associations: appropriate  Intact   Thought Process:  linear   Thought Content:  Does not verbalize delusional material   Perceptual Disturbances: Denies hallucinations and does not appear to be responding to internal stimuli   Risk Potential: No suicidal or homicidal ideation   Orientation  Oriented x 3   Memory No deficits   Attention/Concentration attention span appeared shorter than expected for age   Insight:  limited   Judgment: Limited   Gait/Station: normal gait/station and normal balance   Motor Activity: No abnormal movement noted     Progress Toward Goals: improving    Assessment/Plan    Principal Problem: Depression  Active Problems:    Microcytic anemia    Tobacco abuse    Bipolar 1 disorder, depressed, severe (HCC)    Suicidal behavior    Overdose of drug      Recommended Treatment: Continue with pharmacotherapy, group therapy, milieu therapy and occupational therapy  The patient will be maintained on the following medications:    Current Facility-Administered Medications:  acetaminophen 650 mg Oral Q6H PRN Sri Cox MD   aluminum-magnesium hydroxide-simethicone 30 mL Oral Q4H PRN Sri Cox MD   benztropine 1 mg Intramuscular Q6H PRN Sri Cox MD   benztropine 1 mg Oral Q6H PRN Sri Cox MD   benztropine 1 mg Oral BID Kenny Irving PA-C   ferrous sulfate 325 mg Oral Daily With Breakfast Mary Cuevas PA-C   FLUoxetine 20 mg Oral Daily Terrence Aliment, CRNP   gabapentin 600 mg Oral TID Terrence Alidiana, CRNP   haloperidol 5 mg Oral Q6H PRN Sri Cox MD   haloperidol lactate 5 mg Intramuscular Q6H PRN Sri Cox MD   hydrOXYzine HCL 75 mg Oral TID Sarwat Lugo MD   LORazepam 1 mg Intramuscular Q6H PRN Sri Cox MD   LORazepam 1 mg Oral Q6H PRN Sri Cox MD   magnesium hydroxide 30 mL Oral Daily PRN Sri Cox MD   nicotine 1 patch Transdermal Daily Sri Cox MD   OLANZapine 10 mg Oral HS Terrence Aliment, CRNP   OLANZapine 10 mg Oral Daily Terrence Aliment, CRNP   paliperidone 9 mg Oral HS Sarwat Lugo MD   traZODone 50 mg Oral HS PRN Sri Cox MD   traZODone 50 mg Oral HS Sarwat Lugo MD       Risks, benefits and possible side effects of Medications:   Risks, benefits, and possible side effects of medications explained to patient and patient verbalizes understanding

## 2019-09-16 NOTE — SOCIAL WORK
Worker called [facility] to reschedule patient's intake, new intake scheduled for 9/24 at 11:00am

## 2019-09-16 NOTE — PLAN OF CARE
Patient preoccupied with "going home" Patient superficially denies anxiety, depression, S/HI,A/VH and pain  Med and meal compliant and mouth check performed for compliance  Isolative and withdrawn to self  Will continue to monitor and provide therapeutic support

## 2019-09-16 NOTE — SOCIAL WORK
Worker contacted [facility] regarding [facility] referral, they reported an intake has not yet been scheduled but Meghana Acharya will be completing it  Worker left a message for Meghana Achayra at [phone] regarding patient and possible intake date

## 2019-09-16 NOTE — NURSING NOTE
Patient visible on unit at start of shift  Pleasant and cooperative with unit routine  Patient visited with her son  Patient requested medications early "I just want to go to bed " Patient encouraged to have snack with peers before medications and she agreed  Patient requested and received PRN trazodone before retreating to her room for sleep  Will continue to monitor

## 2019-09-17 VITALS
DIASTOLIC BLOOD PRESSURE: 73 MMHG | WEIGHT: 149 LBS | BODY MASS INDEX: 25.44 KG/M2 | HEIGHT: 64 IN | HEART RATE: 92 BPM | SYSTOLIC BLOOD PRESSURE: 110 MMHG | OXYGEN SATURATION: 97 % | RESPIRATION RATE: 16 BRPM | TEMPERATURE: 97.2 F

## 2019-09-17 PROCEDURE — 99239 HOSP IP/OBS DSCHRG MGMT >30: CPT | Performed by: PSYCHIATRY & NEUROLOGY

## 2019-09-17 RX ORDER — BENZTROPINE MESYLATE 1 MG/1
1 TABLET ORAL 2 TIMES DAILY
Qty: 60 TABLET | Refills: 0 | Status: ON HOLD | OUTPATIENT
Start: 2019-09-17 | End: 2021-04-27 | Stop reason: SDUPTHER

## 2019-09-17 RX ORDER — FLUOXETINE HYDROCHLORIDE 20 MG/1
20 CAPSULE ORAL DAILY
Qty: 30 CAPSULE | Refills: 0 | Status: SHIPPED | OUTPATIENT
Start: 2019-09-18 | End: 2019-11-01 | Stop reason: HOSPADM

## 2019-09-17 RX ORDER — HYDROXYZINE HYDROCHLORIDE 25 MG/1
75 TABLET, FILM COATED ORAL 3 TIMES DAILY
Qty: 120 TABLET | Refills: 0 | Status: SHIPPED | OUTPATIENT
Start: 2019-09-17 | End: 2019-11-01 | Stop reason: HOSPADM

## 2019-09-17 RX ORDER — FERROUS SULFATE 325(65) MG
325 TABLET ORAL
Qty: 30 TABLET | Refills: 0 | Status: SHIPPED | OUTPATIENT
Start: 2019-09-18 | End: 2021-04-27 | Stop reason: HOSPADM

## 2019-09-17 RX ORDER — GABAPENTIN 300 MG/1
600 CAPSULE ORAL 3 TIMES DAILY
Qty: 90 CAPSULE | Refills: 0 | Status: ON HOLD | OUTPATIENT
Start: 2019-09-17 | End: 2019-11-01 | Stop reason: SDUPTHER

## 2019-09-17 RX ORDER — OLANZAPINE 15 MG/1
15 TABLET, ORALLY DISINTEGRATING ORAL
Qty: 30 TABLET | Refills: 0 | Status: SHIPPED | OUTPATIENT
Start: 2019-09-17 | End: 2019-11-01 | Stop reason: HOSPADM

## 2019-09-17 RX ORDER — TRAZODONE HYDROCHLORIDE 50 MG/1
50 TABLET ORAL
Qty: 30 TABLET | Refills: 0 | Status: ON HOLD | OUTPATIENT
Start: 2019-09-17 | End: 2019-11-01 | Stop reason: SDUPTHER

## 2019-09-17 RX ADMIN — BENZTROPINE MESYLATE 1 MG: 1 TABLET ORAL at 08:40

## 2019-09-17 RX ADMIN — FERROUS SULFATE TAB 325 MG (65 MG ELEMENTAL FE) 325 MG: 325 (65 FE) TAB at 08:40

## 2019-09-17 RX ADMIN — GABAPENTIN 600 MG: 300 CAPSULE ORAL at 08:40

## 2019-09-17 RX ADMIN — PALIPERIDONE PALMITATE 234 MG: 234 INJECTION INTRAMUSCULAR at 09:34

## 2019-09-17 RX ADMIN — HYDROXYZINE HYDROCHLORIDE 75 MG: 50 TABLET, FILM COATED ORAL at 08:40

## 2019-09-17 RX ADMIN — FLUOXETINE 20 MG: 20 CAPSULE ORAL at 08:40

## 2019-09-17 NOTE — DISCHARGE SUMMARY
Discharge Summary - Sebastian 41 y o  female MRN: [id number]  Unit/Bed#: [facility] 109-21 Encounter: [id number]     Admission Date: 9/6/2019         Discharge Date: 9/17/2019  Attending Psychiatrist: Kavita Agarwal MD    Reason for Admission/HPI:     James Liu is a 41year old female with a history of bipolar disorder versus schizoaffective disorder who was admitted to the inpatient psychiatric unit on a voluntary 201 commitment due to depression, lability in mood, auditory hallucinations telling her to hurt herself, and psychotic symptoms  The patient reported that her symptoms prior to admission included helplessness, hopelessness, difficulty sleeping, poor concentration, auditory hallucinations, paranoia, and anhedonia  She reports that the onset of her symptoms started a few days prior and progressively worsened since that time  Stressors preceding admission included substance use, chronic mental illness, noncompliance with medications and treatment plan, and family conflict  On initial evaluation the patient was guarded, irritable, anxious, restless, and voicing auditory hallucinations that were derogatory and commanding  Meds/Allergies     all current active meds have been reviewed    No Known Allergies    Objective     Vital signs in last 24 hours:  Temp:  [97 2 °F (36 2 °C)-98 °F (36 7 °C)] 97 2 °F (36 2 °C)  HR:  [] 92  Resp:  [16] 16  BP: (103-110)/(64-77) 110/73    No intake or output data in the 24 hours ending 09/17/19 2000 Verito Noonan Course: The patient was admitted to the inpatient psychiatric unit and started on every 15 minutes precautions  During the hospitalization the patient was attending individual therapy, group therapy, milieu therapy and occupational therapy  Psychiatric medications were titrated over the hospital stay   To address depressive symptoms, mood instability, irritability, racing thoughts, agitation, paranoid ideation, auditory hallucinations, anxiety symptoms and insomnia the patient was started on antidepressant Prozac and antipsychotic medication Invega and Zyprexa  The patient was also started on Atarax and Gabapentin to address mood stabilization, anxiety, and restlessness  Artur Adrianna was given on 9/17/2019 and second injection will be given by outpatient pharmacist  Joanna Up 156 mg is scheduled for 09/24/2019  Medication doses were titrated during the hospital course  Prior to beginning of treatment medications risks and benefits and possible side effects including risk of parkinsonian symptoms, Tardive Dyskinesia and metabolic syndrome related to treatment with antipsychotic medications and risk of suicidality and serotonin syndrome related to treatment with antidepressants were reviewed with the patient  The patient verbalized understanding and agreement for treatment  Patient's symptoms improved gradually over the hospital course  At the end of treatment the patient was doing well  Mood was stable at the time of discharge  The patient denied suicidal ideation, intent or plan at the time of discharge and denied homicidal ideation, intent or plan at the time of discharge  There was no overt psychosis at the time of discharge  Sleep and appetite were improved  The patient was tolerating medications and was not reporting any significant side effects at the time of discharge  Since she was doing well at the end of the hospitalization, treatment team felt that she could be safely discharged to outpatient care  The outpatient follow up with a psychiatrist and a therapist was arranged by the unit  upon discharge      Mental Status at Time of Discharge:   Appearance:  Adequate hygiene and grooming   Behavior:  cooperative and friendly   Speech:   Language: Normal rate and Normal volume  No overt abnormality   Mood:  improving   Affect:   Associations: blunted  intact   Thought Process:  Goal directed and coherent   Thought Content:  Does not verbalize delusional material   Perceptual Disturbances: Denies hallucinations and does not appear to be responding to internal stimuli     Risk Potential: No suicidal or homicidal ideation   Orientation   Language Oriented x 3  anomia No   Memory  Fund of knowledge No deficits  aware of current events and Aware of past history   Attention/Concentration attention span appeared shorter than expected for age   Insight:  limited   Judgment: Limited   Gait/Station: normal gait/station and normal balance   Motor Activity: No abnormal movement noted       Admission Diagnosis:  Principal Problem:    Depression  Active Problems:    Microcytic anemia    Tobacco abuse    Bipolar 1 disorder, depressed, severe (HCC)    Suicidal behavior    Overdose of drug      Discharge Diagnosis:     Principal Problem:    Depression  Active Problems:    Microcytic anemia    Tobacco abuse    Bipolar 1 disorder, depressed, severe (HCC)    Suicidal behavior    Overdose of drug  Resolved Problems:    * No resolved hospital problems  *      Lab results:    Admission on 09/06/2019   Component Date Value    Amph/Meth UR 09/06/2019 Negative     Barbiturate Ur 09/06/2019 Negative     Benzodiazepine Urine 09/06/2019 Negative     Cocaine Urine 09/06/2019 Positive*    Methadone Urine 09/06/2019 Negative     Opiate Urine 09/06/2019 Negative     PCP Ur 09/06/2019 Negative     THC Urine 09/06/2019 Negative     EXT PREG TEST UR (Ref: N* 09/06/2019 neg   Control 09/06/2019 negative       EXTBreath Alcohol 09/06/2019 0 000     Total CK 09/06/2019 68     WBC 09/07/2019 6 30     RBC 09/07/2019 4 48     Hemoglobin 09/07/2019 9 0*    Hematocrit 09/07/2019 29 2*    MCV 09/07/2019 65*    MCH 09/07/2019 20 2*    MCHC 09/07/2019 31 0     RDW 09/07/2019 25 3*    MPV 09/07/2019 9 0     Platelets 38/82/2855 247     Neutrophils Relative 09/07/2019 47     Lymphocytes Relative 09/07/2019 40     Monocytes Relative 09/07/2019 7     Eosinophils Relative 09/07/2019 5     Basophils Relative 09/07/2019 1     Neutrophils Absolute 09/07/2019 3 00     Lymphocytes Absolute 09/07/2019 2 50     Monocytes Absolute 09/07/2019 0 50     Eosinophils Absolute 09/07/2019 0 30     Basophils Absolute 09/07/2019 0 10     RBC Morphology 09/07/2019 abnormal     Anisocytosis 09/07/2019 Present     Hypochromia 09/07/2019 Present     Platelet Estimate 07/38/5747 Adequate        Discharge Medications:    See after visit summary for reconciled discharge medications provided to patient and family  Discharge instructions/Information to patient and family:     See after visit summary for information provided to patient and family  Provisions for Follow-Up Care:    See after visit summary for information related to follow-up care and any pertinent home health orders  Discharge Statement     I spent 35 minutes discharging the patient  This time was spent on the day of discharge  I had direct contact with the patient on the day of discharge  Additional documentation is required if more than 30 minutes were spent on discharge:    I reviewed with Olga Lindquist importance of compliance with medications and outpatient treatment after discharge  I discussed the medication regimen and possible side effects of the medications with Elida prior to discharge  At the time of discharge she was tolerating psychiatric medications  I discussed outpatient follow up with Olga Lindquist  I reviewed with Elida crisis plan and safety plan upon discharge  I discussed with Olga Lindquist recommendation to follow up with outpatient drug and alcohol counseling and AA meetings

## 2019-09-17 NOTE — NURSING NOTE
Patient visible on unit at start of shift walking halls with peers  Upon approach, patient made some eye contact, no delusional thinking noted  Patient denied any symptoms  Affect depressed, mood anxious  Patient HS medication compliant and PRN trazodone requested and received  Patient retreated to her room for sleep  Will monitor

## 2019-09-17 NOTE — NURSING NOTE
Denies Si at time of Dc   DC'd to home  Patient has all paperwork, medications and belongings in possession at time of Dc  Ambulatory, escorted by unit staff

## 2019-09-17 NOTE — PROGRESS NOTES
09/17/19 0807   Team Meeting   Meeting Type Daily Rounds   Initial Conference Date 09/17/19   Team Members Present   Team Members Present Physician;Nurse;   Physician Team Member Dr Andrew Fountain Team Member [address] [place] [address] Management Team Member Gabriel Banegas   Patient/Family Present   Patient Present No   Patient's Family Present No   Labs and chart reviewed  Pt to received Invega OJ meza  Pt to be discharged this afternoon

## 2019-09-17 NOTE — PROGRESS NOTES
Patient pleasant upon approach  Patient reports "some anxiety" due to pending discharge  Denies depression, S/HI,A/VH and pain  Med and meal compliant  Visible and some social observed  Will continue to monitor and provide therapeutic support

## 2019-09-17 NOTE — SOCIAL WORK
Worker received voicemail from Vineet Gallagher at [facility] regarding patient and intake  She reported that she would not be able to meet with patient this week but requested worker call back to schedule for next week  Worker left message to inform Vineet Gallagher of patient's discharge for today  Worker will provide patient with her contact number to schedule intake

## 2019-09-26 ENCOUNTER — HOSPITAL ENCOUNTER (EMERGENCY)
Facility: HOSPITAL | Age: 39
Discharge: HOME | End: 2019-09-26
Attending: EMERGENCY MEDICINE | Admitting: EMERGENCY MEDICINE
Payer: COMMERCIAL

## 2019-09-26 VITALS
OXYGEN SATURATION: 97 % | DIASTOLIC BLOOD PRESSURE: 82 MMHG | WEIGHT: 135 LBS | SYSTOLIC BLOOD PRESSURE: 114 MMHG | BODY MASS INDEX: 23.17 KG/M2 | TEMPERATURE: 98.1 F | HEART RATE: 116 BPM | RESPIRATION RATE: 18 BRPM

## 2019-09-26 DIAGNOSIS — J36 PERITONSILLAR ABSCESS: Primary | ICD-10-CM

## 2019-09-26 PROCEDURE — 99283 EMERGENCY DEPT VISIT LOW MDM: CPT

## 2019-09-26 PROCEDURE — 42700 I&D ABSCESS PERITONSILLAR: CPT | Performed by: EMERGENCY MEDICINE

## 2019-09-26 PROCEDURE — 99283 EMERGENCY DEPT VISIT LOW MDM: CPT | Performed by: EMERGENCY MEDICINE

## 2019-09-26 RX ORDER — AMOXICILLIN AND CLAVULANATE POTASSIUM 875; 125 MG/1; MG/1
1 TABLET, FILM COATED ORAL ONCE
Status: COMPLETED | OUTPATIENT
Start: 2019-09-26 | End: 2019-09-26

## 2019-09-26 RX ORDER — AMOXICILLIN AND CLAVULANATE POTASSIUM 875; 125 MG/1; MG/1
1 TABLET, FILM COATED ORAL EVERY 12 HOURS
Qty: 13 TABLET | Refills: 0 | Status: SHIPPED | OUTPATIENT
Start: 2019-09-26 | End: 2019-10-03

## 2019-09-26 RX ADMIN — TOPICAL ANESTHETIC 2 SPRAY: 200 SPRAY DENTAL; PERIODONTAL at 13:16

## 2019-09-26 RX ADMIN — AMOXICILLIN AND CLAVULANATE POTASSIUM 1 TABLET: 875; 125 TABLET, FILM COATED ORAL at 13:16

## 2019-09-26 NOTE — ED ATTENDING ATTESTATION
9/26/2019  IAllison MD, saw and evaluated the patient  I have discussed the patient with the resident/non-physician practitioner and agree with the resident's/non-physician practitioner's findings, Plan of Care, and MDM as documented in the resident's/non-physician practitioner's note, except where noted  All available labs and Radiology studies were reviewed  I was present for key portions of any procedure(s) performed by the resident/non-physician practitioner and I was immediately available to provide assistance  At this point I agree with the current assessment done in the Emergency Department  I have conducted an independent evaluation of this patient a history and physical is as follows:      Patient is a 61-year-old female with a 2 day history of progressively worsening of throat swelling and pain  Patient with the visible right-sided peritonsillar abscess on exam   No difficulty swallowing had no trismus  Patient with no airway complications this time  Drained by me at bedside  Will discharge with Augmentin follow up with PCP return the ER for any concerns        ED Course         Critical Care Time  Procedures

## 2019-09-26 NOTE — ED PROCEDURE NOTE
PROCEDURE  Incision and drain  Date/Time: 9/26/2019 1:15 PM  Performed by: Watson Duran MD  Authorized by: Watson Duran MD     Patient location:  ED  Consent:     Consent obtained:  Verbal    Consent given by:  Patient    Risks discussed:  Bleeding and incomplete drainage    Alternatives discussed:  No treatment  Universal protocol:     Immediately prior to procedure a time out was called: yes      Patient identity confirmed:  Arm band  Location:     Type:  Abscess    Size:  2  cm    Location:  [address] location: peritonsilar  Anesthesia (see MAR for exact dosages): Anesthesia method:  Topical application    Topical anesthesia: benzocaine spray  Procedure details:     Needle aspiration: yes      Needle size:  18 G    Approach:  Puncture    Drainage:  Bloody and purulent    Drainage amount:  Scant    Wound treatment:  Wound left open    Packing materials:  None  Post-procedure details:     Patient tolerance of procedure:   Tolerated well, no immediate complications         Watson Duran MD  09/26/19 1310
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
## 2019-10-26 ENCOUNTER — HOSPITAL ENCOUNTER (EMERGENCY)
Facility: HOSPITAL | Age: 39
End: 2019-10-27
Attending: EMERGENCY MEDICINE
Payer: COMMERCIAL

## 2019-10-26 DIAGNOSIS — R45.851 DEPRESSION WITH SUICIDAL IDEATION: Primary | ICD-10-CM

## 2019-10-26 DIAGNOSIS — F19.10 SUBSTANCE ABUSE (HCC): ICD-10-CM

## 2019-10-26 DIAGNOSIS — F32.A DEPRESSION WITH SUICIDAL IDEATION: Primary | ICD-10-CM

## 2019-10-26 DIAGNOSIS — F31.4 BIPOLAR 1 DISORDER, DEPRESSED, SEVERE (HCC): ICD-10-CM

## 2019-10-26 LAB
AMPHETAMINES SERPL QL SCN: NEGATIVE
ATRIAL RATE: 82 BPM
BARBITURATES UR QL: NEGATIVE
BENZODIAZ UR QL: NEGATIVE
CK SERPL-CCNC: 122 U/L (ref 30–135)
COCAINE UR QL: POSITIVE
ETHANOL EXG-MCNC: 0.01 MG/DL
EXT PREG TEST URINE: NEGATIVE
EXT. CONTROL ED NAV: NORMAL
METHADONE UR QL: NEGATIVE
OPIATES UR QL SCN: NEGATIVE
P AXIS: 63 DEGREES
PCP UR QL: NEGATIVE
PR INTERVAL: 152 MS
QRS AXIS: 58 DEGREES
QRSD INTERVAL: 80 MS
QT INTERVAL: 396 MS
QTC INTERVAL: 462 MS
T WAVE AXIS: 63 DEGREES
THC UR QL: NEGATIVE
VENTRICULAR RATE: 82 BPM

## 2019-10-26 PROCEDURE — 93005 ELECTROCARDIOGRAM TRACING: CPT

## 2019-10-26 PROCEDURE — 82075 ASSAY OF BREATH ETHANOL: CPT | Performed by: EMERGENCY MEDICINE

## 2019-10-26 PROCEDURE — 99285 EMERGENCY DEPT VISIT HI MDM: CPT | Performed by: EMERGENCY MEDICINE

## 2019-10-26 PROCEDURE — 82550 ASSAY OF CK (CPK): CPT | Performed by: EMERGENCY MEDICINE

## 2019-10-26 PROCEDURE — 81025 URINE PREGNANCY TEST: CPT | Performed by: EMERGENCY MEDICINE

## 2019-10-26 PROCEDURE — 93010 ELECTROCARDIOGRAM REPORT: CPT | Performed by: INTERNAL MEDICINE

## 2019-10-26 PROCEDURE — 99285 EMERGENCY DEPT VISIT HI MDM: CPT

## 2019-10-26 PROCEDURE — 36415 COLL VENOUS BLD VENIPUNCTURE: CPT | Performed by: EMERGENCY MEDICINE

## 2019-10-26 PROCEDURE — 80307 DRUG TEST PRSMV CHEM ANLYZR: CPT | Performed by: EMERGENCY MEDICINE

## 2019-10-26 RX ORDER — MAGNESIUM HYDROXIDE/ALUMINUM HYDROXICE/SIMETHICONE 120; 1200; 1200 MG/30ML; MG/30ML; MG/30ML
30 SUSPENSION ORAL EVERY 4 HOURS PRN
Status: CANCELLED | OUTPATIENT
Start: 2019-10-26

## 2019-10-26 RX ORDER — RISPERIDONE 0.5 MG/1
0.5 TABLET, ORALLY DISINTEGRATING ORAL
Status: CANCELLED | OUTPATIENT
Start: 2019-10-26

## 2019-10-26 RX ORDER — HALOPERIDOL 5 MG
5 TABLET ORAL EVERY 6 HOURS PRN
Status: CANCELLED | OUTPATIENT
Start: 2019-10-26

## 2019-10-26 RX ORDER — ACETAMINOPHEN 325 MG/1
650 TABLET ORAL EVERY 6 HOURS PRN
Status: CANCELLED | OUTPATIENT
Start: 2019-10-26

## 2019-10-26 RX ORDER — LORAZEPAM 2 MG/ML
1 INJECTION INTRAMUSCULAR EVERY 4 HOURS PRN
Status: CANCELLED | OUTPATIENT
Start: 2019-10-26

## 2019-10-26 RX ORDER — NICOTINE 21 MG/24HR
1 PATCH, TRANSDERMAL 24 HOURS TRANSDERMAL DAILY
Status: CANCELLED | OUTPATIENT
Start: 2019-10-26

## 2019-10-26 RX ORDER — HYDROXYZINE HYDROCHLORIDE 25 MG/1
25 TABLET, FILM COATED ORAL EVERY 4 HOURS PRN
Status: CANCELLED | OUTPATIENT
Start: 2019-10-26

## 2019-10-26 RX ORDER — TRAZODONE HYDROCHLORIDE 100 MG/1
50 TABLET ORAL
Status: CANCELLED | OUTPATIENT
Start: 2019-10-26

## 2019-10-26 RX ORDER — IBUPROFEN 600 MG/1
600 TABLET ORAL EVERY 8 HOURS PRN
Status: CANCELLED | OUTPATIENT
Start: 2019-10-26

## 2019-10-26 RX ORDER — OLANZAPINE 5 MG/1
5 TABLET ORAL
Status: CANCELLED | OUTPATIENT
Start: 2019-10-26

## 2019-10-26 RX ORDER — IBUPROFEN 400 MG/1
800 TABLET ORAL EVERY 8 HOURS PRN
Status: CANCELLED | OUTPATIENT
Start: 2019-10-26

## 2019-10-26 RX ORDER — OLANZAPINE 10 MG/1
5 INJECTION, POWDER, LYOPHILIZED, FOR SOLUTION INTRAMUSCULAR
Status: CANCELLED | OUTPATIENT
Start: 2019-10-26

## 2019-10-26 RX ORDER — HYDROXYZINE HYDROCHLORIDE 25 MG/1
50 TABLET, FILM COATED ORAL EVERY 6 HOURS PRN
Status: CANCELLED | OUTPATIENT
Start: 2019-10-26

## 2019-10-26 NOTE — ED NOTES
Insurance Authorization for admission:   Phone call placed to [facility]  Phone number: [phone]  Spoke to Clemente Ocasio  3 days approved  Level of care: [facility]  Review on 10/28  Authorization # To be issued upon arrival         EVS (Eligibility Verification System) called - [phone]  Automated system indicates: Eligible
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
## 2019-10-26 NOTE — LETTER
[facility] EMERGENCY DEPARTMENT  [address] [facility] [address]  [phone]  Dept: [address] TRANSFER CONSENT    NAME Stanford Ivan                                         1980                              MRN [id number]    I have been informed of my rights regarding examination, treatment, and transfer   by Dr Eric Kaur III, DO    Benefits: Continuity of care    Risks: Potential for delay in receiving treatment      { ED EMTALA TRANSFER CHOICES:[id number]}    I authorize the performance of emergency medical procedures and treatments upon me in both transit and upon arrival at the receiving facility  Additionally, I authorize the release of any and all medical records to the receiving facility and request they be transported with me, if possible  I understand that the safest mode of transportation during a medical emergency is an ambulance and that the Hospital advocates the use of this mode of transport  Risks of traveling to the receiving facility by car, including absence of medical control, life sustaining equipment, such as oxygen, and medical personnel has been explained to me and I fully understand them  (BERNABE CORRECT BOX BELOW)  [x ]  I consent to the stated transfer and to be transported by ambulance/helicopter  [  ]  I consent to the stated transfer, but refuse transportation by ambulance and accept full responsibility for my transportation by car    I understand the risks of non-ambulance transfers and I exonerate the Hospital and its staff from any deterioration in my condition that results from this refusal     X___________________________________________    DATE  10/26/19  TIME________  Signature of patient or legally responsible individual signing on patient behalf           RELATIONSHIP TO PATIENT_self_          Provider Certification    NAME Stanford Ivan                                         1980 MRN [id number]    A medical screening exam was performed on the above named patient  Based on the examination: The patient is medically cleared for transfer to inpatient psychiatry  Condition Necessitating Transfer The primary encounter diagnosis was Depression with suicidal ideation  Diagnoses of Bipolar 1 disorder, depressed, severe ([facility] [address] ) and Substance abuse ([facility] [address] ) were also pertinent to this visit  Patient Condition: The patient has been stabilized such that within reasonable medical probability, no material deterioration of the patient condition or the condition of the unborn child(nasima) is likely to result from the transfer    Reason for Transfer: No bed available at level of patient's needs    Transfer Requirements: [address]   · Space available and qualified personnel available for treatment as acknowledged by Joel Irving [phone]  · Agreed to accept transfer and to provide appropriate medical treatment as acknowledged by       ALLAN Lyn  · Appropriate medical records of the examination and treatment of the patient are provided at the time of transfer   [address] _cmk______  · Transfer will be performed by qualified personnel from [facility] ([place] [phone]  and appropriate transfer equipment as required, including the use of necessary and appropriate life support measures      Provider Certification: I have examined the patient and explained the following risks and benefits of being transferred/refusing transfer to the patient/family:  General risk, such as traffic hazards, adverse weather conditions, rough terrain or turbulence, possible failure of equipment (including vehicle or aircraft), or consequences of actions of persons outside the control of the transport personnel      Based on these reasonable risks and benefits to the patient and/or the unborn child(nasima), and based upon the information available at the time of the patients examination, I certify that the medical benefits reasonably to be expected from the provision of appropriate medical treatments at another medical facility outweigh the increasing risks, if any, to the individuals medical condition, and in the case of labor to the unborn child, from effecting the transfer      X____________________________________________ DATE 10/26/19        TIME_______      ORIGINAL - SEND TO MEDICAL RECORDS   COPY - SEND WITH PATIENT DURING TRANSFER

## 2019-10-26 NOTE — ED NOTES
The following transport companies are not able to provide BLS until Monday 10/28: Varun Lambert, [address], [place], [place], [address] [facility], [facility],    Awaiting responses from [place], [address]eghan Warren , SANJUANA PERAZA [facility] [place]

## 2019-10-26 NOTE — LETTER
Section I - General Information    Name of Patient: Ellen Dodson                 : 1980    Medicare #:____________________  Transport Date: 10/26/19 (PCS is valid for round trips on this date and for all repetitive trips in the 60-day range as noted below )  Origin: [address] [place] [address]                                                         [facility], [facility] [address]__  Is the pt's stay covered under Medicare Part A (PPS/DRG)     (_) YES  (_x) NO  Closest appropriate facility? (x_) YES  (_) NO  If no, why is transport to more distant facility required?________________________  If hosp-hosp transfer, describe services needed at 2nd facility not available at 1st facility? _________________________________  If hospice pt, is this transport related to pt's terminal illness? (_) YES (_) NO Describe____________________________________    Section II - Medical Necessity Questionnaire  Ambulance transportation is medically necessary only if other means of transport are contraindicated or would be potentially harmful to the patient  To meet this requirement, the patient must either be "bed confined" or suffer from a condition such that transport by means other than ambulance is contraindicated by the patient's condition   The following questions must be answered by the medical professional signing below for this form to be valid:    1)  Describe the MEDICAL CONDITION (physical and/or mental) of this patient AT [address] [place] [address] that requires the patient to be transported in an ambulance and why transport by other means is contraindicated by the patient's condition:_Bipolar Disorder; Suicidal  Ideation with a plan __    2) Is the patient "bed confined" as defined below?     (_) YES  (x_) NO  To be "be confined" the patient must satisfy all three of the following conditions: (1) unable to get up from bed without Assistance; AND (2) unable to ambulate; AND (3) unable to sit in a chair or wheelchair  3) Can this patient safely be transported by car or wheelchair [facility] (i e , seated during transport without a medical attendant or monitoring)?   (_) YES  (x_) NO    4) In addition to completing questions 1-3 above, please check any of the following conditions that apply*:  *Note: supporting documentation for any boxes checked must be maintained in the patient's medical records  (_)Contractures   (_)Non-Healed Fractures  (_)Patient is confused (_)Patient is comatose (_)Moderate/severe pain on movement (x_)Danger to self/others  (_)IV meds/fluids required (_)Patient is combative(_)Need or possible need for restraints (_)DVT requires elevation of lower extremity  (x_)Medical attendant required (_)Requires oxygen-unable to self administer (_)Special handling/isolation/infection control precautions required (_)Unable to tolerate seated position for time needed to transport (_)Hemodynamic monitoring required en route (_)Unable to sit in a chair or wheelchair due to decubitus ulcers or other wounds (_)Cardiac monitoring required en route (_)Morbid obesity requires additional personnel/equipment to safely handle patient (_)Orthopedic device (backboard, halo, pins, traction, brace, wedge, etc,) requiring special handling during transport (_)Other(specify)_______________________________________________    Section III - Signature of Physician or Healthcare Professional  I certify that the above information is true and correct based on my evaluation of this patient, and represent that the patient requires transport by ambulance and that other forms of transport are contraindicated  I understand that this information will be used by the Centers for Medicare and Medicaid Services (CMS) to support the determination of medical necessity for ambulance services, and I represent that I have personal knowledge of the patient's condition at time of transport    (_) If this box is checked, I also certify that the patient is physically or mentally incapable of signing the ambulance service's claim and that the institution with which I am affiliated has furnished care, services, or assistance to the patient  My signature below is made on behalf of the patient pursuant to 42 CFR §424 36(b)(4)  In accordance with 42 CFR §424 37, the specific reason(s) that the patient is physically or mentally incapable of signing the claim form is as follows: _________________________________________________________________________________________________________      Signature of Physician* or Healthcare Professional______________________________________________________________  Signature Date 10/26/19 (For scheduled repetitive transports, this form is not valid for transports performed more than 60 days after this date)    Printed Name & Credentials of Physician or Healthcare Professional (MD, DO, RN, etc )_Luigi Hdez III, DO_____  *Form must be signed by patient's attending physician for scheduled, repetitive transports   For non-repetitive, unscheduled ambulance transports, if unable to obtain the signature of the attending physician, any of the following may sign (choose appropriate option below)  (_) Physician Assistant (_)  Clinical Nurse Specialist (_)  Registered Nurse  (_)  Nurse Practitioner  (_) Discharge Planner

## 2019-10-26 NOTE — ED NOTES
Patient is accepted at [facility]  Patient is accepted by Eva Rodriguez  Transportation is arranged with [employer]  Transportation is scheduled for [email or web address]      Nurse report is to be called to [phone] prior to patient transfer

## 2019-10-26 NOTE — ED PROVIDER NOTES
History  Chief Complaint   Patient presents with    Psychiatric Evaluation     I just started with feeling  of depression and having suicidal thoughts  I thought of carbon monoxide poisoning  Have not tried yet  HPI    This is a very pleasant 68-year-old female presents to the emergency department with feelings of depression and suicidal tendencies with thoughts and specific plan  Patient noted that she may increasingly stressed recently because of boyfriend broke up with her and her children currently in foster care may be taken away from her for unknown reasons  Patient had a specific plan where she was going to use carbon monoxide in a garage to kill herself  No recent other stressors and no hallucinations  Patient denies any chest pain shortness of breath nausea vomiting diarrhea  The reported by the nursing staff to me that the patient consumed cocaine yesterday  Prior to Admission Medications   Prescriptions Last Dose Informant Patient Reported? Taking?    FLUoxetine (PROzac) 20 mg capsule   No Yes   Sig: Take 1 capsule (20 mg total) by mouth daily   OLANZapine (ZyPREXA) 15 MG disintegrating tablet   No Yes   Sig: Take 1 tablet (15 mg total) by mouth daily at bedtime   benztropine (COGENTIN) 1 mg tablet   No Yes   Sig: Take 1 tablet (1 mg total) by mouth 2 (two) times a day   ferrous sulfate 325 (65 Fe) mg tablet   No Yes   Sig: Take 1 tablet (325 mg total) by mouth daily with breakfast   gabapentin (NEURONTIN) 300 mg capsule   No Yes   Sig: Take 2 capsules (600 mg total) by mouth 3 (three) times a day   hydrOXYzine HCL (ATARAX) 25 mg tablet   No Yes   Sig: Take 3 tablets (75 mg total) by mouth 3 (three) times a day   paliperidone palmitate ER (INVEGA) 234 mg/1 5 mL IM injection   No No   Sig: Inject 1 mL (156 mg total) into a muscle once for 1 dose   paliperidone palmitate ER (INVEGA) 234 mg/1 5 mL IM injection   No Yes   Sig: Inject 1 5 mL (234 mg total) into a muscle every 30 (thirty) days Given on 2019  Next dose is due on 10/17/2019   traZODone (DESYREL) 50 mg tablet   No Yes   Sig: Take 1 tablet (50 mg total) by mouth daily at bedtime      Facility-Administered Medications: None       Past Medical History:   Diagnosis Date    Addiction to drug ([place] [address] )     Anemia     Anxiety     Depression     Drug abuse ([place] [address] )     Hallucination     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[address] )     Paranoia (psychosis) ([place] [address] )     Psychiatric disorder     Psychiatric illness     PTSD (post-traumatic stress disorder)     Schizo-affective type schizophrenia, chronic state ([place] [address] )     Self-injurious behavior     Suicidal behavior     Suicide attempt ([place] [address] )     Withdrawal symptoms, drug or narcotic ([place] [address] )        Past Surgical History:   Procedure Laterality Date     SECTION         History reviewed  No pertinent family history  I have reviewed and agree with the history as documented  Social History     Tobacco Use    Smoking status: Current Every Day Smoker     Packs/day: 0 50     Years: 15 00     Pack years: 7 50     Types: Cigarettes    Smokeless tobacco: Never Used   Substance Use Topics    Alcohol use: Yes     Frequency: 2-4 times a month     Drinks per session: 3 or 4     Binge frequency: Less than monthly     Comment: occasional; not to point of intoxication; BAT=0 006    Drug use: Yes     Frequency: 5 0 times per week     Types: "Crack" cocaine     Comment: relapse a week ago until last 2 days        Review of Systems   Constitutional: Negative  HENT: Negative  Eyes: Negative  Respiratory: Negative  Cardiovascular: Negative  Gastrointestinal: Negative  Endocrine: Negative  Genitourinary: Negative  Musculoskeletal: Negative  Skin: Negative  Allergic/Immunologic: Negative  Neurological: Negative  Hematological: Negative  Psychiatric/Behavioral: Negative  Physical Exam  Physical Exam   Constitutional: She is oriented to person, place, and time   She appears well-developed and well-nourished  HENT:   Head: Normocephalic and atraumatic  Right Ear: External ear normal    Left Ear: External ear normal    Nose: Nose normal    Mouth/Throat: Oropharynx is clear and moist    Eyes: Pupils are equal, round, and reactive to light  Conjunctivae and EOM are normal    Neck: Normal range of motion  Neck supple  Cardiovascular: Normal rate, regular rhythm, normal heart sounds and intact distal pulses  Pulmonary/Chest: Effort normal and breath sounds normal    Abdominal: Soft  Bowel sounds are normal    Genitourinary:   Genitourinary Comments: Exam deferred  Musculoskeletal: Normal range of motion  Neurological: She is alert and oriented to person, place, and time  Skin: Skin is warm and dry  Capillary refill takes less than 2 seconds  Psychiatric: She has a normal mood and affect  Her behavior is normal    Nursing note and vitals reviewed  Vital Signs  ED Triage Vitals [10/26/19 0748]   Temperature Pulse Respirations Blood Pressure SpO2   97 6 °F (36 4 °C) 95 16 144/84 100 %      Temp Source Heart Rate Source Patient Position - Orthostatic VS BP Location FiO2 (%)   Tympanic Monitor -- Left arm --      Pain Score       No Pain           Vitals:    10/26/19 0748   BP: 144/84   Pulse: 95         Visual Acuity      ED Medications  Medications - No data to display    Diagnostic Studies  Results Reviewed     Procedure Component Value Units Date/Time    Rapid drug screen, urine [[id number]]  (Abnormal) Collected:  10/26/19 0835    Lab Status:  Final result Specimen:  Urine, Other Updated:  10/26/19 0903     Amph/Meth UR Negative     Barbiturate Ur Negative     Benzodiazepine Urine Negative     Cocaine Urine Positive     Methadone Urine Negative     Opiate Urine Negative     PCP Ur Negative     THC Urine Negative    Narrative:       Presumptive report  If requested, specimen will be sent to reference lab for confirmation  FOR MEDICAL PURPOSES ONLY     IF CONFIRMATION NEEDED PLEASE CONTACT THE LAB WITHIN 5 DAYS  Drug Screen Cutoff Levels:  AMPHETAMINE/METHAMPHETAMINES  1000 ng/mL  BARBITURATES     200 ng/mL  BENZODIAZEPINES     200 ng/mL  COCAINE      300 ng/mL  METHADONE      300 ng/mL  OPIATES      300 ng/mL  PHENCYCLIDINE     25 ng/mL  THC       50 ng/mL      POCT pregnancy, urine [[id number]]  (Normal) Resulted:  10/26/19 0836    Lab Status:  Final result Updated:  10/26/19 0838     EXT PREG TEST UR (Ref: Negative) Negative     Control Valid    CK (with reflex to MB) [[id number]]  (Normal) Collected:  10/26/19 0811    Lab Status:  Final result Specimen:  Blood from Arm, Right Updated:  10/26/19 0834     Total  U/L     POCT alcohol breath test [[id number]]  (Normal) Resulted:  10/26/19 0758    Lab Status:  Final result Updated:  10/26/19 0758     EXTBreath Alcohol 0 006                 No orders to display              Procedures  ECG 12 Lead Documentation Only  Date/Time: 10/26/2019 8:29 AM  Performed by: Ellis Ansari III, DO  Authorized by: [employer] III, DO     Indications / Diagnosis:  Cocaine use  ECG reviewed by me, the ED Provider: yes    Patient location:  ED  Comments:      I personally reviewed this EKG was performed on the patient August 26, 2019 at 8:25 a m  EKG was interpreted by me at 8:29 p m   Normal sinus rhythm with all normal intervals  No acute ST abnormalities  ED Course  ED Course as of Oct 26 1346   Sat Oct 26, 2019   1123 PATIENT IS MEDICALLY CLEAR FOR EVALUATION AS AN INPATIENT FOR INPATIENT PSYCHIATRIC ADMISSION  MDM  Number of Diagnoses or Management Options  Depression with suicidal ideation:   Substance abuse [facility]):   Diagnosis management comments: 42-year-old female presents to the emergency department with increased anxiety, depression, suicide ideation secondary to the stress of her children potentially being taken away from her who are currently in foster care    Patient recently broke up with boyfriend and did cocaine yesterday and felt that she could not take it anymore and had a specific plan where she was going to kill herself by carbon monoxide poisoning  Patient medically clear for inpatient admission  Baseline labs EKG unremarkable  Disposition  Final diagnoses:   Depression with suicidal ideation   Substance abuse ([facility] )     Time reflects when diagnosis was documented in both MDM as applicable and the Disposition within this note     Time User Action Codes Description Comment    10/26/2019 11:24 AM Farideh Man Add [F32 9,  J54 [id number]] Depression with suicidal ideation     10/26/2019  1:38 PM Lori Paniagua Add [F31 4] Bipolar 1 disorder, depressed, severe ([facility] )     10/26/2019  1:44 PM Farideh Champagne [F19 10] Substance abuse [facility])       ED Disposition     None      MD Documentation      Most Recent Value   Accepting Physician  [facility]   Sending MD Hugo Van III, DO      RN Documentation      Most [facility] Name, [address]   [facility]      Follow-up Information    None         Patient's Medications   Discharge Prescriptions    No medications on file     No discharge procedures on file      ED Provider  Electronically Signed by           Hugo Van III, DO  10/26/19 7327

## 2019-10-27 ENCOUNTER — HOSPITAL ENCOUNTER (INPATIENT)
Facility: HOSPITAL | Age: 39
LOS: 5 days | Discharge: HOME | DRG: 753 | End: 2019-11-01
Attending: PSYCHIATRY & NEUROLOGY | Admitting: PSYCHIATRY & NEUROLOGY
Payer: COMMERCIAL

## 2019-10-27 VITALS
WEIGHT: 130 LBS | TEMPERATURE: 98.7 F | BODY MASS INDEX: 23.04 KG/M2 | RESPIRATION RATE: 20 BRPM | DIASTOLIC BLOOD PRESSURE: 81 MMHG | HEART RATE: 91 BPM | HEIGHT: 63 IN | SYSTOLIC BLOOD PRESSURE: 118 MMHG | OXYGEN SATURATION: 100 %

## 2019-10-27 DIAGNOSIS — F31.4 BIPOLAR 1 DISORDER, DEPRESSED, SEVERE (HCC): Primary | ICD-10-CM

## 2019-10-27 PROBLEM — E87.6 HYPOKALEMIA: Status: RESOLVED | Noted: 2019-06-13 | Resolved: 2019-10-27

## 2019-10-27 PROBLEM — F32.A DEPRESSION: Chronic | Status: ACTIVE | Noted: 2018-10-16

## 2019-10-27 PROBLEM — Z00.8 MEDICAL CLEARANCE FOR PSYCHIATRIC ADMISSION: Status: ACTIVE | Noted: 2019-10-27

## 2019-10-27 PROBLEM — T50.901A OVERDOSE OF DRUG: Status: RESOLVED | Noted: 2019-06-13 | Resolved: 2019-10-27

## 2019-10-27 PROBLEM — M62.82 RHABDOMYOLYSIS: Status: RESOLVED | Noted: 2019-06-17 | Resolved: 2019-10-27

## 2019-10-27 PROCEDURE — 99253 IP/OBS CNSLTJ NEW/EST LOW 45: CPT | Performed by: PHYSICIAN ASSISTANT

## 2019-10-27 RX ORDER — IBUPROFEN 600 MG/1
600 TABLET ORAL EVERY 8 HOURS PRN
Status: DISCONTINUED | OUTPATIENT
Start: 2019-10-27 | End: 2019-10-28

## 2019-10-27 RX ORDER — OLANZAPINE 10 MG/1
5 INJECTION, POWDER, LYOPHILIZED, FOR SOLUTION INTRAMUSCULAR
Status: DISCONTINUED | OUTPATIENT
Start: 2019-10-27 | End: 2019-10-28

## 2019-10-27 RX ORDER — HYDROXYZINE HYDROCHLORIDE 25 MG/1
25 TABLET, FILM COATED ORAL EVERY 4 HOURS PRN
Status: DISCONTINUED | OUTPATIENT
Start: 2019-10-27 | End: 2019-11-01 | Stop reason: HOSPADM

## 2019-10-27 RX ORDER — OLANZAPINE 5 MG/1
5 TABLET ORAL
Status: DISCONTINUED | OUTPATIENT
Start: 2019-10-27 | End: 2019-10-30

## 2019-10-27 RX ORDER — MAGNESIUM HYDROXIDE/ALUMINUM HYDROXICE/SIMETHICONE 120; 1200; 1200 MG/30ML; MG/30ML; MG/30ML
30 SUSPENSION ORAL EVERY 4 HOURS PRN
Status: DISCONTINUED | OUTPATIENT
Start: 2019-10-27 | End: 2019-11-01 | Stop reason: HOSPADM

## 2019-10-27 RX ORDER — GABAPENTIN 100 MG/1
100 CAPSULE ORAL 3 TIMES DAILY
Status: DISCONTINUED | OUTPATIENT
Start: 2019-10-27 | End: 2019-10-28

## 2019-10-27 RX ORDER — LORAZEPAM 0.5 MG/1
1 TABLET ORAL ONCE
Status: COMPLETED | OUTPATIENT
Start: 2019-10-27 | End: 2019-10-27

## 2019-10-27 RX ORDER — HALOPERIDOL 5 MG
5 TABLET ORAL EVERY 6 HOURS PRN
Status: DISCONTINUED | OUTPATIENT
Start: 2019-10-27 | End: 2019-10-28

## 2019-10-27 RX ORDER — TRAZODONE HYDROCHLORIDE 50 MG/1
50 TABLET ORAL
Status: DISCONTINUED | OUTPATIENT
Start: 2019-10-27 | End: 2019-11-01 | Stop reason: HOSPADM

## 2019-10-27 RX ORDER — OLANZAPINE 5 MG/1
5 TABLET ORAL
Status: DISCONTINUED | OUTPATIENT
Start: 2019-10-27 | End: 2019-10-28

## 2019-10-27 RX ORDER — ACETAMINOPHEN 325 MG/1
650 TABLET ORAL EVERY 6 HOURS PRN
Status: DISCONTINUED | OUTPATIENT
Start: 2019-10-27 | End: 2019-11-01 | Stop reason: HOSPADM

## 2019-10-27 RX ORDER — HYDROXYZINE HYDROCHLORIDE 25 MG/1
50 TABLET, FILM COATED ORAL EVERY 6 HOURS PRN
Status: DISCONTINUED | OUTPATIENT
Start: 2019-10-27 | End: 2019-10-28

## 2019-10-27 RX ORDER — IBUPROFEN 800 MG/1
800 TABLET ORAL EVERY 8 HOURS PRN
Status: DISCONTINUED | OUTPATIENT
Start: 2019-10-27 | End: 2019-10-28

## 2019-10-27 RX ORDER — LORAZEPAM 2 MG/ML
1 INJECTION INTRAMUSCULAR EVERY 4 HOURS PRN
Status: DISCONTINUED | OUTPATIENT
Start: 2019-10-27 | End: 2019-10-28

## 2019-10-27 RX ORDER — RISPERIDONE 0.5 MG/1
0.5 TABLET, ORALLY DISINTEGRATING ORAL
Status: DISCONTINUED | OUTPATIENT
Start: 2019-10-27 | End: 2019-11-01 | Stop reason: HOSPADM

## 2019-10-27 RX ORDER — BENZTROPINE MESYLATE 1 MG/1
1 TABLET ORAL 2 TIMES DAILY
Status: DISCONTINUED | OUTPATIENT
Start: 2019-10-27 | End: 2019-11-01 | Stop reason: HOSPADM

## 2019-10-27 RX ORDER — NICOTINE 21 MG/24HR
1 PATCH, TRANSDERMAL 24 HOURS TRANSDERMAL DAILY
Status: DISCONTINUED | OUTPATIENT
Start: 2019-10-27 | End: 2019-11-01 | Stop reason: HOSPADM

## 2019-10-27 RX ADMIN — BENZTROPINE MESYLATE 1 MG: 1 TABLET ORAL at 16:26

## 2019-10-27 RX ADMIN — NICOTINE 1 PATCH: 21 PATCH, EXTENDED RELEASE TRANSDERMAL at 16:28

## 2019-10-27 RX ADMIN — GABAPENTIN 100 MG: 100 CAPSULE ORAL at 16:27

## 2019-10-27 RX ADMIN — GABAPENTIN 100 MG: 100 CAPSULE ORAL at 21:37

## 2019-10-27 RX ADMIN — OLANZAPINE 5 MG: 5 TABLET, FILM COATED ORAL at 21:37

## 2019-10-27 RX ADMIN — HYDROXYZINE HYDROCHLORIDE 50 MG: 25 TABLET ORAL at 16:27

## 2019-10-27 RX ADMIN — TRAZODONE HYDROCHLORIDE 50 MG: 50 TABLET ORAL at 21:37

## 2019-10-27 RX ADMIN — LORAZEPAM 1 MG: 0.5 TABLET ORAL at 14:09

## 2019-10-27 NOTE — CONSULTS
Consult- Dale Lares 1980, 41 y o  female MRN: [id number]    Unit/Bed#: [facility] 251-02 Encounter: [id number]    Primary Care Provider: Desirae Huang MD   Date and time admitted to hospital: 10/27/2019  3:10 PM      Inpatient consult for Medical Clearance for [facility] patient  Consult performed by: Joann Guevara PA-C  Consult ordered by: ALLAN Garcia          Medical clearance for psychiatric admission  Assessment & Plan  · Patient was cleared for inpatient behavior of the admission at [facility]  · Patient remains medically cleared for inpatient behavior health admission    Tobacco abuse  Assessment & Plan  · Nicotine patch    * Depression with suicidal ideation  Assessment & Plan  · Suicidal ideation with CO2 poisoning  · Management per inpatient behavior health        Recommendations for Discharge:  · Per Primary Service        History of Present Illness:  Dale Lares is a 41 y o  female who was originally evaluated at [facility] emergency room secondary to reports of depression and suicidal ideation with carbon monoxide poisoning  Patient has positive drug abuse history  She has extensive psychiatric and drug abuse history  Patient has prior admissions for inpatient behavior health  She was cleared at [facility] for inpatient behavior held the admission  Patient remains medically stable for inpatient behavior health admission  We will see as needed, thank you for allowing us to participate in care your patient  Patient reports she had tolerated all of her dinner tray and is still hungry  Review of Systems:  Review of Systems   Constitutional: Negative for chills and fever  HENT: Negative for sore throat and trouble swallowing  Respiratory: Negative for cough and shortness of breath  Cardiovascular: Negative for chest pain and palpitations     Gastrointestinal: Negative for diarrhea, nausea and vomiting  Genitourinary: Negative for dysuria and hematuria  Musculoskeletal: Negative for back pain and myalgias  Skin: Negative for rash and wound  Neurological: Negative for dizziness and headaches          Chronic rocking shaking movements secondary to psychiatric condition       Past Medical and Surgical History:   Past Medical History:   Diagnosis Date    Addiction to drug ([place] [address] )     Anemia     Anxiety     Depression     Drug abuse ([place] [address] )     Hallucination     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[address] )     Paranoia (psychosis) ([place] [address] )     Psychiatric disorder     Psychiatric illness     PTSD (post-traumatic stress disorder)     Schizo-affective type schizophrenia, chronic state ([place] [address] )     Self-injurious behavior     Suicidal behavior     Suicide attempt ([place] [address] )     Withdrawal symptoms, drug or narcotic ([place] [address] )        Past Surgical History:   Procedure Laterality Date     SECTION         Meds/Allergies:  all medications and allergies reviewed    Allergies: No Known Allergies    Social History:     Marital Status: Single    Substance Use History:   Social History     Substance and Sexual Activity   Alcohol Use Yes    Frequency: 2-4 times a month    Drinks per session: 3 or 4    Binge frequency: Less than monthly    Comment: occasional; not to point of intoxication; BAT=0 006     Social History     Tobacco Use   Smoking Status Current Every Day Smoker    Packs/day: 0 50    Years: 15 00    Pack years: 7 50    Types: Cigarettes   Smokeless Tobacco Never Used     Social History     Substance and Sexual Activity   Drug Use Yes    Frequency: 5 0 times per week    Types: "Crack" cocaine    Comment: relapse a week ago until last 2 days       Family History:  I have reviewed the patients family history    Physical Exam:   Vitals:   Blood Pressure: 133/94 (10/27/19 1520)  Pulse: 81 (10/27/19 1520)  Temperature: 98 2 °F (36 8 °C) (10/27/19 1520)  Temp Source: Temporal (10/27/19 1520)  Respirations: 18 (10/27/19 1520)  Height: 5' 2 5" (158 8 cm) (10/27/19 1520)  Weight - Scale: 58 5 kg (129 lb) (10/27/19 1520)  SpO2: 96 % (10/27/19 1520)    Physical Exam   Constitutional: She appears well-developed and well-nourished  Sleeping easily arousable  female   HENT:   Head: Normocephalic and atraumatic  Eyes: Conjunctivae and EOM are normal  Right eye exhibits no discharge  Left eye exhibits no discharge  Neck: Normal range of motion  No tracheal deviation present  Cardiovascular: Normal rate, regular rhythm and normal heart sounds  Exam reveals no gallop and no friction rub  No murmur heard  Pulmonary/Chest: Effort normal and breath sounds normal  No respiratory distress  She has no wheezes  She has no rales  Abdominal: Soft  Bowel sounds are normal  She exhibits no distension and no mass  There is no tenderness  There is no guarding  Musculoskeletal: Normal range of motion  She exhibits no edema, tenderness or deformity  Neurological: She is alert  No cranial nerve deficit  Chronic jerking movements   Skin: Skin is warm and dry  No rash noted  No erythema  No pallor  Nursing note and vitals reviewed  Additional Data:   Lab Results: Drug screen positive for cocaine, pregnancy test negative      ** Please Note: This note has been constructed using a voice recognition system   **

## 2019-10-27 NOTE — ED NOTES
Patient offered to shower, pt states "no, I just want to lay here " Lunch tray given to patient        Kenn Feliciaside  10/27/19 2916

## 2019-10-27 NOTE — ASSESSMENT & PLAN NOTE
· Patient was cleared for inpatient behavior of the admission at [facility]  · Patient remains medically cleared for inpatient behavior health admission

## 2019-10-28 PROCEDURE — 99222 1ST HOSP IP/OBS MODERATE 55: CPT | Performed by: PSYCHIATRY & NEUROLOGY

## 2019-10-28 RX ORDER — ACETAMINOPHEN 325 MG/1
650 TABLET ORAL EVERY 4 HOURS PRN
Status: DISCONTINUED | OUTPATIENT
Start: 2019-10-28 | End: 2019-11-01 | Stop reason: HOSPADM

## 2019-10-28 RX ORDER — OLANZAPINE 10 MG/1
10 INJECTION, POWDER, LYOPHILIZED, FOR SOLUTION INTRAMUSCULAR
Status: DISCONTINUED | OUTPATIENT
Start: 2019-10-28 | End: 2019-11-01 | Stop reason: HOSPADM

## 2019-10-28 RX ORDER — LORAZEPAM 2 MG/ML
1 INJECTION INTRAMUSCULAR EVERY 4 HOURS PRN
Status: DISCONTINUED | OUTPATIENT
Start: 2019-10-28 | End: 2019-11-01 | Stop reason: HOSPADM

## 2019-10-28 RX ORDER — BENZTROPINE MESYLATE 1 MG/1
1 TABLET ORAL EVERY 6 HOURS PRN
Status: DISCONTINUED | OUTPATIENT
Start: 2019-10-28 | End: 2019-11-01 | Stop reason: HOSPADM

## 2019-10-28 RX ORDER — HALOPERIDOL 5 MG/ML
5 INJECTION INTRAMUSCULAR EVERY 6 HOURS PRN
Status: DISCONTINUED | OUTPATIENT
Start: 2019-10-28 | End: 2019-11-01 | Stop reason: HOSPADM

## 2019-10-28 RX ORDER — HYDROXYZINE HYDROCHLORIDE 25 MG/1
50 TABLET, FILM COATED ORAL EVERY 6 HOURS PRN
Status: DISCONTINUED | OUTPATIENT
Start: 2019-10-28 | End: 2019-11-01 | Stop reason: HOSPADM

## 2019-10-28 RX ORDER — HALOPERIDOL 5 MG
5 TABLET ORAL EVERY 6 HOURS PRN
Status: DISCONTINUED | OUTPATIENT
Start: 2019-10-28 | End: 2019-11-01 | Stop reason: HOSPADM

## 2019-10-28 RX ORDER — GABAPENTIN 300 MG/1
300 CAPSULE ORAL 3 TIMES DAILY
Status: DISCONTINUED | OUTPATIENT
Start: 2019-10-28 | End: 2019-10-30

## 2019-10-28 RX ORDER — BENZTROPINE MESYLATE 1 MG/ML
1 INJECTION INTRAMUSCULAR; INTRAVENOUS EVERY 6 HOURS PRN
Status: DISCONTINUED | OUTPATIENT
Start: 2019-10-28 | End: 2019-11-01 | Stop reason: HOSPADM

## 2019-10-28 RX ORDER — ACETAMINOPHEN 325 MG/1
975 TABLET ORAL EVERY 6 HOURS PRN
Status: DISCONTINUED | OUTPATIENT
Start: 2019-10-28 | End: 2019-11-01 | Stop reason: HOSPADM

## 2019-10-28 RX ADMIN — OLANZAPINE 5 MG: 5 TABLET, FILM COATED ORAL at 20:57

## 2019-10-28 RX ADMIN — HYDROXYZINE HYDROCHLORIDE 50 MG: 25 TABLET ORAL at 16:26

## 2019-10-28 RX ADMIN — GABAPENTIN 300 MG: 300 CAPSULE ORAL at 16:25

## 2019-10-28 RX ADMIN — GABAPENTIN 100 MG: 100 CAPSULE ORAL at 09:08

## 2019-10-28 RX ADMIN — GABAPENTIN 300 MG: 300 CAPSULE ORAL at 20:56

## 2019-10-28 RX ADMIN — BENZTROPINE MESYLATE 1 MG: 1 TABLET ORAL at 17:39

## 2019-10-28 RX ADMIN — TRAZODONE HYDROCHLORIDE 50 MG: 50 TABLET ORAL at 20:57

## 2019-10-28 RX ADMIN — BENZTROPINE MESYLATE 1 MG: 1 TABLET ORAL at 09:09

## 2019-10-28 NOTE — PROGRESS NOTES
PT passively refused vitals by asking this writer, "Do I have to?" This writer explained that the PT has the right to refuse where the PT responded with a clear, "No" PT's nurse notified

## 2019-10-28 NOTE — PROGRESS NOTES
Pt was seen this morning and was very tired and offered minimal responses during the discussion, pt denies current si/hi at this time  Pt has been meal compliant during the day  Pt appears to be worried about her apartment and contacting her landlord  Pt expresses rambling speech and repeats, "something happened, I can't believe it " pt retreated to her room and is now resting in bed

## 2019-10-28 NOTE — ED NOTES
IInsurance Authorization for Transportation:    Phone call placed to [employer] number [phone] to [facility] #: [id number]  Bethanie Dom given to MELY Gonzalez

## 2019-10-28 NOTE — PROGRESS NOTES
PT upon admission came with these items    T-shirt x 1  Sweatpants x 1  1 pair of socks  Slip on shoes  Rings x 6  Bracelets x 7  Necklace x 4  Pendant x 1  Earring x 1  Cracked cellphone x 1  Lighter x 1  Set of keys    PT has these items in room  Sweatpants x 1  Shirt x 1  Socks x 1 pair  Slip on shoes x 1 pair    PT has said items in contraband  Lighter x 1  Cracked Cellphone x 1  Set of keys       PT has said items put in safe  Ring x 6  Bracelet x 7  Necklace x 4  Pendant x 1  Earring x 1    Said items are in safe bag #[id number]

## 2019-10-28 NOTE — PROGRESS NOTES
Admission Note    Pt cooperative with admission assessment questions and admission paperwork  Declined shower at time of admission  Cooperative with medical consult and denies any current medical concerns  Answers short and brief throughout assessment  Pt states, "I just reall need to restart my medications as soon as possible and get life back on track" Pt reports doing extremely well after discharge mid September 2019 from [facility] where patient received invega sustenna injection which was due 10/17/19  Pt ran out of prescriptions and never went to follow-up appointments and relapsed on crack/cocaine  During assessment pt did acknowledge that she would be willing to "hear different rehab options" if available  Pt has extensive psychiatric hospitalizations and in patient admissions  Increased appetite  Pt placed on double portions and provided additional pb jelly sandwich after dinner  No agitation or irritability  Denies active SI, HI, or hallucinations at this time  AIMS score was positive  involuntary movements of jaw, tongue, neck, upper body, and mild pelvic and lower extremities movements observed  Pt is aware and when asked if previously experienced Pt states, "yes it happens when I get like this" Pt does not appear to be affected by this and offers no complaints  Provider aware  New orders received  Administered medications as prescribed  Pt oriented to unit  Requesting to just relax in room  Safety precautions maintained  Will continue to monitor and assess

## 2019-10-28 NOTE — PROGRESS NOTES
10/28/19 0953   Team Meeting   Meeting Type Daily Rounds   Patient/Family Present   Patient Present No   Patient's Family Present No     Daily Psychiatric Rounds    Team Members Present:    MD Basilia Mccullough, ALLAN Escobar, [place], [place]  Jess Ramirez, MARIA ELENA Ackerman, MARIA ELENA    Discussion:     New admit reviewed  201  Positive for cocaine  Signed a 72hr notice on admission, 10/27/19 at 1540  Abby Galindo was due 10/17

## 2019-10-28 NOTE — QUICK NOTE
Morning vitals attempted on patient on 10/28  Vitals refused by patient, but respirations remained at 16

## 2019-10-28 NOTE — PLAN OF CARE
Problem: DEPRESSION  Goal: Will be euthymic at discharge  Description  INTERVENTIONS:  - Administer medication as ordered  - Provide emotional support via 1:1 interaction with staff  - Encourage involvement in milieu/groups/activities  - Monitor for social isolation  Outcome: Progressing     Problem: PSYCHOSIS  Goal: Will report no hallucinations or delusions  Description  Interventions:  - Administer medication as  ordered  - Every waking shifts and PRN assess for the presence of hallucinations and or delusions  - Assist with reality testing to support increasing orientation  - Assess if patient's hallucinations or delusions are encouraging self-harm or harm to others and intervene as appropriate  Outcome: Progressing     Problem: ANXIETY  Goal: Will report anxiety at manageable levels  Description  INTERVENTIONS:  - Administer medication as ordered  - Teach and encourage coping skills  - Provide emotional support  - Assess patient/family for anxiety and ability to cope  Outcome: Progressing  Goal: By discharge: Patient will verbalize 2 strategies to deal with anxiety  Description  Interventions:  - Identify any obvious source/trigger to anxiety  - Staff will assist patient in applying identified coping technique/skills  - Encourage attendance of scheduled groups and activities  Outcome: Progressing     Problem: SUBSTANCE USE/ABUSE  Goal: Will have no detox symptoms and will verbalize plan for changing substance-related behavior  Description  INTERVENTIONS:  - Monitor physical status and assess for symptoms of withdrawal  - Administer medication as ordered  - Provide emotional support with 1 on 1 interaction with staff  - Encourage recovery focused program/ addiction education  - Assess for verbalization of changing behaviors related to substance abuse  - Initiate consults and referrals as appropriate (Case Management, Spiritual Care, etc )  Outcome: Progressing  Goal: By discharge, will develop insight into their chemical dependency and sustain motivation to continue in recovery  Description  INTERVENTIONS:  - Attends all daily group sessions and scheduled AA groups  - Actively practices coping skills through participation in the therapeutic community and adherence to program rules  - Reviews and completes assignments from individual treatment plan  - Assist patient development of understanding of their personal cycle of addiction and relapse triggers  Outcome: Progressing  Goal: By discharge, patient will have ongoing treatment plan addressing chemical dependency  Description  INTERVENTIONS:  - Assist patient with resources and/or appointments for ongoing recovery based living  Outcome: [address] was initiated on admission  Will continue to monitor and adjust as needed

## 2019-10-28 NOTE — PROGRESS NOTES
Patient spent most of the evening withdrawn to her room  She did come out for evening snack as well as a shower  Denies s/i but reports anxiety  Appears flat and depressed  Restless and fidgety  Irritable at times  Cooperative with evening meds  Requested additional prn trazodone  Did not want her flu shot this evening

## 2019-10-29 PROBLEM — G25.9 MOVEMENT DISORDER: Status: ACTIVE | Noted: 2019-10-29

## 2019-10-29 PROBLEM — F43.12 CHRONIC POST-TRAUMATIC STRESS DISORDER (PTSD): Status: ACTIVE | Noted: 2019-10-29

## 2019-10-29 PROCEDURE — 99232 SBSQ HOSP IP/OBS MODERATE 35: CPT | Performed by: PSYCHIATRY & NEUROLOGY

## 2019-10-29 RX ADMIN — OLANZAPINE 5 MG: 5 TABLET, FILM COATED ORAL at 21:03

## 2019-10-29 RX ADMIN — HALOPERIDOL 5 MG: 5 TABLET ORAL at 15:59

## 2019-10-29 RX ADMIN — BENZTROPINE MESYLATE 1 MG: 1 TABLET ORAL at 16:03

## 2019-10-29 RX ADMIN — BENZTROPINE MESYLATE 1 MG: 1 TABLET ORAL at 09:18

## 2019-10-29 RX ADMIN — HYDROXYZINE HYDROCHLORIDE 50 MG: 25 TABLET ORAL at 19:51

## 2019-10-29 RX ADMIN — HYDROXYZINE HYDROCHLORIDE 50 MG: 25 TABLET ORAL at 11:57

## 2019-10-29 RX ADMIN — GABAPENTIN 300 MG: 300 CAPSULE ORAL at 19:51

## 2019-10-29 RX ADMIN — GABAPENTIN 300 MG: 300 CAPSULE ORAL at 09:18

## 2019-10-29 RX ADMIN — GABAPENTIN 300 MG: 300 CAPSULE ORAL at 15:43

## 2019-10-29 RX ADMIN — TRAZODONE HYDROCHLORIDE 50 MG: 50 TABLET ORAL at 21:05

## 2019-10-29 RX ADMIN — RISPERIDONE 0.5 MG: 0.5 TABLET, ORALLY DISINTEGRATING ORAL at 19:52

## 2019-10-29 NOTE — TREATMENT PLAN
Treatment Plan [address] 41 y o  female MRN: [id number]    [address]44-03 Encounter: [id number]          Admit Date/Time:  10/27/2019  3:10 PM    Treatment Team: Attending Provider: Edmar Snider MD; Patient Care Assistant: Sami Lindsay; Licensed Practical Nurse: Nestor Paulson LPN; Registered Nurse: Isra Jimenez RN    Diagnosis: Principal Problem:    Bipolar 1 disorder, depressed, severe ([facility] )  Active Problems:    Tobacco abuse    Cocaine abuse ([facility] )    Movement disorder    Chronic post-traumatic stress disorder (PTSD)        Patient Strengths: active sense of humor, Jain affiliation, special hobby/interest     Patient Barriers: substance abuse    Progress Toward Goals: ongoing    Recommended Treatment: discharge planning     Treatment Frequency: 1-2 times per week     Expected Discharge Date:     Discharge Plan: referrals as indicated     Treatment Plan Created/Updated By: Edmar Snider MD

## 2019-10-29 NOTE — PROGRESS NOTES
10/29/19 0943   Team Meeting   Meeting Type Daily Rounds   Initial Conference Date 10/29/19   Patient/Family Present   Patient Present No   Patient's Family Present No     72 signed 10/27/19 at 66 91 21  Relapsed on crack and had SI with plan  Restless on the unit  Irritable and guarded  DishDaily Psychiatric Rounds     Team Members Present:     MD Chiara Ortega, [address], MSW [facility] [place], LCSW  Neeraj Gordillo [place] Wednesday

## 2019-10-29 NOTE — DISCHARGE INSTR - APPOINTMENTS
The treatment recommends that you obtain ongoing psychiatric medication management and individual therapy and case management services  An appointment has been scheduled in your behalf on Tuesday, November 5, 2019 at 10:00 am, with therapist Leena Thomas, at [facility], [address] [place] [address], [address] [place] [address]; Phone:  [phone]; Fax:  [phone]  During this appointment, you are recommended to request the scheduling of an appointment with a psychiatrist   Please arrive early and bring your photo ID and insurance cards  Your summary of care will be faxed to this provider for continuity of care  You have been referred for receipt of [facility]) services at [address] [place] [address] ([facility]), [address] [place] [address]; Phone:  [phone]; Fax:  [phone]  [facility] staff will contact you by phone in about two to four weeks for the purpose of communicating to you about the start of [facility]) services, based on your meeting eligibility standards for receipt of such assistance  Your summary of care will be faxed this provider for continuity of care  You have been referred for receipt of an assessment on Wednesday, November 6, 2019, at 8:30 am, that is needed prior to starting outpatient substance abuse counseling services, located at [address], [address] [place] [address]; Phone [phone]; Fax:  [phone]  Please arrive early and bring your photo ID and insurance cards  Your summary of care will be faxed this provider for continuity of care  An appointment has been scheduled in your behalf on Tuesday, November 12, 2019 at 9:45 am, with your new primary care physician, Dr Bess Beauchamp, at [address] [facility], [facility], [address] [facility]; Phone:  [phone]; Fax:  [phone]    During this visit, it is anticipated that Dr Mariusz Saucedo may schedule you for a neurology consult  Please arrive early and bring your photo ID and insurance cards  Your summary of care will be faxed this provider for continuity of care

## 2019-10-29 NOTE — PLAN OF CARE
PT continues to decline most invites to attend offered art therapy groups and is most often observed isolating in pt room with minimal social interactions with peers  Will continue to encourage group participation       Problem: Ineffective Coping  Goal: Participates in unit activities  Description  Interventions:  - Provide therapeutic environment   - Provide required programming   - Redirect inappropriate behaviors   Outcome: Not Progressing

## 2019-10-29 NOTE — PROGRESS NOTES
Patient bright, alert, awake, ate breakfast with peers, back to bed after eating  Compliant with scheduled neurontin and cogentin  Pt keeps eyes closed when speaking to this nurse and is superficial with answers  Restless and fidgeting in bed  Denies si hi and hallucinations and states " I have nothing"  Declines to answer any other questions and states "I  just want to go back  to sleep"  Will maintain on safety precautions and 7 minute checks,  No needs identified

## 2019-10-29 NOTE — PROGRESS NOTES
Patient attended evening snack  No socialization with peers  Appears anxious, flat, and depressed  Irritable edge  Denies s/i but reports high anxiety  Says she is having "life issues on the outside, but I'm alright "  Concerned about a court date on 11/5 related to her children in foster care  Guarded  Did not wish to elaborate  Became irritable when she was told her medications were not due until 2100  Observed talking to herself  Continues to be very restless
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
## 2019-10-29 NOTE — DISCHARGE INSTR - OTHER ORDERS
You will discharge to your apartment at [address] [facility] [address], [address], [address], [place], [address] [facility]; Phone:  [phone]  Crisis Plan:    Triggers you identified during your hospital stay that led to increased anxiety and depression included:  Placement of your children in foster care; breakup with your boyfriend; financial stressors; and lack of family support  Coping skills you identified during your hospital stay include:  Eating and sleeping  After discharge, if you find your coping skills are not effective and you continue to feel distressed, please contact your therapist at [facility]  If that is not effective and you feel you are a danger to yourself or others, please contact [address]: [phone], Peer Support Talk Line (Seven days a week, 1:00 PM  9:00 PM) Call: [phone] or Text: 880.986.7183), [place] [facility]: 625.632.3477, [facility] BETZY STEELE D&A Emergency:  [phone], National Suicide Prevention Lifeline:  5-637.480.3811, National [place] on Mental Illness ([facility]) HEL[employer]INE: 799.338.6284/Email: www jesi  org, or Substance Abuse and [facility] [address] [place] [address])  [employer], which is a confidential, free, 24-hour-a-day, 365-day-a-year, information service for individuals and family members facing mental health and/or substance use disorders  This service provides referrals to local treatment facilities, support groups, and community-based organizations  Callers can also order free publications and other information    Call 3-877.158.8700/PJQJC: www [facility] gov
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
## 2019-10-29 NOTE — PROGRESS NOTES
Progress Note - 1922 [facility] 41 y o  female MRN: [id number]   Unit/Bed#: [facility] 251-01 Encounter: [id number]    Behavior over the last 24 hours: Raisa Rasmussen was seen for an inpatient follow-up psychiatric visit this date  She appeared disheveled with poor hygiene  She was uncooperative during assessment and related she was annoyed because she was sleeping and did not want to come down to the office  She is taking her medications as prescribed and denies any side effects  Appetite is within normal limits  Per psychiatric rounds, she has been restless, irritable, and guarded  She denies any current suicidal or homicidal ideation  ROS: no complaints    Mental Status Evaluation:    Appearance:  dressed in hospital attire, poor hygiene   Behavior:  uncooperative, sarcastic, Dismissive   Speech:  normal rate and volume   Mood:  dysphoric, irritable   Affect:  blunted   Thought Process:  organized, logical, coherent   Associations: intact associations   Thought Content:  no overt delusions   Perceptual Disturbances: none   Risk Potential: Suicidal ideation - None  Homicidal ideation - None  Potential for aggression - No   Sensorium:  oriented to person, place and time/date   Memory:  recent and remote memory grossly intact   Consciousness:  alert and awake   Attention: poor concentration and poor attention span   Insight:  poor   Judgment: poor   Gait/Station: normal gait/station, normal balance   Motor Activity: no abnormal movements     Vital signs in last 24 hours:    Temp:  [98 9 °F (37 2 °C)] 98 9 °F (37 2 °C)  HR:  [90] 90  Resp:  [16] 16  BP: (110)/(60) 110/60    Laboratory results:  I have personally reviewed all pertinent laboratory/tests results      Progress Toward Goals: poor insight, poor motivation    Assessment/Plan   Principal Problem:    Bipolar 1 disorder, depressed, severe (HCC)  Active Problems:    Tobacco abuse    Cocaine abuse (HCC)    Movement disorder    Chronic post-traumatic stress disorder (PTSD)    Recommended Treatment:     Continue current medications as prescribed  Continue to monitor  Discharge disposition and planning are ongoing  All current active medications have been reviewed  Encourage group therapy, milieu therapy and occupational therapy  Behavioral Health checks every 7 minutes      Current Facility-Administered Medications:  acetaminophen 650 mg Oral Q6H PRN Nicholas Barcenass, CRNP   acetaminophen 650 mg Oral Q4H PRN Garrel Medford [employer], CRNP   acetaminophen 975 mg Oral Q6H PRN Luci Mike, CRNP   aluminum-magnesium hydroxide-simethicone 30 mL Oral Q4H PRN Nicholas Wilhelm, CRNP   benztropine 1 mg Intramuscular Q6H PRN Luci Mike, CRNP   benztropine 1 mg Oral BID Nicholas Wilhelm, CRNP   benztropine 1 mg Oral Q6H PRN Luci Mike, CRNP   gabapentin 300 mg Oral TID Zaki Quintanilla MD   haloperidol 5 mg Oral Q6H PRN Luci Mike, CRNP   haloperidol lactate 5 mg Intramuscular Q6H PRN Luci Mike, CRNP   hydrOXYzine HCL 25 mg Oral Q4H PRN Nicholas Wilhelm, CRNP   hydrOXYzine HCL 50 mg Oral Q6H PRN Luci Mike, CRNP   influenza vaccine 0 5 mL Intramuscular Once [employer], CRNP   LORazepam 1 mg Intramuscular Q4H PRN Luci Mike, CRNP   magnesium hydroxide 30 mL Oral Daily PRN Nicholas Wilhelm, CRNP   nicotine 1 patch Transdermal Daily Nicholas Wilhelm, CRNP   OLANZapine 10 mg Intramuscular Q3H PRN Luci Mike, CRNP   OLANZapine 5 mg Oral HS Nicholas Wilhelm, CRNP   risperiDONE 0 5 mg Oral Q3H PRN Nicholas Wilhelm, CRNP   traZODone 50 mg Oral HS PRN Nicholas Wilhelm, CRNP       Risks / Benefits of Treatment:    Risks, benefits, and possible side effects of medications explained to patient and patient verbalizes understanding and agreement for treatment  Counseling / Coordination of Care:      Patient's progress discussed with staff in treatment team meeting    Medications, treatment progress and treatment plan reviewed with patient

## 2019-10-30 RX ORDER — GABAPENTIN 300 MG/1
600 CAPSULE ORAL 3 TIMES DAILY
Status: DISCONTINUED | OUTPATIENT
Start: 2019-10-30 | End: 2019-11-01 | Stop reason: HOSPADM

## 2019-10-30 RX ORDER — GABAPENTIN 400 MG/1
400 CAPSULE ORAL 3 TIMES DAILY
Status: DISCONTINUED | OUTPATIENT
Start: 2019-10-30 | End: 2019-10-30

## 2019-10-30 RX ORDER — OLANZAPINE 10 MG/1
10 TABLET ORAL
Status: DISCONTINUED | OUTPATIENT
Start: 2019-10-30 | End: 2019-11-01 | Stop reason: HOSPADM

## 2019-10-30 RX ADMIN — GABAPENTIN 600 MG: 300 CAPSULE ORAL at 14:53

## 2019-10-30 RX ADMIN — GABAPENTIN 300 MG: 300 CAPSULE ORAL at 08:28

## 2019-10-30 RX ADMIN — BENZTROPINE MESYLATE 1 MG: 1 TABLET ORAL at 08:28

## 2019-10-30 RX ADMIN — OLANZAPINE 10 MG: 10 TABLET, FILM COATED ORAL at 21:26

## 2019-10-30 RX ADMIN — GABAPENTIN 600 MG: 300 CAPSULE ORAL at 21:26

## 2019-10-30 RX ADMIN — HYDROXYZINE HYDROCHLORIDE 25 MG: 25 TABLET ORAL at 20:30

## 2019-10-30 RX ADMIN — BENZTROPINE MESYLATE 1 MG: 1 TABLET ORAL at 17:46

## 2019-10-30 RX ADMIN — TRAZODONE HYDROCHLORIDE 50 MG: 50 TABLET ORAL at 21:26

## 2019-10-30 NOTE — PROGRESS NOTES
Patient bright, alert, ate breakfast with peers, back to bed after eating  Today pt is brighter, better eye contact, polite and more pleasant  Compliant with medications, no fidgety movements or restlessness observed at breakfast  Denies si hi and hallucinations  Pt prefers minimal conversation and is superficial with answers  Will maintain on safety precautions and 7 minute checks, no needs identified

## 2019-10-30 NOTE — PLAN OF CARE
Problem: DEPRESSION  Goal: Will be euthymic at discharge  Description  INTERVENTIONS:  - Administer medication as ordered  - Provide emotional support via 1:1 interaction with staff  - Encourage involvement in milieu/groups/activities  - Monitor for social isolation  Outcome: Progressing     Problem: PSYCHOSIS  Goal: Will report no hallucinations or delusions  Description  Interventions:  - Administer medication as  ordered  - Every waking shifts and PRN assess for the presence of hallucinations and or delusions  - Assist with reality testing to support increasing orientation  - Assess if patient's hallucinations or delusions are encouraging self-harm or harm to others and intervene as appropriate  Outcome: Progressing     Problem: ANXIETY  Goal: Will report anxiety at manageable levels  Description  INTERVENTIONS:  - Administer medication as ordered  - Teach and encourage coping skills  - Provide emotional support  - Assess patient/family for anxiety and ability to cope  Outcome: Progressing  Goal: By discharge: Patient will verbalize 2 strategies to deal with anxiety  Description  Interventions:  - Identify any obvious source/trigger to anxiety  - Staff will assist patient in applying identified coping technique/skills  - Encourage attendance of scheduled groups and activities  Outcome: Progressing     Problem: SUBSTANCE USE/ABUSE  Goal: Will have no detox symptoms and will verbalize plan for changing substance-related behavior  Description  INTERVENTIONS:  - Monitor physical status and assess for symptoms of withdrawal  - Administer medication as ordered  - Provide emotional support with 1 on 1 interaction with staff  - Encourage recovery focused program/ addiction education  - Assess for verbalization of changing behaviors related to substance abuse  - Initiate consults and referrals as appropriate (Case Management, Spiritual Care, etc )  Outcome: Progressing  Goal: By discharge, will develop insight into their chemical dependency and sustain motivation to continue in recovery  Description  INTERVENTIONS:  - Attends all daily group sessions and scheduled AA groups  - Actively practices coping skills through participation in the therapeutic community and adherence to program rules  - Reviews and completes assignments from individual treatment plan  - Assist patient development of understanding of their personal cycle of addiction and relapse triggers  Outcome: Progressing  Goal: By discharge, patient will have ongoing treatment plan addressing chemical dependency  Description  INTERVENTIONS:  - Assist patient with resources and/or appointments for ongoing recovery based living  Outcome: Progressing     Problem: Ineffective Coping  Goal: Participates in unit activities  Description  Interventions:  - Provide therapeutic environment   - Provide required programming   - Redirect inappropriate behaviors   Outcome: Progressing     Problem: DISCHARGE PLANNING - CARE MANAGEMENT  Goal: Discharge to post-acute care or home with appropriate resources  Description  INTERVENTIONS:  - Conduct assessment to determine patient/family and health care team treatment goals, and need for post-acute services based on payer coverage, community resources, and patient preferences, and barriers to discharge  - Address psychosocial, clinical, and financial barriers to discharge as identified in assessment in conjunction with the patient/family and health care team  - Arrange appropriate level of post-acute services according to patient's   needs and preference and payer coverage in collaboration with the physician and health care team  - Communicate with and update the patient/family, physician, and health care team regarding progress on the discharge plan  - Arrange appropriate transportation to post-acute venues   Outcome: Progressing

## 2019-10-30 NOTE — PROGRESS NOTES
10/30/19 1004   Team Meeting   Meeting Type Daily Rounds   Patient/Family Present   Patient Present No   Patient's Family Present No     Daily Psychiatric Rounds    Team Members Present:    MD Jailyn Weiss, ALLAN Banegas, PharmD  Leroy Jorge, MSW  Juan Arriaza, LCSW  Katalina Mena, RN  Sydnee Freed, RN    Discussion:     Discharge for today cancelled, pt reports not being ready to leave   Will need to rescind 67

## 2019-10-30 NOTE — PROGRESS NOTES
AT met with PT to complete self assessment  PT displayed a labile mood and affect, with fleeting eye contact and an irritable edge, did require much prompting and redirection throughout  PT described her biggest stressor leading to this admission as being a bad break-up with her boyfriend and the fact that she cannot contact him and that her kids are in the foster care system  PT reported that what she likes least about her life is that she cannot see her kids and what she likes most about her life is that she can improve everything in her life  PT completed her GED and attended [employer], is currently unemployed  PT was only able to identify eating and sleeping as her healthy coping skills of choice  PT currently unable to identify any kind of positive support system  PT reported a desire to work on her medication management and coping with the symptoms of her illness while she is here and to prepare her for discharge  PT stated she will know she is ready for discharge when she is on her medications

## 2019-10-30 NOTE — PROGRESS NOTES
Pt had some irritability when asked if she would agree to having blood work drawn this morning  Pt stated "No I told you guys this already, No, I am leaving today " Pt refused  Will pass on to the next shift  Nurses made aware

## 2019-10-30 NOTE — PROGRESS NOTES
Progress Note - Behavioral Health   Emory Prader 41 y o  female MRN: [id number]  Unit/Bed#: [facility] 251-01 Encounter: [id number]    Assessment/Plan   Principal Problem:    Bipolar 1 disorder, depressed, severe (HCC)  Active Problems:    Tobacco abuse    Cocaine abuse (HCC)    Movement disorder    Chronic post-traumatic stress disorder (PTSD)    P:  Increase Zyprexa 10 mg at bedtime, and increase gabapentin to 400 mg t i d     Behavior over the last 24 hours:  Regressed  Patient reports that she got into a verbal altercation with a peer last night and it was very tense  She reports that she did not feel ready for discharge and she is still struggling emotionally  She still obsessing about her upcoming court for custody of her 4 children next week and would like to leave before that court hearing  We discussed increasing Zyprexa and gabapentin and she is agreeable with the plan  Sleep: insomnia  Appetite: poor  Medication side effects: No  ROS: no complaints    Mental Status Evaluation:  Appearance:  disheveled   Behavior:  psychomotor agitation   Speech:  tangential   Mood:  dysthymic and irritable   Affect:  constricted   Thought Process:  tangential   Thought Content:  delusions  persecutory   Perceptual Disturbances: Auditory hallucinations without commands   Risk Potential: Potential for Aggression Yes fights with others   Sensorium:  person and place   Cognition:  grossly intact   Consciousness:  awake    Attention: attention span appeared shorter than expected for age   Insight:  limited   Judgment: limited   Gait/Station: normal balance   Motor Activity: abnormal movement noted  dyskinetic movements Cocaine induced     Progress Toward Goals: ongoing    Recommended Treatment: Continue with group therapy, milieu therapy and occupational therapy        Risks, benefits and possible side effects of Medications:   Risks, benefits, and possible side effects of medications explained to patient and patient verbalizes understanding  Medications: continue current psychiatric medications  Labs: reviewed    Counseling / Coordination of Care  Total floor / unit time spent today 15 minutes  Greater than 50% of total time was spent with the patient and / or family counseling and / or coordination of care   A description of the counseling / coordination of care:
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
## 2019-10-30 NOTE — PROGRESS NOTES
No further episode of agitation or anxiety   Patient observed sleeping during most Q 7 minute safety checks (second portion of shift)  No suicidal ideations, acting out or homicidal behaviors  No change in medical condition or complaints voiced  Fluids maintained at bedside to promote hydration

## 2019-10-31 ENCOUNTER — TELEPHONE (OUTPATIENT)
Dept: NEUROLOGY | Facility: CLINIC | Age: 39
End: 2019-10-31

## 2019-10-31 PROCEDURE — 99232 SBSQ HOSP IP/OBS MODERATE 35: CPT | Performed by: PSYCHIATRY & NEUROLOGY

## 2019-10-31 RX ADMIN — HYDROXYZINE HYDROCHLORIDE 50 MG: 25 TABLET ORAL at 22:47

## 2019-10-31 RX ADMIN — OLANZAPINE 10 MG: 10 TABLET, FILM COATED ORAL at 21:13

## 2019-10-31 RX ADMIN — GABAPENTIN 600 MG: 300 CAPSULE ORAL at 21:13

## 2019-10-31 RX ADMIN — GABAPENTIN 600 MG: 300 CAPSULE ORAL at 15:45

## 2019-10-31 RX ADMIN — GABAPENTIN 600 MG: 300 CAPSULE ORAL at 08:44

## 2019-10-31 RX ADMIN — TRAZODONE HYDROCHLORIDE 50 MG: 50 TABLET ORAL at 21:13

## 2019-10-31 RX ADMIN — NICOTINE 1 PATCH: 21 PATCH, EXTENDED RELEASE TRANSDERMAL at 08:48

## 2019-10-31 RX ADMIN — HYDROXYZINE HYDROCHLORIDE 50 MG: 25 TABLET ORAL at 15:45

## 2019-10-31 RX ADMIN — BENZTROPINE MESYLATE 1 MG: 1 TABLET ORAL at 08:45

## 2019-10-31 RX ADMIN — BENZTROPINE MESYLATE 1 MG: 1 TABLET ORAL at 17:45

## 2019-10-31 NOTE — PROGRESS NOTES
Patient c/o anxiety with a rating of 8/10; she appears to be visibly anxious  She is rocking in her seat while talking with this writer  Administered PRN Atarax as per order for mild anxiety  Will monitor for medication effectiveness

## 2019-10-31 NOTE — PROGRESS NOTES
Patient was able to return to sleep after receiving snack and fluids  No complaints voiced  No acute behaviors or agitation exhibited  Q7 minute safety checks in progress

## 2019-10-31 NOTE — PLAN OF CARE
Problem: DEPRESSION  Goal: Will be euthymic at discharge  Description  INTERVENTIONS:  - Administer medication as ordered  - Provide emotional support via 1:1 interaction with staff  - Encourage involvement in milieu/groups/activities  - Monitor for social isolation  Outcome: Progressing     Problem: PSYCHOSIS  Goal: Will report no hallucinations or delusions  Description  Interventions:  - Administer medication as  ordered  - Every waking shifts and PRN assess for the presence of hallucinations and or delusions  - Assist with reality testing to support increasing orientation  - Assess if patient's hallucinations or delusions are encouraging self-harm or harm to others and intervene as appropriate  Outcome: Progressing     Problem: ANXIETY  Goal: Will report anxiety at manageable levels  Description  INTERVENTIONS:  - Administer medication as ordered  - Teach and encourage coping skills  - Provide emotional support  - Assess patient/family for anxiety and ability to cope  Outcome: Progressing  Goal: By discharge: Patient will verbalize 2 strategies to deal with anxiety  Description  Interventions:  - Identify any obvious source/trigger to anxiety  - Staff will assist patient in applying identified coping technique/skills  - Encourage attendance of scheduled groups and activities  Outcome: Progressing     Problem: SUBSTANCE USE/ABUSE  Goal: Will have no detox symptoms and will verbalize plan for changing substance-related behavior  Description  INTERVENTIONS:  - Monitor physical status and assess for symptoms of withdrawal  - Administer medication as ordered  - Provide emotional support with 1 on 1 interaction with staff  - Encourage recovery focused program/ addiction education  - Assess for verbalization of changing behaviors related to substance abuse  - Initiate consults and referrals as appropriate (Case Management, Spiritual Care, etc )  Outcome: Progressing  Goal: By discharge, will develop insight into their chemical dependency and sustain motivation to continue in recovery  Description  INTERVENTIONS:  - Attends all daily group sessions and scheduled AA groups  - Actively practices coping skills through participation in the therapeutic community and adherence to program rules  - Reviews and completes assignments from individual treatment plan  - Assist patient development of understanding of their personal cycle of addiction and relapse triggers  Outcome: Progressing  Goal: By discharge, patient will have ongoing treatment plan addressing chemical dependency  Description  INTERVENTIONS:  - Assist patient with resources and/or appointments for ongoing recovery based living  Outcome: Progressing     Problem: Ineffective Coping  Goal: Participates in unit activities  Description  Interventions:  - Provide therapeutic environment   - Provide required programming   - Redirect inappropriate behaviors   Outcome: Progressing

## 2019-10-31 NOTE — PROGRESS NOTES
Patient C/O increased anxiety, requesting PRN Atarax, same given as ordered with positive effect  Remains on 7" checks for safety and behaviors

## 2019-10-31 NOTE — PROGRESS NOTES
Patient ambulated to the nurses station to request fluids and snack  Both were provided per patient request and then patient returned to her room

## 2019-10-31 NOTE — PROGRESS NOTES
Patient withdrawn to self, verbalizes needs  Pleasant and cooperative in conversation  Affect flat, appears depressed  Patient denies anxiety/depression, SI/HI, hallucinations however noted patient talking to self in room x 1  Remains medication compliant and on 7" checks for safety and behaviors

## 2019-10-31 NOTE — PROGRESS NOTES
Progress Note - Behavioral Health   Meredith Nieto 41 y o  female MRN: [id number]  Unit/Bed#: U 251-01 Encounter: [id number]    Assessment/Plan   Principal Problem:    Bipolar 1 disorder, depressed, severe (HCC)  Active Problems:    Tobacco abuse    Cocaine abuse ([address] )    Movement disorder    Chronic post-traumatic stress disorder (PTSD)        Behavior over the last 24 hours:  Unchanged  Patient again reports being ambivalent about going home and she does not feel safe to go home yet  She is also scared and anxious about the upcoming court hearing next week for the custody of her children  She does feel paranoid and does report having some hallucinations  She tends to keep to herself in her room most of the time  Sleep: hypersomnia  Appetite: poor  Medication side effects: No  ROS: no complaints    Mental Status Evaluation:  Appearance:  disheveled   Behavior:  guarded   Speech:  delayed   Mood:  irritable and labile   Affect:  increased in intensity   Thought Process:  circumstantial   Thought Content:  delusions  persecutory   Perceptual Disturbances: Auditory hallucinations without commands   Risk Potential: Suicidal Ideations without plan   Sensorium:  person and place   Cognition:  grossly intact   Consciousness:  awake    Attention: attention span appeared shorter than expected for age   Insight:  limited   Judgment: limited   Gait/Station: normal gait/station   Motor Activity: abnormal movement noted  chorea present     Progress Toward Goals: ongoing    Recommended Treatment: Continue with group therapy, milieu therapy and occupational therapy  Risks, benefits and possible side effects of Medications:   Risks, benefits, and possible side effects of medications explained to patient and patient verbalizes understanding  Medications: all current active meds have been reviewed  Labs: reviewed    Counseling / Coordination of Care  Total floor / unit time spent today 15 minutes   Greater than 50% of total time was spent with the patient and / or family counseling and / or coordination of care   A description of the counseling / coordination of care:

## 2019-10-31 NOTE — PROGRESS NOTES
Team Members Present:   MD Dixon Umanzor, ALLAN Preston, RN  Nathan Britt, ITALOW   Lynne Caraballo [place]    Still experiencing "crack dancing "  Wants DC on Friday

## 2019-10-31 NOTE — PROGRESS NOTES
Patient was visible in the community this evening; attending snack time  She is cooperative with this writer at time of assessment  Patient did receive one PRN dose of Atarax for anxiety which was minimally effective for her  She continues to have involuntary body movements  She denies depression, denies SI,  HI and hallucinations  She was medication compliant at ; requesting PRN Trazodone for insomnia which was administered as per order  Will monitor for medication effectiveness  Q7 minute safety checks in progress

## 2019-10-31 NOTE — PROGRESS NOTES
Pt continues to have an irritable mood  While obtaining her roommates blood work, pt was continuing to state things like "No " "I'm not getting anything done today " "Oh come on already, turn the light out " Pt continues to refuse labs  Will pass onto the next shift  Nurses made aware

## 2019-11-01 VITALS
DIASTOLIC BLOOD PRESSURE: 64 MMHG | TEMPERATURE: 97.6 F | RESPIRATION RATE: 16 BRPM | BODY MASS INDEX: 22.86 KG/M2 | HEIGHT: 63 IN | OXYGEN SATURATION: 93 % | SYSTOLIC BLOOD PRESSURE: 98 MMHG | WEIGHT: 129 LBS | HEART RATE: 62 BPM

## 2019-11-01 PROCEDURE — 99238 HOSP IP/OBS DSCHRG MGMT 30/<: CPT | Performed by: PSYCHIATRY & NEUROLOGY

## 2019-11-01 RX ORDER — GABAPENTIN 300 MG/1
600 CAPSULE ORAL 3 TIMES DAILY
Qty: 90 CAPSULE | Refills: 0 | Status: SHIPPED | OUTPATIENT
Start: 2019-11-01 | End: 2021-04-27 | Stop reason: HOSPADM

## 2019-11-01 RX ORDER — OLANZAPINE 10 MG/1
10 TABLET ORAL
Qty: 30 TABLET | Refills: 0 | Status: SHIPPED | OUTPATIENT
Start: 2019-11-01 | End: 2019-11-01

## 2019-11-01 RX ORDER — TRAZODONE HYDROCHLORIDE 50 MG/1
50 TABLET ORAL
Qty: 30 TABLET | Refills: 0 | Status: SHIPPED | OUTPATIENT
Start: 2019-11-01 | End: 2019-12-02 | Stop reason: HOSPADM

## 2019-11-01 RX ORDER — OLANZAPINE 10 MG/1
10 TABLET ORAL
Qty: 30 TABLET | Refills: 0 | Status: SHIPPED | OUTPATIENT
Start: 2019-11-01 | End: 2021-04-27 | Stop reason: HOSPADM

## 2019-11-01 RX ADMIN — BENZTROPINE MESYLATE 1 MG: 1 TABLET ORAL at 08:45

## 2019-11-01 RX ADMIN — HYDROXYZINE HYDROCHLORIDE 50 MG: 25 TABLET ORAL at 11:43

## 2019-11-01 RX ADMIN — GABAPENTIN 600 MG: 300 CAPSULE ORAL at 08:45

## 2019-11-01 NOTE — SOCIAL WORK
ANNABEL faxed pt's referral for [facility] services to [facility] regarding which [facility] confirmed receipt of same  ACT will contact pt within two to four weeks to schedule appointment with pt  ANNABEL faxed pt's referral to [facility] regarding which pt may to to complete assessment on 11/6/19 at 8:30, as confirmed by [facility] staff  ANNABEL scheduled pt's appointment with her PCP, Dr Ruben Mota, at [place] [address] Office for 11/12/19 at 9:45 am     ANNABEL met with pt who expressed liking for BHU at [facility] at which she feels safe, having stated that she feels well and ready for discharge about which she is nervous, as she has no family supports  She intends to go court and request a continuance of her case, as she feels she needs more time to prepare for requesting that she assume custody for her children in foster care  SW reviewed pt's aforementioned appointments that she reportedly intends to keep  Pt expressed appreciation for BHU services

## 2019-11-01 NOTE — PLAN OF CARE
Problem: DEPRESSION  Goal: Will be euthymic at discharge  Description  INTERVENTIONS:  - Administer medication as ordered  - Provide emotional support via 1:1 interaction with staff  - Encourage involvement in milieu/groups/activities  - Monitor for social isolation  Outcome: Completed     Problem: PSYCHOSIS  Goal: Will report no hallucinations or delusions  Description  Interventions:  - Administer medication as  ordered  - Every waking shifts and PRN assess for the presence of hallucinations and or delusions  - Assist with reality testing to support increasing orientation  - Assess if patient's hallucinations or delusions are encouraging self-harm or harm to others and intervene as appropriate  Outcome: Completed     Problem: ANXIETY  Goal: Will report anxiety at manageable levels  Description  INTERVENTIONS:  - Administer medication as ordered  - Teach and encourage coping skills  - Provide emotional support  - Assess patient/family for anxiety and ability to cope  Outcome: Completed  Goal: By discharge: Patient will verbalize 2 strategies to deal with anxiety  Description  Interventions:  - Identify any obvious source/trigger to anxiety  - Staff will assist patient in applying identified coping technique/skills  - Encourage attendance of scheduled groups and activities  Outcome: Completed     Problem: SUBSTANCE USE/ABUSE  Goal: Will have no detox symptoms and will verbalize plan for changing substance-related behavior  Description  INTERVENTIONS:  - Monitor physical status and assess for symptoms of withdrawal  - Administer medication as ordered  - Provide emotional support with 1 on 1 interaction with staff  - Encourage recovery focused program/ addiction education  - Assess for verbalization of changing behaviors related to substance abuse  - Initiate consults and referrals as appropriate (Case Management, Spiritual Care, etc )  Outcome: Completed  Goal: By discharge, will develop insight into their chemical dependency and sustain motivation to continue in recovery  Description  INTERVENTIONS:  - Attends all daily group sessions and scheduled AA groups  - Actively practices coping skills through participation in the therapeutic community and adherence to program rules  - Reviews and completes assignments from individual treatment plan  - Assist patient development of understanding of their personal cycle of addiction and relapse triggers  Outcome: Completed  Goal: By discharge, patient will have ongoing treatment plan addressing chemical dependency  Description  INTERVENTIONS:  - Assist patient with resources and/or appointments for ongoing recovery based living  Outcome: Completed     Problem: Ineffective Coping  Goal: Participates in unit activities  Description  Interventions:  - Provide therapeutic environment   - Provide required programming   - Redirect inappropriate behaviors   Outcome: Completed

## 2019-11-01 NOTE — PROGRESS NOTES
Pt continues to refuse blood work  Patient states "I am leaving today " Will pass onto the next shift  Nurses made aware

## 2019-11-01 NOTE — PROGRESS NOTES
Patient has been out pacing the halls talking and laughing to self  Denies SI/HI/AH/VH but appears internally preoccupied  Keeps to herself, does not interact with peers  States she is ready for d/c tomorrow  Pleasant during assessment  Will continue to observe

## 2019-11-01 NOTE — PROGRESS NOTES
11/01/19 0958   Team Meeting   Meeting Type Daily Rounds   Patient/Family Present   Patient Present No   Patient's Family Present No     Daily Psychiatric Rounds    Team Members Present:    MD Dixon Ibrahim [employer] [facility] [employer] [facility], [address] [place] [address], [facility] [place]  Amaris Bob [facility], [facility]  MARIA ELENA Mobley RN    Discussion:     Discharge home today   Has refused all labs, Discontinued

## 2019-11-01 NOTE — SOCIAL WORK
ANNABEL scheduled pt's appointment with therapist Joie Coyne at [facility] on 11/5/19 at 10 am regarding which ANNABEL informed pt who expressed intention to keep appointment

## 2019-11-01 NOTE — PROGRESS NOTES
Pt  Was d/c with own personal belongings  T-shirt-1, pair of socks-1, footwear, sweat pants-1, bra -1 and a spiral notebook,cell phone-1,lighter-1,set of keys-1  Valuables sent home with pt  - rings-6, bracelets-7, necklace-4, pendant (mom)-1, earring-1  Pt  Has NO other belongings or valuables  NO phone , NO money, NO credit cards, NO wallet or purse

## 2019-11-01 NOTE — DISCHARGE INSTRUCTIONS
Bipolar Disorder   WHAT YOU NEED TO KNOW:   Bipolar disorder is a long-term chemical imbalance that causes rapid changes in mood and behavior  High moods are called quentin  Low moods are called depression  Sometimes you will feel manic and sometimes you will feel depressed  You can have alternating episodes of quentin and depression  This is called a mixed bipolar state  DISCHARGE INSTRUCTIONS:   Call 911 if:   · You think about hurting yourself or someone else  Contact your healthcare provider or psychiatrist if:   · You are having trouble managing your bipolar disorder  · You cannot sleep, or are sleeping all the time  · You cannot eat, or are eating more than usual     · You feel dizzy or your stomach is upset  · You cannot make it to your next meeting  · You have questions or concerns about your condition or care  Medicines:   · Medicines  may be given to help keep your mood stable, or to help you sleep  Changes in medicine are often needed as your bipolar disorder changes  · Take your medicine as directed  Contact your healthcare provider if you think your medicine is not helping or if you have side effects  Tell him or her if you are allergic to any medicine  Keep a list of the medicines, vitamins, and herbs you take  Include the amounts, and when and why you take them  Bring the list or the pill bottles to follow-up visits  Carry your medicine list with you in case of an emergency  Follow up with your healthcare provider or psychiatrist as directed:  Write down your questions so you remember to ask them during your visits  Manage bipolar disorder:  Watch for triggers of bipolar disorder symptoms, such as stress  Learn new ways to relax, such as deep breathing, to manage your stress  Tell someone if you feel a manic or depressive period might be coming on  Ask a friend or family member to help watch you for bipolar symptoms   Work to develop skills that will help you manage bipolar disorder  You may need to make lifestyle changes  Ask your healthcare provider or psychiatrist for resources  For support and more information:   · [address] [facility]), [address] [facility], [address] [facility], [address]  [place] [address]   Phone: [phone]  Phone: [phone]  Web Address: Shameka tn  · Depression and [address] (DBS[employer])  [address] [place] [address], [facility] , [address]  Phone: [phone]  Web Address: [employer]  org   © 2017 [facility] Information is for End User's use only and may not be sold, redistributed or otherwise used for commercial purposes  All illustrations and images included in CareNotes® are the copyrighted property of A D A M , Inc  or Shravan Palacios  The above information is an  only  It is not intended as medical advice for individual conditions or treatments  Talk to your doctor, nurse or pharmacist before following any medical regimen to see if it is safe and effective for you  Cigarette Smoking and Your Health   WHAT YOU NEED TO KNOW:   What are the risks to my health if I smoke tobacco?  Nicotine and other chemicals found in tobacco damage every cell in your body  Even if you are a light smoker, you have an increased risk for cancer, heart disease, and lung disease  If you are pregnant or have diabetes, smoking increases your risk for complications  What are the benefits to my health if I stop smoking? · You decrease respiratory symptoms such as coughing, wheezing, and shortness of breath  · You reduce your risk for cancers of the lung, mouth, throat, kidney, bladder, pancreas, stomach, and cervix  If you already have cancer, you increase the benefits of chemotherapy  You also reduce your risk for cancer returning or a second cancer from developing       · You reduce your risk for heart disease, blood clots, heart attack, and stroke  · You reduce your risk for lung infections, and diseases such as pneumonia, asthma, chronic bronchitis, and emphysema  · Your circulation improves  More oxygen can be delivered to your body  If you have diabetes, you lower your risk for complications, such as kidney, artery, and eye diseases  You also lower your risk for nerve damage  Nerve damage can lead to amputations, poor vision, and blindness  · You improve your body's ability to heal and to fight infections  What are the health benefits to others if I stop smoking? Tobacco is harmful to nonsmokers who breathe in your secondhand smoke  The following are ways the health of others around you may improve when you stop smoking:  · You lower the risks for lung cancer and heart disease in nonsmoking adults  · If you are pregnant, you lower the risk for miscarriage, early delivery, low birth weight, and stillbirth  You also lower your baby's risk for SIDS, obesity, developmental delay, and neurobehavioral problems, such as ADHD  · If you have children, you lower their risk for ear infections, colds, pneumonia, bronchitis, and asthma  Where can I find more information and support to stop smoking? · [employer]  Phone: 8- [phone] - [phone]  Web Address: www Kids [employer]  CARE AGREEMENT:   You have the right to help plan your care  Learn about your health condition and how it may be treated  Discuss treatment options with your caregivers to decide what care you want to receive  You always have the right to refuse treatment  The above information is an  only  It is not intended as medical advice for individual conditions or treatments  Talk to your doctor, nurse or pharmacist before following any medical regimen to see if it is safe and effective for you    © 2017 [employer] Information is for End User's use only and may not be sold, redistributed or otherwise used for commercial purposes  All illustrations and images included in CareNotes® are the copyrighted property of A D A M , Inc  or Shravan Palacios  Cocaine Abuse   WHAT YOU NEED TO KNOW:   Cocaine abuse is a pattern of use that causes health or other problems  Abuse can include using large amounts of cocaine at one time or using it several times each day or week  You may start needing more cocaine to get the same feelings of happiness you got from lower amounts  You may have withdrawal symptoms if you have used cocaine for a long time and you suddenly use less or stop using it  DISCHARGE INSTRUCTIONS:   Seek care immediately if:   · You feel like hurting yourself or someone else  · You have a seizure  · You have a temperature over 101°F (38 3°C) after you use cocaine  · You cough or spit up blood  · You have severe abdominal pain  · You have a severe headache, confusion, or feel very nervous  · You have weakness on one side of your body  · You have chest pain, sweating, or shortness of breath  Contact your healthcare provider if:   · You feel you cannot cope with your problems  · You have questions or concerns about your condition or care  Signs of cocaine abuse:  Cocaine abuse may lead to problems being around others, doing your job, or new medical problems  You may have the following problems:  · Use of more cocaine than you first wanted to use     · No ability to decrease or control your use of cocaine    · Spending much of your time using cocaine, or dealing with a hangover after you use cocaine    · Less time spent around others, at work, or doing activities that you enjoy    · Continued cocaine use, even when it causes physical or mental problems  How cocaine may affect your baby:   · Cocaine may harm your unborn baby's brain, heart, stomach, and bowels  It also increases your risk of a miscarriage, early delivery, or stillbirth   Cocaine can cause long-term medical problems for your baby  · Your baby may go through withdrawal after he is born  He may have seizures, problems waking, or feeding  He may overreact to sounds or movement by violently jerking or jumping  He may vomit or have diarrhea  He may have learning difficulties or other behavior problems when he gets older  Signs and symptoms of cocaine withdrawal:  Withdrawal happens when you have used cocaine for a long period of time, and you suddenly take less or stop taking it  Symptoms may begin within a few hours after you decrease or stop taking cocaine and may include the following:  · Severe sadness or fatigue    · Restlessness, nervousness, or anxiety    · Nausea or vomiting    · Trouble sleeping or difficulty waking up    · Unpleasant dreams that seem real    · Seeing, hearing, or feeling things that are not really there     · Sweating, shaking, or a fast heartbeat    · Seizure  Follow up with your healthcare provider as directed:  Write down your questions so you remember to ask them during your visits  For support and more information:   · Substance Abuse and [address] , [address] [place]  Web Address: https://TempMine/  · [facility] on Drug Abuse  [address] [place] [address]  Phone: 3- 044 - [phone]  Web Address: www sacha nih gov  © 2017 [address] [place] [address] Street is for End User's use only and may not be sold, redistributed or otherwise used for commercial purposes  All illustrations and images included in CareNotes® are the copyrighted property of A D A M , Inc  or Shravan Palacios  The above information is an  only  It is not intended as medical advice for individual conditions or treatments  Talk to your doctor, nurse or pharmacist before following any medical regimen to see if it is safe and effective for you      Extrapyramidal Symptoms   WHAT YOU NEED TO KNOW: Extrapyramidal symptoms (EPS) are side effects of antipsychotic medicines  EPS can cause movement and muscle control problems throughout your body  DISCHARGE INSTRUCTIONS:   Common symptoms:  Symptoms may be noticed after you take one dose of medicine or after long-term use  You may not be aware of these symptoms, but others close to you may notice any of the following:  · Restlessness and nervous energy shown by pacing, marching, shuffling, or foot-tapping    · Severe, uncontrolled muscle contractions of your head, neck, trunk, and limbs that cause stiff tongue, twisted neck, and back arching    · Tremors, stiff posture, or no arm movement when you walk    · Uncontrolled movements of your tongue, jaw, lips, or face, such as pursing, chewing, or frequent eye blinking    · Uncontrolled movements of your fingers or toes, head nodding, or pelvic thrusting    · Fast, irregular breathing with grunts, gasping, or sighing    · Weak voice, drooling, or little or no facial expression  If you or someone close to you notices symptoms:   · Do not stop taking your medicines  unless your healthcare provider says it is okay  · Contact your healthcare provider  · Take a list of your medicines with you to your appointment  © 2017 [address]  Information is for End User's use only and may not be sold, redistributed or otherwise used for commercial purposes  All illustrations and images included in CareNotes® are the copyrighted property of A D A M , Inc  or Shravan Palacios  The above information is an  only  It is not intended as medical advice for individual conditions or treatments  Talk to your doctor, nurse or pharmacist before following any medical regimen to see if it is safe and effective for you  How to Stop Smoking   WHAT YOU NEED TO KNOW:   You will improve your health and the health of others around you if you stop smoking   Your risk for heart and lung disease, cancer, stroke, heart attack, and vision problems will also decrease  You can benefit from quitting no matter how long you have smoked  DISCHARGE INSTRUCTIONS:   Prepare to stop smoking:  Nicotine is a highly addictive drug found in cigarettes  Withdrawal symptoms can happen when you stop smoking and make it hard to quit  These include anxiety, depression, irritability, trouble sleeping, and increased appetite  You increase your chances of success if you prepare to quit  · Set a quit date  Sondra Wilde a date that is within the next 2 weeks  Do not pick a day that you think may be stressful or busy  Write down the day or [place] it on your calender  · Tell friends and family that you plan to quit  Explain that you may have withdrawal symptoms when you try to quit  Ask them to support you  They may be able to encourage you and help reduce your stress to make it easier for you to quit  · Make a list of your reasons for quitting  Put the list somewhere you will see it every day, such as your refrigerator  You can look at the list when you have a craving  · Remove all tobacco and nicotine products from your home, car, and workplace  Also, remove anything else that will tempt you to smoke, such as lighters, matches, or ashtrays  Clean your car, home, and places at work that smell like smoke  The smell of smoke can trigger a craving  · Identify triggers that make you want to smoke  This may include activities, feelings, or people  Also write down 1 way you can deal with each of your triggers  For example, if you want to smoke as soon as you wake up, plan another activity during this time, such as exercise  · Make a plan for how you will quit  Learn about the tools that can help you quit, such as medicine, counseling, or nicotine replacement therapy  Choose at least 2 options to help you quit    Tools to help you stop smoking:   · Counseling  from a trained healthcare provider can provide you with support and skills to quit smoking  The provider will also teach you to manage your withdrawal symptoms and cravings  You may receive counseling from one counselor, in group therapy, or through phone therapy called a quit line  · Nicotine replacement therapy (NRT)  such as nicotine patches, gum, or lozenges may help reduce your nicotine cravings  You may get these without a doctor's order  Do not use e-cigarettes or smokeless tobacco in place of cigarettes or to help you quit  They still contain nicotine  · Prescription medicines  such as nasal sprays or nicotine inhalers may help reduce your withdrawal symptoms  Other medicines may also be used to reduce your urge to smoke  Ask your healthcare provider about these medicines  You may need to start certain medicines 2 weeks before your quit date for them to work well  · Hypnosis  is a practice that helps guide you through thoughts and feelings  Hypnosis may help decrease your cravings and make you more willing to quit  · Acupuncture therapy  uses very thin needles to balance energy channels in the body  This is thought to help decrease cravings and symptoms of nicotine withdrawal      · Support groups  let you talk to others who are trying to quit or have already quit  It may be helpful to speak with others about how they quit  Manage your cravings:   · Avoid situations, people, and places that tempt you to smoke  Go to nonsmoking places, such as libraries or restaurants  Understand what tempts you and try to avoid these things  · Keep your hands busy  Hold things such as a stress ball or pen  · Put candy or toothpicks in your mouth  Keep lollipops, sugarless gum, or toothpicks with you at all times  · Do not have alcohol or caffeine  These drinks may tempt you to smoke  Drink healthy liquids such as water or juice instead  · Reward yourself when you resist your cravings  Rewards will motivate you and help you stay positive       · Do an activity that distracts you from your craving  Examples include going for a walk, exercising, or cleaning  Prevent weight gain after you quit:  You may gain a few pounds after you quit smoking  It is healthier for you to gain a few pounds than to continue to smoke  The following can help you prevent weight gain:  · Eat healthy foods  These include fruits, vegetables, whole-grain breads, low-fat dairy products, beans, lean meats, and fish  Eat healthy snacks, such as low-fat yogurt, if you get hungry between meals  · Drink water before, during, and between meals  This will make your stomach feel full and help prevent you from overeating  Ask your healthcare provider how much liquid to drink each day and which liquids are best for you  · Exercise  Take a walk or do some kind of exercise every day  Ask your healthcare provider what exercise is right for you  This may help reduce your cravings and reduce stress  For support and more information:   · [employer]  Phone: 1- 798 - 988-4165  Web Address: www [facility] [facility]  © 2017 [address]  Information is for End User's use only and may not be sold, redistributed or otherwise used for commercial purposes  All illustrations and images included in CareNotes® are the copyrighted property of A D A M , Inc  or Shravan Palacios  The above information is an  only  It is not intended as medical advice for individual conditions or treatments  Talk to your doctor, nurse or pharmacist before following any medical regimen to see if it is safe and effective for you  Post Traumatic Stress Disorder   WHAT YOU NEED TO KNOW:   Post traumatic stress disorder (PTSD) is a condition that may occur after you have experienced a traumatic situation or event  This event may have caused you to feel intense fear, pain, or sorrow  You may think you are going to get hurt or die  You may also continue to feel helpless after the event   These feelings affect your daily activities and relationships  DISCHARGE INSTRUCTIONS:   Medicine:   · Antianxiety medicine: This medicine may be given to decrease anxiety and help you feel calm and relaxed  · Antidepressants: These medicines decrease or stop the symptoms of depression  · Sedative: This medicine is given to help you stay calm and relaxed  · Take your medicine as directed  Contact your healthcare provider if you think your medicine is not helping or if you have side effects  Tell him or her if you are allergic to any medicine  Keep a list of the medicines, vitamins, and herbs you take  Include the amounts, and when and why you take them  Bring the list or the pill bottles to follow-up visits  Carry your medicine list with you in case of an emergency  Follow up with your healthcare provider as directed:  Write down your questions so you remember to ask them during your visits  Self-care:   · Attend therapy sessions as directed: It is normal to have doubts or feel discomfort with your therapy  Tell your healthcare providers if you are uncomfortable or have questions about your therapies  · Get emotional support:  Spend time with family and friends and share your feelings with someone you trust  This extra support may help you feel better  · Get regular exercise:  Exercise may help to decrease stress  Ask your healthcare provider about the best exercise plan for you  For support and more information:   · [facility] for [address] [facility] Traumatic Stress Disorder  Phone: 3- 849 - [phone]  Web Address: [email or web address] [email or web address]  va gov/  · [address] [facility], Public Information & Communication Branch  [address] [place] [address] W, [address], [address]  [place] [address]   Phone: [phone]  Phone: [phone]  Web Address: Shameka michelle  Contact your healthcare provider if:   · You cannot make it to your next appointment      · You cannot sleep or are sleeping too much  · You have questions or concerns about your condition or care  Seek care immediately or call 911 if:   · You think about hurting or killing yourself or someone else  © 2017 [address] Information is for End User's use only and may not be sold, redistributed or otherwise used for commercial purposes  All illustrations and images included in CareNotes® are the copyrighted property of A D A M , Inc  or Shravan Palacios  The above information is an  only  It is not intended as medical advice for individual conditions or treatments  Talk to your doctor, nurse or pharmacist before following any medical regimen to see if it is safe and effective for you  Olanzapine (By mouth)   Olanzapine ([id number])  Treats psychotic mental disorders, such as schizophrenia or bipolar disorder (manic-depressive illness)  Brand Name(s): ZyPREXA, ZyPREXA Zydis   There may be other brand names for this medicine  When This Medicine Should Not Be Used: This medicine is not right for everyone  Do not use it if you had an allergic reaction to olanzapine  How to Use This Medicine:   Tablet, Dissolving Tablet  · Take your medicine as directed  Your dose may need to be changed several times to find what works best for you  · Make sure your hands are dry before you handle the disintegrating tablet  Peel back the foil from the blister pack, then remove the tablet  Do not push the tablet through the foil  Place the tablet in your mouth  After it has melted, swallow or take a drink of water  · This medicine should come with a Medication Guide  Ask your pharmacist for a copy if you do not have one  · Missed dose: Take a dose as soon as you remember  If it is almost time for your next dose, wait until then and take a regular dose  Do not take extra medicine to make up for a missed dose    · Store the medicine in a closed container at room temperature, away from heat, moisture, and direct light  Keep the disintegrating tablet in the original package until you are ready to use it  Drugs and Foods to Avoid:   Ask your doctor or pharmacist before using any other medicine, including over-the-counter medicines, vitamins, and herbal products  · You must be careful if you are also using other medicine that might cause similar side effects as olanzapine  Make sure your doctor knows about all other medicines you are using  · Some medicines can affect how olanzapine works  Tell your doctor if you are using any of the following:  ¨ Carbamazepine, diazepam, fluoxetine, fluvoxamine, levodopa, omeprazole, or rifampin  ¨ Blood pressure medicine  c  · Tell your doctor if you use anything else that makes you sleepy  Some examples are allergy medicine, narcotic pain medicine, and alcohol  · Do not drink alcohol while you are using this medicine  · Tell your doctor if you smoke tobacco  You might need a different amount of this medicine if you smoke  Warnings While Using This Medicine:   · Tell your doctor if you are pregnant, or if you have kidney disease, liver disease, diabetes, glaucoma, prostate problems, or a history of breast cancer, neuroleptic malignant syndrome (NMS), seizures, or severe constipation  Tell your doctor if you have any kind of heart or circulation problems, including low blood pressure, heart failure, heart rhythm problems, or a history of a heart attack or stroke  Tell your doctor if you have a condition called phenylketonuria  · Do not breastfeed while you are using this medicine  · For some children, teenagers, and young adults, this medicine may increase mental or emotional problems  This may lead to thoughts of suicide and violence  Talk with your doctor right away if you have any thoughts or behavior changes that concern you  Tell your doctor if you or anyone in your family has a history of bipolar disorder or suicide attempts    · This medicine may cause the following problems:  ¨ Neuroleptic malignant syndrome (a nerve and muscle problem)  ¨ Drug reaction with eosinophilia and systemic symptoms (DRESS)  ¨ High blood sugar, cholesterol, or triglyceride levels  ¨ Tardive dyskinesia (a muscle problem that may become permanent)  · This medicine may make you dizzy or drowsy, or may cause trouble with thinking or controlling body movements, which may lead to falls, fractures or other injuries  Do not drive or do anything that could be dangerous until you know how this medicine affects you  You may also feel lightheaded when getting up suddenly from a lying or sitting position, so stand up slowly  · This medicine may make you bleed, bruise, or get infections more easily  Take precautions to prevent illness and injury  Wash your hands often  · This medicine may make it more difficult for your body to cool down  Be careful to not become overheated during exercise or hot weather, because you could have heat stroke  · Your doctor will do lab tests at regular visits to check on the effects of this medicine  Keep all appointments  · Keep all medicine out of the reach of children  Never share your medicine with anyone    Possible Side Effects While Using This Medicine:   Call your doctor right away if you notice any of these side effects:  · Allergic reaction: Itching or hives, swelling in your face or hands, swelling or tingling in your mouth or throat, chest tightness, trouble breathing  · Eye pain, trouble seeing  · Feeling very thirsty or hungry, change in how much or how often you urinate  · Fever, chills, cough, sore throat, body aches  · Jerky muscle movement you cannot control (often in your face, tongue, or jaw)  · Lightheadedness, dizziness, fainting  · Seizures or tremors  · Sweating, confusion, uneven heartbeat, muscle stiffness  · Swollen breasts, or liquid discharge from your nipples (men or women)  · Swollen, painful, or tender lymph glands in neck, armpit, or groin  · Trouble breathing or swallowing  · Unusual behavior, thoughts of hurting yourself or others  · Unusual bleeding, bruising, or weakness  If you notice these less serious side effects, talk with your doctor:   · Constipation, upset stomach  · Dry mouth  · Headache, tiredness  · Sleepiness or unusual drowsiness  · Weight gain  If you notice other side effects that you think are caused by this medicine, tell your doctor  Call your doctor for medical advice about side effects  You may report side effects to FDA at 1-420-WYF-9394  © 2017 [facility] Information is for End User's use only and may not be sold, redistributed or otherwise used for commercial purposes  The above information is an  only  It is not intended as medical advice for individual conditions or treatments  Talk to your doctor, nurse or pharmacist before following any medical regimen to see if it is safe and effective for you  Trazodone (By mouth)   Trazodone (TRAZ-oh-done)  Treats depression  Brand Name(s): Oleptro   There may be other brand names for this medicine  When This Medicine Should Not Be Used: This medicine is not right for everyone  Do not use it if you had an allergic reaction to trazodone  How to Use This Medicine:   Tablet, Long Acting Tablet  · Take your medicine as directed  Your dose may need to be changed several times to find what works best for you  · Regular tablet: Take it with or shortly after a meal or light snack  · Extended-release tablet: Take it at the same time each day, preferably at bedtime, without food  · The tablet can be swallowed whole, or you may break the tablet in half along the score line  Do not break the tablet unless your doctor tells you to  Do not crush or chew the tablet  · This medicine should come with a Medication Guide  Ask your pharmacist for a copy if you do not have one  · Missed dose: Take a dose as soon as you remember   If it is almost time for your next dose, wait until then and take a regular dose  Do not take extra medicine to make up for a missed dose  · Store the medicine in a closed container at room temperature, away from heat, moisture, and direct light  Drugs and Foods to Avoid:   Ask your doctor or pharmacist before using any other medicine, including over-the-counter medicines, vitamins, and herbal products  · Do not use trazodone if you currently take an MAO inhibitor (MAOI) or have used an MAOI in the past 14 days  · Tell your doctor if you also use any of the following:  ¨ Carbamazepine, digoxin, phenytoin, indinavir, ritonavir, buspirone, fentanyl, lithium, tryptophan, Robert's wort, tramadol  ¨ Medicine to treat a fungal infection (such as itraconazole, ketoconazole), a diuretic (water pill), blood pressure medicine, an NSAID pain or arthritis medicine (such as aspirin, celecoxib, diclofenac, ibuprofen, naproxen), a blood thinner (such as warfarin), other medicine for depression, or triptan medicine to treat migraine headaches  · Do not drink alcohol while you are using this medicine  · Tell your doctor if you use anything else that makes you sleepy  Some examples are allergy medicine, narcotic pain medicine, and alcohol  Warnings While Using This Medicine:   · Tell your doctor if you are pregnant or breastfeeding, or if you have kidney disease, liver disease, bleeding problems, glaucoma, heart disease, heart rhythm problems, or low blood pressure  Tell your doctor if you recently had a heart attack  · For some children, teenagers, and young adults, this medicine may increase mental or emotional problems  This may lead to thoughts of suicide and violence  Talk with your doctor right away if you have any thoughts or behavior changes that concern you  Tell your doctor if you or anyone in your family has a history of bipolar disorder or suicide attempts    · This medicine may cause the following problems:  ¨ Serotonin syndrome (more likely when used with certain other medicines)  ¨ Heart rhythm problems (QT prolongation)  ¨ Low sodium levels  ¨ Higher risk of bleeding  · Do not stop using this medicine suddenly  Your doctor will need to slowly decrease your dose before you stop it completely  · This medicine may make you dizzy or drowsy  Do not drive or do anything that could be dangerous until you know how this medicine affects you  Stand or sit up slowly if you are dizzy  · Tell any doctor or dentist who treats you that you are using this medicine  You may need to stop using this medicine several days before you have surgery or medical tests  · Your doctor will check your progress and the effects of this medicine at regular visits  Keep all appointments  · Keep all medicine out of the reach of children  Never share your medicine with anyone  Possible Side Effects While Using This Medicine:   Call your doctor right away if you notice any of these side effects:  · Allergic reaction: Itching or hives, swelling in your face or hands, swelling or tingling in your mouth or throat, chest tightness, trouble breathing  · Anxiety, restlessness, fever, sweating, muscle spasms, nausea, vomiting, diarrhea, seeing or hearing things that are not there  · Confusion, weakness, muscle twitching  · Fast, pounding, or uneven heartbeat  · Lightheadedness, dizziness, fainting  · Painful, prolonged erection of your penis  · Sudden increase in energy, feeling irritable, trouble sleeping  · Thoughts of hurting yourself or others, unusual behavior  · Unusual bleeding or bruising  If you notice these less serious side effects, talk with your doctor:   · Constipation, mild nausea  · Dry mouth  · Eye pain, vision changes, seeing halos around lights  · Headache  · Sleepiness or unusual drowsiness  If you notice other side effects that you think are caused by this medicine, tell your doctor  Call your doctor for medical advice about side effects   You may report side effects to FDA at 2-395-FDA-8558  © 2017 [address] Information is for End User's use only and may not be sold, redistributed or otherwise used for commercial purposes  The above information is an  only  It is not intended as medical advice for individual conditions or treatments  Talk to your doctor, nurse or pharmacist before following any medical regimen to see if it is safe and effective for you  Gabapentin (By mouth)   Gabapentin (mary ann-a-PEN-tin)  Treats seizures and pain caused by shingles  Brand Name(s): ACTIVE-PAC with Gabapentin, Convenience Erick, Cyclo/Fallon 10/300 Pack, FusePaq Fanatrex, Fallon-V, Gralise, [address] Pack, Neurontin, SmartRx Fallon Kit   There may be other brand names for this medicine  When This Medicine Should Not Be Used: This medicine is not right for everyone  Do not use it if you had an allergic reaction to gabapentin  How to Use This Medicine:   Capsule, Liquid, Tablet  · Take your medicine as directed  Your dose may need to be changed several times to find what works best for you  If you have epilepsy, do not allow more than 12 hours to pass between doses  · Capsule: Swallow the capsule whole with plenty of water  Do not open, crush, or chew it  · Gralise® tablet: Swallow the tablet whole   Do not crush, break, or chew it  · Neurontin® tablet: If you break a tablet into 2 pieces, use the second half as your next dose  If you don't use it within 28 days, throw it away  · Measure the oral liquid medicine with a marked measuring spoon, oral syringe, or medicine cup  · This medicine should come with a Medication Guide  Ask your pharmacist for a copy if you do not have one  · Missed dose: Take a dose as soon as you remember  If it is almost time for your next dose, wait until then and take a regular dose  Do not take extra medicine to make up for a missed dose    · Store the medicine in a closed container at room temperature, away from heat, moisture, and direct light  Store the Neurontin® oral liquid in the refrigerator  Do not freeze  Drugs and Foods to Avoid:   Ask your doctor or pharmacist before using any other medicine, including over-the-counter medicines, vitamins, and herbal products  · Some medicines can affect how gabapentin works  Tell your doctor if you also use any of the following:   ¨ Hydrocodone  ¨ Morphine  · If you take an antacid, wait at least 2 hours before you take gabapentin  · Tell your doctor if you use anything else that makes you sleepy  Some examples are allergy medicine, narcotic pain medicine, and alcohol  Warnings While Using This Medicine:   · Tell your doctor if you are pregnant or breastfeeding, or if you have kidney problems or are receiving dialysis  Tell your doctor if you have a history of depression or mental health problems  · This medicine may increase depression or thoughts of suicide  Tell your doctor right away if you start to feel more depressed or think about hurting yourself  · This medicine may cause a serious allergic reaction called multiorgan hypersensitivity, which can damage organs and be life-threatening  · Do not stop using this medicine suddenly  Your doctor will need to slowly decrease your dose before you stop it completely  If you take this medicine to prevent seizures, your seizures may return or occur more often if you stop this medicine suddenly  · This medicine may make you dizzy or drowsy  Do not drive or do anything else that could be dangerous until you know how this medicine affects you  · Tell any doctor or dentist who treats you that you are using this medicine  This medicine may affect certain medical test results  · Your doctor will check your progress and the effects of this medicine at regular visits  Keep all appointments  · Keep all medicine out of the reach of children  Never share your medicine with anyone    Possible Side Effects While Using This Medicine: Call your doctor right away if you notice any of these side effects:  · Allergic reaction: Itching or hives, swelling in your face or hands, swelling or tingling in your mouth or throat, chest tightness, trouble breathing  · Behavior problems, aggression, restlessness, trouble concentrating, moodiness (especially in children)  · Blistering, peeling, red skin rash  · Change in how much or how often you urinate, bloody or cloudy urine,  · Chest pain, fast heartbeat, trouble breathing  · Dark urine or pale stools, nausea, vomiting, loss of appetite, stomach pain, yellow skin or eyes  · Fever, rash, swollen or tender glands in the neck, armpit, or groin  · Problems with coordination, shakiness, unsteadiness  · Rapid weight gain, swelling in your hands, ankles, or feet  · Unusual moods or behaviors, thoughts of hurting yourself, feeling depressed  If you notice these less serious side effects, talk with your doctor:   · Dizziness, drowsiness, sleepiness, tiredness  If you notice other side effects that you think are caused by this medicine, tell your doctor  Call your doctor for medical advice about side effects  You may report side effects to FDA at 0-394-FDA-5936  © 2017 [address] Information is for End User's use only and may not be sold, redistributed or otherwise used for commercial purposes  The above information is an  only  It is not intended as medical advice for individual conditions or treatments  Talk to your doctor, nurse or pharmacist before following any medical regimen to see if it is safe and effective for you

## 2019-11-01 NOTE — BH TRANSITION RECORD
Contact Information: If you have any questions, concerns, pended studies, tests and/or procedures, or emergencies regarding your inpatient behavioral health visit  Please contact [facility] behavioral health unit [phone] and ask to speak to a , nurse or physician  A contact is available 24 hours/ 7 days a week at this number  Summary of Procedures Performed During your Stay:  Below is a list of major procedures performed during your hospital stay and a summary of results:  - No major procedures performed  Pending Studies (From admission, onward)    None        If studies are pending at discharge, follow up with your PCP and/or referring provider

## 2019-11-01 NOTE — PROGRESS NOTES
DC instructions reviewed; pt verbalized understanding  Rxs given and belongings returned  Pt picked up by cab provided by hospital to return home

## 2019-11-01 NOTE — PROGRESS NOTES
AT met with PT to complete relapse prevention and discharge planning  PT described her warning signs to indicate that she might need to reach out for help as when her life gets out of control, running out of medications, thoughts to harm herself and racing thoughts  PT was unable to identify any positive support system  PT was able to identify sleeping, eating and taking walks as her healthy coping skills of choice

## 2019-11-01 NOTE — PROGRESS NOTES
Patient given trazodone with hs meds not effective, still up pacing the unit talking to self  Requested something for anxiety and sleep  Atarax 50 mg given  Will monitor effects

## 2019-11-01 NOTE — PROGRESS NOTES
Pt minimally visible on unit  Received Atarax PRN due to anxiety about being DC'd today  Pt continues with restless agitation  Denies SI, HI, A/T/V  Pt states she feels ready to go  Compliant with meals and meds  Monitoring continues
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
## 2019-12-01 ENCOUNTER — HOSPITAL ENCOUNTER (EMERGENCY)
Facility: HOSPITAL | Age: 39
End: 2019-12-02
Attending: EMERGENCY MEDICINE | Admitting: EMERGENCY MEDICINE
Payer: COMMERCIAL

## 2019-12-01 DIAGNOSIS — R45.851 DEPRESSION WITH SUICIDAL IDEATION: Primary | ICD-10-CM

## 2019-12-01 DIAGNOSIS — F41.9 ANXIETY: ICD-10-CM

## 2019-12-01 DIAGNOSIS — F32.A DEPRESSION WITH SUICIDAL IDEATION: Primary | ICD-10-CM

## 2019-12-01 LAB
AMPHETAMINES SERPL QL SCN: NEGATIVE
BARBITURATES UR QL: NEGATIVE
BENZODIAZ UR QL: NEGATIVE
COCAINE UR QL: POSITIVE
ETHANOL EXG-MCNC: 0 MG/DL
EXT PREG TEST URINE: NEGATIVE
EXT. CONTROL ED NAV: NORMAL
METHADONE UR QL: NEGATIVE
OPIATES UR QL SCN: NEGATIVE
PCP UR QL: NEGATIVE
THC UR QL: POSITIVE

## 2019-12-01 PROCEDURE — 99285 EMERGENCY DEPT VISIT HI MDM: CPT

## 2019-12-01 PROCEDURE — 82075 ASSAY OF BREATH ETHANOL: CPT | Performed by: EMERGENCY MEDICINE

## 2019-12-01 PROCEDURE — 96372 THER/PROPH/DIAG INJ SC/IM: CPT

## 2019-12-01 PROCEDURE — 99283 EMERGENCY DEPT VISIT LOW MDM: CPT | Performed by: EMERGENCY MEDICINE

## 2019-12-01 PROCEDURE — 81025 URINE PREGNANCY TEST: CPT | Performed by: EMERGENCY MEDICINE

## 2019-12-01 PROCEDURE — 80307 DRUG TEST PRSMV CHEM ANLYZR: CPT | Performed by: EMERGENCY MEDICINE

## 2019-12-01 RX ORDER — LORAZEPAM 0.5 MG/1
1 TABLET ORAL ONCE
Status: COMPLETED | OUTPATIENT
Start: 2019-12-01 | End: 2019-12-01

## 2019-12-01 RX ORDER — OLANZAPINE 10 MG/1
10 INJECTION, POWDER, LYOPHILIZED, FOR SOLUTION INTRAMUSCULAR ONCE
Status: COMPLETED | OUTPATIENT
Start: 2019-12-01 | End: 2019-12-01

## 2019-12-01 RX ADMIN — LORAZEPAM 1 MG: 0.5 TABLET ORAL at 20:57

## 2019-12-01 RX ADMIN — WATER 2.1 ML: 1 INJECTION INTRAMUSCULAR; INTRAVENOUS; SUBCUTANEOUS at 22:19

## 2019-12-01 RX ADMIN — OLANZAPINE 10 MG: 10 INJECTION, POWDER, LYOPHILIZED, FOR SOLUTION INTRAMUSCULAR at 22:19

## 2019-12-01 NOTE — LETTER
Section I - General Information    Name of Patient: Jenae Carranza                 : 1980    Medicare #:_______n/a_____________  Transport Date: 19 (PCS is valid for round trips on this date and for all repetitive trips in the 60-day range as noted below )  Origin: [place]                                                         Destination:___[facility] _____________________________________________  Is the pt's stay covered under Medicare Part A (PPS/DRG)     (_) YES  (xx_) NO  Closest appropriate facility?  (xx_) YES  (_) NO  If no, why is transport to more distant facility required?________________________  If hosp-hosp transfer, describe services needed at 2nd facility not available at 1st facility? _________________________________  If hospice pt, is this transport related to pt's terminal illness? (_) YES (xx_) NO Describe____________________________________    Section II - Medical Necessity Questionnaire  Ambulance transportation is medically necessary only if other means of transport are contraindicated or would be potentially harmful to the patient  To meet this requirement, the patient must either be "bed confined" or suffer from a condition such that transport by means other than ambulance is contraindicated by the patient's condition   The following questions must be answered by the medical professional signing below for this form to be valid:    1)  Describe the MEDICAL CONDITION (physical and/or mental) of this patient AT [address] [place] [address] that requires the patient to be transported in an ambulance and why transport by other means is contraindicated by the patient's condition:__________schizoaffective bipolar ________________________________________________________________________________________    2) Is the patient "bed confined" as defined below?     (_) YES  (xx_) NO  To be "be confined" the patient must satisfy all three of the following conditions: (1) unable to get up from bed without Assistance; AND (2) unable to ambulate; AND (3) unable to sit in a chair or wheelchair  3) Can this patient safely be transported by car or wheelchair Zaki Inman (i e , seated during transport without a medical attendant or monitoring)?   (_) YES  (xx_) NO    4) In addition to completing questions 1-3 above, please check any of the following conditions that apply*:  *Note: supporting documentation for any boxes checked must be maintained in the patient's medical records  (_)Contractures   (_)Non-Healed Fractures  (_)Patient is confused (_)Patient is comatose (_)Moderate/severe pain on movement (_)Danger to self/others  (_)IV meds/fluids required (_)Patient is combative(_)Need or possible need for restraints (_)DVT requires elevation of lower extremity  (xx_)Medical attendant required (_)Requires oxygen-unable to self administer (_)Special handling/isolation/infection control precautions required (_)Unable to tolerate seated position for time needed to transport (_)Hemodynamic monitoring required en route (_)Unable to sit in a chair or wheelchair due to decubitus ulcers or other wounds (_)Cardiac monitoring required en route (_)Morbid obesity requires additional personnel/equipment to safely handle patient (_)Orthopedic device (backboard, halo, pins, traction, brace, wedge, etc,) requiring special handling during transport (_)Other(specify)_______________________________________________    Section III - Signature of Physician or Healthcare Professional  I certify that the above information is true and correct based on my evaluation of this patient, and represent that the patient requires transport by ambulance and that other forms of transport are contraindicated   I understand that this information will be used by the Centers for Medicare and Medicaid Services (CMS) to support the determination of medical necessity for ambulance services, and I represent that I have personal knowledge of the patient's condition at time of transport  (_) If this box is checked, I also certify that the patient is physically or mentally incapable of signing the ambulance service's claim and that the institution with which I am affiliated has furnished care, services, or assistance to the patient  My signature below is made on behalf of the patient pursuant to 42 CFR §424 36(b)(4)  In accordance with 42 CFR §424 37, the specific reason(s) that the patient is physically or mentally incapable of signing the claim form is as follows: _________________________________________________________________________________________________________      Signature of Physician* or Healthcare Professional______________________________________________________________  Signature Date 12/02/19 (For scheduled repetitive transports, this form is not valid for transports performed more than 60 days after this date)    Printed Name & Credentials of Physician or Healthcare Professional (MD, DO, RN, etc )________________________________  *Form must be signed by patient's attending physician for scheduled, repetitive transports   For non-repetitive, unscheduled ambulance transports, if unable to obtain the signature of the attending physician, any of the following may sign (choose appropriate option below)  (_) Physician Assistant (_)  Clinical Nurse Specialist (_)  Registered Nurse  (_)  Nurse Practitioner  (_) Discharge Planner

## 2019-12-01 NOTE — LETTER
[facility] EMERGENCY DEPARTMENT  [address] [facility] [address]  [phone]  Dept: 628 \[facility] [place] [facility]\"" TRANSFER CONSENT    NAME Jenae Dillon GODOY 1980                              MRN [id number]    I have been informed of my rights regarding examination, treatment, and transfer   by Dr Franca Jay DO    Benefits:   increased depression with suicidal thoughts      Risks:     delay in care   { ED EMTALA TRANSFER CHOICES:[id number]}    I authorize the performance of emergency medical procedures and treatments upon me in both transit and upon arrival at the receiving facility  Additionally, I authorize the release of any and all medical records to the receiving facility and request they be transported with me, if possible  I understand that the safest mode of transportation during a medical emergency is an ambulance and that the Hospital advocates the use of this mode of transport  Risks of traveling to the receiving facility by car, including absence of medical control, life sustaining equipment, such as oxygen, and medical personnel has been explained to me and I fully understand them  (BERNABE CORRECT BOX BELOW)  [ xx ]  I consent to the stated transfer and to be transported by ambulance/helicopter  [  ]  I consent to the stated transfer, but refuse transportation by ambulance and accept full responsibility for my transportation by car    I understand the risks of non-ambulance transfers and I exonerate the Hospital and its staff from any deterioration in my condition that results from this refusal     X___________________________________________    DATE  19  TIME________  Signature of patient or legally responsible individual signing on patient behalf           RELATIONSHIP TO PATIENT_______self__________________          Provider Certification    NAME Jenae GODOY 1980                              MRN [id number]    A medical screening exam was performed on the above named patient  Based on the examination:    Condition Necessitating Transfer The primary encounter diagnosis was Depression with suicidal ideation  A diagnosis of Anxiety was also pertinent to this visit  Patient Condition:   schizoaffective disorder depression  Reason for Transfer:  in need of inpatient mental health treatment     Transfer Requirements: [address]   · Space available and qualified personnel available for treatment as acknowledged by   Dr Kasie Royal   ·   · Agreed to accept transfer and to provide appropriate medical treatment as acknowledged by        ap  · Appropriate medical records of the examination and treatment of the patient are provided at the time of transfer   [facility], [address] ___ap____  · Transfer will be performed by qualified personnel from     [facility]  and appropriate transfer equipment as required, including the use of necessary and appropriate life support measures  Provider Certification: I have examined the patient and explained the following risks and benefits of being transferred/refusing transfer to the patient/family:         Based on these reasonable risks and benefits to the patient and/or the unborn child(nasima), and based upon the information available at the time of the patients examination, I certify that the medical benefits reasonably to be expected from the provision of appropriate medical treatments at another medical facility outweigh the increasing risks, if any, to the individuals medical condition, and in the case of labor to the unborn child, from effecting the transfer      X____________________________________________ DATE 12/02/19        TIME_______      ORIGINAL - SEND TO MEDICAL RECORDS   COPY - SEND WITH PATIENT DURING TRANSFER

## 2019-12-02 VITALS
HEIGHT: 62 IN | HEART RATE: 85 BPM | TEMPERATURE: 98.2 F | DIASTOLIC BLOOD PRESSURE: 50 MMHG | BODY MASS INDEX: 23 KG/M2 | SYSTOLIC BLOOD PRESSURE: 102 MMHG | WEIGHT: 125 LBS | OXYGEN SATURATION: 99 % | RESPIRATION RATE: 18 BRPM

## 2019-12-02 NOTE — ED NOTES
Pt awake, used the BR, given snacks, drink, warm blankets  Pt asked for the time and is currently resting comfortably in room       [facility]  12/02/19 5579

## 2019-12-02 NOTE — ED CARE HANDOFF
Emergency Department Sign Out Note        Sign out and transfer of care from Dr Lanre Albrecht  See Separate Emergency Department note  The patient, Wallace Murillo, was evaluated by the previous provider for mental health  Workup Completed:  Physical exam, labs, crisis evaluation    ED Course / Workup Pending (followup): Patient signed 12, awaiting bed placement, calm and cooperative with no acute events over night Will continue to monitor patient safety until bed can be obtained and transport provided  Procedures  MDM    Disposition  Final diagnoses:   Depression with suicidal ideation   Anxiety     Time reflects when diagnosis was documented in both MDM as applicable and the Disposition within this note     Time User Action Codes Description Comment    12/1/2019 11:00 PM Genny Hsieh Add [F32 9,  H25 277] Depression with suicidal ideation     12/1/2019 11:00 PM Genny Hsieh Add [F41 9] Anxiety       ED Disposition     ED Disposition Condition Date/Time Comment    Transfer to [facility] Dec 1, 2019 11:00 PM Wallace Murillo should be transferred out to Inpatient psychiatric facility and has been medically cleared  Follow-up Information    None       Patient's Medications   Discharge Prescriptions    No medications on file     No discharge procedures on file         ED Provider  Electronically Signed by     Noman Adan DO  12/02/19 4385

## 2019-12-02 NOTE — ED NOTES
Patient noted to be sleeping upon entering room - no apparent discomfort or distress  Adequate respirations  1:1 continues at bedside       Kirill Villafana RN  12/01/19 4212

## 2019-12-02 NOTE — ED NOTES
Patient is presently awake - offers no complaints except that she is hungry and wants snacks - ting crackers and gingerale with cranberry juice given to patient  Patient cooperative  Has some ocassional jerky like movements (hemargarito fuentes's)       Rhonda Hemphill RN  12/02/19 9460

## 2019-12-02 NOTE — ED NOTES
Suicide Check - Patient Location: In room  Patient Activity: Sleeping   Safety - Precautions: Patient safety  General Attitude: Cooperative  Level of Consciousness: Sleeping  Interventions: ID band on; Side rails up x2  Visual Checks: Visual/constant observation   Sitter - Sitter: Continued  Sitter Type: Staff  Staff name: EMELIA Gamble RN  12/02/19 8836

## 2019-12-02 NOTE — ED NOTES
Patient came to the ED tonight as she has been off of all of her medications for close to a month and has been using crack cocaine and marijuana to medicate  She was unable to tell me the amount she uses on a regular basis for either drug  She told me that she has multiple personalities and they are taking over and she just can't take it anymore  She is feeling suicidal with a plan to lock herself in her car in her garage and run the car to kill herself by carbon monoxide poisoning  She denies any HI, delusions or paranoia  She reported that her sleep and appetite are poor  She sleeps about 2-3 hours a night due to racing thoughts so she struggles to fall asleep and then stay asleep  She has had multiple hospitalizations and her most recent was the end of October this year  She is seeking inpatient hospitalization and she signed a 201

## 2019-12-02 NOTE — ED NOTES
EVS (Eligibility Verification System)  Automated system indicates:     PROMISe: [address]

## 2019-12-02 NOTE — ED NOTES
Patient signed a 12 and referral sent to [address] [place] [address] Court as they both had beds

## 2019-12-02 NOTE — ED NOTES
Insurance Authorization for admission:   Phone call placed to Sp Martinez   Phone number:[phone]    Spoke to [place]   3 days approved  Level of care: inpatient admission   Review on  Wed 12/4  Authorization # to be given on admission         EVS (Eligibility Verification System) called - [phone]  Automated system indicates:active     Insurance Authorization for Transportation:    Phone call placed to [facility]  Phone number [phone] to  [place]     Authorization #: [id number]

## 2019-12-02 NOTE — ED NOTES
[facility] to transport pt   All valuables and belongings given for transport       Jovana Dodson RN  12/02/19 8216

## 2019-12-02 NOTE — ED NOTES
Patient is accepted at [facility]  Patient is accepted by Dr Seema Vela per [id number] [facility] is arranged with TBD  Transportation is scheduled for TBD  Patient may go to the floor at any time  Transport to be set up once weather clears  Transportation currently grounded

## 2019-12-02 NOTE — ED PROVIDER NOTES
History  Chief Complaint   Patient presents with    Psychiatric Evaluation     "I am off of my medications, I am having suicidal thoughts, and I am having real bad pannick attacks "     HPI    This is a very pleasant 26-year-old female presents to the emergency department a longstanding history of having substance abuse and psychiatric challenges  Patient admits that she has been off her medications is having suicidal thoughts  Patient stated that she has increasing and becoming anxious over life events  Patient is tearful and anxious during initial evaluation  Patient reports that she does have suicidal ideations specifically she was going to locked herself in a crush internal cardiac kill herself by carbon monoxide poisoning  Prior to Admission Medications   Prescriptions Last Dose Informant Patient Reported? Taking?    OLANZapine (ZyPREXA) 10 mg tablet   No No   Sig: Take 1 tablet (10 mg total) by mouth daily at bedtime   benztropine (COGENTIN) 1 mg tablet   No No   Sig: Take 1 tablet (1 mg total) by mouth 2 (two) times a day   ferrous sulfate 325 (65 Fe) mg tablet   No No   Sig: Take 1 tablet (325 mg total) by mouth daily with breakfast   gabapentin (NEURONTIN) 300 mg capsule   No No   Sig: Take 2 capsules (600 mg total) by mouth 3 (three) times a day   traZODone (DESYREL) 50 mg tablet   No No   Sig: Take 1 tablet (50 mg total) by mouth daily at bedtime      Facility-Administered Medications: None       Past Medical History:   Diagnosis Date    Addiction to drug ([facility] )     Anemia     Anxiety     Depression     Drug abuse (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 )     Paranoia (psychosis) ([facility] )     Psychiatric disorder     Psychiatric illness     PTSD (post-traumatic stress disorder)     Schizo-affective type schizophrenia, chronic state ([facility] )     Self-injurious behavior     Suicidal behavior     Suicide attempt ([facility] )     Withdrawal symptoms, drug or narcotic ([facility] )        Past Surgical History:   Procedure Laterality Date     SECTION         Family History   Problem Relation Age of Onset    No Known Problems Mother     No Known Problems Father      I have reviewed and agree with the history as documented  Social History     Tobacco Use    Smoking status: Current Every Day Smoker     Packs/day: 0 50     Years: 15 00     Pack years: 7 50     Types: Cigarettes    Smokeless tobacco: Never Used   Substance Use Topics    Alcohol use: Yes     Frequency: 2-4 times a month     Drinks per session: 3 or 4     Binge frequency: Less than monthly     Comment: occasional; not to point of intoxication; BAT=0 006    Drug use: Yes     Frequency: 5 0 times per week     Types: "Crack" cocaine, Cocaine     Comment: relapse yesterday        Review of Systems   Constitutional: Negative  HENT: Negative  Eyes: Negative  Respiratory: Negative  Cardiovascular: Negative  Gastrointestinal: Negative  Endocrine: Negative  Genitourinary: Negative  Musculoskeletal: Negative  Skin: Negative  Allergic/Immunologic: Negative  Neurological: Negative  Hematological: Negative  Psychiatric/Behavioral: Positive for agitation and suicidal ideas  The patient is nervous/anxious and is hyperactive  Physical Exam  Physical Exam   Constitutional: She is oriented to person, place, and time  She appears well-developed and well-nourished  HENT:   Head: Normocephalic and atraumatic  Right Ear: External ear normal    Left Ear: External ear normal    Nose: Nose normal    Mouth/Throat: Oropharynx is clear and moist    Eyes: Pupils are equal, round, and reactive to light  Conjunctivae and EOM are normal    Neck: Normal range of motion  Neck supple  Cardiovascular: Normal rate, regular rhythm, normal heart sounds and intact distal pulses  Pulmonary/Chest: Effort normal and breath sounds normal    Abdominal: Soft   Bowel sounds are normal    Genitourinary:   Genitourinary Comments: Exam deferred  Musculoskeletal: Normal range of motion  Neurological: She is alert and oriented to person, place, and time  Skin: Skin is warm and dry  Capillary refill takes less than 2 seconds  Psychiatric: Judgment normal  Her mood appears anxious  Her speech is rapid and/or pressured  She is hyperactive  Cognition and memory are normal  She exhibits a depressed mood  She expresses suicidal ideation  She expresses suicidal plans  She is inattentive  Nursing note and vitals reviewed  Vital Signs  ED Triage Vitals [12/01/19 1938]   Temperature Pulse Respirations Blood Pressure SpO2   98 2 °F (36 8 °C) 101 20 124/82 99 %      Temp Source Heart Rate Source Patient Position - Orthostatic VS BP Location FiO2 (%)   Tympanic Monitor Sitting Left arm --      Pain Score       No Pain           Vitals:    12/01/19 1938   BP: 124/82   Pulse: 101   Patient Position - Orthostatic VS: Sitting         Visual Acuity      ED Medications  Medications   LORazepam (ATIVAN) tablet 1 mg (1 mg Oral Given 12/1/19 2057)   OLANZapine (ZyPREXA) IM injection 10 mg (10 mg Intramuscular Given 12/1/19 2219)   sterile water injection **ADS Override Pull** (2 1 mL  Given 12/1/19 2219)       Diagnostic Studies  Results Reviewed     Procedure Component Value Units Date/Time    Rapid drug screen, urine [[id number]]  (Abnormal) Collected:  12/01/19 2050    Lab Status:  Final result Specimen:  Urine, Clean Catch Updated:  12/01/19 2157     Amph/Meth UR Negative     Barbiturate Ur Negative     Benzodiazepine Urine Negative     Cocaine Urine Positive     Methadone Urine Negative     Opiate Urine Negative     PCP Ur Negative     THC Urine Positive    Narrative:       Presumptive report  If requested, specimen will be sent to reference lab for confirmation  FOR MEDICAL PURPOSES ONLY  IF CONFIRMATION NEEDED PLEASE CONTACT THE LAB WITHIN 5 DAYS      Drug Screen Cutoff Levels:  AMPHETAMINE/METHAMPHETAMINES  1000 ng/mL  BARBITURATES     200 ng/mL  BENZODIAZEPINES     200 ng/mL  COCAINE      300 ng/mL  METHADONE      300 ng/mL  OPIATES      300 ng/mL  PHENCYCLIDINE     25 ng/mL  THC       50 ng/mL      POCT pregnancy, urine [[id number]]  (Normal) Resulted:  12/01/19 2050    Lab Status:  Final result Updated:  12/01/19 2058     EXT PREG TEST UR (Ref: Negative) negative     Control valid    POCT alcohol breath test [[id number]]  (Normal) Resulted:  12/01/19 2004    Lab Status:  Final result Updated:  12/01/19 2004     EXTBreath Alcohol 0 00                 No orders to display              Procedures  Procedures       ED Course                               MDM  Number of Diagnoses or Management Options  Anxiety:   Depression with suicidal ideation:   Diagnosis management comments: This is a very pleasant 51-year-old female presents to the emergency department with increased anxiety and inability to cope with typical life stressors  Patient is suicidal with a specific highly lethal plan of time bicarb monoxide poisoning  Patient at times during the emergency department visit started act out and become increasingly anxious and screaming and crying  Patient required have Zyprexa and Ativan  Patient is willing to sign a 201  Portions of the record may have been created with voice recognition software  Occasional wrong word or "sound a like" substitutions may have occurred due to the inherent limitations of voice recognition software  Read the chart carefully and recognize, using context, where substitutions have occurred          Amount and/or Complexity of Data Reviewed  Clinical lab tests: ordered  Tests in the medicine section of CPT®: ordered        Disposition  Final diagnoses:   Depression with suicidal ideation   Anxiety     Time reflects when diagnosis was documented in both MDM as applicable and the Disposition within this note     Time User Action Codes Description Comment    12/1/2019 11:00 PM Abdullahi Grant [F32 9,  R45 131] Depression with suicidal ideation     12/1/2019 11:00 PM Marilu Vickers, [address] [F41 9] Anxiety       ED Disposition     ED Disposition Condition Date/Time Comment    Transfer to [facility] Dec 1, 2019 11:00 PM Jenae Rota should be transferred out to Inpatient psychiatric facility and has been medically cleared  Follow-up Information    None         Patient's Medications   Discharge Prescriptions    No medications on file     No discharge procedures on file      ED Provider  Electronically Signed by           Himanshu Allen III, DO  12/01/19 1958

## 2020-12-06 ENCOUNTER — HOSPITAL ENCOUNTER (EMERGENCY)
Facility: HOSPITAL | Age: 40
End: 2020-12-07
Attending: EMERGENCY MEDICINE | Admitting: EMERGENCY MEDICINE
Payer: COMMERCIAL

## 2020-12-06 DIAGNOSIS — F19.10 SUBSTANCE ABUSE (HCC): ICD-10-CM

## 2020-12-06 DIAGNOSIS — R45.851 SUICIDAL IDEATIONS: ICD-10-CM

## 2020-12-06 DIAGNOSIS — F32.A DEPRESSION: Primary | ICD-10-CM

## 2020-12-06 LAB
AMPHETAMINES SERPL QL SCN: NEGATIVE
BARBITURATES UR QL: NEGATIVE
BENZODIAZ UR QL: NEGATIVE
COCAINE UR QL: POSITIVE
ETHANOL EXG-MCNC: 0 MG/DL
FLUAV RNA RESP QL NAA+PROBE: NEGATIVE
FLUBV RNA RESP QL NAA+PROBE: NEGATIVE
METHADONE UR QL: NEGATIVE
OPIATES UR QL SCN: NEGATIVE
OXYCODONE+OXYMORPHONE UR QL SCN: NEGATIVE
PCP UR QL: NEGATIVE
RSV RNA RESP QL NAA+PROBE: NEGATIVE
SARS-COV-2 RNA RESP QL NAA+PROBE: NEGATIVE
THC UR QL: NEGATIVE

## 2020-12-06 PROCEDURE — 99285 EMERGENCY DEPT VISIT HI MDM: CPT

## 2020-12-06 PROCEDURE — 82075 ASSAY OF BREATH ETHANOL: CPT | Performed by: EMERGENCY MEDICINE

## 2020-12-06 PROCEDURE — 80307 DRUG TEST PRSMV CHEM ANLYZR: CPT | Performed by: EMERGENCY MEDICINE

## 2020-12-06 PROCEDURE — 99283 EMERGENCY DEPT VISIT LOW MDM: CPT | Performed by: EMERGENCY MEDICINE

## 2020-12-06 PROCEDURE — NC001 PR NO CHARGE: Performed by: EMERGENCY MEDICINE

## 2020-12-06 PROCEDURE — 0241U HB NFCT DS VIR RESP RNA 4 TRGT: CPT | Performed by: EMERGENCY MEDICINE

## 2020-12-06 RX ORDER — IBUPROFEN 400 MG/1
400 TABLET ORAL EVERY 8 HOURS PRN
Status: DISCONTINUED | OUTPATIENT
Start: 2020-12-06 | End: 2020-12-07

## 2020-12-06 RX ORDER — ACETAMINOPHEN 325 MG/1
650 TABLET ORAL EVERY 8 HOURS PRN
Status: DISCONTINUED | OUTPATIENT
Start: 2020-12-06 | End: 2020-12-07 | Stop reason: HOSPADM

## 2020-12-06 RX ORDER — LORAZEPAM 0.5 MG/1
1 TABLET ORAL ONCE
Status: COMPLETED | OUTPATIENT
Start: 2020-12-06 | End: 2020-12-06

## 2020-12-06 RX ORDER — CLONAZEPAM 0.5 MG/1
2 TABLET ORAL ONCE
Status: COMPLETED | OUTPATIENT
Start: 2020-12-06 | End: 2020-12-06

## 2020-12-06 RX ORDER — LORAZEPAM 0.5 MG/1
1 TABLET ORAL EVERY 8 HOURS PRN
Status: DISCONTINUED | OUTPATIENT
Start: 2020-12-06 | End: 2020-12-07 | Stop reason: HOSPADM

## 2020-12-06 RX ADMIN — CLONAZEPAM 2 MG: 0.5 TABLET ORAL at 17:43

## 2020-12-06 RX ADMIN — LORAZEPAM 1 MG: 0.5 TABLET ORAL at 16:16

## 2020-12-07 VITALS
RESPIRATION RATE: 16 BRPM | DIASTOLIC BLOOD PRESSURE: 67 MMHG | OXYGEN SATURATION: 98 % | BODY MASS INDEX: 26.69 KG/M2 | SYSTOLIC BLOOD PRESSURE: 123 MMHG | TEMPERATURE: 98.6 F | HEART RATE: 96 BPM | WEIGHT: 145.94 LBS

## 2020-12-07 RX ORDER — LORAZEPAM 0.5 MG/1
0.5 TABLET ORAL ONCE
Status: COMPLETED | OUTPATIENT
Start: 2020-12-07 | End: 2020-12-07

## 2020-12-07 RX ORDER — DIPHENHYDRAMINE HCL 25 MG
25 TABLET ORAL ONCE
Status: COMPLETED | OUTPATIENT
Start: 2020-12-07 | End: 2020-12-07

## 2020-12-07 RX ORDER — IBUPROFEN 400 MG/1
400 TABLET ORAL EVERY 8 HOURS PRN
Status: DISCONTINUED | OUTPATIENT
Start: 2020-12-07 | End: 2020-12-07 | Stop reason: HOSPADM

## 2020-12-07 RX ADMIN — DIPHENHYDRAMINE HCL 25 MG: 25 TABLET ORAL at 07:56

## 2020-12-07 RX ADMIN — LORAZEPAM 1 MG: 0.5 TABLET ORAL at 06:58

## 2020-12-07 RX ADMIN — LORAZEPAM 0.5 MG: 0.5 TABLET ORAL at 07:56

## 2021-04-07 ENCOUNTER — HOSPITAL ENCOUNTER (INPATIENT)
Facility: HOSPITAL | Age: 41
LOS: 20 days | Discharge: HOME | DRG: 753 | End: 2021-04-27
Attending: STUDENT IN AN ORGANIZED HEALTH CARE EDUCATION/TRAINING PROGRAM | Admitting: PSYCHIATRY & NEUROLOGY
Payer: COMMERCIAL

## 2021-04-07 ENCOUNTER — HOSPITAL ENCOUNTER (EMERGENCY)
Facility: HOSPITAL | Age: 41
End: 2021-04-07
Attending: EMERGENCY MEDICINE | Admitting: EMERGENCY MEDICINE
Payer: COMMERCIAL

## 2021-04-07 VITALS
RESPIRATION RATE: 18 BRPM | WEIGHT: 127.43 LBS | BODY MASS INDEX: 23.31 KG/M2 | DIASTOLIC BLOOD PRESSURE: 98 MMHG | SYSTOLIC BLOOD PRESSURE: 140 MMHG | OXYGEN SATURATION: 98 % | HEART RATE: 91 BPM | TEMPERATURE: 98.3 F

## 2021-04-07 DIAGNOSIS — Z00.8 MEDICAL CLEARANCE FOR PSYCHIATRIC ADMISSION: ICD-10-CM

## 2021-04-07 DIAGNOSIS — E73.9 LACTOSE INTOLERANCE: ICD-10-CM

## 2021-04-07 DIAGNOSIS — G47.00 INSOMNIA: Primary | ICD-10-CM

## 2021-04-07 DIAGNOSIS — F31.4 BIPOLAR 1 DISORDER, DEPRESSED, SEVERE (HCC): Primary | ICD-10-CM

## 2021-04-07 DIAGNOSIS — F19.10 DRUG ABUSE (HCC): ICD-10-CM

## 2021-04-07 DIAGNOSIS — F41.9 ANXIETY: ICD-10-CM

## 2021-04-07 DIAGNOSIS — F31.4 BIPOLAR 1 DISORDER, DEPRESSED, SEVERE (HCC): ICD-10-CM

## 2021-04-07 LAB
AMPHETAMINES SERPL QL SCN: NEGATIVE
BARBITURATES UR QL: NEGATIVE
BENZODIAZ UR QL: NEGATIVE
COCAINE UR QL: POSITIVE
ETHANOL EXG-MCNC: 0 MG/DL
EXT PREG TEST URINE: NEGATIVE
EXT. CONTROL ED NAV: NORMAL
FLUAV RNA RESP QL NAA+PROBE: NEGATIVE
FLUBV RNA RESP QL NAA+PROBE: NEGATIVE
METHADONE UR QL: NEGATIVE
OPIATES UR QL SCN: NEGATIVE
OXYCODONE+OXYMORPHONE UR QL SCN: NEGATIVE
PCP UR QL: NEGATIVE
RSV RNA RESP QL NAA+PROBE: NEGATIVE
SARS-COV-2 RNA RESP QL NAA+PROBE: NEGATIVE
THC UR QL: POSITIVE

## 2021-04-07 PROCEDURE — 82075 ASSAY OF BREATH ETHANOL: CPT | Performed by: EMERGENCY MEDICINE

## 2021-04-07 PROCEDURE — 81025 URINE PREGNANCY TEST: CPT | Performed by: EMERGENCY MEDICINE

## 2021-04-07 PROCEDURE — 0241U HB NFCT DS VIR RESP RNA 4 TRGT: CPT | Performed by: EMERGENCY MEDICINE

## 2021-04-07 PROCEDURE — 80307 DRUG TEST PRSMV CHEM ANLYZR: CPT | Performed by: EMERGENCY MEDICINE

## 2021-04-07 PROCEDURE — 99285 EMERGENCY DEPT VISIT HI MDM: CPT | Performed by: PHYSICIAN ASSISTANT

## 2021-04-07 PROCEDURE — 99285 EMERGENCY DEPT VISIT HI MDM: CPT

## 2021-04-07 RX ORDER — LORAZEPAM 2 MG/ML
1 INJECTION INTRAMUSCULAR EVERY 4 HOURS PRN
Status: CANCELLED | OUTPATIENT
Start: 2021-04-07

## 2021-04-07 RX ORDER — ACETAMINOPHEN 325 MG/1
650 TABLET ORAL EVERY 4 HOURS PRN
Status: CANCELLED | OUTPATIENT
Start: 2021-04-07

## 2021-04-07 RX ORDER — OLANZAPINE 10 MG/1
10 INJECTION, POWDER, LYOPHILIZED, FOR SOLUTION INTRAMUSCULAR
Status: CANCELLED | OUTPATIENT
Start: 2021-04-07

## 2021-04-07 RX ORDER — NICOTINE 21 MG/24HR
1 PATCH, TRANSDERMAL 24 HOURS TRANSDERMAL DAILY
Status: CANCELLED | OUTPATIENT
Start: 2021-04-08

## 2021-04-07 RX ORDER — HYDROXYZINE HYDROCHLORIDE 25 MG/1
50 TABLET, FILM COATED ORAL
Status: CANCELLED | OUTPATIENT
Start: 2021-04-07

## 2021-04-07 RX ORDER — OLANZAPINE 10 MG/1
5 INJECTION, POWDER, LYOPHILIZED, FOR SOLUTION INTRAMUSCULAR
Status: CANCELLED | OUTPATIENT
Start: 2021-04-07

## 2021-04-07 RX ORDER — ACETAMINOPHEN 325 MG/1
975 TABLET ORAL EVERY 6 HOURS PRN
Status: CANCELLED | OUTPATIENT
Start: 2021-04-07

## 2021-04-07 RX ORDER — OLANZAPINE 5 MG/1
2.5 TABLET ORAL
Status: CANCELLED | OUTPATIENT
Start: 2021-04-07

## 2021-04-07 RX ORDER — AMOXICILLIN 250 MG
1 CAPSULE ORAL DAILY PRN
Status: CANCELLED | OUTPATIENT
Start: 2021-04-07

## 2021-04-07 RX ORDER — LANOLIN ALCOHOL/MO/W.PET/CERES
3 CREAM (GRAM) TOPICAL
Status: CANCELLED | OUTPATIENT
Start: 2021-04-07

## 2021-04-07 RX ORDER — TRAZODONE HYDROCHLORIDE 50 MG/1
50 TABLET ORAL
Status: CANCELLED | OUTPATIENT
Start: 2021-04-07

## 2021-04-07 RX ORDER — LORAZEPAM 1 MG/1
1 TABLET ORAL
Status: CANCELLED | OUTPATIENT
Start: 2021-04-07

## 2021-04-07 RX ORDER — LORAZEPAM 2 MG/ML
2 INJECTION INTRAMUSCULAR
Status: CANCELLED | OUTPATIENT
Start: 2021-04-07

## 2021-04-07 RX ORDER — HYDROXYZINE HYDROCHLORIDE 25 MG/1
25 TABLET, FILM COATED ORAL
Status: CANCELLED | OUTPATIENT
Start: 2021-04-07

## 2021-04-07 RX ORDER — ACETAMINOPHEN 325 MG/1
650 TABLET ORAL EVERY 6 HOURS PRN
Status: CANCELLED | OUTPATIENT
Start: 2021-04-07

## 2021-04-07 RX ORDER — OLANZAPINE 5 MG/1
5 TABLET ORAL
Status: CANCELLED | OUTPATIENT
Start: 2021-04-07

## 2021-04-07 RX ORDER — OLANZAPINE 5 MG/1
10 TABLET ORAL
Status: CANCELLED | OUTPATIENT
Start: 2021-04-07
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
## 2021-04-07 NOTE — LETTER
[facility] [address]  [address] [facility] [address]  Dept: [phone]      EMTALA TRANSFER CONSENT    NAME Michelle Cox                                         1980                              MRN [id number]    I have been informed of my rights regarding examination, treatment, and transfer   by Dr Ruslan Randolph DO    Benefits: Continuity of care    Risks: Other: (Include comment)__________________________(Behavioral Health Patient)      Transfer Request   I acknowledge that my medical condition has been evaluated and explained to me by the emergency department physician or other qualified medical person and/or my attending physician who has recommended and offered to me further medical examination and treatment  I understand the Hospital's obligation with respect to the treatment and stabilization of my emergency medical condition  I nevertheless request to be transferred  I release the Hospital, the doctor, and any other persons caring for me from all responsibility or liability for any injury or ill effects that may result from my transfer and agree to accept all responsibility for the consequences of my choice to transfer, rather than receive stabilizing treatment at the Hospital  I understand that because the transfer is my request, my insurance may not provide reimbursement for the services  The Hospital will assist and direct me and my family in how to make arrangements for transfer, but the hospital is not liable for any fees charged by the transport service  In spite of this understanding, I refuse to consent to further medical examination and treatment which has been offered to me, and request transfer to  [facility] Name, [address] [facility] [address] [place] [address]  I authorize the performance of emergency medical procedures and treatments upon me in both transit and upon arrival at the receiving facility    Additionally, I authorize the release of any and all medical records to the receiving facility and request they be transported with me, if possible  I authorize the performance of emergency medical procedures and treatments upon me in both transit and upon arrival at the receiving facility  Additionally, I authorize the release of any and all medical records to the receiving facility and request they be transported with me, if possible  I understand that the safest mode of transportation during a medical emergency is an ambulance and that the Hospital advocates the use of this mode of transport  Risks of traveling to the receiving facility by car, including absence of medical control, life sustaining equipment, such as oxygen, and medical personnel has been explained to me and I fully understand them  (BERNABE CORRECT BOX BELOW)  [x]  I consent to the stated transfer and to be transported by ambulance/helicopter  [  ]  I consent to the stated transfer, but refuse transportation by ambulance and accept full responsibility for my transportation by car  I understand the risks of non-ambulance transfers and I exonerate the Hospital and its staff from any deterioration in my condition that results from this refusal     X___________________________________________    DATE  21  TIME________  Signature of patient or legally responsible individual signing on patient behalf           RELATIONSHIP TO PATIENT_________________________          Provider Certification    NAME Katia Morning                                         1980                              MRN [id number]    A medical screening exam was performed on the above named patient  Based on the examination:    Condition Necessitating Transfer The primary encounter diagnosis was Bipolar 1 disorder, depressed, severe ([address] )  Diagnoses of Drug abuse ([facility] [address] ) and Medical clearance for psychiatric admission were also pertinent to this visit      Patient Condition: The patient has been stabilized such that within reasonable medical probability, no material deterioration of the patient condition or the condition of the unborn child(nasima) is likely to result from the transfer    Reason for Transfer: Level of Care needed not available at this facility, No bed available at level of patient's needs    Transfer Requirements: [address] [employer] [address] [place], [place]   · Space available and qualified personnel available for treatment as acknowledged by Shawna Bonilla [phone]  · Agreed to accept transfer and to provide appropriate medical treatment as acknowledged by       Dr Cammy Prakash  · Appropriate medical records of the examination and treatment of the patient are provided at the time of transfer   [address] [facility], [address] _______  · Transfer will be performed by qualified personnel from [facility]  and appropriate transfer equipment as required, including the use of necessary and appropriate life support measures      Provider Certification: I have examined the patient and explained the following risks and benefits of being transferred/refusing transfer to the patient/family:  The patient is stable for psychiatric transfer because they are medically stable, and is protected from harming him/herself or others during transport, General risk, such as traffic hazards, adverse weather conditions, rough terrain or turbulence, possible failure of equipment (including vehicle or aircraft), or consequences of actions of persons outside the control of the transport personnel      Based on these reasonable risks and benefits to the patient and/or the unborn child(nasima), and based upon the information available at the time of the patients examination, I certify that the medical benefits reasonably to be expected from the provision of appropriate medical treatments at another medical facility outweigh the increasing risks, if any, to the individuals medical condition, and in the case of labor to the unborn child, from effecting the transfer      X____________________________________________ DATE 04/07/21        TIME_______      ORIGINAL - SEND TO MEDICAL RECORDS   COPY - SEND WITH PATIENT DURING TRANSFER

## 2021-04-08 PROCEDURE — 93005 ELECTROCARDIOGRAM TRACING: CPT

## 2021-04-08 PROCEDURE — 99222 1ST HOSP IP/OBS MODERATE 55: CPT | Performed by: STUDENT IN AN ORGANIZED HEALTH CARE EDUCATION/TRAINING PROGRAM

## 2021-04-08 RX ORDER — NICOTINE 21 MG/24HR
1 PATCH, TRANSDERMAL 24 HOURS TRANSDERMAL DAILY
Status: DISCONTINUED | OUTPATIENT
Start: 2021-04-08 | End: 2021-04-27 | Stop reason: HOSPADM

## 2021-04-08 RX ORDER — ACETAMINOPHEN 325 MG/1
975 TABLET ORAL EVERY 6 HOURS PRN
Status: DISCONTINUED | OUTPATIENT
Start: 2021-04-08 | End: 2021-04-27 | Stop reason: HOSPADM

## 2021-04-08 RX ORDER — OLANZAPINE 10 MG/1
10 TABLET ORAL
Status: DISCONTINUED | OUTPATIENT
Start: 2021-04-08 | End: 2021-04-27 | Stop reason: HOSPADM

## 2021-04-08 RX ORDER — LORAZEPAM 1 MG/1
1 TABLET ORAL
Status: DISCONTINUED | OUTPATIENT
Start: 2021-04-08 | End: 2021-04-27 | Stop reason: HOSPADM

## 2021-04-08 RX ORDER — OLANZAPINE 5 MG/1
5 TABLET ORAL
Status: DISCONTINUED | OUTPATIENT
Start: 2021-04-08 | End: 2021-04-27 | Stop reason: HOSPADM

## 2021-04-08 RX ORDER — HYDROXYZINE 50 MG/1
50 TABLET, FILM COATED ORAL
Status: DISCONTINUED | OUTPATIENT
Start: 2021-04-08 | End: 2021-04-27 | Stop reason: HOSPADM

## 2021-04-08 RX ORDER — OLANZAPINE 10 MG/1
5 INJECTION, POWDER, LYOPHILIZED, FOR SOLUTION INTRAMUSCULAR
Status: DISCONTINUED | OUTPATIENT
Start: 2021-04-08 | End: 2021-04-27 | Stop reason: HOSPADM

## 2021-04-08 RX ORDER — ACETAMINOPHEN 325 MG/1
650 TABLET ORAL EVERY 4 HOURS PRN
Status: DISCONTINUED | OUTPATIENT
Start: 2021-04-08 | End: 2021-04-27 | Stop reason: HOSPADM

## 2021-04-08 RX ORDER — OLANZAPINE 2.5 MG/1
2.5 TABLET ORAL
Status: DISCONTINUED | OUTPATIENT
Start: 2021-04-08 | End: 2021-04-27 | Stop reason: HOSPADM

## 2021-04-08 RX ORDER — OLANZAPINE 10 MG/1
10 INJECTION, POWDER, LYOPHILIZED, FOR SOLUTION INTRAMUSCULAR
Status: DISCONTINUED | OUTPATIENT
Start: 2021-04-08 | End: 2021-04-27 | Stop reason: HOSPADM

## 2021-04-08 RX ORDER — ACETAMINOPHEN 325 MG/1
650 TABLET ORAL EVERY 6 HOURS PRN
Status: DISCONTINUED | OUTPATIENT
Start: 2021-04-08 | End: 2021-04-27 | Stop reason: HOSPADM

## 2021-04-08 RX ORDER — TRAZODONE HYDROCHLORIDE 50 MG/1
50 TABLET ORAL
Status: DISCONTINUED | OUTPATIENT
Start: 2021-04-08 | End: 2021-04-27 | Stop reason: HOSPADM

## 2021-04-08 RX ORDER — LANOLIN ALCOHOL/MO/W.PET/CERES
3 CREAM (GRAM) TOPICAL
Status: DISCONTINUED | OUTPATIENT
Start: 2021-04-08 | End: 2021-04-27 | Stop reason: HOSPADM

## 2021-04-08 RX ORDER — HYDROXYZINE HYDROCHLORIDE 25 MG/1
25 TABLET, FILM COATED ORAL
Status: DISCONTINUED | OUTPATIENT
Start: 2021-04-08 | End: 2021-04-27 | Stop reason: HOSPADM

## 2021-04-08 RX ORDER — LORAZEPAM 2 MG/ML
2 INJECTION INTRAMUSCULAR
Status: DISCONTINUED | OUTPATIENT
Start: 2021-04-08 | End: 2021-04-27 | Stop reason: HOSPADM

## 2021-04-08 RX ORDER — AMOXICILLIN 250 MG
1 CAPSULE ORAL DAILY PRN
Status: DISCONTINUED | OUTPATIENT
Start: 2021-04-08 | End: 2021-04-27 | Stop reason: HOSPADM

## 2021-04-08 RX ORDER — LORAZEPAM 2 MG/ML
1 INJECTION INTRAMUSCULAR EVERY 4 HOURS PRN
Status: DISCONTINUED | OUTPATIENT
Start: 2021-04-08 | End: 2021-04-27 | Stop reason: HOSPADM

## 2021-04-08 RX ADMIN — MELATONIN TAB 3 MG 3 MG: 3 TAB at 21:30

## 2021-04-08 NOTE — CASE MANAGEMENT
CM attempted to meet with Pt, however, she was sleeping & declined  CM will attempt to engage tomorrow

## 2021-04-08 NOTE — NURSING NOTE
Patients belongings:    Purple short sleeves T-shirt  Under T- shirt  Black tights  Grey bra  Pair of yellow socks  Black and grey spring jacket  1 colorful sunglasses  1 baby oil gel  1 body spay  1 deodorant  1 pack of candido's  2 peanut chew  1 set of keys  2 cigaret lighters  1 mask/ pouch  1 toothpaste  1 shaving/ nail polish  1 skin care product  2 cell phone chargers/ bottom   1 wallet  w/ss and bank card  Make-up  Purple   Sneakers  1 cell phone    * Patients money amount: $[phone]  Envelope Seal # [id number]

## 2021-04-08 NOTE — ED NOTES
Pt unable to provider urine specimen at this time and was reminded one is required       Allen Peace RN  04/07/21 203

## 2021-04-08 NOTE — PROGRESS NOTES
Refused AM labs, MD aware  OOB briefly for breakfast but quickly returnd to room and sleep  Refused lunch  Scant conversation, denies SI/HI and hallucinations

## 2021-04-08 NOTE — CMS CERTIFICATION NOTE
Recertification: Based upon physical, mental and social evaluations, I certify that inpatient psychiatric services continue to be medically necessary for this patient for a duration of 7 midnights for the treatment of  Bipolar 1 disorder, depressed, severe ([facility] [address] )   Available alternative community resources still do not meet the patient's mental health care needs  I further attest that an established written individualized plan of care has been updated and is outlined in the patient's medical records

## 2021-04-08 NOTE — ED NOTES
Security belongings obtained from security and given to [address] [employer] transport staff to take with patient to [facility] behavioral health unit        Althea Rizzo RN  04/07/21 0091

## 2021-04-08 NOTE — ED NOTES
Patient is accepted at [facility]  Patient is accepted by Dr Lata Fritz per 3 [facility] is arranged with CTS  Transportation is scheduled for 2330   Patient may go to the floor after 2300  Nurse report is to be called to [phone] prior to patient transfer
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
## 2021-04-08 NOTE — CONSULTS
[address] [place] 1980, 47 y o  female MRN: [id number]  Unit/Bed#: Sum[facility] Mckeon 134-63 Encounter: [id number]  Primary Care Provider: Joni Martinez MD   Date and time admitted to hospital: 4/7/2021 11:57 PM    Consults    Medical clearance for psychiatric admission  Assessment & Plan  ·  CBC and TSH pending  · UDS positive for THC and cocaine   · COVID-19 negative   · EKG 10/26/19: NSR,   · Medically stable for continued inpatient psychiatric treatment    Alcohol use disorder, moderate, dependence ([address] )  Assessment & Plan  ·  Monitor for signs of withdrawal, would monitor on CIWA protocol    Tobacco abuse  Assessment & Plan  ·   on cessation   · Nicotine replacement therapy while admitted      VTE Prophylaxis: Reason for no pharmacologic prophylaxis low risk  / reason for no mechanical VTE prophylaxis low risk     Recommendations for Discharge:  · Discharge timeline per primary team   Routine follow-up with primary care provider  Resume all medications as indicated  Counseled on cessation from substance abuse  Counseling / Coordination of Care Time: 30 minutes  Greater than 50% of total time spent on patient counseling and coordination of care  Collaboration of Care: Were Recommendations Directly Discussed with Primary Treatment Team? - No     History of Present Illness:    Lacie Hermosillo is a 47 y o  female who is originally admitted to the  Behavioral health service due to  Suicidal ideation and hallucination  We are consulted for  Management of chronic medical conditions  Patient refused interview  Patient has been off medications for about a month per ER note  Per chart review patient does have anemia, should continue on prior to admission prescribed medications including iron supplement  Given substance abuse will monitor for signs of withdrawal including CIWA protocol    Requested to meet with patient multiple times to which she replied "No, I'm sleeping, come back later "       Review of Systems:    Review of Systems - unable to perform due to patient cooperation    Past Medical and Surgical History:     Past Medical History:   Diagnosis Date    Addiction to drug ([place] [address] )     Anemia     Anxiety     Depression     Drug abuse ([place] [address] )     Hallucination     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[place] [address] )     Paranoia (psychosis) ([place] [address] )     Psychiatric disorder     Psychiatric illness     PTSD (post-traumatic stress disorder)     Schizo-affective type schizophrenia, chronic state ([place] [address] )     Self-injurious behavior     Suicidal behavior     Suicide attempt ([place] [address] )     Withdrawal symptoms, drug or narcotic ([place] [address] )        Past Surgical History:   Procedure Laterality Date     SECTION         Meds/Allergies:    PTA meds:   Prior to Admission Medications   Prescriptions Last Dose Informant Patient Reported? Taking?    OLANZapine (ZyPREXA) 10 mg tablet   No No   Sig: Take 1 tablet (10 mg total) by mouth daily at bedtime   Patient not taking: Reported on 2020   benztropine (COGENTIN) 1 mg tablet Not Taking at Unknown time  No No   Sig: Take 1 tablet (1 mg total) by mouth 2 (two) times a day   Patient not taking: Reported on 2019   ferrous sulfate 325 (65 Fe) mg tablet Not Taking at Unknown time  No No   Sig: Take 1 tablet (325 mg total) by mouth daily with breakfast   Patient not taking: Reported on 2019   gabapentin (NEURONTIN) 300 mg capsule   No No   Sig: Take 2 capsules (600 mg total) by mouth 3 (three) times a day   Patient not taking: Reported on 2020      Facility-Administered Medications: None       Allergies: No Known Allergies    Social History:     Marital Status: Single    Substance Use History:   Social History     Substance and Sexual Activity   Alcohol Use Not Currently    Comment: Pt stated "I don't drink"     Social History     Tobacco Use   Smoking Status Current Every Day Smoker    Packs/day: 0 50    Years: 0 00    Pack years: 0 00    Types: Cigarettes   Smokeless Tobacco Never Used     Social History     Substance and Sexual Activity   Drug Use Yes    Types: "Crack" cocaine, Marijuana       Family History:    Family History   Problem Relation Age of Onset    No Known Problems Mother     No Known Problems Father        Physical Exam:     Vitals:   Blood Pressure: 140/90 (04/08/21 0719)  Pulse: 97 (04/08/21 0051)  Temperature: 98 4 °F (36 9 °C) (04/08/21 0719)  Temp Source: Tympanic (04/08/21 0719)  Respirations: 18 (04/08/21 0719)  Height: 5' 2" (157 5 cm) (04/08/21 0051)  Weight - Scale: 56 5 kg (124 lb 9 6 oz) (04/08/21 0051)  SpO2: 96 % (04/08/21 0051)    Physical Exam  Constitutional:       General: She is not in acute distress  Appearance: Normal appearance  She is well-developed and well-groomed  HENT:      Head: Normocephalic and atraumatic  Eyes:      General: Lids are normal  No scleral icterus  Pulmonary:      Effort: Pulmonary effort is normal       Breath sounds: Normal air entry  Skin:     Findings: No lesion or rash  Neurological:      Mental Status: She is alert  Psychiatric:         Mood and Affect: Affect is angry  Behavior: Behavior is agitated  Patient refused exam, appeared to be in no acute distress, lying in bed    Additional Data:     Lab Results: I have personally reviewed pertinent reports  Lab Results   Component Value Date/Time    HGBA1C 4 5 05/15/2019 08:30 AM               Imaging: I have personally reviewed pertinent reports  No orders to display       EKG, Pathology, and Other Studies Reviewed on Admission:   · EKG: NSR,     ** Please Note: This note has been constructed using a voice recognition system   **

## 2021-04-08 NOTE — TREATMENT PLAN
TREATMENT PLAN REVIEW - 809 Matt Almazan 47 y o  1980 female MRN: [id number]    [address] [place] [address] Room / Bed: Natasha [facility] 261/[facility] 137-98 Encounter: [id number]          Admit Date/Time:  4/7/2021 11:57 PM    Treatment Team: Attending Provider: Curtis De Los Santos MD; Consulting Physician: Radha Park MD; Patient Care Technician: Ashley Sherwood; Care Manager: Raúl Quarles RN; Security: Therese Paulix; Patient Care Assistant: Richa Villa; Patient Care Technician: Eleni Rocha; Registered Nurse: Ramón Toussaint RN; Charge Nurse: Ernestine Judd RN; Patient Care Technician: Sirena Cleary;  Occupational Therapy Assistant: ALEXANDRA Alvarenga; : Elli Lemus; Registered Nurse: Dean Nguyen RN    Diagnosis: Principal Problem:    Bipolar 1 disorder, depressed, severe ([facility] )  Active Problems:    Tobacco abuse    Alcohol use disorder, moderate, dependence ([facility] 75 )    Medical clearance for psychiatric admission      Patient Strengths/Assets: good physical health, motivation for treatment/growth, patient is on a voluntary commitment    Patient Barriers/Limitations: poor self-care, substance abuse    Short Term Goals: decrease in depressive symptoms, decrease in anxiety symptoms, decrease in psychotic symptoms, decrease in suicidal thoughts, decrease in self abusive behaviors, improvement in insight, improvement in self care, sleep improvement    Long Term Goals: improvement in depression, improvement in anxiety, resolution of depressive symptoms, stabilization of mood, free of suicidal thoughts, improved insight, adequate self care    Progress Towards Goals: starting psychiatric medications as prescribed    Recommended Treatment: medication management, patient medication education, group therapy, milieu therapy, continued Behavioral Health psychiatric evaluation/assessment process    Treatment Frequency: daily medication monitoring, group and milieu therapy daily, monitoring through interdisciplinary rounds, monitoring through weekly patient care conferences    Expected Discharge Date:  5-7 days    Discharge Plan: referral for outpatient medication management with a psychiatrist, referral for outpatient psychotherapy    Treatment Plan Created/Updated By: Cesar Agosto MD

## 2021-04-08 NOTE — PROGRESS NOTES
Patients belongings:    Purple short sleeves T-shirt  Under T- shirt  Black tights  Grey bra  Pair of yellow socks  Black and grey spring jacket  1 colorful sunglasses  1 baby oil gel  1 body spay  1 deodorant  1 pack of candido's  2 peanut chew  1 set of keys  2 cigaret lighters  1 mask/ pouch  1 toothpaste  1 shaving/ nail polish  1 skin care product  2 cell phone chargers/ bottom   1 wallet  w/ss and bank card  Make-up  Purple   Sneakers  1 cell phone    * Patients money amount: $65 [phone]  Envelope Seal # [phone]

## 2021-04-08 NOTE — ASSESSMENT & PLAN NOTE
·  CBC and TSH pending  · UDS positive for THC and cocaine   · COVID-19 negative   · EKG 10/26/19: NSR,   · Medically stable for continued inpatient psychiatric treatment

## 2021-04-08 NOTE — ED NOTES
Insurance Authorization for admission:   Phone call placed to [employer]  [facility]  Phone number: [phone]    Spoke to [employer]   3 days approved  Level of care: [facility] BH; 201  Authorization # to be obtained by accepting facility on patient's arrival    EVS (Eligibility Verification System) called - [phone]  Automated system indicates: [employer]   Adam [address] for Transportation:    Rebekah Wang, to be completed, but not for CTS

## 2021-04-08 NOTE — ED NOTES
201 sent to [facility] intake, to be reviewed for admission at [facility]

## 2021-04-08 NOTE — TREATMENT TEAM
Pt informed RN will meet with pt at least twice daily to educate on medication, diagnosis and assess for symptoms  Pt declined tour of unit  Pt reported wanting to sleep
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
## 2021-04-09 PROCEDURE — 99232 SBSQ HOSP IP/OBS MODERATE 35: CPT | Performed by: STUDENT IN AN ORGANIZED HEALTH CARE EDUCATION/TRAINING PROGRAM

## 2021-04-09 RX ORDER — GABAPENTIN 100 MG/1
100 CAPSULE ORAL 3 TIMES DAILY
Status: DISCONTINUED | OUTPATIENT
Start: 2021-04-09 | End: 2021-04-12

## 2021-04-09 RX ORDER — OLANZAPINE 5 MG/1
5 TABLET ORAL
Status: DISCONTINUED | OUTPATIENT
Start: 2021-04-09 | End: 2021-04-13

## 2021-04-09 RX ADMIN — GABAPENTIN 100 MG: 100 CAPSULE ORAL at 17:17

## 2021-04-09 RX ADMIN — OLANZAPINE 5 MG: 5 TABLET, FILM COATED ORAL at 22:09

## 2021-04-09 RX ADMIN — GABAPENTIN 100 MG: 100 CAPSULE ORAL at 22:09

## 2021-04-09 RX ADMIN — MELATONIN TAB 3 MG 3 MG: 3 TAB at 22:09

## 2021-04-09 NOTE — PLAN OF CARE
Problem: Anxiety  Goal: Anxiety is at manageable level  Description: Interventions:  - Assess and monitor patient's anxiety level  - Monitor for signs and symptoms (heart palpitations, chest pain, shortness of breath, headaches, nausea, feeling jumpy, restlessness, irritable, apprehensive)  - Collaborate with interdisciplinary team and initiate plan and interventions as ordered    - [place] patient to unit/surroundings  - Explain treatment plan  - Encourage participation in care  - Encourage verbalization of concerns/fears  - Identify coping mechanisms  - Assist in developing anxiety-reducing skills  - Administer/offer alternative therapies  - Limit or eliminate stimulants  Outcome: Progressing     Problem: SUBSTANCE USE/ABUSE  Goal: Will have no detox symptoms and will verbalize plan for changing substance-related behavior  Description: INTERVENTIONS:  - Monitor physical status and assess for symptoms of withdrawal  - Administer medication as ordered  - Provide emotional support with 1 on 1 interaction with staff  - Encourage recovery focused program/ addiction education  - Assess for verbalization of changing behaviors related to substance abuse  - Initiate consults and referrals as appropriate (Case Management, Spiritual Care, etc )  Outcome: Progressing  Goal: By discharge, will develop insight into their chemical dependency and sustain motivation to continue in recovery  Description: INTERVENTIONS:  - Attends all daily group sessions and scheduled AA groups  - Actively practices coping skills through participation in the therapeutic community and adherence to program rules  - Reviews and completes assignments from individual treatment plan  - Assist patient development of understanding of their personal cycle of addiction and relapse triggers  Outcome: Progressing  Goal: By discharge, patient will have ongoing treatment plan addressing chemical dependency  Description: INTERVENTIONS:  - Assist patient with resources and/or appointments for ongoing recovery based living  Outcome: Progressing     Problem: Nutrition/Hydration-ADULT  Goal: Nutrient/Hydration intake appropriate for improving, restoring or maintaining nutritional needs  Description: Monitor and assess patient's nutrition/hydration status for malnutrition  Collaborate with interdisciplinary team and initiate plan and interventions as ordered  Monitor patient's weight and dietary intake as ordered or per policy  Utilize nutrition screening tool and intervene as necessary  Determine patient's food preferences and provide high-protein, high-caloric foods as appropriate       INTERVENTIONS:  - Monitor oral intake, urinary output, labs, and treatment plans  - Assess nutrition and hydration status and recommend course of action  - Evaluate amount of meals eaten  - Assist patient with eating if necessary   - Allow adequate time for meals  - Recommend/ encourage appropriate diets, oral nutritional supplements, and vitamin/mineral supplements  - Order, calculate, and assess calorie counts as needed  - Recommend, monitor, and adjust tube feedings and TPN/PPN based on assessed needs  - Assess need for intravenous fluids  - Provide specific nutrition/hydration education as appropriate  - Include patient/family/caregiver in decisions related to nutrition  Outcome: Progressing

## 2021-04-09 NOTE — PROGRESS NOTES
Patient denies SI/HI/AVH at this time  Patient stated the only thing that is bothering her is her anxiety  When asked if she wanted anything she said I just want to sleep  This nurse gave her melatonin for bed  This nurse also told her that if she would like anything for anxiety to please tell staff  Patient has no questions or concerns at this time  Will continue to monitor, educate, encourage and maintain patient safety

## 2021-04-09 NOTE — CASE MANAGEMENT
CM met with Pt, who was moving around in a jerking-motion on her bed  CM asked if they could speak for a few minutes, so that CM could complete intake & find out of there are any referrals that she could initiate for Pt? Pt said no, that she didn't want to talk right now  CM asked if she was feeling okay & if she needed to speak to a nurse, but Pt declined stating, "I'm fine, just leave me alone, I don't want to talk right now"

## 2021-04-09 NOTE — PROGRESS NOTES
Progress Note - Behavioral Health   Eduardo Vail 47 y o  female MRN: [id number]  Unit/Bed#: [facility] [place] [facility]-57 Encounter: [id number]    Assessment/Plan   Principal Problem:    Bipolar 1 disorder, depressed, severe ([address] )  Active Problems:    Tobacco abuse    Alcohol use disorder, moderate, dependence ([facility])    Medical clearance for psychiatric admission      Subjective: Patient was seen, chart reviewed and case discussed with team  Patient appears comfortably lying in bed and withdrawn  Patient endorses depressed mood and anxiety  She appears anxious, paranoid, irritable and dismissive  She is willing to start medications zyprexa and gabapentin as they were helpful during last hospitalization  Denies SI/HI     Psychiatric Review of Systems:  Behavior over the last 24 hours:  unchanged  Sleep: normal  Appetite: normal  Medication side effects: No  ROS: no complaints, all others negative    Current Medications:  Current Facility-Administered Medications   Medication Dose Route Frequency    acetaminophen (TYLENOL) tablet 650 mg  650 mg Oral Q6H PRN    acetaminophen (TYLENOL) tablet 650 mg  650 mg Oral Q4H PRN    acetaminophen (TYLENOL) tablet 975 mg  975 mg Oral Q6H PRN    hydrOXYzine HCL (ATARAX) tablet 25 mg  25 mg Oral Q6H PRN Max 4/day    hydrOXYzine HCL (ATARAX) tablet 50 mg  50 mg Oral Q4H PRN Max 4/day    Or    LORazepam (ATIVAN) injection 1 mg  1 mg Intramuscular Q4H PRN    LORazepam (ATIVAN) tablet 1 mg  1 mg Oral Q4H PRN Max 6/day    Or    LORazepam (ATIVAN) injection 2 mg  2 mg Intramuscular Q6H PRN Max 3/day    melatonin tablet 3 mg  3 mg Oral HS    nicotine (NICODERM CQ) 21 mg/24 hr TD 24 hr patch 1 patch  1 patch Transdermal Daily    OLANZapine (ZyPREXA) tablet 10 mg  10 mg Oral Q3H PRN Max 3/day    Or    OLANZapine (ZyPREXA) IM injection 10 mg  10 mg Intramuscular Q3H PRN Max 3/day    OLANZapine (ZyPREXA) tablet 5 mg  5 mg Oral Q3H PRN Max 6/day    Or    OLANZapine (ZyPREXA) IM injection 5 mg  5 mg Intramuscular Q3H PRN Max 6/day    OLANZapine (ZyPREXA) tablet 2 5 mg  2 5 mg Oral Q3H PRN Max 8/day    senna-docusate sodium (SENOKOT S) 8 6-50 mg per tablet 1 tablet  1 tablet Oral Daily PRN    traZODone (DESYREL) tablet 50 mg  50 mg Oral HS PRN       Behavioral Health Medications: all current active meds have been reviewed  Vitals:  Vitals:    04/09/21 0741   BP: 100/64   Pulse: 73   Resp: 17   Temp: (!) 97 3 °F (36 3 °C)   SpO2:        Laboratory results:  I have personally reviewed all pertinent laboratory/tests results  Mental Status Evaluation:  Appearance:  older than stated age   Behavior:  guarded and restless and fidgety   Speech:  soft   Mood:  anxious and depressed   Affect:  constricted and labile   Language Appropriate   Thought Process:  goal directed and logical   Thought Content:  paranoia   Perceptual Disturbances: None   Risk Potential: Suicidal Ideations none, Homicidal Ideations none and Potential for Aggression No   Sensorium:  person, place and time/date   Cognition:  recent and remote memory grossly intact   Consciousness:  alert    Attention: attention span appeared shorter than expected for age   Insight:  fair   Judgment: fair   Gait/Station: normal gait/station   Motor Activity: no abnormal movements     Progress Toward Goals: Progressing    Recommended Treatment: Continue with pharmacotherapy, group therapy, milieu therapy and occupational therapy  1  Start gabapentin 100 mg TID  2  Start zyprexa 5 mg PO HS    Risks, benefits and possible side effects of Medications:   Risks, benefits, and possible side effects of medications explained to patient and patient verbalizes understanding

## 2021-04-09 NOTE — PROGRESS NOTES
Treatment Plan Meeting:  Diagnosis of Bipolar 1 disorder, depressed, severe & alcohol use disorder, moderate, dependence reviewed  Discussed short term goals for decrease in depression, anxiety, psychotic symptoms, decrease in suicidal thoughts, self abusive behaviors, improvement in insight, improvement in self care, sleep improvement  Pt has refused to talk with treatment team, so at this time, no medication has been started & no referrals made  There is no discharge date at this time  All parties in agreement & treatment plan was signed       04/09/21 0820   Team Meeting   Meeting Type Tx Team Meeting   Initial Conference Date 04/09/21   Next Conference Date 05/05/21   Team Members Present   Team Members Present Physician;Nurse;   Physician Team Member Dr Marla Meyers Team Member [facility] Management Team Member [place]   Patient/Family Present   Patient Present No  (Pt declined to meet with the treatment team )   Patient's Family Present No

## 2021-04-09 NOTE — PROGRESS NOTES
Isolative, scant  Uninterested in unit activities or conversation  Denies SI/HI/AVH  Poor/no eye contact, writhing/rocking in bed

## 2021-04-09 NOTE — PLAN OF CARE
Problem: DISCHARGE PLANNING  Goal: Discharge to home or other facility with appropriate resources  Description: INTERVENTIONS:  - Identify barriers to discharge w/patient and caregiver  - Arrange for needed discharge resources and transportation as appropriate  - Identify discharge learning needs (meds, wound care, etc )  - Arrange for interpretive services to assist at discharge as needed  - Refer to Case Management Department for coordinating discharge planning if the patient needs post-hospital services based on physician/advanced practitioner order or complex needs related to functional status, cognitive ability, or social support system  Outcome: Progressing     Problem: Ineffective Coping  Goal: Participates in unit activities  Description: Interventions:  - Provide therapeutic environment   - Provide required programming   - Redirect inappropriate behaviors   Outcome: Progressing     Problem: Anxiety  Goal: Anxiety is at manageable level  Description: Interventions:  - Assess and monitor patient's anxiety level  - Monitor for signs and symptoms (heart palpitations, chest pain, shortness of breath, headaches, nausea, feeling jumpy, restlessness, irritable, apprehensive)  - Collaborate with interdisciplinary team and initiate plan and interventions as ordered    - [place] patient to unit/surroundings  - Explain treatment plan  - Encourage participation in care  - Encourage verbalization of concerns/fears  - Identify coping mechanisms  - Assist in developing anxiety-reducing skills  - Administer/offer alternative therapies  - Limit or eliminate stimulants  Outcome: Progressing     Problem: SUBSTANCE USE/ABUSE  Goal: Will have no detox symptoms and will verbalize plan for changing substance-related behavior  Description: INTERVENTIONS:  - Monitor physical status and assess for symptoms of withdrawal  - Administer medication as ordered  - Provide emotional support with 1 on 1 interaction with staff  - Encourage recovery focused program/ addiction education  - Assess for verbalization of changing behaviors related to substance abuse  - Initiate consults and referrals as appropriate (Case Management, Spiritual Care, etc )  Outcome: Progressing  Goal: By discharge, will develop insight into their chemical dependency and sustain motivation to continue in recovery  Description: INTERVENTIONS:  - Attends all daily group sessions and scheduled AA groups  - Actively practices coping skills through participation in the therapeutic community and adherence to program rules  - Reviews and completes assignments from individual treatment plan  - Assist patient development of understanding of their personal cycle of addiction and relapse triggers  Outcome: Progressing  Goal: By discharge, patient will have ongoing treatment plan addressing chemical dependency  Description: INTERVENTIONS:  - Assist patient with resources and/or appointments for ongoing recovery based living  Outcome: Progressing     Problem: Nutrition/Hydration-ADULT  Goal: Nutrient/Hydration intake appropriate for improving, restoring or maintaining nutritional needs  Description: Monitor and assess patient's nutrition/hydration status for malnutrition  Collaborate with interdisciplinary team and initiate plan and interventions as ordered  Monitor patient's weight and dietary intake as ordered or per policy  Utilize nutrition screening tool and intervene as necessary  Determine patient's food preferences and provide high-protein, high-caloric foods as appropriate       INTERVENTIONS:  - Monitor oral intake, urinary output, labs, and treatment plans  - Assess nutrition and hydration status and recommend course of action  - Evaluate amount of meals eaten  - Assist patient with eating if necessary   - Allow adequate time for meals  - Recommend/ encourage appropriate diets, oral nutritional supplements, and vitamin/mineral supplements  - Order, calculate, and assess calorie counts as needed  - Recommend, monitor, and adjust tube feedings and TPN/PPN based on assessed needs  - Assess need for intravenous fluids  - Provide specific nutrition/hydration education as appropriate  - Include patient/family/caregiver in decisions related to nutrition  Outcome: Progressing

## 2021-04-09 NOTE — PROGRESS NOTES
Status: Pt seclusive to room/bed entire day  Pt reported depression & anxiety are 10/10  Pt refusing to talk with staff, asking to be left alone or ignoring questioning all together  Medication: no medications started / no PRNs  D/C: TBD / Pt declined to meet with CM yesterday; CM will attempt to engage again today       04/09/21 0750   Team Meeting   Meeting Type Daily Rounds   Team Members Present   Team Members Present Physician;Nurse;;Occupational Therapist   Physician Team Member Dr Derwood Eisenmenger / Ramin Collier / Yudelka Santiago Team Member Chet Barajas / Javier [place] Management Team Member Jake Cerrato / Steven Catrer   OT Team Member Mckenzie Saenz / Kip Night   Patient/Family Present   Patient Present No   Patient's Family Present No

## 2021-04-09 NOTE — PROGRESS NOTES
Pt seclusive to room, napping throughtout the day  OOB for meals  Denies SI/HI/AVH  Pt states "I feel much better"  Pt is laying bed and remains with eyes closed during interaction  Continues to appear restless  Denies any needs at this time

## 2021-04-10 LAB
ATRIAL RATE: 83 BPM
P AXIS: 73 DEGREES
PR INTERVAL: 150 MS
QRS AXIS: 62 DEGREES
QRSD INTERVAL: 74 MS
QT INTERVAL: 386 MS
QTC INTERVAL: 453 MS
T WAVE AXIS: 71 DEGREES
VENTRICULAR RATE: 83 BPM

## 2021-04-10 PROCEDURE — 99232 SBSQ HOSP IP/OBS MODERATE 35: CPT | Performed by: PSYCHIATRY & NEUROLOGY

## 2021-04-10 PROCEDURE — 93010 ELECTROCARDIOGRAM REPORT: CPT | Performed by: INTERNAL MEDICINE

## 2021-04-10 RX ADMIN — MELATONIN TAB 3 MG 3 MG: 3 TAB at 21:57

## 2021-04-10 RX ADMIN — OLANZAPINE 5 MG: 5 TABLET, FILM COATED ORAL at 21:57

## 2021-04-10 RX ADMIN — GABAPENTIN 100 MG: 100 CAPSULE ORAL at 21:57

## 2021-04-10 RX ADMIN — GABAPENTIN 100 MG: 100 CAPSULE ORAL at 15:59

## 2021-04-10 NOTE — PROGRESS NOTES
Remains isolative to room and uninterested in unit activities  Does attend meals  Was compliant with afternoon medications  Denies SI/HI/AVH  Denies concerns

## 2021-04-10 NOTE — TREATMENT TEAM
AM rounds- seclusive to room, only out for meals  Compliant with scheduled medications  Denies SI/HI  Scant in conversation however denies any concerns  Slept overnight

## 2021-04-10 NOTE — PROGRESS NOTES
PT refused labs this A M   When this writer went into room PT was facing away from door   When asked if we could do labs pt stated no and began rocking  This writer told pt they would make DR and nurse aware that PT refused

## 2021-04-10 NOTE — NURSING NOTE
Writer knocked on patient door and patient woke up  Writer asked patient if she was willing to take her medication this a m  and patient refused  Writer verbalized education on importance of medication; however patient uninterested

## 2021-04-10 NOTE — PROGRESS NOTES
Scant and seclusive, OOB for meals only  Dismissive and uninterested in conversation  Denies SI/HI/AVH  Denies questions

## 2021-04-10 NOTE — PROGRESS NOTES
Progress Note - Behavioral Health   Shaylee Murillo 47 y o  female MRN: [id number]  Unit/Bed#: [facility]02 Encounter: @CSN        Assessment/Plan   Principal Problem:    Bipolar 1 disorder, depressed, severe ([address] )  Active Problems:    Tobacco abuse    Alcohol use disorder, moderate, dependence ([facility])    Medical clearance for psychiatric admission      Subjective: The patient was seen today for continuing care and reviewed with treatment team     Bobby Prader  reports that she is tired and would like to sleep  She has been mostly in her room sleeping except going to dine  Not attending in groups and activities  Has been selectively compliant with medication  She is uncooperative, not willing to engage in conversation  Denies any AH/VH  Patient denies any SI or HI  Patient is able to contract  for safety, verbally at this time  No management issues reported by staff overnight      Current Medications:  Current Facility-Administered Medications   Medication Dose Route Frequency Provider Last Rate    acetaminophen  650 mg Oral Q6H PRN Ivana Garvey MD      acetaminophen  650 mg Oral Q4H PRN Ivana Garvey MD      acetaminophen  975 mg Oral Q6H PRN Ivana Garvey MD      gabapentin  100 mg Oral TID Rosemary Lozano MD      hydrOXYzine HCL  25 mg Oral Q6H PRN Max 4/day Ivana Garvey MD      hydrOXYzine HCL  50 mg Oral Q4H PRN Max 4/day Ivana Garvey MD      Or    LORazepam  1 mg Intramuscular Q4H PRN Ivana Garvey MD      LORazepam  1 mg Oral Q4H PRN Max 6/day Ivana Garvey MD      Or    LORazepam  2 mg Intramuscular Q6H PRN Max 3/day Ivana Garvey MD      melatonin  3 mg Oral HS Ivana Garvey MD      nicotine  1 patch Transdermal Daily Ivana Garvey MD      OLANZapine  10 mg Oral Q3H PRN Max 3/day Ivana Garvey MD      Or    OLANZapine  10 mg Intramuscular Q3H PRN Max 3/day Ivana Garvey MD      OLANZapine  5 mg Oral Q3H PRN Max 6/day Ivana Garvey MD      Or    OLANZapine  5 mg Intramuscular Q3H PRN Max 6/day Cammy Prakash MD      OLANZapine  2 5 mg Oral Q3H PRN Max 8/day Cammy Prakash MD      OLANZapine  5 mg Oral HS Primitivo Diaz MD      senna-docusate sodium  1 tablet Oral Daily PRN Cammy Prakash MD      traZODone  50 mg Oral HS PRN Cammy Prakash MD         Behavioral Health Medications: all current active meds have been reviewed and continue current psychiatric medications  Vital signs in last 24 hours:  Temp:  [98 7 °F (37 1 °C)] 98 7 °F (37 1 °C)  HR:  [77] 77  Resp:  [15] 15  BP: (92)/(63) 92/63    Laboratory results:  I have personally reviewed all pertinent laboratory/tests results  Psychiatric Review of Systems:  Behavior over the last 24 hours:  unchanged  Sleep: normal  Appetite: normal  Medication side effects: No  ROS: no complaints and All other systems negative    Mental Status Evaluation:  Appearance:  age appropriate and disheveled   Behavior:  uncooperative    Speech:  normal pitch and normal volume   Mood:  irritable   Affect:  mood-congruent   Thought Process:  concrete   Thought Content:  paranoid    Perceptual Disturbances: None   Risk Potential: Suicidal Ideations none, Homicidal Ideations none and Potential for Aggression No   Sensorium:  person, place and time/date   Consciousness:  alert    Insight:  limited    Judgment: limited    Gait/Station: normal gait/station   Motor Activity: no abnormal movements       Progress Toward Goals:  unchanged    Recommended Treatment: 1  Continue with group therapy, milieu therapy and occupational therapy     2 Continue following current medications:   Current Facility-Administered Medications   Medication Dose Route Frequency Provider Last Rate    acetaminophen  650 mg Oral Q6H PRN Cammy Prakash MD      acetaminophen  650 mg Oral Q4H PRN Cammy Prakash MD      acetaminophen  975 mg Oral Q6H PRN Cammy Prakash MD      gabapentin  100 mg Oral TID Primitivo Diaz MD      hydrOXYzine HCL  25 mg Oral Q6H PRN Max 4/day Laurian Spurling, MD      hydrOXYzine HCL  50 mg Oral Q4H PRN Max 4/day Laurian Spurling, MD      Or    LORazepam  1 mg Intramuscular Q4H PRN Laurian Spurling, MD      LORazepam  1 mg Oral Q4H PRN Max 6/day Laurian Spurling, MD      Or    LORazepam  2 mg Intramuscular Q6H PRN Max 3/day Laurian Spurling, MD      melatonin  3 mg Oral HS Laurian Spurling, MD      nicotine  1 patch Transdermal Daily Laurian Spurling, MD      OLANZapine  10 mg Oral Q3H PRN Max 3/day Laurian Spurling, MD      Or    OLANZapine  10 mg Intramuscular Q3H PRN Max 3/day Laurian Spurling, MD      OLANZapine  5 mg Oral Q3H PRN Max 6/day Laurian Spurling, MD      Or    OLANZapine  5 mg Intramuscular Q3H PRN Max 6/day Laurian Spurling, MD      OLANZapine  2 5 mg Oral Q3H PRN Max 8/day Laurian Spurling, MD      OLANZapine  5 mg Oral HS Cecily Tucker MD      senna-docusate sodium  1 tablet Oral Daily PRN Laurian Spurling, MD      traZODone  50 mg Oral HS PRN Laurian Spurling, MD         Risks, benefits and possible side effects of Medications:   Risks, benefits, and possible side effects of medications explained to patient and patient verbalizes understanding  Risks of medications in pregnancy explained if female patient  Patient verbalizes understanding and agrees to notify her doctor if she becomes pregnant  This note has been constructed using a voice recognition system  Occasional wrong word or "sound a like" substitutions may have occurred due to the inherent limitations of voice recognition software  There may be translation, syntax,  or grammatical errors  If you have any questions, please contact the dictating provider      Isi Schuster MD  04/10/21

## 2021-04-11 LAB
ALBUMIN SERPL BCP-MCNC: 2.9 G/DL (ref 3.5–5)
ALP SERPL-CCNC: 51 U/L (ref 46–116)
ALT SERPL W P-5'-P-CCNC: 20 U/L (ref 12–78)
ANION GAP SERPL CALCULATED.3IONS-SCNC: 5 MMOL/L (ref 4–13)
AST SERPL W P-5'-P-CCNC: 10 U/L (ref 5–45)
BASOPHILS # BLD AUTO: 0.05 THOUSANDS/ΜL (ref 0–0.1)
BASOPHILS NFR BLD AUTO: 1 % (ref 0–1)
BILIRUB SERPL-MCNC: 0.3 MG/DL (ref 0.2–1)
BUN SERPL-MCNC: 14 MG/DL (ref 5–25)
CALCIUM ALBUM COR SERPL-MCNC: 9.6 MG/DL (ref 8.3–10.1)
CALCIUM SERPL-MCNC: 8.7 MG/DL (ref 8.3–10.1)
CHLORIDE SERPL-SCNC: 106 MMOL/L (ref 100–108)
CHOLEST SERPL-MCNC: 179 MG/DL (ref 50–200)
CO2 SERPL-SCNC: 30 MMOL/L (ref 21–32)
CREAT SERPL-MCNC: 0.6 MG/DL (ref 0.6–1.3)
EOSINOPHIL # BLD AUTO: 0.01 THOUSAND/ΜL (ref 0–0.61)
EOSINOPHIL NFR BLD AUTO: 0 % (ref 0–6)
ERYTHROCYTE [DISTWIDTH] IN BLOOD BY AUTOMATED COUNT: 17.7 % (ref 11.6–15.1)
EST. AVERAGE GLUCOSE BLD GHB EST-MCNC: 111 MG/DL
GFR SERPL CREATININE-BSD FRML MDRD: 134 ML/MIN/1.73SQ M
GLUCOSE SERPL-MCNC: 87 MG/DL (ref 65–140)
HBA1C MFR BLD: 5.5 %
HCT VFR BLD AUTO: 43 % (ref 34.8–46.1)
HDLC SERPL-MCNC: 66 MG/DL
HGB BLD-MCNC: 12.6 G/DL (ref 11.5–15.4)
IMM GRANULOCYTES # BLD AUTO: 0.05 THOUSAND/UL (ref 0–0.2)
IMM GRANULOCYTES NFR BLD AUTO: 1 % (ref 0–2)
LDLC SERPL CALC-MCNC: 98 MG/DL (ref 0–100)
LYMPHOCYTES # BLD AUTO: 2.21 THOUSANDS/ΜL (ref 0.6–4.47)
LYMPHOCYTES NFR BLD AUTO: 36 % (ref 14–44)
MCH RBC QN AUTO: 22.9 PG (ref 26.8–34.3)
MCHC RBC AUTO-ENTMCNC: 29.3 G/DL (ref 31.4–37.4)
MCV RBC AUTO: 78 FL (ref 82–98)
MONOCYTES # BLD AUTO: 0.58 THOUSAND/ΜL (ref 0.17–1.22)
MONOCYTES NFR BLD AUTO: 10 % (ref 4–12)
NEUTROPHILS # BLD AUTO: 3.19 THOUSANDS/ΜL (ref 1.85–7.62)
NEUTS SEG NFR BLD AUTO: 52 % (ref 43–75)
NONHDLC SERPL-MCNC: 113 MG/DL
NRBC BLD AUTO-RTO: 0 /100 WBCS
PLATELET # BLD AUTO: 295 THOUSANDS/UL (ref 149–390)
PMV BLD AUTO: 11 FL (ref 8.9–12.7)
POTASSIUM SERPL-SCNC: 4.6 MMOL/L (ref 3.5–5.3)
PROT SERPL-MCNC: 6.3 G/DL (ref 6.4–8.2)
RBC # BLD AUTO: 5.51 MILLION/UL (ref 3.81–5.12)
SODIUM SERPL-SCNC: 141 MMOL/L (ref 136–145)
TRIGL SERPL-MCNC: 74 MG/DL
WBC # BLD AUTO: 6.09 THOUSAND/UL (ref 4.31–10.16)

## 2021-04-11 PROCEDURE — 85025 COMPLETE CBC W/AUTO DIFF WBC: CPT | Performed by: STUDENT IN AN ORGANIZED HEALTH CARE EDUCATION/TRAINING PROGRAM

## 2021-04-11 PROCEDURE — 80053 COMPREHEN METABOLIC PANEL: CPT | Performed by: STUDENT IN AN ORGANIZED HEALTH CARE EDUCATION/TRAINING PROGRAM

## 2021-04-11 PROCEDURE — 80061 LIPID PANEL: CPT | Performed by: STUDENT IN AN ORGANIZED HEALTH CARE EDUCATION/TRAINING PROGRAM

## 2021-04-11 PROCEDURE — 83036 HEMOGLOBIN GLYCOSYLATED A1C: CPT | Performed by: STUDENT IN AN ORGANIZED HEALTH CARE EDUCATION/TRAINING PROGRAM

## 2021-04-11 RX ADMIN — GABAPENTIN 100 MG: 100 CAPSULE ORAL at 22:05

## 2021-04-11 RX ADMIN — MELATONIN TAB 3 MG 3 MG: 3 TAB at 22:05

## 2021-04-11 RX ADMIN — OLANZAPINE 5 MG: 5 TABLET, FILM COATED ORAL at 22:05

## 2021-04-11 RX ADMIN — GABAPENTIN 100 MG: 100 CAPSULE ORAL at 15:58

## 2021-04-11 NOTE — PROGRESS NOTES
Remains scant and isolative to self, however is awake and walking the halls this shift  Denies SI/HI/AVH  Uninterested in unit activities

## 2021-04-11 NOTE — PLAN OF CARE
Problem: Anxiety  Goal: Anxiety is at manageable level  Description: Interventions:  - Assess and monitor patient's anxiety level  - Monitor for signs and symptoms (heart palpitations, chest pain, shortness of breath, headaches, nausea, feeling jumpy, restlessness, irritable, apprehensive)  - Collaborate with interdisciplinary team and initiate plan and interventions as ordered    - [place] patient to unit/surroundings  - Explain treatment plan  - Encourage participation in care  - Encourage verbalization of concerns/fears  - Identify coping mechanisms  - Assist in developing anxiety-reducing skills  - Administer/offer alternative therapies  - Limit or eliminate stimulants  Outcome: Progressing     Problem: SUBSTANCE USE/ABUSE  Goal: Will have no detox symptoms and will verbalize plan for changing substance-related behavior  Description: INTERVENTIONS:  - Monitor physical status and assess for symptoms of withdrawal  - Administer medication as ordered  - Provide emotional support with 1 on 1 interaction with staff  - Encourage recovery focused program/ addiction education  - Assess for verbalization of changing behaviors related to substance abuse  - Initiate consults and referrals as appropriate (Case Management, Spiritual Care, etc )  Outcome: Progressing  Goal: By discharge, will develop insight into their chemical dependency and sustain motivation to continue in recovery  Description: INTERVENTIONS:  - Attends all daily group sessions and scheduled AA groups  - Actively practices coping skills through participation in the therapeutic community and adherence to program rules  - Reviews and completes assignments from individual treatment plan  - Assist patient development of understanding of their personal cycle of addiction and relapse triggers  Outcome: Progressing  Goal: By discharge, patient will have ongoing treatment plan addressing chemical dependency  Description: INTERVENTIONS:  - Assist patient with resources and/or appointments for ongoing recovery based living  Outcome: Progressing     Problem: Nutrition/Hydration-ADULT  Goal: Nutrient/Hydration intake appropriate for improving, restoring or maintaining nutritional needs  Description: Monitor and assess patient's nutrition/hydration status for malnutrition  Collaborate with interdisciplinary team and initiate plan and interventions as ordered  Monitor patient's weight and dietary intake as ordered or per policy  Utilize nutrition screening tool and intervene as necessary  Determine patient's food preferences and provide high-protein, high-caloric foods as appropriate       INTERVENTIONS:  - Monitor oral intake, urinary output, labs, and treatment plans  - Assess nutrition and hydration status and recommend course of action  - Evaluate amount of meals eaten  - Assist patient with eating if necessary   - Allow adequate time for meals  - Recommend/ encourage appropriate diets, oral nutritional supplements, and vitamin/mineral supplements  - Order, calculate, and assess calorie counts as needed  - Recommend, monitor, and adjust tube feedings and TPN/PPN based on assessed needs  - Assess need for intravenous fluids  - Provide specific nutrition/hydration education as appropriate  - Include patient/family/caregiver in decisions related to nutrition  Outcome: Progressing     Problem: SELF HARM/SUICIDALITY  Goal: Will have no self-injury during hospital stay  Description: INTERVENTIONS:  - Q 15 MINUTES: Routine safety checks  - Q WAKING SHIFT & PRN: Assess risk to determine if routine checks are adequate to maintain patient safety  - Encourage patient to participate actively in care by formulating a plan to combat response to suicidal ideation, identify supports and resources  Outcome: Progressing

## 2021-04-11 NOTE — PROGRESS NOTES
Scant, isolative and uninterested  Did attend breakfast in dining room  Poor/no eye contact throughout interaction  Reports sleeping well at night  Denies SI/HI/AVH  Denies questions/concerns

## 2021-04-11 NOTE — PLAN OF CARE
Problem: Anxiety  Goal: Anxiety is at manageable level  Description: Interventions:  - Assess and monitor patient's anxiety level  - Monitor for signs and symptoms (heart palpitations, chest pain, shortness of breath, headaches, nausea, feeling jumpy, restlessness, irritable, apprehensive)  - Collaborate with interdisciplinary team and initiate plan and interventions as ordered    - [place] patient to unit/surroundings  - Explain treatment plan  - Encourage participation in care  - Encourage verbalization of concerns/fears  - Identify coping mechanisms  - Assist in developing anxiety-reducing skills  - Administer/offer alternative therapies  - Limit or eliminate stimulants  Outcome: Progressing     Problem: SUBSTANCE USE/ABUSE  Goal: Will have no detox symptoms and will verbalize plan for changing substance-related behavior  Description: INTERVENTIONS:  - Monitor physical status and assess for symptoms of withdrawal  - Administer medication as ordered  - Provide emotional support with 1 on 1 interaction with staff  - Encourage recovery focused program/ addiction education  - Assess for verbalization of changing behaviors related to substance abuse  - Initiate consults and referrals as appropriate (Case Management, Spiritual Care, etc )  Outcome: Progressing  Goal: By discharge, will develop insight into their chemical dependency and sustain motivation to continue in recovery  Description: INTERVENTIONS:  - Attends all daily group sessions and scheduled AA groups  - Actively practices coping skills through participation in the therapeutic community and adherence to program rules  - Reviews and completes assignments from individual treatment plan  - Assist patient development of understanding of their personal cycle of addiction and relapse triggers  Outcome: Progressing  Goal: By discharge, patient will have ongoing treatment plan addressing chemical dependency  Description: INTERVENTIONS:  - Assist patient with resources and/or appointments for ongoing recovery based living  Outcome: Progressing     Problem: Nutrition/Hydration-ADULT  Goal: Nutrient/Hydration intake appropriate for improving, restoring or maintaining nutritional needs  Description: Monitor and assess patient's nutrition/hydration status for malnutrition  Collaborate with interdisciplinary team and initiate plan and interventions as ordered  Monitor patient's weight and dietary intake as ordered or per policy  Utilize nutrition screening tool and intervene as necessary  Determine patient's food preferences and provide high-protein, high-caloric foods as appropriate       INTERVENTIONS:  - Monitor oral intake, urinary output, labs, and treatment plans  - Assess nutrition and hydration status and recommend course of action  - Evaluate amount of meals eaten  - Assist patient with eating if necessary   - Allow adequate time for meals  - Recommend/ encourage appropriate diets, oral nutritional supplements, and vitamin/mineral supplements  - Order, calculate, and assess calorie counts as needed  - Recommend, monitor, and adjust tube feedings and TPN/PPN based on assessed needs  - Assess need for intravenous fluids  - Provide specific nutrition/hydration education as appropriate  - Include patient/family/caregiver in decisions related to nutrition  Outcome: Progressing

## 2021-04-11 NOTE — TREATMENT TEAM
AM rounds- seclusive to room, denies SI/HI, AVH  Scant and guarded  Only out of room for meals  Compliant with medications  Slept well

## 2021-04-12 PROCEDURE — 99232 SBSQ HOSP IP/OBS MODERATE 35: CPT | Performed by: PHYSICIAN ASSISTANT

## 2021-04-12 RX ORDER — BENZTROPINE MESYLATE 1 MG/1
1 TABLET ORAL 2 TIMES DAILY
Status: DISCONTINUED | OUTPATIENT
Start: 2021-04-12 | End: 2021-04-27 | Stop reason: HOSPADM

## 2021-04-12 RX ORDER — GABAPENTIN 300 MG/1
300 CAPSULE ORAL 3 TIMES DAILY
Status: DISCONTINUED | OUTPATIENT
Start: 2021-04-12 | End: 2021-04-13

## 2021-04-12 RX ADMIN — GABAPENTIN 100 MG: 100 CAPSULE ORAL at 09:49

## 2021-04-12 RX ADMIN — GABAPENTIN 300 MG: 300 CAPSULE ORAL at 21:44

## 2021-04-12 RX ADMIN — OLANZAPINE 5 MG: 5 TABLET, FILM COATED ORAL at 21:44

## 2021-04-12 RX ADMIN — MELATONIN TAB 3 MG 3 MG: 3 TAB at 21:44

## 2021-04-12 RX ADMIN — HYDROXYZINE HYDROCHLORIDE 50 MG: 50 TABLET, FILM COATED ORAL at 16:13

## 2021-04-12 RX ADMIN — BENZTROPINE MESYLATE 1 MG: 1 TABLET ORAL at 13:00

## 2021-04-12 RX ADMIN — BENZTROPINE MESYLATE 1 MG: 1 TABLET ORAL at 17:46

## 2021-04-12 RX ADMIN — GABAPENTIN 300 MG: 300 CAPSULE ORAL at 16:13

## 2021-04-12 NOTE — CASE MANAGEMENT
CM attempted to meet with pt, pt stated she did not want to meet right now and did not want to meet later today either

## 2021-04-12 NOTE — PROGRESS NOTES
Seclusive to room, lying in bed, legs restless throughout conversation  Scant conversation  OOB for breakfast and medications  Denies SI/HI and hallucinations  Disheveled appearance, hospital attire personal preference

## 2021-04-12 NOTE — NURSING NOTE
Pt cooperative and pleasant during interaction  Pt visibly restless  Pt reported increased anxiety and received PRN Atarax 50 mg at 1613  Pt reported "I'm paranoid and delusional  Like I don't know what is real or fake " Pt reported feeling safe on the unit  Pt denies SI/HI/AVH  Pt reported feeling tired and sleeping at night and during the day  Pt denies any questions or concerns

## 2021-04-12 NOTE — PLAN OF CARE
Problem: Anxiety  Goal: Anxiety is at manageable level  Description: Interventions:  - Assess and monitor patient's anxiety level  - Monitor for signs and symptoms (heart palpitations, chest pain, shortness of breath, headaches, nausea, feeling jumpy, restlessness, irritable, apprehensive)  - Collaborate with interdisciplinary team and initiate plan and interventions as ordered    - [place] patient to unit/surroundings  - Explain treatment plan  - Encourage participation in care  - Encourage verbalization of concerns/fears  - Identify coping mechanisms  - Assist in developing anxiety-reducing skills  - Administer/offer alternative therapies  - Limit or eliminate stimulants  Outcome: Progressing     Problem: SUBSTANCE USE/ABUSE  Goal: Will have no detox symptoms and will verbalize plan for changing substance-related behavior  Description: INTERVENTIONS:  - Monitor physical status and assess for symptoms of withdrawal  - Administer medication as ordered  - Provide emotional support with 1 on 1 interaction with staff  - Encourage recovery focused program/ addiction education  - Assess for verbalization of changing behaviors related to substance abuse  - Initiate consults and referrals as appropriate (Case Management, Spiritual Care, etc )  Outcome: Progressing  Goal: By discharge, will develop insight into their chemical dependency and sustain motivation to continue in recovery  Description: INTERVENTIONS:  - Attends all daily group sessions and scheduled AA groups  - Actively practices coping skills through participation in the therapeutic community and adherence to program rules  - Reviews and completes assignments from individual treatment plan  - Assist patient development of understanding of their personal cycle of addiction and relapse triggers  Outcome: Progressing  Goal: By discharge, patient will have ongoing treatment plan addressing chemical dependency  Description: INTERVENTIONS:  - Assist patient with resources and/or appointments for ongoing recovery based living  Outcome: Progressing     Problem: SELF HARM/SUICIDALITY  Goal: Will have no self-injury during hospital stay  Description: INTERVENTIONS:  - Q 15 MINUTES: Routine safety checks  - Q WAKING SHIFT & PRN: Assess risk to determine if routine checks are adequate to maintain patient safety  - Encourage patient to participate actively in care by formulating a plan to combat response to suicidal ideation, identify supports and resources  Outcome: Progressing     Problem: Nutrition/Hydration-ADULT  Goal: Nutrient/Hydration intake appropriate for improving, restoring or maintaining nutritional needs  Description: Monitor and assess patient's nutrition/hydration status for malnutrition  Collaborate with interdisciplinary team and initiate plan and interventions as ordered  Monitor patient's weight and dietary intake as ordered or per policy  Utilize nutrition screening tool and intervene as necessary  Determine patient's food preferences and provide high-protein, high-caloric foods as appropriate       INTERVENTIONS:  - Monitor oral intake, urinary output, labs, and treatment plans  - Assess nutrition and hydration status and recommend course of action  - Evaluate amount of meals eaten  - Assist patient with eating if necessary   - Allow adequate time for meals  - Recommend/ encourage appropriate diets, oral nutritional supplements, and vitamin/mineral supplements  - Order, calculate, and assess calorie counts as needed  - Recommend, monitor, and adjust tube feedings and TPN/PPN based on assessed needs  - Assess need for intravenous fluids  - Provide specific nutrition/hydration education as appropriate  - Include patient/family/caregiver in decisions related to nutrition  Outcome: Progressing

## 2021-04-12 NOTE — PROGRESS NOTES
Status: Pt remains seclusive to her room & scant in conversation      Medication: no changes / no PRNs  D/C: TBD / Pt continues to refuse to meet with CM      04/12/21 0750   Team Meeting   Meeting Type Daily Rounds   Team Members Present   Team Members Present Physician;Nurse;Occupational Therapist;   Physician Team Member Dr Emily Rao / Norma Bailey / Theresa Kurtz Team Member Elsi Landers / Kelly Frey Management Team Member Michael Vela / Shavon Carter / Werner Paradise   OT Team Member Sean Mina / Torsten Quiroz   Patient/Family Present   Patient Present No   Patient's Family Present No

## 2021-04-12 NOTE — PROGRESS NOTES
Progress Note - Behavioral Health   Yonny Nicholson 47 y o  female MRN: [id number]  Unit/Bed#: [facility] 875-31 Encounter: [id number]    Assessment/Plan   Principal Problem:    Bipolar 1 disorder, depressed, severe ([address] )  Active Problems:    Tobacco abuse    Alcohol use disorder, moderate, dependence ([facility])    Medical clearance for psychiatric admission      Subjective: Patient was seen, chart reviewed and case discussed with team   Patient is severely restless in her bed  Had abnormal leg movements and was moving throughout the interview  Did not want to speak very long and states she is not ready to leave the hospital   She did deny having suicidal thoughts and having hallucinations at this time  Appeared depressed with lack of motivation to leave her room, low energy, and is uninterested with socializing with her peers  Appeared somewhat paranoid and had poor eye contact  Slightly irritable but overall was cooperative  Not currently manic  Appeared moderately anxious  Medication compliant  Besides restlessness appears to be tolerating medications well without serious side effects      Psychiatric Review of Systems:  Behavior over the last 24 hours:  unchanged  Sleep: hypersomnia  Appetite: normal  Medication side effects: Yes, restlessness  ROS: no complaints, all others negative    Current Medications:  Current Facility-Administered Medications   Medication Dose Route Frequency    acetaminophen (TYLENOL) tablet 650 mg  650 mg Oral Q6H PRN    acetaminophen (TYLENOL) tablet 650 mg  650 mg Oral Q4H PRN    acetaminophen (TYLENOL) tablet 975 mg  975 mg Oral Q6H PRN    benztropine (COGENTIN) tablet 1 mg  1 mg Oral BID    gabapentin (NEURONTIN) capsule 300 mg  300 mg Oral TID    hydrOXYzine HCL (ATARAX) tablet 25 mg  25 mg Oral Q6H PRN Max 4/day    hydrOXYzine HCL (ATARAX) tablet 50 mg  50 mg Oral Q4H PRN Max 4/day    Or    LORazepam (ATIVAN) injection 1 mg  1 mg Intramuscular Q4H PRN    LORazepam (ATIVAN) tablet 1 mg  1 mg Oral Q4H PRN Max 6/day    Or    LORazepam (ATIVAN) injection 2 mg  2 mg Intramuscular Q6H PRN Max 3/day    melatonin tablet 3 mg  3 mg Oral HS    nicotine (NICODERM CQ) 21 mg/24 hr TD 24 hr patch 1 patch  1 patch Transdermal Daily    OLANZapine (ZyPREXA) tablet 10 mg  10 mg Oral Q3H PRN Max 3/day    Or    OLANZapine (ZyPREXA) IM injection 10 mg  10 mg Intramuscular Q3H PRN Max 3/day    OLANZapine (ZyPREXA) tablet 5 mg  5 mg Oral Q3H PRN Max 6/day    Or    OLANZapine (ZyPREXA) IM injection 5 mg  5 mg Intramuscular Q3H PRN Max 6/day    OLANZapine (ZyPREXA) tablet 2 5 mg  2 5 mg Oral Q3H PRN Max 8/day    OLANZapine (ZyPREXA) tablet 5 mg  5 mg Oral HS    senna-docusate sodium (SENOKOT S) 8 6-50 mg per tablet 1 tablet  1 tablet Oral Daily PRN    traZODone (DESYREL) tablet 50 mg  50 mg Oral HS PRN       Behavioral Health Medications: all current active meds have been reviewed and continue current psychiatric medications  Vitals:  Vitals:    04/12/21 0800   BP: 98/53   Pulse: 90   Resp: 16   Temp: 98 8 °F (37 1 °C)   SpO2:        Laboratory results:    I have personally reviewed all pertinent laboratory/tests results    Most Recent Labs:   Lab Results   Component Value Date    WBC 6 09 04/11/2021    RBC 5 51 (H) 04/11/2021    HGB 12 6 04/11/2021    HCT 43 0 04/11/2021     04/11/2021    RDW 17 7 (H) 04/11/2021    NEUTROABS 3 19 04/11/2021    SODIUM 141 04/11/2021    K 4 6 04/11/2021     04/11/2021    CO2 30 04/11/2021    BUN 14 04/11/2021    CREATININE 0 60 04/11/2021    GLUC 87 04/11/2021    GLUF 42 (L) 06/14/2019    CALCIUM 8 7 04/11/2021    AST 10 04/11/2021    ALT 20 04/11/2021    ALKPHOS 51 04/11/2021    TP 6 3 (L) 04/11/2021    ALB 2 9 (L) 04/11/2021    TBILI 0 30 04/11/2021    CHOLESTEROL 179 04/11/2021    HDL 66 04/11/2021    TRIG 74 04/11/2021    LDLCALC 98 04/11/2021    NONHDLC 113 04/11/2021    AZO9PDCFASEC 1 184 05/15/2019    PREGUR NEGATIVE 04/07/2021    PREGSERUM Negative 06/18/2019    HCGQUANT <3 10/17/2018    RPR Non-Reactive 05/15/2019    HGBA1C 5 5 04/11/2021     04/11/2021       Mental Status Evaluation:  Appearance:  casually dressed   Behavior:  restless and fidgety   Speech:  soft   Mood:  depressed   Affect:  constricted and flat   Language Appropriate   Thought Process:  goal directed and linear   Thought Content:  some paranoia   Perceptual Disturbances: None   Risk Potential: Denied SI/HI  Potential for aggression no   Sensorium:  person, place and time/date   Cognition:  recent and remote memory grossly intact   Consciousness:  awake    Attention: attention span appeared shorter than expected for age   Insight:  limited   Judgment: limited   Gait/Station: Did not observe   Motor Activity: Restless leg movements     Progress Toward Goals: unchanged    Recommended Treatment: Continue with pharmacotherapy, group therapy, milieu therapy and occupational therapy  1   Add Cogentin 1mg BID for possible akasthisia  2  Increase Neurontin to 300mg TID for restlessness and anxiety  3  Continue other medications  4  No discharge date at this time    Risks, benefits and possible side effects of Medications:   Risks, benefits, and possible side effects of medications explained to patient and patient verbalizes understanding

## 2021-04-13 PROCEDURE — 99232 SBSQ HOSP IP/OBS MODERATE 35: CPT | Performed by: STUDENT IN AN ORGANIZED HEALTH CARE EDUCATION/TRAINING PROGRAM

## 2021-04-13 RX ORDER — GABAPENTIN 400 MG/1
400 CAPSULE ORAL 3 TIMES DAILY
Status: DISCONTINUED | OUTPATIENT
Start: 2021-04-13 | End: 2021-04-14

## 2021-04-13 RX ORDER — FLUOXETINE HYDROCHLORIDE 20 MG/1
20 CAPSULE ORAL DAILY
Status: DISCONTINUED | OUTPATIENT
Start: 2021-04-13 | End: 2021-04-27 | Stop reason: HOSPADM

## 2021-04-13 RX ADMIN — NICOTINE 1 PATCH: 21 PATCH, EXTENDED RELEASE TRANSDERMAL at 08:54

## 2021-04-13 RX ADMIN — GABAPENTIN 300 MG: 300 CAPSULE ORAL at 08:54

## 2021-04-13 RX ADMIN — BENZTROPINE MESYLATE 1 MG: 1 TABLET ORAL at 08:54

## 2021-04-13 RX ADMIN — OLANZAPINE 7.5 MG: 5 TABLET, FILM COATED ORAL at 21:40

## 2021-04-13 RX ADMIN — GABAPENTIN 400 MG: 400 CAPSULE ORAL at 15:39

## 2021-04-13 RX ADMIN — BENZTROPINE MESYLATE 1 MG: 1 TABLET ORAL at 18:10

## 2021-04-13 RX ADMIN — FLUOXETINE 20 MG: 20 CAPSULE ORAL at 10:23

## 2021-04-13 RX ADMIN — MELATONIN TAB 3 MG 3 MG: 3 TAB at 21:40

## 2021-04-13 RX ADMIN — GABAPENTIN 400 MG: 400 CAPSULE ORAL at 21:40

## 2021-04-13 NOTE — PROGRESS NOTES
OOB for breakfast and medications  As she walked down the zhou, she swayed from side to side with a smile on her face  Denies SI/HI and hallucinations

## 2021-04-13 NOTE — PROGRESS NOTES
Attended groups  Loud and friendly  Patient spoke with her mom on the telephone  States the only time she is able to speak with er mom and son is when she is in the hospital, her boyfriend does not let her have contact with them  Listening to radio, dancing

## 2021-04-13 NOTE — PROGRESS NOTES
04/13/21 1000 04/13/21 1100 04/13/21 1315   Activity/Group Checklist   Group [employer] meeting  [place]) Nursing Education  (Relaxation) Life Skills  (thought distortions)   Attendance Did not attend Attended Attended   Attendance Duration (min)  --  16-30 46-60   Interactions  --  Interacted appropriately Interacted appropriately  (some intrusive comments, confusion)   Affect/Mood  --  Appropriate;Bright Appropriate   Goals Achieved  --  Identified feelings; Discussed coping strategies; Able to listen to others; Able to engage in interactions Discussed coping strategies; Identified distorted thoughts/beliefs; Able to listen to others; Able to engage in interactions      04/13/21 1415   Activity/Group Checklist   Group Other (Comment)  (music assisted breathing)   Attendance Attended   Attendance Duration (min) 16-30   Interactions Interacted appropriately   Affect/Mood Appropriate;Calm   Goals Achieved Able to experience relief/decrease in symptoms  (reported increased relaxation)   Pt attended 3 of 4 groups  She presented a bright affect and engaged enthusiastically in group activities, though with limited participation in group discussions  She interacted appropriately and pleasantly with her peers

## 2021-04-13 NOTE — PROGRESS NOTES
Progress Note - Behavioral Health   Yonny Nicholson 47 y o  female MRN: [id number]  Unit/Bed#: [facility] 266-37 Encounter: [id number]    Assessment/Plan   Principal Problem:    Bipolar 1 disorder, depressed, severe ([address] )  Active Problems:    Tobacco abuse    Alcohol use disorder, moderate, dependence ([facility])    Medical clearance for psychiatric admission      Subjective: Patient was seen, chart reviewed and case discussed with team  As per report patient is withdrawn and received PRN medications for anxiety  Patient was seen in her room lying in bed and shaking her limbs  Reports that she is anxious  Patient also endorses paranoia and doesn't know what is real  Later she was loud, expansive and euphoric  Patient appears labile and paranoid  Denies SI/HI       Psychiatric Review of Systems:  Behavior over the last 24 hours:  improved  Sleep: normal  Appetite: normal  Medication side effects: No  ROS: no complaints, all others negative    Current Medications:  Current Facility-Administered Medications   Medication Dose Route Frequency    acetaminophen (TYLENOL) tablet 650 mg  650 mg Oral Q6H PRN    acetaminophen (TYLENOL) tablet 650 mg  650 mg Oral Q4H PRN    acetaminophen (TYLENOL) tablet 975 mg  975 mg Oral Q6H PRN    benztropine (COGENTIN) tablet 1 mg  1 mg Oral BID    FLUoxetine (PROzac) capsule 20 mg  20 mg Oral Daily    gabapentin (NEURONTIN) capsule 400 mg  400 mg Oral TID    hydrOXYzine HCL (ATARAX) tablet 25 mg  25 mg Oral Q6H PRN Max 4/day    hydrOXYzine HCL (ATARAX) tablet 50 mg  50 mg Oral Q4H PRN Max 4/day    Or    LORazepam (ATIVAN) injection 1 mg  1 mg Intramuscular Q4H PRN    LORazepam (ATIVAN) tablet 1 mg  1 mg Oral Q4H PRN Max 6/day    Or    LORazepam (ATIVAN) injection 2 mg  2 mg Intramuscular Q6H PRN Max 3/day    melatonin tablet 3 mg  3 mg Oral HS    nicotine (NICODERM CQ) 21 mg/24 hr TD 24 hr patch 1 patch  1 patch Transdermal Daily    OLANZapine (ZyPREXA) tablet 10 mg  10 mg Oral Q3H PRN Max 3/day    Or    OLANZapine (ZyPREXA) IM injection 10 mg  10 mg Intramuscular Q3H PRN Max 3/day    OLANZapine (ZyPREXA) tablet 5 mg  5 mg Oral Q3H PRN Max 6/day    Or    OLANZapine (ZyPREXA) IM injection 5 mg  5 mg Intramuscular Q3H PRN Max 6/day    OLANZapine (ZyPREXA) tablet 2 5 mg  2 5 mg Oral Q3H PRN Max 8/day    OLANZapine (ZyPREXA) tablet 7 5 mg  7 5 mg Oral HS    senna-docusate sodium (SENOKOT S) 8 6-50 mg per tablet 1 tablet  1 tablet Oral Daily PRN    traZODone (DESYREL) tablet 50 mg  50 mg Oral HS PRN       Behavioral Health Medications: all current active meds have been reviewed  Vitals:  Vitals:    04/13/21 0720   BP: 100/60   Pulse: 60   Resp: 16   Temp: 98 6 °F (37 °C)   SpO2:        Laboratory results:  I have personally reviewed all pertinent laboratory/tests results  Mental Status Evaluation:  Appearance:  thin & gaunt looking   Behavior:  evasive and restless and fidgety   Speech:  loud   Mood:  anxious   Affect:  labile   Language Appropriate   Thought Process:  goal directed and logical   Thought Content:  paranoia   Perceptual Disturbances: None   Risk Potential: Suicidal Ideations none, Homicidal Ideations none and Potential for Aggression No   Sensorium:  person, place, time/date, situation, day of week, month of year and year   Cognition:  recent and remote memory grossly intact   Consciousness:  alert    Attention: attention span appeared shorter than expected for age   Insight:  fair   Judgment: fair   Gait/Station: normal gait/station   Motor Activity: no abnormal movements     Progress Toward Goals: Progressing    Recommended Treatment: Continue with pharmacotherapy, group therapy, milieu therapy and occupational therapy  1  Increase zyprexa to 7 5 mg PO HS  2  Increase gabapentin to 400 mg TID    Risks, benefits and possible side effects of Medications:   Risks, benefits, and possible side effects of medications explained to patient and patient verbalizes understanding  R/O Thrombocytosis

## 2021-04-13 NOTE — CONSULTS
Inpatient consult for Medical Clearance for [facility] patient  Consult performed by: Eleazar Kohler PA-C  Consult ordered by: Emory Le MD        Please refer to consult note completed 4/8/21

## 2021-04-13 NOTE — PLAN OF CARE
Problem: Ineffective Coping  Goal: Participates in unit activities  Description: Interventions:  - Provide therapeutic environment   - Provide required programming   - Redirect inappropriate behaviors   Outcome: Progressing     Problem: Anxiety  Goal: Anxiety is at manageable level  Description: Interventions:  - Assess and monitor patient's anxiety level  - Monitor for signs and symptoms (heart palpitations, chest pain, shortness of breath, headaches, nausea, feeling jumpy, restlessness, irritable, apprehensive)  - Collaborate with interdisciplinary team and initiate plan and interventions as ordered    - [place] patient to unit/surroundings  - Explain treatment plan  - Encourage participation in care  - Encourage verbalization of concerns/fears  - Identify coping mechanisms  - Assist in developing anxiety-reducing skills  - Administer/offer alternative therapies  - Limit or eliminate stimulants  Outcome: Progressing     Problem: SUBSTANCE USE/ABUSE  Goal: Will have no detox symptoms and will verbalize plan for changing substance-related behavior  Description: INTERVENTIONS:  - Monitor physical status and assess for symptoms of withdrawal  - Administer medication as ordered  - Provide emotional support with 1 on 1 interaction with staff  - Encourage recovery focused program/ addiction education  - Assess for verbalization of changing behaviors related to substance abuse  - Initiate consults and referrals as appropriate (Case Management, Spiritual Care, etc )  Outcome: Progressing

## 2021-04-14 PROCEDURE — 99232 SBSQ HOSP IP/OBS MODERATE 35: CPT | Performed by: PHYSICIAN ASSISTANT

## 2021-04-14 RX ORDER — GABAPENTIN 300 MG/1
600 CAPSULE ORAL 3 TIMES DAILY
Status: DISCONTINUED | OUTPATIENT
Start: 2021-04-14 | End: 2021-04-22

## 2021-04-14 RX ADMIN — GABAPENTIN 600 MG: 300 CAPSULE ORAL at 21:28

## 2021-04-14 RX ADMIN — OLANZAPINE 5 MG: 5 TABLET, FILM COATED ORAL at 14:38

## 2021-04-14 RX ADMIN — FLUOXETINE 20 MG: 20 CAPSULE ORAL at 08:23

## 2021-04-14 RX ADMIN — BENZTROPINE MESYLATE 1 MG: 1 TABLET ORAL at 08:23

## 2021-04-14 RX ADMIN — HYDROXYZINE HYDROCHLORIDE 50 MG: 50 TABLET, FILM COATED ORAL at 11:30

## 2021-04-14 RX ADMIN — BENZTROPINE MESYLATE 1 MG: 1 TABLET ORAL at 17:47

## 2021-04-14 RX ADMIN — OLANZAPINE 7.5 MG: 5 TABLET, FILM COATED ORAL at 21:28

## 2021-04-14 RX ADMIN — GABAPENTIN 600 MG: 300 CAPSULE ORAL at 16:05

## 2021-04-14 RX ADMIN — NICOTINE 1 PATCH: 21 PATCH, EXTENDED RELEASE TRANSDERMAL at 08:23

## 2021-04-14 RX ADMIN — MELATONIN TAB 3 MG 3 MG: 3 TAB at 21:28

## 2021-04-14 RX ADMIN — GABAPENTIN 400 MG: 400 CAPSULE ORAL at 08:23

## 2021-04-14 NOTE — PROGRESS NOTES
Status: Pt remains restless but was up & out of her room  She utilized the radio & was dancing in her room  She reported some anxiety & she spoke to her mom on the phone & needed redirection for getting loud; she said that she only gets to talk to her mom when she is in the hospital, because her boyfriend won't let her call her mom  Medication: Prozac 20mg daily started & Neurontin increased to 400mg TID & Zyprexa increaed to 7 5mg at bedtime / no PRNs  D/C: next week / Pt did meet with CM & would like referrals for housing, outpatient, & ICM  CM was able to print Pt her docket sheets, she has court 5/5 for possession       04/14/21 0750   Team Meeting   Meeting Type Daily Rounds   Team Members Present   Team Members Present Physician;Nurse;Occupational Therapist;   Physician Team Member Dr Christal Christina / Alice Villareal Team Member Oziel Ascencio / Neetu Verdugo Management Team Member [address]   OT Team Member Shilpa Magallon / ALEXANDRA-Student   Patient/Family Present   Patient Present No   Patient's Family Present No

## 2021-04-14 NOTE — CASE MANAGEMENT
CM contacted Kevin Roque is the [facility], @ [phone] & left a message for Shanelle Peoples, to possibly refer Pt to their program

## 2021-04-14 NOTE — PROGRESS NOTES
Status: Pt seclusive to her room during the day, but out more walking the halls & eating ice in the afternoon/evening  Pt denied any SI/HI  She was paranoid in the evening & received PRNs    Medication: Cogentin 1mg twice/day started, Neurontin increased to 300mg TID / PRN - Atarax   D/C: next week(?) / Pt has not agreed to meet with CM to do intake yet     04/13/21 0750   Team Meeting   Meeting Type Daily Rounds   Team Members Present   Team Members Present Physician;Nurse;Occupational Therapist;   Physician Team Member Dr Christina Woo / Roz Leon Team Member Walter Guzmán / Irish Khan Management Team Member Sandip Collins / Edgardo Mahajan / Irene Seth   OT Team Member Octaviano Neely / Jeni Gomez   Patient/Family Present   Patient Present No   Patient's Family Present No
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
## 2021-04-14 NOTE — PROGRESS NOTES
Med Note: Pt requested med for anxiety and accepted offer of Atarax 50mg  On follow up, pt reports continued anxiety and is observed restless and pacing the hallway  Pt states she will go to group and will return if anxiety continues

## 2021-04-14 NOTE — PROGRESS NOTES
Pt received Zyprexa 5 mg po for moderate agitation @1438  Upon follow up, pt reported "medication helpful"  Pt visibly appears calmer watching TV in the dayroom  Medication effective

## 2021-04-14 NOTE — PROGRESS NOTES
Progress Note - Behavioral Health   Lacie Hermosillo 47 y o  female MRN: [id number]  Unit/Bed#: [facility] 202-36 Encounter: [id number]    Assessment/Plan   Principal Problem:    Bipolar 1 disorder, depressed, severe ([address] )  Active Problems:    Tobacco abuse    Alcohol use disorder, moderate, dependence ([facility])    Medical clearance for psychiatric admission      Subjective: Patient was seen, chart reviewed and case discussed with team   Patient irritable and restless  Stated "leave me alone "  Reports that she is feeling better and hopeful to be placed through case management  Was mildly irritable  States that her anxiety is moderate at this time with racing thoughts  Reports that her depression is low and is denying suicidal thoughts  Was observed to be out of her room more yesterday pacing the hallways  Additionally was observed dancing in her room and overly energetic  Denied having any mood swings or agitation  Denied having hallucinations or delusions  Did appear somewhat paranoid  Medication compliant  Denied additional somatic complaints or potential serious side effects  Psychiatric Review of Systems:  Behavior over the last 24 hours:  unchanged  Sleep: normal  Appetite: normal  Medication side effects: Yes, possible restlessness from medications    Appears chronic  ROS: no complaints, all others negative    Current Medications:  Current Facility-Administered Medications   Medication Dose Route Frequency    acetaminophen (TYLENOL) tablet 650 mg  650 mg Oral Q6H PRN    acetaminophen (TYLENOL) tablet 650 mg  650 mg Oral Q4H PRN    acetaminophen (TYLENOL) tablet 975 mg  975 mg Oral Q6H PRN    benztropine (COGENTIN) tablet 1 mg  1 mg Oral BID    FLUoxetine (PROzac) capsule 20 mg  20 mg Oral Daily    gabapentin (NEURONTIN) capsule 400 mg  400 mg Oral TID    hydrOXYzine HCL (ATARAX) tablet 25 mg  25 mg Oral Q6H PRN Max 4/day    hydrOXYzine HCL (ATARAX) tablet 50 mg  50 mg Oral Q4H PRN Max 4/day    Or    LORazepam (ATIVAN) injection 1 mg  1 mg Intramuscular Q4H PRN    LORazepam (ATIVAN) tablet 1 mg  1 mg Oral Q4H PRN Max 6/day    Or    LORazepam (ATIVAN) injection 2 mg  2 mg Intramuscular Q6H PRN Max 3/day    melatonin tablet 3 mg  3 mg Oral HS    nicotine (NICODERM CQ) 21 mg/24 hr TD 24 hr patch 1 patch  1 patch Transdermal Daily    OLANZapine (ZyPREXA) tablet 10 mg  10 mg Oral Q3H PRN Max 3/day    Or    OLANZapine (ZyPREXA) IM injection 10 mg  10 mg Intramuscular Q3H PRN Max 3/day    OLANZapine (ZyPREXA) tablet 5 mg  5 mg Oral Q3H PRN Max 6/day    Or    OLANZapine (ZyPREXA) IM injection 5 mg  5 mg Intramuscular Q3H PRN Max 6/day    OLANZapine (ZyPREXA) tablet 2 5 mg  2 5 mg Oral Q3H PRN Max 8/day    OLANZapine (ZyPREXA) tablet 7 5 mg  7 5 mg Oral HS    senna-docusate sodium (SENOKOT S) 8 6-50 mg per tablet 1 tablet  1 tablet Oral Daily PRN    traZODone (DESYREL) tablet 50 mg  50 mg Oral HS PRN       Behavioral Health Medications: all current active meds have been reviewed and continue current psychiatric medications  Vitals:  Vitals:    04/14/21 0717   BP: 102/55   Pulse: 71   Resp: 16   Temp: (!) 97 2 °F (36 2 °C)   SpO2:        Laboratory results:    I have personally reviewed all pertinent laboratory/tests results    Most Recent Labs:   Lab Results   Component Value Date    WBC 6 09 04/11/2021    RBC 5 51 (H) 04/11/2021    HGB 12 6 04/11/2021    HCT 43 0 04/11/2021     04/11/2021    RDW 17 7 (H) 04/11/2021    NEUTROABS 3 19 04/11/2021    SODIUM 141 04/11/2021    K 4 6 04/11/2021     04/11/2021    CO2 30 04/11/2021    BUN 14 04/11/2021    CREATININE 0 60 04/11/2021    GLUC 87 04/11/2021    GLUF 42 (L) 06/14/2019    CALCIUM 8 7 04/11/2021    AST 10 04/11/2021    ALT 20 04/11/2021    ALKPHOS 51 04/11/2021    TP 6 3 (L) 04/11/2021    ALB 2 9 (L) 04/11/2021    TBILI 0 30 04/11/2021    CHOLESTEROL 179 04/11/2021    HDL 66 04/11/2021    TRIG 74 04/11/2021    LDLCALC 98 04/11/2021    [facility] 113 04/11/2021    SVY9KYFCJWSY 1 184 05/15/2019    PREGUR NEGATIVE 04/07/2021    PREGSERUM Negative 06/18/2019    HCGQUANT <3 10/17/2018    RPR Non-Reactive 05/15/2019    HGBA1C 5 5 04/11/2021     04/11/2021       Mental Status Evaluation:  Appearance:  casually dressed, older than stated age   Behavior:  guarded   Speech:  soft   Mood:  euthymic   Affect:  increased in range   Language Appropriate   Thought Process:  goal directed and linear   Thought Content:  Probable paranoia   Perceptual Disturbances: None   Risk Potential: Denied SI/HI  Potential for aggression no   Sensorium:  person, place and time/date   Cognition:  recent and remote memory grossly intact   Consciousness:  alert and awake    Attention: attention span appeared shorter than expected for age   Insight:  limited   Judgment: limited   Gait/Station: normal gait/station and normal balance   Motor Activity: Restless leg movements     Progress Toward Goals:  Progressing slowly    Recommended Treatment: Continue with pharmacotherapy, group therapy, milieu therapy and occupational therapy  1  Increase Neurontin to 600mg TID for anxiety and restless leg movements  2  Continue other medications  3  Disposition planning    Risks, benefits and possible side effects of Medications:   Risks, benefits, and possible side effects of medications explained to patient and patient verbalizes understanding

## 2021-04-14 NOTE — PROGRESS NOTES
Woke for breakfast and medications and returned to bed briefly reporting increased fatigue  Upon waking pleasant and polite  Denies SI/HI and hallucinations  Received her own personal attire  Restless during interaction, shifting weight from one foot to the other  Attended nursing education briefly

## 2021-04-14 NOTE — PROGRESS NOTES
Pt remains pleasant however guarded in conversation  States she is walking halls to help alleviate anxiety  Pt observed walking and dancing in halls at times  Denies SI/HI, AVH  Pt requested composition book to journal however none was available  Offered pt paper to writer/draw however pt politely declined  Denies any questions at this time

## 2021-04-14 NOTE — PLAN OF CARE
Problem: Anxiety  Goal: Anxiety is at manageable level  Description: Interventions:  - Assess and monitor patient's anxiety level  - Monitor for signs and symptoms (heart palpitations, chest pain, shortness of breath, headaches, nausea, feeling jumpy, restlessness, irritable, apprehensive)  - Collaborate with interdisciplinary team and initiate plan and interventions as ordered    - [place] patient to unit/surroundings  - Explain treatment plan  - Encourage participation in care  - Encourage verbalization of concerns/fears  - Identify coping mechanisms  - Assist in developing anxiety-reducing skills  - Administer/offer alternative therapies  - Limit or eliminate stimulants  Outcome: Progressing     Problem: SUBSTANCE USE/ABUSE  Goal: Will have no detox symptoms and will verbalize plan for changing substance-related behavior  Description: INTERVENTIONS:  - Monitor physical status and assess for symptoms of withdrawal  - Administer medication as ordered  - Provide emotional support with 1 on 1 interaction with staff  - Encourage recovery focused program/ addiction education  - Assess for verbalization of changing behaviors related to substance abuse  - Initiate consults and referrals as appropriate (Case Management, Spiritual Care, etc )  Outcome: Progressing  Goal: By discharge, will develop insight into their chemical dependency and sustain motivation to continue in recovery  Description: INTERVENTIONS:  - Attends all daily group sessions and scheduled AA groups  - Actively practices coping skills through participation in the therapeutic community and adherence to program rules  - Reviews and completes assignments from individual treatment plan  - Assist patient development of understanding of their personal cycle of addiction and relapse triggers  Outcome: Progressing  Goal: By discharge, patient will have ongoing treatment plan addressing chemical dependency  Description: INTERVENTIONS:  - Assist patient with resources and/or appointments for ongoing recovery based living  Outcome: Progressing     Problem: Nutrition/Hydration-ADULT  Goal: Nutrient/Hydration intake appropriate for improving, restoring or maintaining nutritional needs  Description: Monitor and assess patient's nutrition/hydration status for malnutrition  Collaborate with interdisciplinary team and initiate plan and interventions as ordered  Monitor patient's weight and dietary intake as ordered or per policy  Utilize nutrition screening tool and intervene as necessary  Determine patient's food preferences and provide high-protein, high-caloric foods as appropriate       INTERVENTIONS:  - Monitor oral intake, urinary output, labs, and treatment plans  - Assess nutrition and hydration status and recommend course of action  - Evaluate amount of meals eaten  - Assist patient with eating if necessary   - Allow adequate time for meals  - Recommend/ encourage appropriate diets, oral nutritional supplements, and vitamin/mineral supplements  - Order, calculate, and assess calorie counts as needed  - Recommend, monitor, and adjust tube feedings and TPN/PPN based on assessed needs  - Assess need for intravenous fluids  - Provide specific nutrition/hydration education as appropriate  - Include patient/family/caregiver in decisions related to nutrition  Outcome: Progressing     Problem: SELF HARM/SUICIDALITY  Goal: Will have no self-injury during hospital stay  Description: INTERVENTIONS:  - Q 15 MINUTES: Routine safety checks  - Q WAKING SHIFT & PRN: Assess risk to determine if routine checks are adequate to maintain patient safety  - Encourage patient to participate actively in care by formulating a plan to combat response to suicidal ideation, identify supports and resources  Outcome: Progressing

## 2021-04-15 PROCEDURE — 99232 SBSQ HOSP IP/OBS MODERATE 35: CPT | Performed by: STUDENT IN AN ORGANIZED HEALTH CARE EDUCATION/TRAINING PROGRAM

## 2021-04-15 RX ORDER — OLANZAPINE 10 MG/1
10 TABLET ORAL
Status: DISCONTINUED | OUTPATIENT
Start: 2021-04-15 | End: 2021-04-18

## 2021-04-15 RX ADMIN — GABAPENTIN 600 MG: 300 CAPSULE ORAL at 16:19

## 2021-04-15 RX ADMIN — MELATONIN TAB 3 MG 3 MG: 3 TAB at 21:40

## 2021-04-15 RX ADMIN — NICOTINE 1 PATCH: 21 PATCH, EXTENDED RELEASE TRANSDERMAL at 08:11

## 2021-04-15 RX ADMIN — GABAPENTIN 600 MG: 300 CAPSULE ORAL at 21:40

## 2021-04-15 RX ADMIN — HYDROXYZINE HYDROCHLORIDE 50 MG: 50 TABLET, FILM COATED ORAL at 11:24

## 2021-04-15 RX ADMIN — ACETAMINOPHEN 975 MG: 325 TABLET, FILM COATED ORAL at 08:13

## 2021-04-15 RX ADMIN — OLANZAPINE 10 MG: 10 TABLET, FILM COATED ORAL at 21:40

## 2021-04-15 RX ADMIN — FLUOXETINE 20 MG: 20 CAPSULE ORAL at 08:12

## 2021-04-15 RX ADMIN — BENZTROPINE MESYLATE 1 MG: 1 TABLET ORAL at 17:32

## 2021-04-15 RX ADMIN — BENZTROPINE MESYLATE 1 MG: 1 TABLET ORAL at 08:11

## 2021-04-15 RX ADMIN — GABAPENTIN 600 MG: 300 CAPSULE ORAL at 08:11

## 2021-04-15 NOTE — PROGRESS NOTES
04/15/21 1000 04/15/21 1315 04/15/21 1415   Activity/Group Checklist   Group Community meeting  (self value) Life Skills  (problem solving) Other (Comment)  (leisure/trivia)   Attendance Did not attend Attended Attended   Attendance Duration (min)  --  31-45 0-15   Interactions  --  Disorganized interaction  (in and out of group, loud, intrusive) Interacted appropriately   Affect/Mood  --  Bright Appropriate;Bright   Goals Achieved  --  Discussed coping strategies; Identified resources and support systems; Able to listen to others; Able to engage in interactions  --    Pt attended 2 of 3 groups  She was very high energy and bright in affect, though frequently intrusive  However, she interacted very pleasantly with peers and offered encouragement

## 2021-04-15 NOTE — PROGRESS NOTES
Progress Note - Behavioral Health   Eduardo Vail 47 y o  female MRN: [id number]  Unit/Bed#: [facility] 130-41 Encounter: [id number]    Assessment/Plan   Principal Problem:    Bipolar 1 disorder, depressed, severe ([address] )  Active Problems:    Tobacco abuse    Alcohol use disorder, moderate, dependence ([facility])    Medical clearance for psychiatric admission      Subjective: Patient was seen, chart reviewed and case discussed with team  As per report patient received PRN zyprexa and atarax  Patient endorses irritability, mood swings, racing thoughts and anxiety at times  Patient appears labile and euphoric at times  Denies SI/HI/AH/VH    Psychiatric Review of Systems:  Behavior over the last 24 hours:  unchanged  Sleep: normal  Appetite: normal  Medication side effects: No  ROS: no complaints, all others negative    Current Medications:  Current Facility-Administered Medications   Medication Dose Route Frequency    acetaminophen (TYLENOL) tablet 650 mg  650 mg Oral Q6H PRN    acetaminophen (TYLENOL) tablet 650 mg  650 mg Oral Q4H PRN    acetaminophen (TYLENOL) tablet 975 mg  975 mg Oral Q6H PRN    benztropine (COGENTIN) tablet 1 mg  1 mg Oral BID    FLUoxetine (PROzac) capsule 20 mg  20 mg Oral Daily    gabapentin (NEURONTIN) capsule 600 mg  600 mg Oral TID    hydrOXYzine HCL (ATARAX) tablet 25 mg  25 mg Oral Q6H PRN Max 4/day    hydrOXYzine HCL (ATARAX) tablet 50 mg  50 mg Oral Q4H PRN Max 4/day    Or    LORazepam (ATIVAN) injection 1 mg  1 mg Intramuscular Q4H PRN    LORazepam (ATIVAN) tablet 1 mg  1 mg Oral Q4H PRN Max 6/day    Or    LORazepam (ATIVAN) injection 2 mg  2 mg Intramuscular Q6H PRN Max 3/day    melatonin tablet 3 mg  3 mg Oral HS    nicotine (NICODERM CQ) 21 mg/24 hr TD 24 hr patch 1 patch  1 patch Transdermal Daily    OLANZapine (ZyPREXA) tablet 10 mg  10 mg Oral Q3H PRN Max 3/day    Or    OLANZapine (ZyPREXA) IM injection 10 mg  10 mg Intramuscular Q3H PRN Max 3/day    OLANZapine (ZyPREXA) tablet 5 mg  5 mg Oral Q3H PRN Max 6/day    Or    OLANZapine (ZyPREXA) IM injection 5 mg  5 mg Intramuscular Q3H PRN Max 6/day    OLANZapine (ZyPREXA) tablet 10 mg  10 mg Oral HS    OLANZapine (ZyPREXA) tablet 2 5 mg  2 5 mg Oral Q3H PRN Max 8/day    senna-docusate sodium (SENOKOT S) 8 6-50 mg per tablet 1 tablet  1 tablet Oral Daily PRN    traZODone (DESYREL) tablet 50 mg  50 mg Oral HS PRN       Behavioral Health Medications: all current active meds have been reviewed  Vitals:  Vitals:    04/15/21 0719   BP: 105/57   Pulse: 73   Resp: 18   Temp: 98 2 °F (36 8 °C)   SpO2:        Laboratory results:  I have personally reviewed all pertinent laboratory/tests results  Mental Status Evaluation:  Appearance:  age appropriate   Behavior:  restless and fidgety   Speech:  normal pitch and normal volume   Mood:  anxious   Affect:  labile   Language Appropriate   Thought Process:  goal directed and logical   Thought Content:  normal   Perceptual Disturbances: None   Risk Potential: Suicidal Ideations none, Homicidal Ideations none and Potential for Aggression No   Sensorium:  person, place, time/date, situation, day of week, month of year and year   Cognition:  recent and remote memory grossly intact   Consciousness:  alert    Attention: attention span appeared shorter than expected for age   Insight:  fair   Judgment: fair   Gait/Station: normal gait/station   Motor Activity: no abnormal movements     Progress Toward Goals: Progressing    Recommended Treatment: Continue with pharmacotherapy, group therapy, milieu therapy and occupational therapy  1  Increase zyprexa to 10 mg PO HS  Risks, benefits and possible side effects of Medications:   Risks, benefits, and possible side effects of medications explained to patient and patient verbalizes understanding

## 2021-04-15 NOTE — PLAN OF CARE
Problem: Anxiety  Goal: Anxiety is at manageable level  Description: Interventions:  - Assess and monitor patient's anxiety level  - Monitor for signs and symptoms (heart palpitations, chest pain, shortness of breath, headaches, nausea, feeling jumpy, restlessness, irritable, apprehensive)  - Collaborate with interdisciplinary team and initiate plan and interventions as ordered  - [place] patient to unit/surroundings  - Explain treatment plan  - Encourage participation in care  - Encourage verbalization of concerns/fears  - Identify coping mechanisms  - Assist in developing anxiety-reducing skills  - Administer/offer alternative therapies  - Limit or eliminate stimulants  Outcome: Progressing     Problem: SUBSTANCE USE/ABUSE  Goal: By discharge, will develop insight into their chemical dependency and sustain motivation to continue in recovery  Description: INTERVENTIONS:  - Attends all daily group sessions and scheduled AA groups  - Actively practices coping skills through participation in the therapeutic community and adherence to program rules  - Reviews and completes assignments from individual treatment plan  - Assist patient development of understanding of their personal cycle of addiction and relapse triggers  Outcome: Progressing  Goal: By discharge, patient will have ongoing treatment plan addressing chemical dependency  Description: INTERVENTIONS:  - Assist patient with resources and/or appointments for ongoing recovery based living  Outcome: Progressing     Problem: Nutrition/Hydration-ADULT  Goal: Nutrient/Hydration intake appropriate for improving, restoring or maintaining nutritional needs  Description: Monitor and assess patient's nutrition/hydration status for malnutrition  Collaborate with interdisciplinary team and initiate plan and interventions as ordered  Monitor patient's weight and dietary intake as ordered or per policy  Utilize nutrition screening tool and intervene as necessary  Determine patient's food preferences and provide high-protein, high-caloric foods as appropriate       INTERVENTIONS:  - Monitor oral intake, urinary output, labs, and treatment plans  - Assess nutrition and hydration status and recommend course of action  - Evaluate amount of meals eaten  - Assist patient with eating if necessary   - Allow adequate time for meals  - Recommend/ encourage appropriate diets, oral nutritional supplements, and vitamin/mineral supplements  - Order, calculate, and assess calorie counts as needed  - Recommend, monitor, and adjust tube feedings and TPN/PPN based on assessed needs  - Assess need for intravenous fluids  - Provide specific nutrition/hydration education as appropriate  - Include patient/family/caregiver in decisions related to nutrition  Outcome: Progressing     Problem: SELF HARM/SUICIDALITY  Goal: Will have no self-injury during hospital stay  Description: INTERVENTIONS:  - Q 15 MINUTES: Routine safety checks  - Q WAKING SHIFT & PRN: Assess risk to determine if routine checks are adequate to maintain patient safety  - Encourage patient to participate actively in care by formulating a plan to combat response to suicidal ideation, identify supports and resources  Outcome: Progressing     Problem: SUBSTANCE USE/ABUSE  Goal: Will have no detox symptoms and will verbalize plan for changing substance-related behavior  Description: INTERVENTIONS:  - Monitor physical status and assess for symptoms of withdrawal  - Administer medication as ordered  - Provide emotional support with 1 on 1 interaction with staff  - Encourage recovery focused program/ addiction education  - Assess for verbalization of changing behaviors related to substance abuse  - Initiate consults and referrals as appropriate (Case Management, Spiritual Care, etc )  Outcome: Completed

## 2021-04-15 NOTE — NURSING NOTE
Patient reports feeling anxious  Atarax 50mg administered and appears to have been only mildly effective; however, at this time patient is not requesting more antianxiety medication at this time

## 2021-04-15 NOTE — PROGRESS NOTES
Pt remains seclusive to her room, sleeping for majority of the morning, declined groups stating she wanted to sleep longer  Pt denies SI/HI, anxiety and depression  Denies AVH  Pt disinterested in speaking with this writer stating "I just want to sleep " Denies any questions at this time

## 2021-04-16 PROCEDURE — 99232 SBSQ HOSP IP/OBS MODERATE 35: CPT | Performed by: STUDENT IN AN ORGANIZED HEALTH CARE EDUCATION/TRAINING PROGRAM

## 2021-04-16 RX ORDER — MAGNESIUM HYDROXIDE/ALUMINUM HYDROXICE/SIMETHICONE 120; 1200; 1200 MG/30ML; MG/30ML; MG/30ML
30 SUSPENSION ORAL EVERY 4 HOURS PRN
Status: DISCONTINUED | OUTPATIENT
Start: 2021-04-16 | End: 2021-04-27 | Stop reason: HOSPADM

## 2021-04-16 RX ADMIN — FLUOXETINE 20 MG: 20 CAPSULE ORAL at 08:17

## 2021-04-16 RX ADMIN — MELATONIN TAB 3 MG 3 MG: 3 TAB at 21:23

## 2021-04-16 RX ADMIN — OLANZAPINE 10 MG: 10 TABLET, FILM COATED ORAL at 21:23

## 2021-04-16 RX ADMIN — BENZTROPINE MESYLATE 1 MG: 1 TABLET ORAL at 17:07

## 2021-04-16 RX ADMIN — NICOTINE 1 PATCH: 21 PATCH, EXTENDED RELEASE TRANSDERMAL at 08:16

## 2021-04-16 RX ADMIN — BENZTROPINE MESYLATE 1 MG: 1 TABLET ORAL at 08:17

## 2021-04-16 RX ADMIN — LORAZEPAM 1 MG: 1 TABLET ORAL at 13:31

## 2021-04-16 RX ADMIN — HYDROXYZINE HYDROCHLORIDE 50 MG: 50 TABLET, FILM COATED ORAL at 11:18

## 2021-04-16 RX ADMIN — ALUMINUM HYDROXIDE, MAGNESIUM HYDROXIDE, AND SIMETHICONE 30 ML: 200; 200; 20 SUSPENSION ORAL at 17:07

## 2021-04-16 RX ADMIN — GABAPENTIN 600 MG: 300 CAPSULE ORAL at 21:23

## 2021-04-16 RX ADMIN — GABAPENTIN 600 MG: 300 CAPSULE ORAL at 08:17

## 2021-04-16 RX ADMIN — LORAZEPAM 1 MG: 1 TABLET ORAL at 17:06

## 2021-04-16 RX ADMIN — ACETAMINOPHEN 650 MG: 325 TABLET, FILM COATED ORAL at 08:17

## 2021-04-16 RX ADMIN — GABAPENTIN 600 MG: 300 CAPSULE ORAL at 16:27

## 2021-04-16 RX ADMIN — LORAZEPAM 1 MG: 1 TABLET ORAL at 21:25

## 2021-04-16 RX ADMIN — ALUMINUM HYDROXIDE, MAGNESIUM HYDROXIDE, AND SIMETHICONE 30 ML: 200; 200; 20 SUSPENSION ORAL at 13:43

## 2021-04-16 NOTE — CASE MANAGEMENT
CM completed & faxed ICM referral to [facility] ICM @ [phone]  CM met with Pt to provide & update & she is agreeable  She asked again about the [facility], & CM reviewed that she had left a message on Weds for [employer]  CM agreed to call again on Monday, if the  does not call back by then

## 2021-04-16 NOTE — PROGRESS NOTES
Status: Pt napping most of the morning, but up in the afternoon  Pt reported that she prefers "Xanny bars" to prevent relapse  Pt appears preoccupied but did attend wrap-up group      Medication: Neurontin increased to 600mg TID / PRN - Atarax & Zyprexa  D/C: next week / MARILUZ left a message for [facility])     04/15/21 0750   Team Meeting   Meeting Type Daily Rounds   Team Members Present   Team Members Present Physician;Nurse;Occupational Therapist;   Physician Team Member Dr Thalia Chavis / Fritz Richardson Team Member Rupa Stokes / Tyler Lee Management Team Member Kandace Tam / Hyacinth Lux   OT Team Member Shahida Ruiz / Chris Livingston   Patient/Family Present   Patient Present No   Patient's Family Present No

## 2021-04-16 NOTE — PROGRESS NOTES
Status: Pt dancing & appears to be RIS, however, she denies any SI/HI/Hallucinations  Pt also observed talking to herself in the hallways & is disheveled  Medication: Zyprexa increased to 10mg at bedtime / PRN - Tylenol & Atarax  D/C: next week / Pt is scheduled to talk with Shirley Dinero from [facility] on [place] afternoon       04/16/21 0750   Team Meeting   Meeting Type Daily Rounds   Team Members Present   Team Members Present Physician;Nurse;Occupational Therapist;   Physician Team Member Dr Nini Carroll / Turner Owen Team Member Nayan Mills / Milagro Ascencio Management Team Member Marcy Worrell / Kristin Gan   OT Team Member Summer Noel / Ellen Meeks   Patient/Family Present   Patient Present No   Patient's Family Present No

## 2021-04-16 NOTE — PROGRESS NOTES
Progress Note - Behavioral Health   Sunday Vega 47 y o  female MRN: [id number]  Unit/Bed#: Richard [facility] 965-48 Encounter: [id number]    Assessment/Plan   Principal Problem:    Bipolar 1 disorder, depressed, severe ([address] )  Active Problems:    Tobacco abuse    Alcohol use disorder, moderate, dependence (formerly [facility])    Medical clearance for psychiatric admission      Subjective: Patient was seen, chart reviewed and case discussed with team  As per report patient is talking to self and dancing in her room  Patient endorses anxiety, racing thoughts, mood swings and irritable mood  Denies SI/HI/AH/VH  She is compliant medications  Zyprexa was increased last night  Denies any side effects     Psychiatric Review of Systems:  Behavior over the last 24 hours:  improved  Sleep: normal  Appetite: normal  Medication side effects: No  ROS: no complaints, all others negative    Current Medications:  Current Facility-Administered Medications   Medication Dose Route Frequency    acetaminophen (TYLENOL) tablet 650 mg  650 mg Oral Q6H PRN    acetaminophen (TYLENOL) tablet 650 mg  650 mg Oral Q4H PRN    acetaminophen (TYLENOL) tablet 975 mg  975 mg Oral Q6H PRN    benztropine (COGENTIN) tablet 1 mg  1 mg Oral BID    FLUoxetine (PROzac) capsule 20 mg  20 mg Oral Daily    gabapentin (NEURONTIN) capsule 600 mg  600 mg Oral TID    hydrOXYzine HCL (ATARAX) tablet 25 mg  25 mg Oral Q6H PRN Max 4/day    hydrOXYzine HCL (ATARAX) tablet 50 mg  50 mg Oral Q4H PRN Max 4/day    Or    LORazepam (ATIVAN) injection 1 mg  1 mg Intramuscular Q4H PRN    LORazepam (ATIVAN) tablet 1 mg  1 mg Oral Q4H PRN Max 6/day    Or    LORazepam (ATIVAN) injection 2 mg  2 mg Intramuscular Q6H PRN Max 3/day    melatonin tablet 3 mg  3 mg Oral HS    nicotine (NICODERM CQ) 21 mg/24 hr TD 24 hr patch 1 patch  1 patch Transdermal Daily    OLANZapine (ZyPREXA) tablet 10 mg  10 mg Oral Q3H PRN Max 3/day    Or    OLANZapine (ZyPREXA) IM injection 10 mg  10 mg Intramuscular Q3H PRN Max 3/day    OLANZapine (ZyPREXA) tablet 5 mg  5 mg Oral Q3H PRN Max 6/day    Or    OLANZapine (ZyPREXA) IM injection 5 mg  5 mg Intramuscular Q3H PRN Max 6/day    OLANZapine (ZyPREXA) tablet 10 mg  10 mg Oral HS    OLANZapine (ZyPREXA) tablet 2 5 mg  2 5 mg Oral Q3H PRN Max 8/day    senna-docusate sodium (SENOKOT S) 8 6-50 mg per tablet 1 tablet  1 tablet Oral Daily PRN    traZODone (DESYREL) tablet 50 mg  50 mg Oral HS PRN       Behavioral Health Medications: all current active meds have been reviewed  Vitals:  Vitals:    04/16/21 0756   BP: 106/68   Pulse: 91   Resp: 19   Temp: (!) 97 2 °F (36 2 °C)   SpO2:        Laboratory results:  I have personally reviewed all pertinent laboratory/tests results  Mental Status Evaluation:  Appearance:  age appropriate and casually dressed   Behavior:  restless and fidgety   Speech:  normal pitch and normal volume   Mood:  anxious and irritable   Affect:  labile   Language Appropriate   Thought Process:  goal directed and logical   Thought Content:  paranoia   Perceptual Disturbances: None   Risk Potential: Suicidal Ideations none, Homicidal Ideations none and Potential for Aggression No   Sensorium:  person, place, time/date, situation, day of week, month of year and year   Cognition:  recent and remote memory grossly intact   Consciousness:  alert    Attention: attention span appeared shorter than expected for age   Insight:  fair   Judgment: fair   Gait/Station: normal gait/station   Motor Activity: no abnormal movements     Progress Toward Goals: Progressing    Recommended Treatment: Continue with pharmacotherapy, group therapy, milieu therapy and occupational therapy

## 2021-04-16 NOTE — DISCHARGE INSTR - APPOINTMENTS
Tin Sy RN, our [employer], will be calling you after your discharge, on the phone number that you provided  She will be available as an additional support, if needed  If you wish to speak with her, you may contact Sheba Dubois at [phone]

## 2021-04-16 NOTE — CASE MANAGEMENT
CM contacted Devorah Severs of [employer] & she reported they do have an opening & she would like to speak to Pt directly    CM scheduled phone interview for Mon at 2:00 PM

## 2021-04-16 NOTE — PROGRESS NOTES
Pt remains pleasant and calm  Social with select peers  Pt denies SI/HI, reports fluctuating anxiety throughout the day  States music helps to decrease this and was encouraged to request radio if feeling anxious and when group is not occurring  Also encouraged pt to attend groups  Denies AVH however is observed talking to self in room/halls  Denies any concerns or questions at this time

## 2021-04-16 NOTE — PLAN OF CARE
ICM referral completed & faxed to [address] ICM    Pt has a phone interview with [address] on [facility] at 2 PM

## 2021-04-16 NOTE — PLAN OF CARE
Problem: Anxiety  Goal: Anxiety is at manageable level  Description: Interventions:  - Assess and monitor patient's anxiety level  - Monitor for signs and symptoms (heart palpitations, chest pain, shortness of breath, headaches, nausea, feeling jumpy, restlessness, irritable, apprehensive)  - Collaborate with interdisciplinary team and initiate plan and interventions as ordered  - [place] patient to unit/surroundings  - Explain treatment plan  - Encourage participation in care  - Encourage verbalization of concerns/fears  - Identify coping mechanisms  - Assist in developing anxiety-reducing skills  - Administer/offer alternative therapies  - Limit or eliminate stimulants  Outcome: Progressing     Problem: SUBSTANCE USE/ABUSE  Goal: By discharge, will develop insight into their chemical dependency and sustain motivation to continue in recovery  Description: INTERVENTIONS:  - Attends all daily group sessions and scheduled AA groups  - Actively practices coping skills through participation in the therapeutic community and adherence to program rules  - Reviews and completes assignments from individual treatment plan  - Assist patient development of understanding of their personal cycle of addiction and relapse triggers  Outcome: Progressing  Goal: By discharge, patient will have ongoing treatment plan addressing chemical dependency  Description: INTERVENTIONS:  - Assist patient with resources and/or appointments for ongoing recovery based living  Outcome: Progressing     Problem: Nutrition/Hydration-ADULT  Goal: Nutrient/Hydration intake appropriate for improving, restoring or maintaining nutritional needs  Description: Monitor and assess patient's nutrition/hydration status for malnutrition  Collaborate with interdisciplinary team and initiate plan and interventions as ordered  Monitor patient's weight and dietary intake as ordered or per policy  Utilize nutrition screening tool and intervene as necessary  Determine patient's food preferences and provide high-protein, high-caloric foods as appropriate       INTERVENTIONS:  - Monitor oral intake, urinary output, labs, and treatment plans  - Assess nutrition and hydration status and recommend course of action  - Evaluate amount of meals eaten  - Assist patient with eating if necessary   - Allow adequate time for meals  - Recommend/ encourage appropriate diets, oral nutritional supplements, and vitamin/mineral supplements  - Order, calculate, and assess calorie counts as needed  - Recommend, monitor, and adjust tube feedings and TPN/PPN based on assessed needs  - Assess need for intravenous fluids  - Provide specific nutrition/hydration education as appropriate  - Include patient/family/caregiver in decisions related to nutrition  Outcome: Progressing     Problem: SELF HARM/SUICIDALITY  Goal: Will have no self-injury during hospital stay  Description: INTERVENTIONS:  - Q 15 MINUTES: Routine safety checks  - Q WAKING SHIFT & PRN: Assess risk to determine if routine checks are adequate to maintain patient safety  - Encourage patient to participate actively in care by formulating a plan to combat response to suicidal ideation, identify supports and resources  Outcome: Progressing

## 2021-04-17 PROCEDURE — 99232 SBSQ HOSP IP/OBS MODERATE 35: CPT | Performed by: STUDENT IN AN ORGANIZED HEALTH CARE EDUCATION/TRAINING PROGRAM

## 2021-04-17 RX ADMIN — MELATONIN TAB 3 MG 3 MG: 3 TAB at 21:32

## 2021-04-17 RX ADMIN — BENZTROPINE MESYLATE 1 MG: 1 TABLET ORAL at 09:15

## 2021-04-17 RX ADMIN — ALUMINUM HYDROXIDE, MAGNESIUM HYDROXIDE, AND SIMETHICONE 30 ML: 200; 200; 20 SUSPENSION ORAL at 17:08

## 2021-04-17 RX ADMIN — OLANZAPINE 10 MG: 10 TABLET, FILM COATED ORAL at 21:32

## 2021-04-17 RX ADMIN — FLUOXETINE 20 MG: 20 CAPSULE ORAL at 09:15

## 2021-04-17 RX ADMIN — NICOTINE 1 PATCH: 21 PATCH, EXTENDED RELEASE TRANSDERMAL at 09:14

## 2021-04-17 RX ADMIN — GABAPENTIN 600 MG: 300 CAPSULE ORAL at 21:32

## 2021-04-17 RX ADMIN — GABAPENTIN 600 MG: 300 CAPSULE ORAL at 09:15

## 2021-04-17 RX ADMIN — GABAPENTIN 600 MG: 300 CAPSULE ORAL at 16:05

## 2021-04-17 RX ADMIN — BENZTROPINE MESYLATE 1 MG: 1 TABLET ORAL at 17:08

## 2021-04-17 RX ADMIN — OLANZAPINE 5 MG: 5 TABLET, FILM COATED ORAL at 16:05

## 2021-04-17 RX ADMIN — LORAZEPAM 1 MG: 1 TABLET ORAL at 09:16
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
## 2021-04-17 NOTE — PROGRESS NOTES
Progress Note - Behavioral Health   Jim Salamanca 47 y o  female MRN: [id number]  Unit/Bed#: Kenn Lentz 714-80 Encounter: [id number]    Assessment/Plan   Principal Problem:    Bipolar 1 disorder, depressed, severe ([address] )  Active Problems:    Tobacco abuse    Alcohol use disorder, moderate, dependence ([facility])    Medical clearance for psychiatric admission      Subjective: Patient was seen, chart reviewed and case discussed with team  As per report patient is talking, dancing and singing to self  Patient reports that her mood is good but anxious at times  She continues to have racing thoughts  Sleep and appetite have improved  Denies SI/HI/AH/VH    Psychiatric Review of Systems:  Behavior over the last 24 hours:  improved  Sleep: normal  Appetite: normal  Medication side effects: No  ROS: no complaints, all others negative    Current Medications:  Current Facility-Administered Medications   Medication Dose Route Frequency    acetaminophen (TYLENOL) tablet 650 mg  650 mg Oral Q6H PRN    acetaminophen (TYLENOL) tablet 650 mg  650 mg Oral Q4H PRN    acetaminophen (TYLENOL) tablet 975 mg  975 mg Oral Q6H PRN    aluminum-magnesium hydroxide-simethicone (MYLANTA) oral suspension 30 mL  30 mL Oral Q4H PRN    benztropine (COGENTIN) tablet 1 mg  1 mg Oral BID    FLUoxetine (PROzac) capsule 20 mg  20 mg Oral Daily    gabapentin (NEURONTIN) capsule 600 mg  600 mg Oral TID    hydrOXYzine HCL (ATARAX) tablet 25 mg  25 mg Oral Q6H PRN Max 4/day    hydrOXYzine HCL (ATARAX) tablet 50 mg  50 mg Oral Q4H PRN Max 4/day    Or    LORazepam (ATIVAN) injection 1 mg  1 mg Intramuscular Q4H PRN    LORazepam (ATIVAN) tablet 1 mg  1 mg Oral Q4H PRN Max 6/day    Or    LORazepam (ATIVAN) injection 2 mg  2 mg Intramuscular Q6H PRN Max 3/day    melatonin tablet 3 mg  3 mg Oral HS    nicotine (NICODERM CQ) 21 mg/24 hr TD 24 hr patch 1 patch  1 patch Transdermal Daily    OLANZapine (ZyPREXA) tablet 10 mg  10 mg Oral Q3H PRN Max 3/day    Or    OLANZapine (ZyPREXA) IM injection 10 mg  10 mg Intramuscular Q3H PRN Max 3/day    OLANZapine (ZyPREXA) tablet 5 mg  5 mg Oral Q3H PRN Max 6/day    Or    OLANZapine (ZyPREXA) IM injection 5 mg  5 mg Intramuscular Q3H PRN Max 6/day    OLANZapine (ZyPREXA) tablet 10 mg  10 mg Oral HS    OLANZapine (ZyPREXA) tablet 2 5 mg  2 5 mg Oral Q3H PRN Max 8/day    senna-docusate sodium (SENOKOT S) 8 6-50 mg per tablet 1 tablet  1 tablet Oral Daily PRN    traZODone (DESYREL) tablet 50 mg  50 mg Oral HS PRN       Behavioral Health Medications: all current active meds have been reviewed  Vitals:  Vitals:    04/17/21 0720   BP: 96/55   Pulse: 74   Resp: 18   Temp: 98 6 °F (37 °C)   SpO2:        Laboratory results:  I have personally reviewed all pertinent laboratory/tests results  Mental Status Evaluation:  Appearance:  age appropriate   Behavior:  guarded and restless and fidgety   Speech:  soft   Mood:  anxious   Affect:  labile   Language Appropriate   Thought Process:  goal directed and logical   Thought Content:  normal   Perceptual Disturbances: None   Risk Potential: Suicidal Ideations none, Homicidal Ideations none and Potential for Aggression No   Sensorium:  person, place, time/date, situation, day of week, month of year and year   Cognition:  recent and remote memory grossly intact   Consciousness:  alert    Attention: attention span appeared shorter than expected for age   Insight:  fair   Judgment: fair   Gait/Station: normal gait/station   Motor Activity: no abnormal movements     Progress Toward Goals: Progressing    Recommended Treatment: Continue with pharmacotherapy, group therapy, milieu therapy and occupational therapy

## 2021-04-17 NOTE — PROGRESS NOTES
Patient received PRN ativan 1 mg for severe anxiety  Upon reassessment patient was sleeping peacefully in her assigned room  Will continue to monitor, educate, encourage and maintain patient safety

## 2021-04-17 NOTE — NURSING NOTE
Pt approached RN reporting feeling agitated  Pt reported "It's like from crying " Pt given Zyprexa 5mg at 1605  [place]  On follow up, pt reported medication was effective

## 2021-04-17 NOTE — PROGRESS NOTES
Pleasant during interaction, restless/fidgeting  Visible walking halls and social with select peers  Denies SI/HI  Denies AVH, does not appear to be RIS  Reports good sleep overnight

## 2021-04-17 NOTE — PLAN OF CARE
Problem: Anxiety  Goal: Anxiety is at manageable level  Description: Interventions:  - Assess and monitor patient's anxiety level  - Monitor for signs and symptoms (heart palpitations, chest pain, shortness of breath, headaches, nausea, feeling jumpy, restlessness, irritable, apprehensive)  - Collaborate with interdisciplinary team and initiate plan and interventions as ordered  - [place] patient to unit/surroundings  - Explain treatment plan  - Encourage participation in care  - Encourage verbalization of concerns/fears  - Identify coping mechanisms  - Assist in developing anxiety-reducing skills  - Administer/offer alternative therapies  - Limit or eliminate stimulants  Outcome: Progressing     Problem: SUBSTANCE USE/ABUSE  Goal: By discharge, will develop insight into their chemical dependency and sustain motivation to continue in recovery  Description: INTERVENTIONS:  - Attends all daily group sessions and scheduled AA groups  - Actively practices coping skills through participation in the therapeutic community and adherence to program rules  - Reviews and completes assignments from individual treatment plan  - Assist patient development of understanding of their personal cycle of addiction and relapse triggers  Outcome: Progressing  Goal: By discharge, patient will have ongoing treatment plan addressing chemical dependency  Description: INTERVENTIONS:  - Assist patient with resources and/or appointments for ongoing recovery based living  Outcome: Progressing     Problem: Nutrition/Hydration-ADULT  Goal: Nutrient/Hydration intake appropriate for improving, restoring or maintaining nutritional needs  Description: Monitor and assess patient's nutrition/hydration status for malnutrition  Collaborate with interdisciplinary team and initiate plan and interventions as ordered  Monitor patient's weight and dietary intake as ordered or per policy  Utilize nutrition screening tool and intervene as necessary  Determine patient's food preferences and provide high-protein, high-caloric foods as appropriate       INTERVENTIONS:  - Monitor oral intake, urinary output, labs, and treatment plans  - Assess nutrition and hydration status and recommend course of action  - Evaluate amount of meals eaten  - Assist patient with eating if necessary   - Allow adequate time for meals  - Recommend/ encourage appropriate diets, oral nutritional supplements, and vitamin/mineral supplements  - Order, calculate, and assess calorie counts as needed  - Recommend, monitor, and adjust tube feedings and TPN/PPN based on assessed needs  - Assess need for intravenous fluids  - Provide specific nutrition/hydration education as appropriate  - Include patient/family/caregiver in decisions related to nutrition  Outcome: Progressing     Problem: SELF HARM/SUICIDALITY  Goal: Will have no self-injury during hospital stay  Description: INTERVENTIONS:  - Q 15 MINUTES: Routine safety checks  - Q WAKING SHIFT & PRN: Assess risk to determine if routine checks are adequate to maintain patient safety  - Encourage patient to participate actively in care by formulating a plan to combat response to suicidal ideation, identify supports and resources  Outcome: Progressing

## 2021-04-17 NOTE — PROGRESS NOTES
Remains pleasant during interaction  Visible/social with peers in the day room  Denies SI/HI  Denies AVH, verbally contracts for safety  Denies questions/concerns

## 2021-04-18 PROCEDURE — 99232 SBSQ HOSP IP/OBS MODERATE 35: CPT | Performed by: STUDENT IN AN ORGANIZED HEALTH CARE EDUCATION/TRAINING PROGRAM

## 2021-04-18 RX ADMIN — GABAPENTIN 600 MG: 300 CAPSULE ORAL at 16:08

## 2021-04-18 RX ADMIN — NICOTINE 1 PATCH: 21 PATCH, EXTENDED RELEASE TRANSDERMAL at 08:08

## 2021-04-18 RX ADMIN — FLUOXETINE 20 MG: 20 CAPSULE ORAL at 08:09

## 2021-04-18 RX ADMIN — BENZTROPINE MESYLATE 1 MG: 1 TABLET ORAL at 08:09

## 2021-04-18 RX ADMIN — ALUMINUM HYDROXIDE, MAGNESIUM HYDROXIDE, AND SIMETHICONE 30 ML: 200; 200; 20 SUSPENSION ORAL at 12:50

## 2021-04-18 RX ADMIN — LORAZEPAM 1 MG: 1 TABLET ORAL at 16:09

## 2021-04-18 RX ADMIN — GABAPENTIN 600 MG: 300 CAPSULE ORAL at 21:28

## 2021-04-18 RX ADMIN — HYDROXYZINE HYDROCHLORIDE 50 MG: 50 TABLET, FILM COATED ORAL at 08:10

## 2021-04-18 RX ADMIN — OLANZAPINE 15 MG: 10 TABLET, FILM COATED ORAL at 21:28

## 2021-04-18 RX ADMIN — GABAPENTIN 600 MG: 300 CAPSULE ORAL at 08:09

## 2021-04-18 RX ADMIN — BENZTROPINE MESYLATE 1 MG: 1 TABLET ORAL at 17:10

## 2021-04-18 RX ADMIN — MELATONIN TAB 3 MG 3 MG: 3 TAB at 21:28

## 2021-04-18 NOTE — PROGRESS NOTES
Progress Note - Behavioral Health   Prosper Holt 47 y o  female MRN: [id number]  Unit/Bed#: [facility] 182-14 Encounter: [id number]    Assessment/Plan   Principal Problem:    Bipolar 1 disorder, depressed, severe ([address] )  Active Problems:    Tobacco abuse    Alcohol use disorder, moderate, dependence ([facility])    Medical clearance for psychiatric admission      Subjective: Patient was seen, chart reviewed and case discussed with team  Patient appears anxious, expansive and pacing the hallways  Patient reports increased anxiety and racing thoughts at times, so walks to calm herself  Sleep and appetite has improved  Denies SI/HI/AH/VH     Psychiatric Review of Systems:  Behavior over the last 24 hours:  improved  Sleep: normal  Appetite: normal  Medication side effects: No  ROS: no complaints, all others negative    Current Medications:  Current Facility-Administered Medications   Medication Dose Route Frequency    acetaminophen (TYLENOL) tablet 650 mg  650 mg Oral Q6H PRN    acetaminophen (TYLENOL) tablet 650 mg  650 mg Oral Q4H PRN    acetaminophen (TYLENOL) tablet 975 mg  975 mg Oral Q6H PRN    aluminum-magnesium hydroxide-simethicone (MYLANTA) oral suspension 30 mL  30 mL Oral Q4H PRN    benztropine (COGENTIN) tablet 1 mg  1 mg Oral BID    FLUoxetine (PROzac) capsule 20 mg  20 mg Oral Daily    gabapentin (NEURONTIN) capsule 600 mg  600 mg Oral TID    hydrOXYzine HCL (ATARAX) tablet 25 mg  25 mg Oral Q6H PRN Max 4/day    hydrOXYzine HCL (ATARAX) tablet 50 mg  50 mg Oral Q4H PRN Max 4/day    Or    LORazepam (ATIVAN) injection 1 mg  1 mg Intramuscular Q4H PRN    LORazepam (ATIVAN) tablet 1 mg  1 mg Oral Q4H PRN Max 6/day    Or    LORazepam (ATIVAN) injection 2 mg  2 mg Intramuscular Q6H PRN Max 3/day    melatonin tablet 3 mg  3 mg Oral HS    nicotine (NICODERM CQ) 21 mg/24 hr TD 24 hr patch 1 patch  1 patch Transdermal Daily    OLANZapine (ZyPREXA) tablet 10 mg  10 mg Oral Q3H PRN Max 3/day    Or    OLANZapine (ZyPREXA) IM injection 10 mg  10 mg Intramuscular Q3H PRN Max 3/day    OLANZapine (ZyPREXA) tablet 5 mg  5 mg Oral Q3H PRN Max 6/day    Or    OLANZapine (ZyPREXA) IM injection 5 mg  5 mg Intramuscular Q3H PRN Max 6/day    OLANZapine (ZyPREXA) tablet 10 mg  10 mg Oral HS    OLANZapine (ZyPREXA) tablet 2 5 mg  2 5 mg Oral Q3H PRN Max 8/day    senna-docusate sodium (SENOKOT S) 8 6-50 mg per tablet 1 tablet  1 tablet Oral Daily PRN    traZODone (DESYREL) tablet 50 mg  50 mg Oral HS PRN       Behavioral Health Medications: all current active meds have been reviewed  Vitals:  Vitals:    04/18/21 1034   BP: 94/51   Pulse: 87   Resp: 18   Temp: 99 6 °F (37 6 °C)   SpO2:        Laboratory results:  I have personally reviewed all pertinent laboratory/tests results  Mental Status Evaluation:  Appearance:  age appropriate and casually dressed   Behavior:  restless and fidgety   Speech:  normal pitch and normal volume   Mood:  anxious   Affect:  increased in range and labile   Language Appropriate   Thought Process:  goal directed and logical   Thought Content:  normal   Perceptual Disturbances: None   Risk Potential: Suicidal Ideations none, Homicidal Ideations none and Potential for Aggression No   Sensorium:  person, place, time/date, situation, day of week, month of year and year   Cognition:  recent and remote memory grossly intact   Consciousness:  alert    Attention: attention span appeared shorter than expected for age   Insight:  fair   Judgment: fair   Gait/Station: normal gait/station   Motor Activity: no abnormal movements     Progress Toward Goals: Progressing    Recommended Treatment: Continue with pharmacotherapy, group therapy, milieu therapy and occupational therapy      1  Increase zyprexa to 15 mg Po HS

## 2021-04-18 NOTE — PROGRESS NOTES
Pleasant and cooperative  Does report times of elevated anxiety and received PRN medication (effective for use)  Visible and social w/ peers and attending group  Denies SI/HI/AVH  Compliant w/ medications  Denies questions

## 2021-04-18 NOTE — PLAN OF CARE
Problem: Anxiety  Goal: Anxiety is at manageable level  Description: Interventions:  - Assess and monitor patient's anxiety level  - Monitor for signs and symptoms (heart palpitations, chest pain, shortness of breath, headaches, nausea, feeling jumpy, restlessness, irritable, apprehensive)  - Collaborate with interdisciplinary team and initiate plan and interventions as ordered  - [place] patient to unit/surroundings  - Explain treatment plan  - Encourage participation in care  - Encourage verbalization of concerns/fears  - Identify coping mechanisms  - Assist in developing anxiety-reducing skills  - Administer/offer alternative therapies  - Limit or eliminate stimulants  Outcome: Progressing     Problem: SUBSTANCE USE/ABUSE  Goal: By discharge, will develop insight into their chemical dependency and sustain motivation to continue in recovery  Description: INTERVENTIONS:  - Attends all daily group sessions and scheduled AA groups  - Actively practices coping skills through participation in the therapeutic community and adherence to program rules  - Reviews and completes assignments from individual treatment plan  - Assist patient development of understanding of their personal cycle of addiction and relapse triggers  Outcome: Progressing  Goal: By discharge, patient will have ongoing treatment plan addressing chemical dependency  Description: INTERVENTIONS:  - Assist patient with resources and/or appointments for ongoing recovery based living  Outcome: Progressing     Problem: Nutrition/Hydration-ADULT  Goal: Nutrient/Hydration intake appropriate for improving, restoring or maintaining nutritional needs  Description: Monitor and assess patient's nutrition/hydration status for malnutrition  Collaborate with interdisciplinary team and initiate plan and interventions as ordered  Monitor patient's weight and dietary intake as ordered or per policy  Utilize nutrition screening tool and intervene as necessary  Determine patient's food preferences and provide high-protein, high-caloric foods as appropriate       INTERVENTIONS:  - Monitor oral intake, urinary output, labs, and treatment plans  - Assess nutrition and hydration status and recommend course of action  - Evaluate amount of meals eaten  - Assist patient with eating if necessary   - Allow adequate time for meals  - Recommend/ encourage appropriate diets, oral nutritional supplements, and vitamin/mineral supplements  - Order, calculate, and assess calorie counts as needed  - Recommend, monitor, and adjust tube feedings and TPN/PPN based on assessed needs  - Assess need for intravenous fluids  - Provide specific nutrition/hydration education as appropriate  - Include patient/family/caregiver in decisions related to nutrition  Outcome: Progressing     Problem: SELF HARM/SUICIDALITY  Goal: Will have no self-injury during hospital stay  Description: INTERVENTIONS:  - Q 15 MINUTES: Routine safety checks  - Q WAKING SHIFT & PRN: Assess risk to determine if routine checks are adequate to maintain patient safety  - Encourage patient to participate actively in care by formulating a plan to combat response to suicidal ideation, identify supports and resources  Outcome: Progressing

## 2021-04-18 NOTE — TREATMENT TEAM
04/18/21 0700   Team Meeting   Meeting Type Daily Rounds   Team Members Present   Team Members Present Physician;Nurse   Physician Team Member Thalia Chavis   Nursing Team Member Sohan   Patient/Family Present   Patient Present No   Patient's Family Present No     Daily Rounds: Pt improving, decreased rocking and restlessness  Pleasant and social on unit  Pt making phone calls looking for longterm houses

## 2021-04-18 NOTE — TREATMENT TEAM
04/17/21 0700   Team Meeting   Meeting Type Daily Rounds   Team Members Present   Team Members Present Physician;Nurse   Physician Team Member Kunia   Nursing Team Member [facility]   Patient/Family Present   Patient Present No   Patient's Family Present No     Daily Rounds: Pt singing, dancing at times  Improved mood, still having anxiety/restlessness  Eating and sleeping well  Forward thinking

## 2021-04-19 PROCEDURE — 99232 SBSQ HOSP IP/OBS MODERATE 35: CPT | Performed by: PHYSICIAN ASSISTANT

## 2021-04-19 RX ORDER — SULFAMETHOXAZOLE AND TRIMETHOPRIM 800; 160 MG/1; MG/1
1 TABLET ORAL EVERY 12 HOURS SCHEDULED
Status: DISCONTINUED | OUTPATIENT
Start: 2021-04-19 | End: 2021-04-19

## 2021-04-19 RX ADMIN — MELATONIN TAB 3 MG 3 MG: 3 TAB at 21:30

## 2021-04-19 RX ADMIN — BENZTROPINE MESYLATE 1 MG: 1 TABLET ORAL at 17:08

## 2021-04-19 RX ADMIN — BENZTROPINE MESYLATE 1 MG: 1 TABLET ORAL at 08:23

## 2021-04-19 RX ADMIN — LORAZEPAM 1 MG: 1 TABLET ORAL at 12:15

## 2021-04-19 RX ADMIN — ALUMINUM HYDROXIDE, MAGNESIUM HYDROXIDE, AND SIMETHICONE 30 ML: 200; 200; 20 SUSPENSION ORAL at 16:17

## 2021-04-19 RX ADMIN — GABAPENTIN 600 MG: 300 CAPSULE ORAL at 16:15

## 2021-04-19 RX ADMIN — ACETAMINOPHEN 650 MG: 325 TABLET, FILM COATED ORAL at 19:37

## 2021-04-19 RX ADMIN — GABAPENTIN 600 MG: 300 CAPSULE ORAL at 08:22

## 2021-04-19 RX ADMIN — FLUOXETINE 20 MG: 20 CAPSULE ORAL at 08:22

## 2021-04-19 RX ADMIN — GABAPENTIN 600 MG: 300 CAPSULE ORAL at 21:29

## 2021-04-19 RX ADMIN — OLANZAPINE 15 MG: 10 TABLET, FILM COATED ORAL at 21:30

## 2021-04-19 RX ADMIN — TRAZODONE HYDROCHLORIDE 50 MG: 50 TABLET ORAL at 21:32

## 2021-04-19 RX ADMIN — HYDROXYZINE HYDROCHLORIDE 25 MG: 25 TABLET ORAL at 17:38

## 2021-04-19 RX ADMIN — NICOTINE 1 PATCH: 21 PATCH, EXTENDED RELEASE TRANSDERMAL at 08:22

## 2021-04-19 NOTE — PROGRESS NOTES
Progress Note - Behavioral Health   Hang Carey 47 y o  female MRN: [id number]  Unit/Bed#: [facility] 715-71 Encounter: [id number]    Assessment/Plan   Principal Problem:    Bipolar 1 disorder, depressed, severe ([address] )  Active Problems:    Tobacco abuse    Alcohol use disorder, moderate, dependence ([facility])    Medical clearance for psychiatric admission      Subjective: Patient was seen, chart reviewed and case discussed with team   Patient less guarded and spoke longer today  States she still cannot decipher what is reality and what is not at times  Appears less paranoid  Additionally, she states that she has an alternate personality but is trying to keep that under control  Denied having any form of hallucination  Still remains restless and fidgety  States that her anxiety does fluctuate and does have racing thoughts at times  Reports that her depression is decreasing denied suicidal thoughts  Not currently manic  States she is more optimistic for the future  Medication compliant  Denied additional somatic complaints today      Psychiatric Review of Systems:  Behavior over the last 24 hours:  improved  Sleep: normal  Appetite: normal  Medication side effects: Yes, possible akathisia (appears chronic)  ROS: no complaints, all others negative    Current Medications:  Current Facility-Administered Medications   Medication Dose Route Frequency    acetaminophen (TYLENOL) tablet 650 mg  650 mg Oral Q6H PRN    acetaminophen (TYLENOL) tablet 650 mg  650 mg Oral Q4H PRN    acetaminophen (TYLENOL) tablet 975 mg  975 mg Oral Q6H PRN    aluminum-magnesium hydroxide-simethicone (MYLANTA) oral suspension 30 mL  30 mL Oral Q4H PRN    benztropine (COGENTIN) tablet 1 mg  1 mg Oral BID    FLUoxetine (PROzac) capsule 20 mg  20 mg Oral Daily    gabapentin (NEURONTIN) capsule 600 mg  600 mg Oral TID    hydrOXYzine HCL (ATARAX) tablet 25 mg  25 mg Oral Q6H PRN Max 4/day    hydrOXYzine HCL (ATARAX) tablet 50 mg  50 mg Oral Q4H PRN Max 4/day    Or    LORazepam (ATIVAN) injection 1 mg  1 mg Intramuscular Q4H PRN    LORazepam (ATIVAN) tablet 1 mg  1 mg Oral Q4H PRN Max 6/day    Or    LORazepam (ATIVAN) injection 2 mg  2 mg Intramuscular Q6H PRN Max 3/day    melatonin tablet 3 mg  3 mg Oral HS    nicotine (NICODERM CQ) 21 mg/24 hr TD 24 hr patch 1 patch  1 patch Transdermal Daily    OLANZapine (ZyPREXA) tablet 10 mg  10 mg Oral Q3H PRN Max 3/day    Or    OLANZapine (ZyPREXA) IM injection 10 mg  10 mg Intramuscular Q3H PRN Max 3/day    OLANZapine (ZyPREXA) tablet 5 mg  5 mg Oral Q3H PRN Max 6/day    Or    OLANZapine (ZyPREXA) IM injection 5 mg  5 mg Intramuscular Q3H PRN Max 6/day    OLANZapine (ZyPREXA) tablet 15 mg  15 mg Oral HS    OLANZapine (ZyPREXA) tablet 2 5 mg  2 5 mg Oral Q3H PRN Max 8/day    senna-docusate sodium (SENOKOT S) 8 6-50 mg per tablet 1 tablet  1 tablet Oral Daily PRN    traZODone (DESYREL) tablet 50 mg  50 mg Oral HS PRN       Behavioral Health Medications: all current active meds have been reviewed and continue current psychiatric medications  Vitals:  Vitals:    04/19/21 0724   BP: 99/55   Pulse: 81   Resp: 18   Temp: 98 8 °F (37 1 °C)   SpO2:        Laboratory results:    I have personally reviewed all pertinent laboratory/tests results    Most Recent Labs:   Lab Results   Component Value Date    WBC 6 09 04/11/2021    RBC 5 51 (H) 04/11/2021    HGB 12 6 04/11/2021    HCT 43 0 04/11/2021     04/11/2021    RDW 17 7 (H) 04/11/2021    NEUTROABS 3 19 04/11/2021    SODIUM 141 04/11/2021    K 4 6 04/11/2021     04/11/2021    CO2 30 04/11/2021    BUN 14 04/11/2021    CREATININE 0 60 04/11/2021    GLUC 87 04/11/2021    GLUF 42 (L) 06/14/2019    CALCIUM 8 7 04/11/2021    AST 10 04/11/2021    ALT 20 04/11/2021    ALKPHOS 51 04/11/2021    TP 6 3 (L) 04/11/2021    ALB 2 9 (L) 04/11/2021    TBILI 0 30 04/11/2021    CHOLESTEROL 179 04/11/2021    HDL 66 04/11/2021    TRIG 74 04/11/2021    LDLCALC 98 04/11/2021    [place] 113 04/11/2021    ZIL3AFRISUOM 1 184 05/15/2019    PREGUR NEGATIVE 04/07/2021    PREGSERUM Negative 06/18/2019    HCGQUANT <3 10/17/2018    RPR Non-Reactive 05/15/2019    HGBA1C 5 5 04/11/2021     04/11/2021       Mental Status Evaluation:  Appearance:  casually dressed   Behavior:  guarded   Speech:  soft   Mood:  dysthymic   Affect:  increased in range   Language Appropriate   Thought Process:  goal directed and illogical   Thought Content:  paranoia   Perceptual Disturbances: None   Risk Potential: Denied SI/HI  Potential for aggression: No   Sensorium:  person, place and time/date   Cognition:  recent and remote memory grossly intact   Consciousness:  alert and awake    Attention: attention span appeared shorter than expected for age   Insight:  limited   Judgment: improving   Gait/Station: normal gait/station and normal balance   Motor Activity: restless leg movements     Progress Toward Goals: progressing slowly    Recommended Treatment: Continue with pharmacotherapy, group therapy, milieu therapy and occupational therapy  1   Continue current medications (had recent increase in Zyprexa dosing)  2  No discharge at this time    Risks, benefits and possible side effects of Medications:   Risks, benefits, and possible side effects of medications explained to patient and patient verbalizes understanding

## 2021-04-19 NOTE — PLAN OF CARE
Problem: Anxiety  Goal: Anxiety is at manageable level  Description: Interventions:  - Assess and monitor patient's anxiety level  - Monitor for signs and symptoms (heart palpitations, chest pain, shortness of breath, headaches, nausea, feeling jumpy, restlessness, irritable, apprehensive)  - Collaborate with interdisciplinary team and initiate plan and interventions as ordered  - [place] patient to unit/surroundings  - Explain treatment plan  - Encourage participation in care  - Encourage verbalization of concerns/fears  - Identify coping mechanisms  - Assist in developing anxiety-reducing skills  - Administer/offer alternative therapies  - Limit or eliminate stimulants  Outcome: Progressing     Problem: SUBSTANCE USE/ABUSE  Goal: By discharge, will develop insight into their chemical dependency and sustain motivation to continue in recovery  Description: INTERVENTIONS:  - Attends all daily group sessions and scheduled AA groups  - Actively practices coping skills through participation in the therapeutic community and adherence to program rules  - Reviews and completes assignments from individual treatment plan  - Assist patient development of understanding of their personal cycle of addiction and relapse triggers  Outcome: Progressing  Goal: By discharge, patient will have ongoing treatment plan addressing chemical dependency  Description: INTERVENTIONS:  - Assist patient with resources and/or appointments for ongoing recovery based living  Outcome: Progressing     Problem: Nutrition/Hydration-ADULT  Goal: Nutrient/Hydration intake appropriate for improving, restoring or maintaining nutritional needs  Description: Monitor and assess patient's nutrition/hydration status for malnutrition  Collaborate with interdisciplinary team and initiate plan and interventions as ordered  Monitor patient's weight and dietary intake as ordered or per policy  Utilize nutrition screening tool and intervene as necessary  Determine patient's food preferences and provide high-protein, high-caloric foods as appropriate       INTERVENTIONS:  - Monitor oral intake, urinary output, labs, and treatment plans  - Assess nutrition and hydration status and recommend course of action  - Evaluate amount of meals eaten  - Assist patient with eating if necessary   - Allow adequate time for meals  - Recommend/ encourage appropriate diets, oral nutritional supplements, and vitamin/mineral supplements  - Order, calculate, and assess calorie counts as needed  - Recommend, monitor, and adjust tube feedings and TPN/PPN based on assessed needs  - Assess need for intravenous fluids  - Provide specific nutrition/hydration education as appropriate  - Include patient/family/caregiver in decisions related to nutrition  Outcome: Progressing     Problem: SELF HARM/SUICIDALITY  Goal: Will have no self-injury during hospital stay  Description: INTERVENTIONS:  - Q 15 MINUTES: Routine safety checks  - Q WAKING SHIFT & PRN: Assess risk to determine if routine checks are adequate to maintain patient safety  - Encourage patient to participate actively in care by formulating a plan to combat response to suicidal ideation, identify supports and resources  Outcome: Progressing

## 2021-04-19 NOTE — PROGRESS NOTES
04/19/21 1000 04/19/21 1140 04/19/21 1315   Activity/Group Checklist   Group [facility]  ([facility]) Nursing Education Life Skills  (big picture vision board)   Attendance Did not attend Did not attend Attended   Attendance Duration (min)  --   --  0-15   Interactions  --   --  Disorganized interaction  (participated in activity for a short time, no discussion)   Affect/Mood  --   --  Bright   Pt attended 1 of 3 groups, briefly, but with a bright affect  She entered Life Skills group late and engaged in a collage activity for 5-10 minutes, then left

## 2021-04-19 NOTE — PROGRESS NOTES
Pt is in her room dancing to music when approached for this interview  Pt reported to this writer that she is happy that everything has worked out for her and she is going to a half-way house  Pt denies SI/HI/AVH as well as anxiety and depression and denies having any questions or concerns

## 2021-04-19 NOTE — PLAN OF CARE
Pt completed phone screening with [facility]    Tentative plan is to stepdown to crisis residence for continued stabilization, before going to the program

## 2021-04-19 NOTE — CASE MANAGEMENT
CM met with Pt to assist her with contacting Shirley Dinero of [facility] @ [phone]  Pt asked where the home is located & Shirley Dinero said at [address] in [place] [facility] [place]  Pt said that she would not be able to stay there, as her drug dealer lives nearby & her ex-boyfriend does as well  Pt ended the call & CM spoke to her about [address], [employer], or rehab  Pt said that she guessed she would just call her ex & go back to where she came from  CM asked if she was returning to that, why wouldn't she at least give [facility] a try, as she would have support there? Pt asked about [facility] & CM called the program @ [phone] & left another message  Pt then said that she would like to talk to Shirley Dinero more about the program   Pt said that she has never had any support & that she may really wants to change & be clean/sober  CM contacted Shirley Dinero again & she agreed to talk to Pt  She reviewed that the woman are in rooms of 2-4 & Pt expressed concern in sharing a room with 3 other women, due to her anxiety  Pt spoke about her anxiety & her need to remain on medications  Shirley Dinero spoke to her about rent, which is $500  They typically charge $100 admission & then $500 first & last month's rent  She said that she would permit Pt to come in without that, & just pay $25 extra/month toward those fees  CM reviewed that Pt could possibly step down to a crisis residence for further stabilization & until her funds would become available on the 1st   CM then spoke to Shirley Dinero, who did express some concern, as they are not a dual provider & cannot manage individuals with serious mental illness  She reported it would be up to the treatment team to decide if they felt Pt's mental health needs were stable enough for her to live in such an environment  CM reviewed that Pt is making progress & medications are continually being adjusted as needed   CM asked about the nearest provider for substance abuse & Wendy Hughes reported that [facility] is within walking distance  CM agreed to provide updates, & Wendy Hughes agreeable  Pt asking CM for clothing & CM agreed to check with [facility]  Pt said that she was going to ask her mom to drive her to her ex's to get belongings, but decided she better not  Pt hopeful to go to the recovery house

## 2021-04-19 NOTE — NURSING NOTE
At approx 1215 this afternoon reports to med room with c/o severe anxiety and appears unable to sit still  Ativan administered and patient encouraged to utilize coping skills  Patient resting comfortably in bed and ativan appears to have been effective

## 2021-04-19 NOTE — PROGRESS NOTES
Status: Pt denies any SI/HI  She remains restless/fidgetty  Pt reporting that she is anxious & has racing thoughts  Pt slept, no issues to report     Medication: Zyprexa increased to 15mg at bedtime / PRN - Mylanta, Atarax, Ativan  D/C: Weds/Thurs  / Pt is scheduled to talk to a recovery house at 2 PM   CM completed ICM referral & faxed it to [address] [place] [address]      04/19/21 [address]   Team Meeting   Meeting Type Daily Rounds   Team Members Present   Team Members Present Physician;Nurse;Occupational Therapist;   Physician Team Member Dr Delmis Veliz / Jj Colbert / Martin Crawley Team Member Marycruz Jamison / Ardie Runner Management Team Member Davonte Christina / Percy Harada / Evangelina Marie   OT Team Member Julio Griffith / Neli Gan   Patient/Family Present   Patient Present No   Patient's Family Present No

## 2021-04-19 NOTE — PROGRESS NOTES
Pleasant during interaction  Denies SI/HI and hallucinations  Anxious and restless  Remains unable to sit or stand still during interaction  Compliant with meals and medications  Restful sleep

## 2021-04-19 NOTE — PROGRESS NOTES
Patient received PRN atarax 25mg for mild anxiety  At reassessment patient stated that the PRN didn't work  This writer told patient to try deep breathing exercises  Patient agreed  Will continue to monitor, educate, encourage and maintain patient safety

## 2021-04-20 PROCEDURE — 99232 SBSQ HOSP IP/OBS MODERATE 35: CPT | Performed by: STUDENT IN AN ORGANIZED HEALTH CARE EDUCATION/TRAINING PROGRAM

## 2021-04-20 RX ADMIN — BENZTROPINE MESYLATE 1 MG: 1 TABLET ORAL at 17:17

## 2021-04-20 RX ADMIN — BENZTROPINE MESYLATE 1 MG: 1 TABLET ORAL at 09:40

## 2021-04-20 RX ADMIN — GABAPENTIN 600 MG: 300 CAPSULE ORAL at 21:37

## 2021-04-20 RX ADMIN — FLUOXETINE 20 MG: 20 CAPSULE ORAL at 09:40

## 2021-04-20 RX ADMIN — NICOTINE 1 PATCH: 21 PATCH, EXTENDED RELEASE TRANSDERMAL at 09:40

## 2021-04-20 RX ADMIN — GABAPENTIN 600 MG: 300 CAPSULE ORAL at 09:40

## 2021-04-20 RX ADMIN — OLANZAPINE 15 MG: 10 TABLET, FILM COATED ORAL at 21:38

## 2021-04-20 RX ADMIN — ACETAMINOPHEN 650 MG: 325 TABLET, FILM COATED ORAL at 00:53

## 2021-04-20 RX ADMIN — HYDROXYZINE HYDROCHLORIDE 50 MG: 50 TABLET, FILM COATED ORAL at 21:39

## 2021-04-20 RX ADMIN — MELATONIN TAB 3 MG 3 MG: 3 TAB at 21:37

## 2021-04-20 RX ADMIN — HYDROXYZINE HYDROCHLORIDE 50 MG: 50 TABLET, FILM COATED ORAL at 14:14

## 2021-04-20 RX ADMIN — LORAZEPAM 1 MG: 1 TABLET ORAL at 17:17

## 2021-04-20 RX ADMIN — GABAPENTIN 600 MG: 300 CAPSULE ORAL at 16:03

## 2021-04-20 NOTE — CASE MANAGEMENT
MARILUZ received a phone call from Venkatesh of [facility], who was requesting to come meet with Pt, to open her for services  Venkatesh requesting to come on Thurs  At 9:15/9:30 AM & CM agreed  CM reviewed that plan is for Pt to possibly stepdown to crisis residence at the end of the week, beginning of next week, & then go to a recovery house  Venkatesh provided cell phone number [phone] for future contact & confirmed Pt can be given this number as well  CM met with Pt to review the above & she was in agreement  Pt still excited to go to the recovery house, & agrees with plan for continued stabilization at crisis residence, if accepted

## 2021-04-20 NOTE — PROGRESS NOTES
Patient in bed napping, denies SI, HI, AVH, reported "I feel very safe here, and that is why I can sleep "  Pt also appears less restless and reported increased motivation for discharge this week

## 2021-04-20 NOTE — TREATMENT TEAM
04/20/21 1000 04/20/21 1100 04/20/21 1315   Activity/Group Checklist   Group Community meeting  (barriers) Nursing Education  (Metabolic Syndrome) Life Skills  (20 questions - communication)   Attendance Did not attend Attended Attended   Attendance Duration (min)  --  16-30 0-15   Interactions  --  Interacted appropriately Disorganized interaction  (in and out, limited interaction)   Affect/Mood  --  Appropriate Appropriate   Goals Achieved  --  Able to listen to others; Able to engage in interactions  --       04/20/21 1415   Activity/Group Checklist   Group Other (Comment)  (reflective journaling)   Attendance Attended   Attendance Duration (min) 0-15   Interactions Disorganized interaction  (in and out, limited interaction)   Affect/Mood Appropriate   Goals Achieved  --    Pt attended 3 of 4 groups today, presenting with an appropriate affect  However, in 2 of 4 groups, she interacted only briefly before leaving

## 2021-04-20 NOTE — PROGRESS NOTES
Seclusive to room, reporting increased fatigue this AM   Compliant with meals and medications  Improved mood

## 2021-04-20 NOTE — PROGRESS NOTES
Status: Pt utilizes the radio & dances, bizarre at times  Pt was up once overnight reporting it felt like she was having contractions  Fluids encouraged, vitals WNL & Pt able to return to sleep  Medication: no changes / PRN - Tylenol, Mylanta, Atarax, Ativan, & Trazodone  D/C: Thurs/Friday(?) / Pt completed interview with recovery house yesterday, & they expressed some concerns with Pt being psychiatrically stable, before coming to their program, as they are not dual   CM & Pt discussed stepdown to crisis residence       04/20/21 0750   Team Meeting   Meeting Type Daily Rounds   Team Members Present   Team Members Present Physician;Nurse;Occupational Therapist;   Physician Team Member Dr Liss Jacobo / Debby Jeffrey / Juan Souza Team Member Bonnie Christian / Ernsetina Contreras / RN-Student(2)   Care Management Team Member Marli Espinosa / Barbara Jha / Jailyn Wade   OT Team Member Faviola Mancini / MORRIS-Student   Patient/Family Present   Patient Present No   Patient's Family Present No

## 2021-04-20 NOTE — PROGRESS NOTES
Progress Note - Behavioral Health   Jason Londono 47 y o  female MRN: [id number]  Unit/Bed#: [facility] 790-09 Encounter: [id number]    Assessment/Plan   Principal Problem:    Bipolar 1 disorder, depressed, severe ([address] )  Active Problems:    Tobacco abuse    Alcohol use disorder, moderate, dependence ([facility])    Medical clearance for psychiatric admission      Subjective: Patient was seen, chart reviewed and case discussed with team  AS per report patient received PRN medication for anxiety  Patient reports tiredness, anxiety and racing thoughts  Denies SI/HI/AH/VH  Patient is secclusive and withdrawn to room today  She is compliant with medications     Psychiatric Review of Systems:  Behavior over the last 24 hours:  unchanged  Sleep: normal  Appetite: normal  Medication side effects: No  ROS: no complaints, all others negative    Current Medications:  Current Facility-Administered Medications   Medication Dose Route Frequency    acetaminophen (TYLENOL) tablet 650 mg  650 mg Oral Q6H PRN    acetaminophen (TYLENOL) tablet 650 mg  650 mg Oral Q4H PRN    acetaminophen (TYLENOL) tablet 975 mg  975 mg Oral Q6H PRN    aluminum-magnesium hydroxide-simethicone (MYLANTA) oral suspension 30 mL  30 mL Oral Q4H PRN    benztropine (COGENTIN) tablet 1 mg  1 mg Oral BID    FLUoxetine (PROzac) capsule 20 mg  20 mg Oral Daily    gabapentin (NEURONTIN) capsule 600 mg  600 mg Oral TID    hydrOXYzine HCL (ATARAX) tablet 25 mg  25 mg Oral Q6H PRN Max 4/day    hydrOXYzine HCL (ATARAX) tablet 50 mg  50 mg Oral Q4H PRN Max 4/day    Or    LORazepam (ATIVAN) injection 1 mg  1 mg Intramuscular Q4H PRN    LORazepam (ATIVAN) tablet 1 mg  1 mg Oral Q4H PRN Max 6/day    Or    LORazepam (ATIVAN) injection 2 mg  2 mg Intramuscular Q6H PRN Max 3/day    melatonin tablet 3 mg  3 mg Oral HS    nicotine (NICODERM CQ) 21 mg/24 hr TD 24 hr patch 1 patch  1 patch Transdermal Daily    OLANZapine (ZyPREXA) tablet 10 mg  10 mg Oral Q3H PRN Max 3/day Or    OLANZapine (ZyPREXA) IM injection 10 mg  10 mg Intramuscular Q3H PRN Max 3/day    OLANZapine (ZyPREXA) tablet 5 mg  5 mg Oral Q3H PRN Max 6/day    Or    OLANZapine (ZyPREXA) IM injection 5 mg  5 mg Intramuscular Q3H PRN Max 6/day    OLANZapine (ZyPREXA) tablet 15 mg  15 mg Oral HS    OLANZapine (ZyPREXA) tablet 2 5 mg  2 5 mg Oral Q3H PRN Max 8/day    senna-docusate sodium (SENOKOT S) 8 6-50 mg per tablet 1 tablet  1 tablet Oral Daily PRN    traZODone (DESYREL) tablet 50 mg  50 mg Oral HS PRN       Behavioral Health Medications: all current active meds have been reviewed  Vitals:  Vitals:    04/20/21 0744   BP: 108/57   Pulse: 75   Resp: 20   Temp: 98 2 °F (36 8 °C)   SpO2:        Laboratory results:  I have personally reviewed all pertinent laboratory/tests results  Mental Status Evaluation:  Appearance:  age appropriate and casually dressed   Behavior:  guarded   Speech:  normal pitch and normal volume   Mood:  anxious and irritable   Affect:  constricted and labile   Language Appropriate   Thought Process:  goal directed and logical   Thought Content:  normal   Perceptual Disturbances: None   Risk Potential: Suicidal Ideations none, Homicidal Ideations none and Potential for Aggression No   Sensorium:  person, place, time/date, situation, day of week, month of year and year   Cognition:  recent and remote memory grossly intact   Consciousness:  alert    Attention: attention span appeared shorter than expected for age   Insight:  fair   Judgment: fair   Gait/Station: normal gait/station   Motor Activity: no abnormal movements     Progress Toward Goals: Progressing    Recommended Treatment: Continue with pharmacotherapy, group therapy, milieu therapy and occupational therapy      1  Might need further titration of zyprexa

## 2021-04-20 NOTE — PROGRESS NOTES
04/20/21 1000 04/20/21 1100 04/20/21 1315   Activity/Group Checklist   Group Community meeting  (barriers) Nursing Education  (Metabolic Syndrome) Life Skills  (20 questions - communication)   Attendance Did not attend Attended Attended   Attendance Duration (min)  --  16-30 0-15   Interactions  --  Interacted appropriately Disorganized interaction  (in and out, limited interaction)   Affect/Mood  --  Appropriate Appropriate   Goals Achieved  --  Able to listen to others; Able to engage in interactions  --       04/20/21 1415   Activity/Group Checklist   Group Other (Comment)  (reflective journaling)   Attendance Attended   Attendance Duration (min) 0-15   Interactions Disorganized interaction  (in and out, limited interaction)   Affect/Mood Appropriate   Goals Achieved  --    Pt attended 3 of 4 groups today  However, she frequently exhibited disorganized and/or limited interaction, either arriving late or leaving early without participating in group activities or discussions

## 2021-04-20 NOTE — QUICK NOTE
Pt requested and was given Ativan 1 mg po at 1717 for severe anxiety  Pt is noted to be calm in the milieu

## 2021-04-21 PROCEDURE — 99232 SBSQ HOSP IP/OBS MODERATE 35: CPT | Performed by: PHYSICIAN ASSISTANT

## 2021-04-21 RX ORDER — OLANZAPINE 5 MG/1
5 TABLET ORAL DAILY
Status: DISCONTINUED | OUTPATIENT
Start: 2021-04-21 | End: 2021-04-27 | Stop reason: HOSPADM

## 2021-04-21 RX ADMIN — MELATONIN TAB 3 MG 3 MG: 3 TAB at 21:20

## 2021-04-21 RX ADMIN — OLANZAPINE 5 MG: 5 TABLET, FILM COATED ORAL at 12:10

## 2021-04-21 RX ADMIN — GABAPENTIN 600 MG: 300 CAPSULE ORAL at 15:45

## 2021-04-21 RX ADMIN — FLUOXETINE 20 MG: 20 CAPSULE ORAL at 09:01

## 2021-04-21 RX ADMIN — HYDROXYZINE HYDROCHLORIDE 50 MG: 50 TABLET, FILM COATED ORAL at 14:15

## 2021-04-21 RX ADMIN — BENZTROPINE MESYLATE 1 MG: 1 TABLET ORAL at 09:01

## 2021-04-21 RX ADMIN — ACETAMINOPHEN 650 MG: 325 TABLET, FILM COATED ORAL at 21:21

## 2021-04-21 RX ADMIN — BENZTROPINE MESYLATE 1 MG: 1 TABLET ORAL at 17:23

## 2021-04-21 RX ADMIN — OLANZAPINE 15 MG: 10 TABLET, FILM COATED ORAL at 21:20

## 2021-04-21 RX ADMIN — NICOTINE 1 PATCH: 21 PATCH, EXTENDED RELEASE TRANSDERMAL at 09:04

## 2021-04-21 RX ADMIN — GABAPENTIN 600 MG: 300 CAPSULE ORAL at 09:01

## 2021-04-21 RX ADMIN — GABAPENTIN 600 MG: 300 CAPSULE ORAL at 21:19

## 2021-04-21 RX ADMIN — HYDROXYZINE HYDROCHLORIDE 50 MG: 50 TABLET, FILM COATED ORAL at 09:02

## 2021-04-21 NOTE — PLAN OF CARE
Problem: Anxiety  Goal: Anxiety is at manageable level  Description: Interventions:  - Assess and monitor patient's anxiety level  - Monitor for signs and symptoms (heart palpitations, chest pain, shortness of breath, headaches, nausea, feeling jumpy, restlessness, irritable, apprehensive)  - Collaborate with interdisciplinary team and initiate plan and interventions as ordered  - [place] patient to unit/surroundings  - Explain treatment plan  - Encourage participation in care  - Encourage verbalization of concerns/fears  - Identify coping mechanisms  - Assist in developing anxiety-reducing skills  - Administer/offer alternative therapies  - Limit or eliminate stimulants  Outcome: Progressing     Problem: SUBSTANCE USE/ABUSE  Goal: By discharge, will develop insight into their chemical dependency and sustain motivation to continue in recovery  Description: INTERVENTIONS:  - Attends all daily group sessions and scheduled AA groups  - Actively practices coping skills through participation in the therapeutic community and adherence to program rules  - Reviews and completes assignments from individual treatment plan  - Assist patient development of understanding of their personal cycle of addiction and relapse triggers  Outcome: Progressing  Goal: By discharge, patient will have ongoing treatment plan addressing chemical dependency  Description: INTERVENTIONS:  - Assist patient with resources and/or appointments for ongoing recovery based living  Outcome: Progressing     Problem: Nutrition/Hydration-ADULT  Goal: Nutrient/Hydration intake appropriate for improving, restoring or maintaining nutritional needs  Description: Monitor and assess patient's nutrition/hydration status for malnutrition  Collaborate with interdisciplinary team and initiate plan and interventions as ordered  Monitor patient's weight and dietary intake as ordered or per policy  Utilize nutrition screening tool and intervene as necessary  Determine patient's food preferences and provide high-protein, high-caloric foods as appropriate       INTERVENTIONS:  - Monitor oral intake, urinary output, labs, and treatment plans  - Assess nutrition and hydration status and recommend course of action  - Evaluate amount of meals eaten  - Assist patient with eating if necessary   - Allow adequate time for meals  - Recommend/ encourage appropriate diets, oral nutritional supplements, and vitamin/mineral supplements  - Order, calculate, and assess calorie counts as needed  - Recommend, monitor, and adjust tube feedings and TPN/PPN based on assessed needs  - Assess need for intravenous fluids  - Provide specific nutrition/hydration education as appropriate  - Include patient/family/caregiver in decisions related to nutrition  Outcome: Progressing     Problem: SELF HARM/SUICIDALITY  Goal: Will have no self-injury during hospital stay  Description: INTERVENTIONS:  - Q 15 MINUTES: Routine safety checks  - Q WAKING SHIFT & PRN: Assess risk to determine if routine checks are adequate to maintain patient safety  - Encourage patient to participate actively in care by formulating a plan to combat response to suicidal ideation, identify supports and resources  Outcome: Progressing

## 2021-04-21 NOTE — PROGRESS NOTES
Patient napping most of morning, appearing to be restless while in bed as well  Pt did attend nursing education group at 11 before heading to lunch  Pt is more visible as the day goes on  Denies psych symptoms and CFS on unit  Needs redirection at times to wear mask appropriately

## 2021-04-21 NOTE — QUICK NOTE
Pt noted to be resting comfortably in her bed after receiving Atarax 50 mg po as ordered for anxiety

## 2021-04-21 NOTE — PROGRESS NOTES
Prn atarax administered at 0902  , pt became upset with writer during assessment of anxiety and questioning about coping skills  Individual stated "why do you's people ask me questions,just give me what I want  Writer attempted to educate pt on prn use/need and coping skills with no response from pt back  PRN atarax was effective pt was observed resting comfortably

## 2021-04-21 NOTE — PROGRESS NOTES
Progress Note - Behavioral Health   Michelle Cox 47 y o  female MRN: [id number]  Unit/Bed#: [facility] 272-28 Encounter: [id number]    Assessment/Plan   Principal Problem:    Bipolar 1 disorder, depressed, severe ([facility] )  Active Problems:    Tobacco abuse    Alcohol use disorder, moderate, dependence ([facility] )    Medical clearance for psychiatric admission      Subjective: Patient was seen, chart reviewed and case discussed with team   Patient observed labile and dancing out the hallways  Were reports increased anxiety and racing thoughts earlier  Was irritable with nursing staff earlier in the day  Did appear mildly euphoric during conversation  Will increase Zyprexa today for mood stabilization  Reports that her depression has decreased and denied suicidal thoughts  Denied having any hallucinations  Does report at times being unable to decipher what is real and what is not  Medication compliant  Is restless but does appear chronic  Monitor to see if patient has worsening restlessness with additional daytime Zyprexa      Psychiatric Review of Systems:  Behavior over the last 24 hours:  unchanged  Sleep: normal  Appetite: normal  Medication side effects: Yes, appears restless  ROS: no complaints, all others negative    Current Medications:  Current Facility-Administered Medications   Medication Dose Route Frequency    acetaminophen (TYLENOL) tablet 650 mg  650 mg Oral Q6H PRN    acetaminophen (TYLENOL) tablet 650 mg  650 mg Oral Q4H PRN    acetaminophen (TYLENOL) tablet 975 mg  975 mg Oral Q6H PRN    aluminum-magnesium hydroxide-simethicone (MYLANTA) oral suspension 30 mL  30 mL Oral Q4H PRN    benztropine (COGENTIN) tablet 1 mg  1 mg Oral BID    FLUoxetine (PROzac) capsule 20 mg  20 mg Oral Daily    gabapentin (NEURONTIN) capsule 600 mg  600 mg Oral TID    hydrOXYzine HCL (ATARAX) tablet 25 mg  25 mg Oral Q6H PRN Max 4/day    hydrOXYzine HCL (ATARAX) tablet 50 mg  50 mg Oral Q4H PRN Max 4/day    Or    LORazepam (ATIVAN) injection 1 mg  1 mg Intramuscular Q4H PRN    LORazepam (ATIVAN) tablet 1 mg  1 mg Oral Q4H PRN Max 6/day    Or    LORazepam (ATIVAN) injection 2 mg  2 mg Intramuscular Q6H PRN Max 3/day    melatonin tablet 3 mg  3 mg Oral HS    nicotine (NICODERM CQ) 21 mg/24 hr TD 24 hr patch 1 patch  1 patch Transdermal Daily    OLANZapine (ZyPREXA) tablet 10 mg  10 mg Oral Q3H PRN Max 3/day    Or    OLANZapine (ZyPREXA) IM injection 10 mg  10 mg Intramuscular Q3H PRN Max 3/day    OLANZapine (ZyPREXA) tablet 5 mg  5 mg Oral Q3H PRN Max 6/day    Or    OLANZapine (ZyPREXA) IM injection 5 mg  5 mg Intramuscular Q3H PRN Max 6/day    OLANZapine (ZyPREXA) tablet 15 mg  15 mg Oral HS    OLANZapine (ZyPREXA) tablet 2 5 mg  2 5 mg Oral Q3H PRN Max 8/day    OLANZapine (ZyPREXA) tablet 5 mg  5 mg Oral Daily    senna-docusate sodium (SENOKOT S) 8 6-50 mg per tablet 1 tablet  1 tablet Oral Daily PRN    traZODone (DESYREL) tablet 50 mg  50 mg Oral HS PRN       Behavioral Health Medications: all current active meds have been reviewed and continue current psychiatric medications  Vitals:  Vitals:    04/21/21 0712   BP: 105/65   Pulse: 92   Resp: 16   Temp: 98 3 °F (36 8 °C)   SpO2:        Laboratory results:    I have personally reviewed all pertinent laboratory/tests results    Most Recent Labs:   Lab Results   Component Value Date    WBC 6 09 04/11/2021    RBC 5 51 (H) 04/11/2021    HGB 12 6 04/11/2021    HCT 43 0 04/11/2021     04/11/2021    RDW 17 7 (H) 04/11/2021    NEUTROABS 3 19 04/11/2021    SODIUM 141 04/11/2021    K 4 6 04/11/2021     04/11/2021    CO2 30 04/11/2021    BUN 14 04/11/2021    CREATININE 0 60 04/11/2021    GLUC 87 04/11/2021    GLUF 42 (L) 06/14/2019    CALCIUM 8 7 04/11/2021    AST 10 04/11/2021    ALT 20 04/11/2021    ALKPHOS 51 04/11/2021    TP 6 3 (L) 04/11/2021    ALB 2 9 (L) 04/11/2021    TBILI 0 30 04/11/2021    CHOLESTEROL 179 04/11/2021    HDL 66 04/11/2021    TRIG 74 04/11/2021    [address] 98 04/11/2021    NONHDLC 113 04/11/2021    JXI3VQSNGVKI 1 184 05/15/2019    PREGUR NEGATIVE 04/07/2021    PREGSERUM Negative 06/18/2019    HCGQUANT <3 10/17/2018    RPR Non-Reactive 05/15/2019    HGBA1C 5 5 04/11/2021     04/11/2021       Mental Status Evaluation:  Appearance:  casually dressed   Behavior:  restless and fidgety   Speech:  normal pitch and normal volume   Mood:  euphoric   Affect:  increased in range   Language Appropriate   Thought Process:  circumstantial and goal directed   Thought Content:  some paranoia   Perceptual Disturbances: None   Risk Potential: Denied SI/HI  Potential for aggression: No   Sensorium:  person, place and time/date   Cognition:  recent and remote memory grossly intact   Consciousness:  alert and awake    Attention: attention span appeared shorter than expected for age   Insight:  limited   Judgment: partial   Gait/Station: normal gait/station and normal balance   Motor Activity: restless movements     Progress Toward Goals: unchanged    Recommended Treatment: Continue with pharmacotherapy, group therapy, milieu therapy and occupational therapy  1   Increase Zyprexa to 5mg QD AM + 15mg HS for mood stabilization   (monitor for side effects)  2  Continue other medications  3  No discharge date at this time    Risks, benefits and possible side effects of Medications:   Risks, benefits, and possible side effects of medications explained to patient and patient verbalizes understanding

## 2021-04-21 NOTE — PROGRESS NOTES
Patient pacing halls at this time, reporting increased anxiety, however is CFS  Pt hopeful to discharge on Friday to a alf house and is denying present needs or concerns    Pt reported interest in taking a nap before dinner, but requested to be prompted by 430, writer acknowledged pt request

## 2021-04-21 NOTE — PROGRESS NOTES
Pt received Atarax 50 mg po for moderate anxiety @1415  Davis score 24  Upon follow up, pt reported "still feeling valdemar"  Pt using coping mechanism and taking scheduled medication to help with anxiety  Medication moderately effective

## 2021-04-22 PROCEDURE — 99232 SBSQ HOSP IP/OBS MODERATE 35: CPT | Performed by: STUDENT IN AN ORGANIZED HEALTH CARE EDUCATION/TRAINING PROGRAM

## 2021-04-22 RX ORDER — DIAPER,BRIEF,INFANT-TODD,DISP
EACH MISCELLANEOUS 4 TIMES DAILY PRN
Status: DISCONTINUED | OUTPATIENT
Start: 2021-04-22 | End: 2021-04-27 | Stop reason: HOSPADM

## 2021-04-22 RX ADMIN — NICOTINE 1 PATCH: 21 PATCH, EXTENDED RELEASE TRANSDERMAL at 08:23

## 2021-04-22 RX ADMIN — GABAPENTIN 600 MG: 300 CAPSULE ORAL at 08:23

## 2021-04-22 RX ADMIN — OLANZAPINE 15 MG: 10 TABLET, FILM COATED ORAL at 21:10

## 2021-04-22 RX ADMIN — ALUMINUM HYDROXIDE, MAGNESIUM HYDROXIDE, AND SIMETHICONE 30 ML: 200; 200; 20 SUSPENSION ORAL at 12:38

## 2021-04-22 RX ADMIN — OLANZAPINE 5 MG: 5 TABLET, FILM COATED ORAL at 08:23

## 2021-04-22 RX ADMIN — GABAPENTIN 700 MG: 400 CAPSULE ORAL at 21:08

## 2021-04-22 RX ADMIN — HYDROXYZINE HYDROCHLORIDE 50 MG: 50 TABLET, FILM COATED ORAL at 19:23

## 2021-04-22 RX ADMIN — MELATONIN TAB 3 MG 3 MG: 3 TAB at 21:08

## 2021-04-22 RX ADMIN — BENZTROPINE MESYLATE 1 MG: 1 TABLET ORAL at 17:10

## 2021-04-22 RX ADMIN — GABAPENTIN 700 MG: 400 CAPSULE ORAL at 15:28

## 2021-04-22 RX ADMIN — BENZTROPINE MESYLATE 1 MG: 1 TABLET ORAL at 08:23

## 2021-04-22 RX ADMIN — FLUOXETINE 20 MG: 20 CAPSULE ORAL at 08:23

## 2021-04-22 NOTE — PLAN OF CARE
Problem: Anxiety  Goal: Anxiety is at manageable level  Description: Interventions:  - Assess and monitor patient's anxiety level  - Monitor for signs and symptoms (heart palpitations, chest pain, shortness of breath, headaches, nausea, feeling jumpy, restlessness, irritable, apprehensive)  - Collaborate with interdisciplinary team and initiate plan and interventions as ordered  - [place] patient to unit/surroundings  - Explain treatment plan  - Encourage participation in care  - Encourage verbalization of concerns/fears  - Identify coping mechanisms  - Assist in developing anxiety-reducing skills  - Administer/offer alternative therapies  - Limit or eliminate stimulants  Outcome: Progressing     Problem: SUBSTANCE USE/ABUSE  Goal: By discharge, will develop insight into their chemical dependency and sustain motivation to continue in recovery  Description: INTERVENTIONS:  - Attends all daily group sessions and scheduled AA groups  - Actively practices coping skills through participation in the therapeutic community and adherence to program rules  - Reviews and completes assignments from individual treatment plan  - Assist patient development of understanding of their personal cycle of addiction and relapse triggers  Outcome: Progressing  Goal: By discharge, patient will have ongoing treatment plan addressing chemical dependency  Description: INTERVENTIONS:  - Assist patient with resources and/or appointments for ongoing recovery based living  Outcome: Progressing     Problem: Nutrition/Hydration-ADULT  Goal: Nutrient/Hydration intake appropriate for improving, restoring or maintaining nutritional needs  Description: Monitor and assess patient's nutrition/hydration status for malnutrition  Collaborate with interdisciplinary team and initiate plan and interventions as ordered  Monitor patient's weight and dietary intake as ordered or per policy  Utilize nutrition screening tool and intervene as necessary  Determine patient's food preferences and provide high-protein, high-caloric foods as appropriate       INTERVENTIONS:  - Monitor oral intake, urinary output, labs, and treatment plans  - Assess nutrition and hydration status and recommend course of action  - Evaluate amount of meals eaten  - Assist patient with eating if necessary   - Allow adequate time for meals  - Recommend/ encourage appropriate diets, oral nutritional supplements, and vitamin/mineral supplements  - Order, calculate, and assess calorie counts as needed  - Recommend, monitor, and adjust tube feedings and TPN/PPN based on assessed needs  - Assess need for intravenous fluids  - Provide specific nutrition/hydration education as appropriate  - Include patient/family/caregiver in decisions related to nutrition  Outcome: Progressing     Problem: SELF HARM/SUICIDALITY  Goal: Will have no self-injury during hospital stay  Description: INTERVENTIONS:  - Q 15 MINUTES: Routine safety checks  - Q WAKING SHIFT & PRN: Assess risk to determine if routine checks are adequate to maintain patient safety  - Encourage patient to participate actively in care by formulating a plan to combat response to suicidal ideation, identify supports and resources  Outcome: Progressing

## 2021-04-22 NOTE — PROGRESS NOTES
Awake and alert  Met with OP provider this AM   Denies SI/HI  Reports chronic anxiety, discussed healthy coping skills and enjoys yoga  Mild depression/sadness today  Encouraged patient to tidy room  Small red, rash to right antecubital, mild itching, patient inquired if rash could be do to night sweats, MD aware  No rash noted on any other part of body

## 2021-04-22 NOTE — PROGRESS NOTES
Pt is visible in the milieu and is cooperative,  pleasant and social with peers  Pt endorses moderate anxiety and depression and denies SI/HI/AVH and denies having any questions or concerns

## 2021-04-22 NOTE — CASE MANAGEMENT
CM contacted [address] [place] [address] @ [phone] & left a message seeking to confirm that the ICM referral that was faxed on 4/21 was received

## 2021-04-22 NOTE — PROGRESS NOTES
Progress Note - Behavioral Health   Prosper Holt 47 y o  female MRN: [id number]  Unit/Bed#: Rui Poster 262-20 Encounter: [id number]    Assessment/Plan   Principal Problem:    Bipolar 1 disorder, depressed, severe ([address] )  Active Problems:    Tobacco abuse    Alcohol use disorder, moderate, dependence ([facility])    Medical clearance for psychiatric admission      Subjective: Patient was seen, chart reviewed and case discussed with team  Zyprexa was increased yesterday  As per report patient is pacing and anxious at times  Reports that she was on lithium and depakote in the past but is not interested to take due to weight gain  Patient continues to have increased anxiety and racing thoughts at times  Denies SI/HI/AH/VH     Psychiatric Review of Systems:  Behavior over the last 24 hours:  improved  Sleep: normal  Appetite: normal  Medication side effects: No  ROS: no complaints, all others negative    Current Medications:  Current Facility-Administered Medications   Medication Dose Route Frequency    acetaminophen (TYLENOL) tablet 650 mg  650 mg Oral Q6H PRN    acetaminophen (TYLENOL) tablet 650 mg  650 mg Oral Q4H PRN    acetaminophen (TYLENOL) tablet 975 mg  975 mg Oral Q6H PRN    aluminum-magnesium hydroxide-simethicone (MYLANTA) oral suspension 30 mL  30 mL Oral Q4H PRN    benztropine (COGENTIN) tablet 1 mg  1 mg Oral BID    FLUoxetine (PROzac) capsule 20 mg  20 mg Oral Daily    gabapentin (NEURONTIN) capsule 600 mg  600 mg Oral TID    hydrocortisone 1 % cream   Topical 4x Daily PRN    hydrOXYzine HCL (ATARAX) tablet 25 mg  25 mg Oral Q6H PRN Max 4/day    hydrOXYzine HCL (ATARAX) tablet 50 mg  50 mg Oral Q4H PRN Max 4/day    Or    LORazepam (ATIVAN) injection 1 mg  1 mg Intramuscular Q4H PRN    LORazepam (ATIVAN) tablet 1 mg  1 mg Oral Q4H PRN Max 6/day    Or    LORazepam (ATIVAN) injection 2 mg  2 mg Intramuscular Q6H PRN Max 3/day    melatonin tablet 3 mg  3 mg Oral HS    nicotine (NICODERM CQ) 21 mg/24 hr TD 24 hr patch 1 patch  1 patch Transdermal Daily    OLANZapine (ZyPREXA) tablet 10 mg  10 mg Oral Q3H PRN Max 3/day    Or    OLANZapine (ZyPREXA) IM injection 10 mg  10 mg Intramuscular Q3H PRN Max 3/day    OLANZapine (ZyPREXA) tablet 5 mg  5 mg Oral Q3H PRN Max 6/day    Or    OLANZapine (ZyPREXA) IM injection 5 mg  5 mg Intramuscular Q3H PRN Max 6/day    OLANZapine (ZyPREXA) tablet 15 mg  15 mg Oral HS    OLANZapine (ZyPREXA) tablet 2 5 mg  2 5 mg Oral Q3H PRN Max 8/day    OLANZapine (ZyPREXA) tablet 5 mg  5 mg Oral Daily    senna-docusate sodium (SENOKOT S) 8 6-50 mg per tablet 1 tablet  1 tablet Oral Daily PRN    traZODone (DESYREL) tablet 50 mg  50 mg Oral HS PRN       Behavioral Health Medications: all current active meds have been reviewed  Vitals:  Vitals:    04/22/21 0717   BP: 105/62   Pulse: 92   Resp: 18   Temp: 97 7 °F (36 5 °C)   SpO2:        Laboratory results:  I have personally reviewed all pertinent laboratory/tests results  Mental Status Evaluation:  Appearance:  age appropriate   Behavior:  restless and fidgety   Speech:  normal pitch and normal volume   Mood:  anxious   Affect:  increased in range and labile   Language Appropriate   Thought Process:  goal directed and logical   Thought Content:  normal   Perceptual Disturbances: None   Risk Potential: Suicidal Ideations none, Homicidal Ideations none and Potential for Aggression No   Sensorium:  person, place, time/date, situation, day of week, month of year and year   Cognition:  recent and remote memory grossly intact   Consciousness:  alert    Attention: attention span appeared shorter than expected for age   Insight:  fair   Judgment: fair   Gait/Station: normal gait/station   Motor Activity: no abnormal movements     Progress Toward Goals: Progressing    Recommended Treatment: Continue with pharmacotherapy, group therapy, milieu therapy and occupational therapy      1  Increase gabapentin to 700 mg TID

## 2021-04-22 NOTE — PROGRESS NOTES
Status: Pt appears restless/anxious & was observed scratching at her arm  Pt slept overnight, no issues to report     Medication: no changes / PRN - Atarax(x2) & Ativan  D/C: Friday(?)      04/21/21 0750   Team Meeting   Meeting Type Daily Rounds   Team Members Present   Team Members Present Physician;Nurse;;Occupational Therapist   Physician Team Member Dr Vivek Huitron / Elizabeth Blackman / Brandon Hernández Team Member Tenisha Burrell / Katelynn Hernandez Management Team Member [place] Brown / Hugo Romero   OT Team Member Christopher Fam / Hans Guillen   Patient/Family Present   Patient Present No   Patient's Family Present No

## 2021-04-22 NOTE — CASE MANAGEMENT
CM met with Pt to begin the referral for [facility](crisis res)  Pt said that she didn't want to go & didn't remember the conversation about stepping down to crisis residence for a week or so, before going to the recovery house  CM reviewed the supports/services offered, however, Pt stating she was anxious just thinking about it  CM reviewed that was why it would be a good stepdown, to help her transition from the hospital to the community, & ultimately to the recovery house  Pt stating she likes the hospital & wants to stay here until she can go to the recovery house  Pt pacing & rocking side to side, stating, "look I can't even stand still"  CM asked if the rocking/movements ever go completely away & Pt said yes, "they did in [place], when I was on the right medications"  CM inquired what medications she was on at that time, but Pt just shrugged  CM reviewed that she would be at crisis residence for a short period of time, possibly a week  Pt asking CM to guarantee she wouldn't have to stay there longer & CM reviewed that she could not guarantee anything, once Pt leaves the hospital, CM is not longer involved in their treatment & planning  Pt becoming upset, stating, "that's bullshit you can't guarantee anything"  CM encouraged Pt to talk to the attending physician if she feels that her medications need further adjustments/changes & she agreed  CM contacted [facility] @ [phone] & spoke with Wagner Gonzalez, who reported they do not have any current beds available, nor any planned discharges for tomorrow

## 2021-04-22 NOTE — CASE MANAGEMENT
CM met with Teodoro Mario, ICM from [facility], after she opened Pt for services  Pt was bright & cheerful & said that she would go to crisis residence, & now understood why she should go  Pt stating she looks forward to working with [facility]  CM provided information to [facility] regarding plan for Pt to stepdown to crisis residence for further stabilization, before going to the recovery house  [facility] provided her business card & CM agreed to provide on-going updates

## 2021-04-22 NOTE — PROGRESS NOTES
Slept until coffee time  Reports she slept well & returned to bed immediately  After coffee  Will continue to monitor

## 2021-04-22 NOTE — PROGRESS NOTES
Status: Pt irritable at times & demanding Ativan(PRN); Pt encouraged to use coping skills  Pt napping at times & up & in the day room other times  Pt slept, no issues to report  Medication: Zyprexa increased to 5mg in the morning & 15mg a bedtime / PRN - Tylenol, Atarax(x2)  D/C: Friday(?) / Plan is for Pt to stepdown to crisis residence for continued stabilization, before going to [facility] on 5/1  Trejason Casey, from [facility], is to come to the unit today to open Pt for [facility] services       04/22/21 0750   Team Meeting   Meeting Type Daily Rounds   Team Members Present   Team Members Present Physician;Nurse;;Occupational Therapist   Physician Team Member Dr Fidelina Ling / Shira Meade / Autumn Leonardo Team Member Lluvia Schmitt / Wu Keys Management Team Member Unique Harding / Peter Pereira   OT Team Member Valeriy Galindo / Juana Mariscal   Patient/Family Present   Patient Present No   Patient's Family Present No

## 2021-04-23 PROCEDURE — 99232 SBSQ HOSP IP/OBS MODERATE 35: CPT | Performed by: PHYSICIAN ASSISTANT

## 2021-04-23 RX ORDER — GABAPENTIN 400 MG/1
800 CAPSULE ORAL 3 TIMES DAILY
Status: DISCONTINUED | OUTPATIENT
Start: 2021-04-23 | End: 2021-04-27 | Stop reason: HOSPADM

## 2021-04-23 RX ADMIN — NICOTINE 1 PATCH: 21 PATCH, EXTENDED RELEASE TRANSDERMAL at 08:44

## 2021-04-23 RX ADMIN — GABAPENTIN 700 MG: 400 CAPSULE ORAL at 08:46

## 2021-04-23 RX ADMIN — BENZTROPINE MESYLATE 1 MG: 1 TABLET ORAL at 17:06

## 2021-04-23 RX ADMIN — FLUOXETINE 20 MG: 20 CAPSULE ORAL at 08:46

## 2021-04-23 RX ADMIN — GABAPENTIN 800 MG: 400 CAPSULE ORAL at 20:47

## 2021-04-23 RX ADMIN — OLANZAPINE 15 MG: 10 TABLET, FILM COATED ORAL at 21:00

## 2021-04-23 RX ADMIN — OLANZAPINE 5 MG: 5 TABLET, FILM COATED ORAL at 08:46

## 2021-04-23 RX ADMIN — GABAPENTIN 800 MG: 400 CAPSULE ORAL at 16:28

## 2021-04-23 RX ADMIN — BENZTROPINE MESYLATE 1 MG: 1 TABLET ORAL at 08:46

## 2021-04-23 RX ADMIN — ALUMINUM HYDROXIDE, MAGNESIUM HYDROXIDE, AND SIMETHICONE 30 ML: 200; 200; 20 SUSPENSION ORAL at 18:10

## 2021-04-23 RX ADMIN — HYDROXYZINE HYDROCHLORIDE 50 MG: 50 TABLET, FILM COATED ORAL at 14:03

## 2021-04-23 RX ADMIN — MELATONIN TAB 3 MG 3 MG: 3 TAB at 21:00

## 2021-04-23 NOTE — PROGRESS NOTES
Pt more isolative this shift, observed to be napping  C/o upset stomach, received PRN Mylanta  Can be loud when making phone calls, but is redirectable  Denies SI/HI/AVH

## 2021-04-23 NOTE — PROGRESS NOTES
Status: Pt reported having a good day  She met with [facility] from [address] completed intake  Pt had c/o rash on right antecubital fossa  Pt did report some anxiety & received PRN medication  Medication: Neurontin was increased to 700mg TID / PRN - Mylanta & Atarax   D/C: Monday(?) / Pt waffling back & forth regarding stepdown to crisis residence  [facility] did not have a bed available today, & CM will follow-up regarding possible bed on Monday 04/23/21 0750   Team Meeting   Meeting Type Daily Rounds   Team Members Present   Team Members Present Physician;Nurse;;Occupational Therapist   Physician Team Member Dr Kathy Willis / Shagufta Caldwell / Ty Canales Team Member Amy Muñoz / Karina Parker Management Team Member Matt Mejia / Jen Mohr   OT Team Member Machelle Epps / Anabelle Smith   Patient/Family Present   Patient Present No   Patient's Family Present No

## 2021-04-23 NOTE — PROGRESS NOTES
Progress Note - Behavioral Health   Dalton Almazan 47 y o  female MRN: [id number]  Unit/Bed#: Natasha Erickson 991-43 Encounter: [id number]    Assessment/Plan   Principal Problem:    Bipolar 1 disorder, depressed, severe ([address] )  Active Problems:    Tobacco abuse    Alcohol use disorder, moderate, dependence ([facility])    Medical clearance for psychiatric admission      Subjective: Patient was seen, chart reviewed and case discussed with team   Patient's main complaint is restlessness and anxiety  Abnormal movements appears to be chronic  States today that she is not having any hallucinations and denied delusional material   Not irritable or agitated today  States she can tell what is reality and what is not  Reports that her depression is low and denied suicidal thoughts  Seen with brighter affect in out socializing with her peers  Medication compliant  Denied additional somatic complaints or side effects      Psychiatric Review of Systems:  Behavior over the last 24 hours:  improved  Sleep: normal  Appetite: normal  Medication side effects: Yes, restlessness  ROS: no complaints, all others negative    Current Medications:  Current Facility-Administered Medications   Medication Dose Route Frequency    acetaminophen (TYLENOL) tablet 650 mg  650 mg Oral Q6H PRN    acetaminophen (TYLENOL) tablet 650 mg  650 mg Oral Q4H PRN    acetaminophen (TYLENOL) tablet 975 mg  975 mg Oral Q6H PRN    aluminum-magnesium hydroxide-simethicone (MYLANTA) oral suspension 30 mL  30 mL Oral Q4H PRN    benztropine (COGENTIN) tablet 1 mg  1 mg Oral BID    FLUoxetine (PROzac) capsule 20 mg  20 mg Oral Daily    gabapentin (NEURONTIN) capsule 800 mg  800 mg Oral TID    hydrocortisone 1 % cream   Topical 4x Daily PRN    hydrOXYzine HCL (ATARAX) tablet 25 mg  25 mg Oral Q6H PRN Max 4/day    hydrOXYzine HCL (ATARAX) tablet 50 mg  50 mg Oral Q4H PRN Max 4/day    Or    LORazepam (ATIVAN) injection 1 mg  1 mg Intramuscular Q4H PRN    LORazepam (ATIVAN) tablet 1 mg  1 mg Oral Q4H PRN Max 6/day    Or    LORazepam (ATIVAN) injection 2 mg  2 mg Intramuscular Q6H PRN Max 3/day    melatonin tablet 3 mg  3 mg Oral HS    nicotine (NICODERM CQ) 21 mg/24 hr TD 24 hr patch 1 patch  1 patch Transdermal Daily    OLANZapine (ZyPREXA) tablet 10 mg  10 mg Oral Q3H PRN Max 3/day    Or    OLANZapine (ZyPREXA) IM injection 10 mg  10 mg Intramuscular Q3H PRN Max 3/day    OLANZapine (ZyPREXA) tablet 5 mg  5 mg Oral Q3H PRN Max 6/day    Or    OLANZapine (ZyPREXA) IM injection 5 mg  5 mg Intramuscular Q3H PRN Max 6/day    OLANZapine (ZyPREXA) tablet 15 mg  15 mg Oral HS    OLANZapine (ZyPREXA) tablet 2 5 mg  2 5 mg Oral Q3H PRN Max 8/day    OLANZapine (ZyPREXA) tablet 5 mg  5 mg Oral Daily    senna-docusate sodium (SENOKOT S) 8 6-50 mg per tablet 1 tablet  1 tablet Oral Daily PRN    traZODone (DESYREL) tablet 50 mg  50 mg Oral HS PRN       Behavioral Health Medications: all current active meds have been reviewed and continue current psychiatric medications  Vitals:  Vitals:    04/23/21 0727   BP: 115/66   Pulse: 88   Resp: 18   Temp: (!) 97 3 °F (36 3 °C)   SpO2:        Laboratory results:    I have personally reviewed all pertinent laboratory/tests results    Most Recent Labs:   Lab Results   Component Value Date    WBC 6 09 04/11/2021    RBC 5 51 (H) 04/11/2021    HGB 12 6 04/11/2021    HCT 43 0 04/11/2021     04/11/2021    RDW 17 7 (H) 04/11/2021    NEUTROABS 3 19 04/11/2021    SODIUM 141 04/11/2021    K 4 6 04/11/2021     04/11/2021    CO2 30 04/11/2021    BUN 14 04/11/2021    CREATININE 0 60 04/11/2021    GLUC 87 04/11/2021    GLUF 42 (L) 06/14/2019    CALCIUM 8 7 04/11/2021    AST 10 04/11/2021    ALT 20 04/11/2021    ALKPHOS 51 04/11/2021    TP 6 3 (L) 04/11/2021    ALB 2 9 (L) 04/11/2021    TBILI 0 30 04/11/2021    CHOLESTEROL 179 04/11/2021    HDL 66 04/11/2021    TRIG 74 04/11/2021    LDLCALC 98 04/11/2021    NONHDLC 113 04/11/2021 MPO2TDUPCGLK 1 184 05/15/2019    PREGUR NEGATIVE 04/07/2021    PREGSERUM Negative 06/18/2019    HCGQUANT <3 10/17/2018    RPR Non-Reactive 05/15/2019    HGBA1C 5 5 04/11/2021     04/11/2021       Mental Status Evaluation:  Appearance:  casually dressed   Behavior:  restless and fidgety   Speech:  normal pitch and normal volume   Mood:  anxious   Affect:  increased in range   Language Appropriate   Thought Process:  circumstantial and goal directed   Thought Content:  appears paranoia is diminished   Perceptual Disturbances: None   Risk Potential: Denied SI/HI  Potential for aggression: No   Sensorium:  person, place and time/date   Cognition:  recent and remote memory grossly intact   Consciousness:  alert and awake    Attention: attention span appeared shorter than expected for age   Insight:  partial   Judgment: partial   Gait/Station: normal   Motor Activity: Restless leg movements     Progress Toward Goals:  Progressing    Recommended Treatment: Continue with pharmacotherapy, group therapy, milieu therapy and occupational therapy  1   Increase Neurontin to 800mg TID for anxiety/restlessness  2  Continue other medications  3  Disposition planning with tentative discharge early next week    Risks, benefits and possible side effects of Medications:   Risks, benefits, and possible side effects of medications explained to patient and patient verbalizes understanding

## 2021-04-23 NOTE — PLAN OF CARE
Problem: Anxiety  Goal: Anxiety is at manageable level  Description: Interventions:  - Assess and monitor patient's anxiety level  - Monitor for signs and symptoms (heart palpitations, chest pain, shortness of breath, headaches, nausea, feeling jumpy, restlessness, irritable, apprehensive)  - Collaborate with interdisciplinary team and initiate plan and interventions as ordered  - [place] patient to unit/surroundings  - Explain treatment plan  - Encourage participation in care  - Encourage verbalization of concerns/fears  - Identify coping mechanisms  - Assist in developing anxiety-reducing skills  - Administer/offer alternative therapies  - Limit or eliminate stimulants  Outcome: Progressing     Problem: SUBSTANCE USE/ABUSE  Goal: By discharge, will develop insight into their chemical dependency and sustain motivation to continue in recovery  Description: INTERVENTIONS:  - Attends all daily group sessions and scheduled AA groups  - Actively practices coping skills through participation in the therapeutic community and adherence to program rules  - Reviews and completes assignments from individual treatment plan  - Assist patient development of understanding of their personal cycle of addiction and relapse triggers  Outcome: Progressing  Goal: By discharge, patient will have ongoing treatment plan addressing chemical dependency  Description: INTERVENTIONS:  - Assist patient with resources and/or appointments for ongoing recovery based living  Outcome: Progressing     Problem: Nutrition/Hydration-ADULT  Goal: Nutrient/Hydration intake appropriate for improving, restoring or maintaining nutritional needs  Description: Monitor and assess patient's nutrition/hydration status for malnutrition  Collaborate with interdisciplinary team and initiate plan and interventions as ordered  Monitor patient's weight and dietary intake as ordered or per policy  Utilize nutrition screening tool and intervene as necessary  Determine patient's food preferences and provide high-protein, high-caloric foods as appropriate       INTERVENTIONS:  - Monitor oral intake, urinary output, labs, and treatment plans  - Assess nutrition and hydration status and recommend course of action  - Evaluate amount of meals eaten  - Assist patient with eating if necessary   - Allow adequate time for meals  - Recommend/ encourage appropriate diets, oral nutritional supplements, and vitamin/mineral supplements  - Order, calculate, and assess calorie counts as needed  - Recommend, monitor, and adjust tube feedings and TPN/PPN based on assessed needs  - Assess need for intravenous fluids  - Provide specific nutrition/hydration education as appropriate  - Include patient/family/caregiver in decisions related to nutrition  Outcome: Progressing     Problem: SELF HARM/SUICIDALITY  Goal: Will have no self-injury during hospital stay  Description: INTERVENTIONS:  - Q 15 MINUTES: Routine safety checks  - Q WAKING SHIFT & PRN: Assess risk to determine if routine checks are adequate to maintain patient safety  - Encourage patient to participate actively in care by formulating a plan to combat response to suicidal ideation, identify supports and resources  Outcome: Progressing

## 2021-04-23 NOTE — PROGRESS NOTES
Pt scant in conversation with writer this morning  Denies questions or concerns  Observed laughing and social with peers  Denies SI/HI/AVH

## 2021-04-23 NOTE — PROGRESS NOTES
Patient reassessed for moderate anxiety after receiving PRN atarax 50mg  Patient stated that she feels less anxiety, PRN successful  Will continue to monitor, educate, encourage and maintain patient safety

## 2021-04-23 NOTE — CASE MANAGEMENT
MARILUZ met with Pt to discuss discharge planning  Pt said that she called her boyfriend & the conversation went well  CM asked if she was changing her mind regarding the recovery house, & she said she wasn't sure  Pt then said, "no, go ahead with the planning"  MASOOD obtained for [employer]

## 2021-04-24 PROCEDURE — 99232 SBSQ HOSP IP/OBS MODERATE 35: CPT | Performed by: PSYCHIATRY & NEUROLOGY

## 2021-04-24 RX ORDER — SIMETHICONE 80 MG
80 TABLET,CHEWABLE ORAL EVERY 6 HOURS PRN
Status: DISCONTINUED | OUTPATIENT
Start: 2021-04-24 | End: 2021-04-27 | Stop reason: HOSPADM

## 2021-04-24 RX ADMIN — OLANZAPINE 5 MG: 5 TABLET, FILM COATED ORAL at 08:22

## 2021-04-24 RX ADMIN — GABAPENTIN 800 MG: 400 CAPSULE ORAL at 08:21

## 2021-04-24 RX ADMIN — OLANZAPINE 15 MG: 10 TABLET, FILM COATED ORAL at 21:18

## 2021-04-24 RX ADMIN — HYDROXYZINE HYDROCHLORIDE 50 MG: 50 TABLET, FILM COATED ORAL at 13:39

## 2021-04-24 RX ADMIN — MELATONIN TAB 3 MG 3 MG: 3 TAB at 21:18

## 2021-04-24 RX ADMIN — NICOTINE 1 PATCH: 21 PATCH, EXTENDED RELEASE TRANSDERMAL at 08:22

## 2021-04-24 RX ADMIN — SIMETHICONE 80 MG: 80 TABLET, CHEWABLE ORAL at 14:48

## 2021-04-24 RX ADMIN — BENZTROPINE MESYLATE 1 MG: 1 TABLET ORAL at 17:12

## 2021-04-24 RX ADMIN — GABAPENTIN 800 MG: 400 CAPSULE ORAL at 21:18

## 2021-04-24 RX ADMIN — LORAZEPAM 1 MG: 1 TABLET ORAL at 10:29

## 2021-04-24 RX ADMIN — BENZTROPINE MESYLATE 1 MG: 1 TABLET ORAL at 08:22

## 2021-04-24 RX ADMIN — ALUMINUM HYDROXIDE, MAGNESIUM HYDROXIDE, AND SIMETHICONE 30 ML: 200; 200; 20 SUSPENSION ORAL at 13:40

## 2021-04-24 RX ADMIN — FLUOXETINE 20 MG: 20 CAPSULE ORAL at 08:22

## 2021-04-24 RX ADMIN — ALUMINUM HYDROXIDE, MAGNESIUM HYDROXIDE, AND SIMETHICONE 30 ML: 200; 200; 20 SUSPENSION ORAL at 19:26

## 2021-04-24 RX ADMIN — GABAPENTIN 800 MG: 400 CAPSULE ORAL at 15:38

## 2021-04-24 NOTE — PROGRESS NOTES
Calm and cooperative  Visible and social with peers, attending groups  Denies SI/HI  Anxiety and depression are manageable at this time  Continues to complain of bloating, simethicone ordered and administered

## 2021-04-24 NOTE — TREATMENT TEAM
AM rounds- napping throughout the day, denies SI, social with peers  Became anxious in the evening when watching a show that upset her  Used deep breathing and music to help calm  Slept well

## 2021-04-24 NOTE — PROGRESS NOTES
Progress Note - [address] 47 y o  female MRN: @MRN   Unit/Bed#: Kenn Lentz 092-65 Encounter: [id number]    Per nursing report and sign out, patient came in with SI, cocaine and MJ use  Pleasant and no SI thoughts  Restless (but prior to coming in)  Panic symptoms  Slept overnight  Lacie Hermosillo reports today that things are going ok  Depression 5/10, anxiety 810  Ongoing AH but "it is just 1 voice now"  No SI, HI, VH  Feels she is making progress  Ongoing restlessness, she notes she was on something at a past hospital that helped, but she cannot remember what  She will think about where it was or what it was  Med change recently, will give more time  Energy: ok  Sleep: normal  Appetite: normal  Medication side effects: No   ROS: all other systems are negative    Mental Status Evaluation:    Appearance:  age appropriate   Behavior:  pleasant, cooperative, with good eye contact   Speech:  Normal volume, regular rate and rhythm   Mood:  depressed and anxious   Affect:  mood congruent, brighter with some improvement, constricted       Thought Process:  Linear and goal directed   Associations: intact associations   Thought Content:  normal and appropriate   Perceptual Disturbances: auditory hallucinations   Risk Potential: Suicidal ideation - None  Homicidal ideation - None  Potential for aggression - No   Sensorium:  A&Ox3   Cognition:  grossly intact   Consciousness:  Alert & Awake   Memory:  recent and remote memory grossly intact   Attention: attention span and concentration are age appropriate           Insight:  fair   Judgment: fair   Muscle Strength  Muscle Tone normal  normal   Gait/Station: normal gait/station with good balance   Motor Activity: Restless, cannot sit still, rocking on feet  Vital signs in last 24 hours:    Temp:  [98 2 °F (36 8 °C)-98 3 °F (36 8 °C)] 98 3 °F (36 8 °C)  HR:  [] 102  Resp:  [16-19] 16  BP: ()/(58-71) 105/71    Laboratory results:   I have personally reviewed all pertinent laboratory/tests results  Assessment/Plan   Principal Problem:    Bipolar 1 disorder, depressed, severe ([facility] [address] )  Active Problems:    Tobacco abuse    [facility] use disorder, moderate, dependence [facility] )    Medical clearance for psychiatric admission      Alvarez Ascencio is about the same from yesterday  Reported her physical symptoms are not new, longstanding  Unclear cause, but are not worse with treatment, and she has had periods of non compliance with treatment and still has movements  Recommended Treatment:     Planned medication and treatment changes: All current active medications have been reviewed    Encourage group therapy, milieu therapy and occupational therapy  [address] checks as unit standard unless ordered or noted otherwise    Current Facility-Administered Medications   Medication Dose Route Frequency Provider Last Rate    acetaminophen  650 mg Oral Q6H PRN Pam Lin MD      acetaminophen  650 mg Oral Q4H PRN Pam Lin MD      acetaminophen  975 mg Oral Q6H PRN Pam Lin MD      aluminum-magnesium hydroxide-simethicone  30 mL Oral Q4H PRN Brenden Rust MD      benztropine  1 mg Oral BID Phi Lucio, PA-C      FLUoxetine  20 mg Oral Daily Brenden Rust MD      gabapentin  800 mg Oral TID Phi Lucio, PA-C      hydrocortisone   Topical 4x Daily PRN Phi Lucio, PA-C      hydrOXYzine HCL  25 mg Oral Q6H PRN Max 4/day Pam Lin MD      hydrOXYzine HCL  50 mg Oral Q4H PRN Max 4/day Pam Lin MD      Or    LORazepam  1 mg Intramuscular Q4H PRN Pam Lin MD      LORazepam  1 mg Oral Q4H PRN Max 6/day Pam Lin MD      Or    LORazepam  2 mg Intramuscular Q6H PRN Max 3/day Pam Lin MD      melatonin  3 mg Oral HS Pam Lin MD      nicotine  1 patch Transdermal Daily Pam Lin MD      OLANZapine  10 mg Oral Q3H PRN Max 3/day Pam Lin MD      Or    OLANZapine 10 mg Intramuscular Q3H PRN Max 3/day Ramin Bueno MD      OLANZapine  5 mg Oral Q3H PRN Max 6/day Ramin Bueno MD      Or    OLANZapine  5 mg Intramuscular Q3H PRN Max 6/day Ramin Bueno MD      OLANZapine  15 mg Oral HS Dale Mayen MD      OLANZapine  2 5 mg Oral Q3H PRN Max 8/day Ramin Bueno MD      OLANZapine  5 mg Oral Daily Aj Leong PA-C      senna-docusate sodium  1 tablet Oral Daily PRN Ramin Bueno MD      traZODone  50 mg Oral HS PRN Ramin Bueno MD         1) continue current treatment  2) Continue to support patient and engage them in the programs available as feasible and appropriate  Continue case management support and therapy  Continue discharge planning  Risks / Benefits of Treatment:    Risks, benefits, and possible side effects of medications explained to patient and patient verbalizes understanding and agreement for treatment  Counseling / Coordination of Care:    Medication changes reviewed with nursing staff  Medications, treatment progress and treatment plan reviewed with patient        Maureen Ren III, DO  4/24/2021  8:23 AM

## 2021-04-24 NOTE — PROGRESS NOTES
Pleasant and cooperative  OOB at start of shift, ADLs attended too  Reports good sleep over night  Denies SI/HI/AVH  Compliant with medication administration  Denies concerns at this time

## 2021-04-24 NOTE — PLAN OF CARE
Problem: Anxiety  Goal: Anxiety is at manageable level  Description: Interventions:  - Assess and monitor patient's anxiety level  - Monitor for signs and symptoms (heart palpitations, chest pain, shortness of breath, headaches, nausea, feeling jumpy, restlessness, irritable, apprehensive)  - Collaborate with interdisciplinary team and initiate plan and interventions as ordered  - [place] patient to unit/surroundings  - Explain treatment plan  - Encourage participation in care  - Encourage verbalization of concerns/fears  - Identify coping mechanisms  - Assist in developing anxiety-reducing skills  - Administer/offer alternative therapies  - Limit or eliminate stimulants  Outcome: Progressing     Problem: SUBSTANCE USE/ABUSE  Goal: By discharge, will develop insight into their chemical dependency and sustain motivation to continue in recovery  Description: INTERVENTIONS:  - Attends all daily group sessions and scheduled AA groups  - Actively practices coping skills through participation in the therapeutic community and adherence to program rules  - Reviews and completes assignments from individual treatment plan  - Assist patient development of understanding of their personal cycle of addiction and relapse triggers  Outcome: Progressing  Goal: By discharge, patient will have ongoing treatment plan addressing chemical dependency  Description: INTERVENTIONS:  - Assist patient with resources and/or appointments for ongoing recovery based living  Outcome: Progressing     Problem: Nutrition/Hydration-ADULT  Goal: Nutrient/Hydration intake appropriate for improving, restoring or maintaining nutritional needs  Description: Monitor and assess patient's nutrition/hydration status for malnutrition  Collaborate with interdisciplinary team and initiate plan and interventions as ordered  Monitor patient's weight and dietary intake as ordered or per policy  Utilize nutrition screening tool and intervene as necessary  Determine patient's food preferences and provide high-protein, high-caloric foods as appropriate       INTERVENTIONS:  - Monitor oral intake, urinary output, labs, and treatment plans  - Assess nutrition and hydration status and recommend course of action  - Evaluate amount of meals eaten  - Assist patient with eating if necessary   - Allow adequate time for meals  - Recommend/ encourage appropriate diets, oral nutritional supplements, and vitamin/mineral supplements  - Order, calculate, and assess calorie counts as needed  - Recommend, monitor, and adjust tube feedings and TPN/PPN based on assessed needs  - Assess need for intravenous fluids  - Provide specific nutrition/hydration education as appropriate  - Include patient/family/caregiver in decisions related to nutrition  Outcome: Progressing     Problem: SELF HARM/SUICIDALITY  Goal: Will have no self-injury during hospital stay  Description: INTERVENTIONS:  - Q 15 MINUTES: Routine safety checks  - Q WAKING SHIFT & PRN: Assess risk to determine if routine checks are adequate to maintain patient safety  - Encourage patient to participate actively in care by formulating a plan to combat response to suicidal ideation, identify supports and resources  Outcome: Progressing

## 2021-04-24 NOTE — NURSING NOTE
After a m  medication administration, at about 10:30 a m  patient reports feeling "super anxious " Writer encouraged patient to take atarax first to see if this would help decrease her anxiety and also utilize coping skills; however, patient verbalizes that atarax does not work well for her and coping skills are not helping her with her anxiety at this time  Patient denies feelings of agitation at this time  Ativan 1 mg administered for patient c/o anxiety  After administration patient went to room and listened to music  Ativan appears to have been effective  C/o anxiety again this afternoon  Atarax administered  Appears to have been effective

## 2021-04-25 PROCEDURE — 99232 SBSQ HOSP IP/OBS MODERATE 35: CPT | Performed by: PSYCHIATRY & NEUROLOGY

## 2021-04-25 RX ORDER — CHOLECALCIFEROL (VITAMIN D3) 125 MCG
6000 CAPSULE ORAL
Status: DISCONTINUED | OUTPATIENT
Start: 2021-04-25 | End: 2021-04-27 | Stop reason: HOSPADM

## 2021-04-25 RX ADMIN — LACTASE TAB 3000 UNIT 6000 UNITS: 3000 TAB at 16:09

## 2021-04-25 RX ADMIN — NICOTINE 1 PATCH: 21 PATCH, EXTENDED RELEASE TRANSDERMAL at 09:19

## 2021-04-25 RX ADMIN — BENZTROPINE MESYLATE 1 MG: 1 TABLET ORAL at 17:18

## 2021-04-25 RX ADMIN — GABAPENTIN 800 MG: 400 CAPSULE ORAL at 21:22

## 2021-04-25 RX ADMIN — HYDROXYZINE HYDROCHLORIDE 50 MG: 50 TABLET, FILM COATED ORAL at 09:21

## 2021-04-25 RX ADMIN — GABAPENTIN 800 MG: 400 CAPSULE ORAL at 16:08

## 2021-04-25 RX ADMIN — MELATONIN TAB 3 MG 3 MG: 3 TAB at 21:22

## 2021-04-25 RX ADMIN — LORAZEPAM 1 MG: 1 TABLET ORAL at 13:19

## 2021-04-25 RX ADMIN — LACTASE TAB 3000 UNIT 6000 UNITS: 3000 TAB at 11:40

## 2021-04-25 RX ADMIN — BENZTROPINE MESYLATE 1 MG: 1 TABLET ORAL at 09:18

## 2021-04-25 RX ADMIN — FLUOXETINE 20 MG: 20 CAPSULE ORAL at 09:18

## 2021-04-25 RX ADMIN — OLANZAPINE 5 MG: 5 TABLET, FILM COATED ORAL at 09:18

## 2021-04-25 RX ADMIN — SIMETHICONE 80 MG: 80 TABLET, CHEWABLE ORAL at 17:20

## 2021-04-25 RX ADMIN — OLANZAPINE 15 MG: 10 TABLET, FILM COATED ORAL at 21:22

## 2021-04-25 RX ADMIN — GABAPENTIN 800 MG: 400 CAPSULE ORAL at 09:18

## 2021-04-25 RX ADMIN — SIMETHICONE 80 MG: 80 TABLET, CHEWABLE ORAL at 09:17

## 2021-04-25 NOTE — PROGRESS NOTES
Remains pleasant and cooperative  Tearful before dinner reporting "Tears of carrie" after seeing significant other waving to her from parking lot  States "I'm doing great, he just made my day"  Hopeful to discharge 4/26  Denies SI/HI/AVH  Anxiety manageable at this time (Pt did receive Ativan-1mg at 1319 - effective)  Denies concerns

## 2021-04-25 NOTE — TREATMENT TEAM
04/25/21 0800   Team Meeting   Meeting Type Daily Rounds   Team Members Present   Team Members Present Physician;Nurse   Physician Team Member Dr Frances Khan Team Member CM   Patient/Family Present   Patient Present No   Patient's Family Present No       AM rounds- denies SI, social with peers  Attending select groups  Slept well

## 2021-04-25 NOTE — PROGRESS NOTES
Pleasant and calm during interaction  Slept well over night  Visible in the day room and social with peers  Attending groups  Denies SI/HI/AVH  Compliant w/ medications  Denies questions

## 2021-04-25 NOTE — PROGRESS NOTES
Progress Note - [address] 47 y [address]  female MRN: @MRN   Unit/Bed#: Natasha Erickson 786-33 Encounter: [id number]    Per nursing report and sign out, patient denies SI, ongoing stomach bloating  Slept  Dalton Quest reports today that she is making progress  Less restless, AH now absent  Less depressed and anxious  No SI, no HI  Looking forward to discharge  Ongoing bloating, is having period and does have bloating from milk products  Asked for lactaid  Energy: ok  Sleep: normal  Appetite: normal  Medication side effects: No   ROS: all other systems are negative    Mental Status Evaluation:    Appearance:  age appropriate   Behavior:  pleasant, cooperative, with good eye contact   Speech:  Normal volume, regular rate and rhythm   Mood:  dysphoric, anxious   Affect:  brighter with some improvement       Thought Process:  Linear and goal directed   Associations: intact associations   Thought Content:  normal and appropriate   Perceptual Disturbances: auditory hallucinations are improving   Risk Potential: Suicidal ideation - None  Homicidal ideation - None  Potential for aggression - No   Sensorium:  A&Ox3   Cognition:  grossly intact   Consciousness:  Alert & Awake   Memory:  recent and remote memory grossly intact   Attention: attention span and concentration are age appropriate           Insight:  fair   Judgment: fair   Muscle Strength  Muscle Tone normal  normal   Gait/Station: normal gait/station with good balance   Motor Activity: Restless still, but less so in session clearly       Vital signs in last 24 hours:    Temp:  [97 7 °F (36 5 °C)-99 °F (37 2 °C)] 97 7 °F (36 5 °C)  HR:  [105-118] 105  Resp:  [18] 18  BP: (125-130)/(69-70) 125/69    Laboratory results:  I have personally reviewed all pertinent laboratory/tests results      Assessment/Plan   Principal Problem:    Bipolar 1 disorder, depressed, severe (HCC)  Active Problems:    Tobacco abuse    Alcohol use disorder, moderate, dependence [facility])    Medical clearance for psychiatric admission      Alvarez Ascencio is continue current treatment    Recommended Treatment:     Planned medication and treatment changes: All current active medications have been reviewed    Encourage group therapy, milieu therapy and occupational therapy  [address] checks as unit standard unless ordered or noted otherwise    Current Facility-Administered Medications   Medication Dose Route Frequency Provider Last Rate    acetaminophen  650 mg Oral Q6H PRN Pam Lin MD      acetaminophen  650 mg Oral Q4H PRN Pam Lin MD      acetaminophen  975 mg Oral Q6H PRN Pam Lin MD      aluminum-magnesium hydroxide-simethicone  30 mL Oral Q4H PRN Brenden Rust MD      benztropine  1 mg Oral BID Phi JOSÉ MIGUEL Lucio      FLUoxetine  20 mg Oral Daily Brenden Rust MD      gabapentin  800 mg Oral TID Phi LucioJOSÉ MIGUEL      hydrocortisone   Topical 4x Daily PRN Phi JOSÉ MIGUEL Lucio      hydrOXYzine HCL  25 mg Oral Q6H PRN Max 4/day Pam Lin MD      hydrOXYzine HCL  50 mg Oral Q4H PRN Max 4/day Pam Lin MD      Or    LORazepam  1 mg Intramuscular Q4H PRN Pam Lin MD      LORazepam  1 mg Oral Q4H PRN Max 6/day Pam Lin MD      Or    LORazepam  2 mg Intramuscular Q6H PRN Max 3/day Pam Lin MD      melatonin  3 mg Oral HS Pam Lin MD      nicotine  1 patch Transdermal Daily Pam Lin MD      OLANZapine  10 mg Oral Q3H PRN Max 3/day Pam Lin MD      Or    OLANZapine  10 mg Intramuscular Q3H PRN Max 3/day Pam Lin MD      OLANZapine  5 mg Oral Q3H PRN Max 6/day Pam Lin MD      Or    OLANZapine  5 mg Intramuscular Q3H PRN Max 6/day Pam Lin MD      OLANZapine  15 mg Oral HS Brenden Rust MD      OLANZapine  2 5 mg Oral Q3H PRN Max 8/day Pam Lin MD      OLANZapine  5 mg Oral Daily Phi JOSÉ MIGUEL Lucio      senna-docusate sodium  1 tablet Oral Daily PRN Cayla Guerrero MD      simethicone  80 mg Oral Q6H PRN Moises Carrion III, DO      traZODone  50 mg Oral HS PRN Cayla Guerrero MD         1) continue current treatment  2) Continue to support patient and engage them in the programs available as feasible and appropriate  Continue case management support and therapy  Continue discharge planning  Risks / Benefits of Treatment:    Risks, benefits, and possible side effects of medications explained to patient and patient verbalizes understanding and agreement for treatment  Counseling / Coordination of Care:    Medication changes reviewed with nursing staff  Medications, treatment progress and treatment plan reviewed with patient        Moises Carrion III, DO  4/25/2021  8:20 AM

## 2021-04-25 NOTE — PROGRESS NOTES
C/o anxiety  Reports inability to sit still and feeling like her thoughts are racing  Atarax administered earlier this shift; however, patient reports that it was ineffective  Ativan 1 mg then administered  Patient reports that Ativan continues to be most effective for her anxiety

## 2021-04-25 NOTE — PLAN OF CARE
Problem: Anxiety  Goal: Anxiety is at manageable level  Description: Interventions:  - Assess and monitor patient's anxiety level  - Monitor for signs and symptoms (heart palpitations, chest pain, shortness of breath, headaches, nausea, feeling jumpy, restlessness, irritable, apprehensive)  - Collaborate with interdisciplinary team and initiate plan and interventions as ordered  - [place] patient to unit/surroundings  - Explain treatment plan  - Encourage participation in care  - Encourage verbalization of concerns/fears  - Identify coping mechanisms  - Assist in developing anxiety-reducing skills  - Administer/offer alternative therapies  - Limit or eliminate stimulants  Outcome: Progressing     Problem: SUBSTANCE USE/ABUSE  Goal: By discharge, will develop insight into their chemical dependency and sustain motivation to continue in recovery  Description: INTERVENTIONS:  - Attends all daily group sessions and scheduled AA groups  - Actively practices coping skills through participation in the therapeutic community and adherence to program rules  - Reviews and completes assignments from individual treatment plan  - Assist patient development of understanding of their personal cycle of addiction and relapse triggers  Outcome: Progressing  Goal: By discharge, patient will have ongoing treatment plan addressing chemical dependency  Description: INTERVENTIONS:  - Assist patient with resources and/or appointments for ongoing recovery based living  Outcome: Progressing     Problem: Nutrition/Hydration-ADULT  Goal: Nutrient/Hydration intake appropriate for improving, restoring or maintaining nutritional needs  Description: Monitor and assess patient's nutrition/hydration status for malnutrition  Collaborate with interdisciplinary team and initiate plan and interventions as ordered  Monitor patient's weight and dietary intake as ordered or per policy  Utilize nutrition screening tool and intervene as necessary  Determine patient's food preferences and provide high-protein, high-caloric foods as appropriate       INTERVENTIONS:  - Monitor oral intake, urinary output, labs, and treatment plans  - Assess nutrition and hydration status and recommend course of action  - Evaluate amount of meals eaten  - Assist patient with eating if necessary   - Allow adequate time for meals  - Recommend/ encourage appropriate diets, oral nutritional supplements, and vitamin/mineral supplements  - Order, calculate, and assess calorie counts as needed  - Recommend, monitor, and adjust tube feedings and TPN/PPN based on assessed needs  - Assess need for intravenous fluids  - Provide specific nutrition/hydration education as appropriate  - Include patient/family/caregiver in decisions related to nutrition  Outcome: Progressing     Problem: SELF HARM/SUICIDALITY  Goal: Will have no self-injury during hospital stay  Description: INTERVENTIONS:  - Q 15 MINUTES: Routine safety checks  - Q WAKING SHIFT & PRN: Assess risk to determine if routine checks are adequate to maintain patient safety  - Encourage patient to participate actively in care by formulating a plan to combat response to suicidal ideation, identify supports and resources  Outcome: Progressing

## 2021-04-25 NOTE — PROGRESS NOTES
With the exception of being awake briefly to check the time, has been sleeping in bed throughout the night, without restlessness  Was able to resume sleep within 30 mins  Will continue to monitor

## 2021-04-26 PROCEDURE — 99232 SBSQ HOSP IP/OBS MODERATE 35: CPT | Performed by: PHYSICIAN ASSISTANT

## 2021-04-26 RX ADMIN — GABAPENTIN 800 MG: 400 CAPSULE ORAL at 20:59

## 2021-04-26 RX ADMIN — GABAPENTIN 800 MG: 400 CAPSULE ORAL at 09:05

## 2021-04-26 RX ADMIN — NICOTINE 1 PATCH: 21 PATCH, EXTENDED RELEASE TRANSDERMAL at 09:06

## 2021-04-26 RX ADMIN — BENZTROPINE MESYLATE 1 MG: 1 TABLET ORAL at 09:06

## 2021-04-26 RX ADMIN — BENZTROPINE MESYLATE 1 MG: 1 TABLET ORAL at 17:01

## 2021-04-26 RX ADMIN — OLANZAPINE 15 MG: 10 TABLET, FILM COATED ORAL at 21:00

## 2021-04-26 RX ADMIN — LACTASE TAB 3000 UNIT 6000 UNITS: 3000 TAB at 08:30

## 2021-04-26 RX ADMIN — MELATONIN TAB 3 MG 3 MG: 3 TAB at 21:00

## 2021-04-26 RX ADMIN — LACTASE TAB 3000 UNIT 6000 UNITS: 3000 TAB at 11:17

## 2021-04-26 RX ADMIN — FLUOXETINE 20 MG: 20 CAPSULE ORAL at 09:05

## 2021-04-26 RX ADMIN — LACTASE TAB 3000 UNIT 6000 UNITS: 3000 TAB at 16:12

## 2021-04-26 RX ADMIN — OLANZAPINE 5 MG: 5 TABLET, FILM COATED ORAL at 09:06

## 2021-04-26 RX ADMIN — GABAPENTIN 800 MG: 400 CAPSULE ORAL at 16:11

## 2021-04-26 NOTE — PROGRESS NOTES
04/26/21 1000 04/26/21 1100 04/26/21 1315   Activity/Group Checklist   Group [employer] meeting  (motivation stages) Nursing Education  (Relapse Prevention) Life Skills   Attendance Attended Attended Did not attend   Attendance Duration (min) 31-45 16-30  --    Interactions Interacted appropriately  (on topic and engaged) Interacted appropriately  --    Affect/Mood Appropriate Appropriate  --    Goals Achieved Able to listen to others; Able to engage in interactions; Identified feelings; Discussed coping strategies Able to engage in interactions; Identified triggers  --       04/26/21 1400   Activity/Group Checklist   Group Other (Comment)  (creative leisure)   Attendance Did not attend   Attendance Duration (min)  --    Interactions  --    Affect/Mood  --    Goals Achieved  --    Pt attended 2/4 therapeutic groups today  She presented as pleasant and social with peers, engaging frequently in group activities and offering encouraging feedback  Pt does appear restless at times frequently moving around in chair or leaving group briefly and returning

## 2021-04-26 NOTE — CASE MANAGEMENT
CM met with Pt who reported that she was just going to go home to her boyfriend tomorrow  Pt said that she realized that she was not taking her medication & was acting foolish & using drugs, & so it was her fault he was treating her that way  Pt said that now she is back on her medications, she felt better & they had talked a lot the past few days  She said that he was actually in [facility] yesterday & stood outside to wave to her  Pt said that she will need transportation home tomorrow      CM electronically sent [facility] transport request for tomorrow; ETA 10:00 AM

## 2021-04-26 NOTE — PROGRESS NOTES
Patient awake and alert  OOB for breakfast and medications  Denies SI/HI  Inquired about discharge and states she is ready to leave  Plans to return to stay with her boyfriend  Spoke about her drug addiction and mentioned that her boyfriend is a support and has been sober for fifteen years  Encouraged compliance with medications and OP appointments  No questions or concerns

## 2021-04-26 NOTE — CASE MANAGEMENT
CM met with Pt to review referrals for outpatient services; Pt agreeable with [facility], however, said that location is important because she doesn't have her car currently  Pt said that if she has to do substance abuse treatment at one location & mental health treatment at another location that was fine  CM contacted [facility] @ [phone] & they reported that they do not currently have any openings  Pt reported that [place] was too far from her residence  CM met with Pt & she agreed with referrals for [facility]; ROIs obtained  CM contacted [facility] @ [phone] & was told they need to speak directly with Pt  CM went onto the unit & assisted Pt with calling  She completed the phone screening & has an intake on 5/4 at 11:15 AM (in Person)  CM contacted [facility] @ [phone] & was able to schedule Pt an intake for 5/10 at 11:00 AM(in Person)  CM confirmed fax number [phone] to send discharge information  CM contacted Pt's ICM, Yani Villar, at [facility] @ [phone] & left a message to notify her of d/c plans for tomorrow  CM sent an email to [facility] to notify her that Pt was no longer interested in the recovery house  CM met with Pt to review all discharge plans & she was in agreement

## 2021-04-26 NOTE — PROGRESS NOTES
Status: Pt has been pleasant & cooperative  Her boyfriend came by the hospital & stood outside of her window yesterday  She has been talking with him & reports plans to discharge to his house, instead of crisis residence/recovery house  Pt had c/o feeling bloated  Pt slept overnight, no issues to report  Medication: Lactaid 6,000 units started yesterday / PRN - Atarax, Ativan, & Mylicon(x2)  D/C: Tuesday / CM will follow-up with Pt regarding d/c plans  A referral was made to [facility] & they were anticipating a discharge/opening today, however, Pt is reportedly no longer interested in this level of care       04/26/21 0750   Team Meeting   Meeting Type Daily Rounds   Team Members Present   Team Members Present Physician;Nurse;;Occupational Therapist   Physician Team Member Dr Shayy Chadwick / Violeta Wilson / Shira Carrel Team Member Shoaib Kurtz / Екатерина Garduno Management Team Member Edil Del Rio / Kamran Erickson / Viky Mercer   OT Team Member Daniel Garcia / Jamie Do   Patient/Family Present   Patient Present No   Patient's Family Present No

## 2021-04-26 NOTE — PROGRESS NOTES
Progress Note - Behavioral Health   Prosper Holt 47 y o  female MRN: [id number]  Unit/Bed#: [facility] 222-07 Encounter: [id number]    Assessment/Plan   Principal Problem:    Bipolar 1 disorder, depressed, severe ([address] )  Active Problems:    Tobacco abuse    Alcohol use disorder, moderate, dependence ([facility])    Medical clearance for psychiatric admission      Subjective: Patient was seen, chart reviewed and case discussed with team  Patient appeared to have brighter affect and was cooperative  Hopeful for discharge  States she feels safe and secure here, and is very pleased with her progress  Notes she used to have multiple personalities fighting for control of her body, but is happy these have all subsided  Appears less paranoid  Denied hallucinations  She enjoys attending group, and is very anxious to go home  Complains of continued restlessness  Appeared less restless today  States her depression has greatly improved and denies suicidal or homicidal thoughts  Reports sleep and appetite are normal  Medication compliant, denied additional somatic complaints      Psychiatric Review of Systems:  Behavior over the last 24 hours:  improved  Sleep: normal  Appetite: normal  Medication side effects: No  ROS: no complaints, all others negative    Current Medications:  Current Facility-Administered Medications   Medication Dose Route Frequency    acetaminophen (TYLENOL) tablet 650 mg  650 mg Oral Q6H PRN    acetaminophen (TYLENOL) tablet 650 mg  650 mg Oral Q4H PRN    acetaminophen (TYLENOL) tablet 975 mg  975 mg Oral Q6H PRN    aluminum-magnesium hydroxide-simethicone (MYLANTA) oral suspension 30 mL  30 mL Oral Q4H PRN    benztropine (COGENTIN) tablet 1 mg  1 mg Oral BID    FLUoxetine (PROzac) capsule 20 mg  20 mg Oral Daily    gabapentin (NEURONTIN) capsule 800 mg  800 mg Oral TID    hydrocortisone 1 % cream   Topical 4x Daily PRN    hydrOXYzine HCL (ATARAX) tablet 25 mg  25 mg Oral Q6H PRN Max 4/day    hydrOXYzine HCL (ATARAX) tablet 50 mg  50 mg Oral Q4H PRN Max 4/day    Or    LORazepam (ATIVAN) injection 1 mg  1 mg Intramuscular Q4H PRN    lactase (LACTAID) tablet 6,000 Units  6,000 Units Oral TID With Meals    LORazepam (ATIVAN) tablet 1 mg  1 mg Oral Q4H PRN Max 6/day    Or    LORazepam (ATIVAN) injection 2 mg  2 mg Intramuscular Q6H PRN Max 3/day    melatonin tablet 3 mg  3 mg Oral HS    nicotine (NICODERM CQ) 21 mg/24 hr TD 24 hr patch 1 patch  1 patch Transdermal Daily    OLANZapine (ZyPREXA) tablet 10 mg  10 mg Oral Q3H PRN Max 3/day    Or    OLANZapine (ZyPREXA) IM injection 10 mg  10 mg Intramuscular Q3H PRN Max 3/day    OLANZapine (ZyPREXA) tablet 5 mg  5 mg Oral Q3H PRN Max 6/day    Or    OLANZapine (ZyPREXA) IM injection 5 mg  5 mg Intramuscular Q3H PRN Max 6/day    OLANZapine (ZyPREXA) tablet 15 mg  15 mg Oral HS    OLANZapine (ZyPREXA) tablet 2 5 mg  2 5 mg Oral Q3H PRN Max 8/day    OLANZapine (ZyPREXA) tablet 5 mg  5 mg Oral Daily    senna-docusate sodium (SENOKOT S) 8 6-50 mg per tablet 1 tablet  1 tablet Oral Daily PRN    simethicone (MYLICON) chewable tablet 80 mg  80 mg Oral Q6H PRN    traZODone (DESYREL) tablet 50 mg  50 mg Oral HS PRN       Behavioral Health Medications: all current active meds have been reviewed  Vitals:  Vitals:    04/26/21 0758   BP: 102/59   Pulse: 79   Resp: 17   Temp: 97 6 °F (36 4 °C)   SpO2:        Laboratory results:    I have personally reviewed all pertinent laboratory/tests results    Most Recent Labs:   Lab Results   Component Value Date    WBC 6 09 04/11/2021    RBC 5 51 (H) 04/11/2021    HGB 12 6 04/11/2021    HCT 43 0 04/11/2021     04/11/2021    RDW 17 7 (H) 04/11/2021    NEUTROABS 3 19 04/11/2021    SODIUM 141 04/11/2021    K 4 6 04/11/2021     04/11/2021    CO2 30 04/11/2021    BUN 14 04/11/2021    CREATININE 0 60 04/11/2021    GLUC 87 04/11/2021    GLUF 42 (L) 06/14/2019    CALCIUM 8 7 04/11/2021    AST 10 04/11/2021    ALT 20 04/11/2021    ALKPHOS 51 04/11/2021    TP 6 3 (L) 04/11/2021    ALB 2 9 (L) 04/11/2021    TBILI 0 30 04/11/2021    CHOLESTEROL 179 04/11/2021    HDL 66 04/11/2021    TRIG 74 04/11/2021    LDLCALC 98 04/11/2021    NONHDLC 113 04/11/2021    MBM7PKEBURNJ 1 184 05/15/2019    PREGUR NEGATIVE 04/07/2021    PREGSERUM Negative 06/18/2019    HCGQUANT <3 10/17/2018    RPR Non-Reactive 05/15/2019    HGBA1C 5 5 04/11/2021     04/11/2021       Mental Status Evaluation:  Appearance:  casually dressed   Behavior:  restless and fidgety, less   Speech:  normal pitch and normal volume   Mood:  euthymic   Affect:  brighter   Language Appropriate   Thought Process:  Goal-directed and linear   Thought Content:  decreased paranoia   Perceptual Disturbances: None   Risk Potential: Suicidal Ideations none, Homicidal Ideations none and Potential for Aggression No   Sensorium:  person, place and time/date   Cognition:  recent and remote memory grossly intact   Consciousness:  alert    Attention: attention span appeared shorter than expected for age   Insight:  Partial   Judgment: fair   Gait/Station: normal gait/station   Motor Activity: restless     Progress Toward Goals: improved    Recommended Treatment: Continue with pharmacotherapy, group therapy, milieu therapy and occupational therapy  1   Continue current medications  2  Disposition planning with tentative discharge tomorrow    Risks, benefits and possible side effects of Medications:   Risks, benefits, and possible side effects of medications explained to patient and patient verbalizes understanding

## 2021-04-27 VITALS
HEART RATE: 101 BPM | OXYGEN SATURATION: 96 % | WEIGHT: 144.18 LBS | BODY MASS INDEX: 26.53 KG/M2 | TEMPERATURE: 97.8 F | SYSTOLIC BLOOD PRESSURE: 97 MMHG | DIASTOLIC BLOOD PRESSURE: 58 MMHG | HEIGHT: 62 IN | RESPIRATION RATE: 18 BRPM

## 2021-04-27 PROCEDURE — 99239 HOSP IP/OBS DSCHRG MGMT >30: CPT | Performed by: STUDENT IN AN ORGANIZED HEALTH CARE EDUCATION/TRAINING PROGRAM

## 2021-04-27 RX ORDER — GABAPENTIN 400 MG/1
800 CAPSULE ORAL 3 TIMES DAILY
Qty: 180 CAPSULE | Refills: 1 | Status: SHIPPED | OUTPATIENT
Start: 2021-04-27 | End: 2021-11-12 | Stop reason: SDUPTHER

## 2021-04-27 RX ORDER — OLANZAPINE 5 MG/1
5 TABLET ORAL DAILY
Qty: 30 TABLET | Refills: 1 | Status: SHIPPED | OUTPATIENT
Start: 2021-04-28 | End: 2021-11-12 | Stop reason: SDUPTHER

## 2021-04-27 RX ORDER — LANOLIN ALCOHOL/MO/W.PET/CERES
3 CREAM (GRAM) TOPICAL
Qty: 30 TABLET | Refills: 1 | Status: SHIPPED | OUTPATIENT
Start: 2021-04-27 | End: 2021-11-12 | Stop reason: HOSPADM

## 2021-04-27 RX ORDER — BENZTROPINE MESYLATE 1 MG/1
1 TABLET ORAL 2 TIMES DAILY
Qty: 60 TABLET | Refills: 1 | Status: SHIPPED | OUTPATIENT
Start: 2021-04-27 | End: 2021-11-12 | Stop reason: SDUPTHER

## 2021-04-27 RX ORDER — FLUOXETINE HYDROCHLORIDE 20 MG/1
20 CAPSULE ORAL DAILY
Qty: 30 CAPSULE | Refills: 1 | Status: SHIPPED | OUTPATIENT
Start: 2021-04-28 | End: 2021-11-12 | Stop reason: HOSPADM

## 2021-04-27 RX ORDER — CHOLECALCIFEROL (VITAMIN D3) 125 MCG
6000 CAPSULE ORAL
Qty: 180 TABLET | Refills: 1 | Status: SHIPPED | OUTPATIENT
Start: 2021-04-27

## 2021-04-27 RX ORDER — OLANZAPINE 15 MG/1
15 TABLET ORAL
Qty: 30 TABLET | Refills: 1 | Status: SHIPPED | OUTPATIENT
Start: 2021-04-27 | End: 2021-11-12 | Stop reason: SDUPTHER

## 2021-04-27 RX ADMIN — GABAPENTIN 800 MG: 400 CAPSULE ORAL at 08:06

## 2021-04-27 RX ADMIN — OLANZAPINE 5 MG: 5 TABLET, FILM COATED ORAL at 08:07

## 2021-04-27 RX ADMIN — LACTASE TAB 3000 UNIT 6000 UNITS: 3000 TAB at 08:06

## 2021-04-27 RX ADMIN — NICOTINE 1 PATCH: 21 PATCH, EXTENDED RELEASE TRANSDERMAL at 08:05

## 2021-04-27 RX ADMIN — BENZTROPINE MESYLATE 1 MG: 1 TABLET ORAL at 08:06

## 2021-04-27 RX ADMIN — FLUOXETINE 20 MG: 20 CAPSULE ORAL at 08:06

## 2021-04-27 NOTE — PROGRESS NOTES
Status: Pt pleasant & cooperative  She slept overnight with no issues to report  Pt reported that her boyfriend has been sober for 15 years, & he gets angry with her because she uses $200 worth of crack cocaine  Medication: no changes / no PRNs  D/C: Today / via [facility] at 10 AM   Pt has an intake at [facility] on 5/4 at 11:15 AM (substance abuse) & [facility]  Counseling 5/10 at 11:00 AM (mental health)    Pt was referred to & opened for ICM services through [address] [place] [address]        04/27/21 0750   Team Meeting   Meeting Type Daily Rounds   Team Members Present   Team Members Present Physician;Nurse;;Occupational Therapist   Physician Team Member Dr Nick Walters / Merissa Flores / Mirza Mauricio Team Member Kylie Kelly / Artie Baker Management Team Member Niru Bianchi / Yessi Estrada / Rita Oneal   OT Team Member Eli Marte / Moises Lackey   Patient/Family Present   Patient Present No   Patient's Family Present No

## 2021-04-27 NOTE — PLAN OF CARE
Problem: DISCHARGE PLANNING  Goal: Discharge to home or other facility with appropriate resources  Description: INTERVENTIONS:  - Identify barriers to discharge w/patient and caregiver  - Arrange for needed discharge resources and transportation as appropriate  - Identify discharge learning needs (meds, wound care, etc )  - Arrange for interpretive services to assist at discharge as needed  - Refer to Case Management Department for coordinating discharge planning if the patient needs post-hospital services based on physician/advanced practitioner order or complex needs related to functional status, cognitive ability, or social support system  Outcome: Progressing     Problem: Ineffective Coping  Goal: Participates in unit activities  Description: Interventions:  - Provide therapeutic environment   - Provide required programming   - Redirect inappropriate behaviors   Outcome: Progressing     Problem: Anxiety  Goal: Anxiety is at manageable level  Description: Interventions:  - Assess and monitor patient's anxiety level  - Monitor for signs and symptoms (heart palpitations, chest pain, shortness of breath, headaches, nausea, feeling jumpy, restlessness, irritable, apprehensive)  - Collaborate with interdisciplinary team and initiate plan and interventions as ordered    - [place] patient to unit/surroundings  - Explain treatment plan  - Encourage participation in care  - Encourage verbalization of concerns/fears  - Identify coping mechanisms  - Assist in developing anxiety-reducing skills  - Administer/offer alternative therapies  - Limit or eliminate stimulants  Outcome: Progressing     Problem: SUBSTANCE USE/ABUSE  Goal: By discharge, will develop insight into their chemical dependency and sustain motivation to continue in recovery  Description: INTERVENTIONS:  - Attends all daily group sessions and scheduled AA groups  - Actively practices coping skills through participation in the therapeutic community and adherence to program rules  - Reviews and completes assignments from individual treatment plan  - Assist patient development of understanding of their personal cycle of addiction and relapse triggers  Outcome: Progressing

## 2021-04-27 NOTE — PLAN OF CARE
Problem: DISCHARGE PLANNING  Goal: Discharge to home or other facility with appropriate resources  Description: INTERVENTIONS:  - Identify barriers to discharge w/patient and caregiver  - Arrange for needed discharge resources and transportation as appropriate  - Identify discharge learning needs (meds, wound care, etc )  - Arrange for interpretive services to assist at discharge as needed  - Refer to Case Management Department for coordinating discharge planning if the patient needs post-hospital services based on physician/advanced practitioner order or complex needs related to functional status, cognitive ability, or social support system  Outcome: Adequate for Discharge     Problem: Ineffective Coping  Goal: Participates in unit activities  Description: Interventions:  - Provide therapeutic environment   - Provide required programming   - Redirect inappropriate behaviors   Outcome: Adequate for Discharge     Problem: Anxiety  Goal: Anxiety is at manageable level  Description: Interventions:  - Assess and monitor patient's anxiety level  - Monitor for signs and symptoms (heart palpitations, chest pain, shortness of breath, headaches, nausea, feeling jumpy, restlessness, irritable, apprehensive)  - Collaborate with interdisciplinary team and initiate plan and interventions as ordered    - [place] patient to unit/surroundings  - Explain treatment plan  - Encourage participation in care  - Encourage verbalization of concerns/fears  - Identify coping mechanisms  - Assist in developing anxiety-reducing skills  - Administer/offer alternative therapies  - Limit or eliminate stimulants  Outcome: Adequate for Discharge     Problem: SUBSTANCE USE/ABUSE  Goal: By discharge, will develop insight into their chemical dependency and sustain motivation to continue in recovery  Description: INTERVENTIONS:  - Attends all daily group sessions and scheduled AA groups  - Actively practices coping skills through participation in the therapeutic community and adherence to program rules  - Reviews and completes assignments from individual treatment plan  - Assist patient development of understanding of their personal cycle of addiction and relapse triggers  Outcome: Adequate for Discharge  Goal: By discharge, patient will have ongoing treatment plan addressing chemical dependency  Description: INTERVENTIONS:  - Assist patient with resources and/or appointments for ongoing recovery based living  Outcome: Adequate for Discharge     Problem: SELF HARM/SUICIDALITY  Goal: Will have no self-injury during hospital stay  Description: INTERVENTIONS:  - Q 15 MINUTES: Routine safety checks  - Q WAKING SHIFT & PRN: Assess risk to determine if routine checks are adequate to maintain patient safety  - Encourage patient to participate actively in care by formulating a plan to combat response to suicidal ideation, identify supports and resources  Outcome: Adequate for Discharge     Problem: Nutrition/Hydration-ADULT  Goal: Nutrient/Hydration intake appropriate for improving, restoring or maintaining nutritional needs  Description: Monitor and assess patient's nutrition/hydration status for malnutrition  Collaborate with interdisciplinary team and initiate plan and interventions as ordered  Monitor patient's weight and dietary intake as ordered or per policy  Utilize nutrition screening tool and intervene as necessary  Determine patient's food preferences and provide high-protein, high-caloric foods as appropriate       INTERVENTIONS:  - Monitor oral intake, urinary output, labs, and treatment plans  - Assess nutrition and hydration status and recommend course of action  - Evaluate amount of meals eaten  - Assist patient with eating if necessary   - Allow adequate time for meals  - Recommend/ encourage appropriate diets, oral nutritional supplements, and vitamin/mineral supplements  - Order, calculate, and assess calorie counts as needed  - Recommend, monitor, and adjust tube feedings and TPN/PPN based on assessed needs  - Assess need for intravenous fluids  - Provide specific nutrition/hydration education as appropriate  - Include patient/family/caregiver in decisions related to nutrition  Outcome: Adequate for Discharge     Expressed readiness for discharge

## 2021-04-27 NOTE — NURSING NOTE
Awake and alert  Expressed readiness for discharge  Plans to return to reside with her boyfriend  Denies SI/HI  Improved mood and sleep  Fluctuating anxiety, discussed healthy coping skills and enjoys music, dancing and yoga  Encouraged compliance with medications and OP appointments and encouraged NA mtgs  No questions or concerns  AVS reviewed

## 2021-04-27 NOTE — DISCHARGE INSTRUCTIONS
Abuse of Alcohol   WHAT YOU NEED TO KNOW:   Alcohol abuse means you drink more than the recommended daily or weekly limits  You may be drinking alcohol regularly or drinking large amounts in a short period of time (binge drinking)  You continue to drink even though it causes legal, work, or relationship problems  DISCHARGE INSTRUCTIONS:   Call your local emergency number (911 in the [address]) for any of the following:   · You have sudden chest pain or trouble breathing  · You want to harm yourself or others  · You have a seizure or have shaking or trembling  Call your doctor if:   · You have hallucinations (you see or hear things that are not real)  · You cannot stop vomiting or you vomit blood  · You need help to stop drinking alcohol  · You have questions or concerns about your condition or care  Medicines:   · Vitamin supplements  may be given to treat low vitamin levels  Alcohol can make it hard for your body to absorb enough vitamins such as B1  Vitamin supplements may also be given to prevent alcohol related brain damage  · Take your medicine as directed  Contact your healthcare provider if you think your medicine is not helping or if you have side effects  Tell him or her if you are allergic to any medicine  Keep a list of the medicines, vitamins, and herbs you take  Include the amounts, and when and why you take them  Bring the list or the pill bottles to follow-up visits  Carry your medicine list with you in case of an emergency  Recommended alcohol limits:   · Men 21 to 64 years  should limit alcohol to 2 drinks a day  Do not have more than 4 drinks in 1 day or more than 14 in 1 week  · All women, and men 72 or older  should limit alcohol to 1 drink in a day  Do not have more than 3 drinks in 1 day or more than 7 in 1 week  No amount of alcohol is okay during pregnancy      Health problems alcohol abuse can cause:   · Cancer in your liver, pancreas, stomach, colon, kidney, or breast    · Stroke or a heart attack    · Liver, kidney, or lung disease    · Blackouts, memory loss, brain damage, or dementia    · Diabetes, immune system problems, or thiamine (vitamin B1) deficiency    · Problems for you and your baby if you drink while pregnant    Manage alcohol use:   · Decrease the amount you drink  This can help prevent health problems such as brain, heart, and liver damage, high blood pressure, diabetes, and cancer  If you cannot stop completely, healthcare providers can help you set goals to decrease the amount you drink  · Plan weekly alcohol use  You will be less likely to drink more than the recommended limit if you plan ahead  · Have food when you drink alcohol  Food will prevent alcohol from getting into your system too quickly  Eat before you have your first alcohol drink  · Time your drinks carefully  Have no more than 1 drink in an hour  Have a liquid such as water, coffee, or a soft drink between alcohol drinks  · Do not drive if you have had alcohol  Make sure someone who has not been drinking can help you get home  · Do not drink alcohol if you are taking medicine  Alcohol is dangerous when you combine it with certain medicines, such as acetaminophen or blood pressure medicine  Talk to your healthcare provider about all the medicines you currently take  Follow up with your healthcare provider as directed:  Write down your questions so you remember to ask them during your visits  For support and more information:   · Alcoholics Anonymous  Web Address: http://www [employer]/    · Substance Abuse and [address] [place]  Web Address: https://[employer]/  © [address] Information is for End User's use only and may not be sold, redistributed or otherwise used for commercial purposes   All illustrations and images included in CareNotes® are the copyrighted property of [employer]  or [address]   The above information is an  only  It is not intended as medical advice for individual conditions or treatments  Talk to your doctor, nurse or pharmacist before following any medical regimen to see if it is safe and effective for you  Bipolar Disorder   WHAT YOU SHOULD KNOW:   Bipolar disorder is a long-term chemical imbalance that causes rapid changes in mood and behavior  High moods are called quentin  Low moods are called depression  Sometimes you will feel manic and sometimes you will feel depressed  You can have quentin and depression at the same time  This is called a mixed bipolar state  CARE AGREEMENT:   You have the right to help plan your care  Learn about your health condition and how it may be treated  Discuss treatment options with your caregivers to decide what care you want to receive  You always have the right to refuse treatment  RISKS:   The periods of quentin and depression may last for weeks or months  Without treatment, your bipolar disorder could get worse  Your illness could make it hard to work and get along with others  It may also affect your eating and sleeping  WHILE YOU ARE HERE:   What caregivers will I work with while being treated for bipolar disorder? · Psychiatrist: This is a medical doctor who works in mental health  The psychiatrist is in charge of ordering your medicine  You may work closely with this doctor and other caregivers  · Therapist: This is a caregiver that works closely with you while you are being treated  This person may be a doctor, psychologist, nurse, mental health counselor, or   What meetings might I need to attend? · Family meetings: Your caregivers will meet with you and your family  You will talk about how to cope with your illness  · Group therapy: A series of meetings that you attend with other patients and staff   During these meetings, patients and staff talk together about ways to cope with illness  · Individual therapy: A time for you to meet alone with your therapist  During this time you and your therapist may talk about how to cope with your illness  · Intensive outpatient program: This is when you come to the hospital or clinic for 1 to 3 hours of treatment  This program is usually 2 to 5 times a week for a short period of time  · Partial care program: This is when you come to the hospital unit every day during the day or evening  After you are treated each day, you can return home  You may need a partial care program after you have been treated in the hospital  Caregivers may also suggest this program instead of having you stay in the hospital   What other treatments may be included in my treatment plan? · Quiet room: This is an empty room used for patients who need to have time out in a safe place  You may be put here if caregivers are concerned you may hurt yourself or others  · Restraints: There are 2 types of restraints that may be used while you are in the hospital  They will only be used if caregivers feel you are in danger of hurting yourself or others  Physical restraints may be put on your wrists or ankles and tied to something else  These are usually cloth or leather bands  Other things will always be tried before using physical restraints, such as going into a quiet room or seclusion  Caregivers may use chemical restraints instead  This is medicine used to help you gain control of your actions and help you relax  Restraints should never be used to punish you  · Seclusion: This is when you need to be locked in a safe room because you are out of control  The door is locked because you might want to leave the room  Caregivers will closely watch you while you are in seclusion  You may come out of seclusion when caregivers feel you will not hurt yourself or others  · Time out: This is time spent away from other people   This is usually needed when you are not able to control your behavior  You may be put in time out if your behavior is affecting others  Time out may be in your room or another room  · Electroconvulsive therapy: This is also called ECT  It may be used for patients with life threatening depression that medicine or therapy has not helped  With ECT, a small amount of electric shock is sent through the brain  Before ECT treatment, you may get medicine to help you relax  After treatment, you may have trouble remembering things for a short time  What else should I know about being treated for bipolar disorder? · 72-hour hold: This is when you are put in the hospital for 72 hours without your permission  The police or a caregiver may decide to put you in the hospital  This may only be done if others are concerned that you may hurt yourself or someone else  It may also be done if caregivers or police do not think you can safely care for yourself  · Clothes: You may wear your own clothes while you are in the hospital     · Inpatient unit: This is the place where you will stay while in the hospital  It usually has bedrooms and a living area  Sometimes the doors of this unit are locked  · Meals: You will eat your meals on the unit or in the cafeteria with other patients  · Personal belongings: When you are admitted to the unit, caregivers may search your belongings  Any belongings brought to you during your stay may also be searched  This search is done to keep you and the staff safe  · Release of information form: This is a legal paper that lets caregivers share information with those listed on this form  You will need to sign this form before any information will be released to persons outside the hospital     · Right to privacy: Information that you share with your caregivers will be kept private among hospital caregivers  They will not share information with others without your permission  · Sharps:  You will not be allowed to keep any sharp items with you  Sharp items include scissors, nail files, razors, or glass  Ask a caregiver if you need to use one of these items  © 2014 [address] is for End User's use only and may not be sold, redistributed or otherwise used for commercial purposes  All illustrations and images included in CareNotes® are the copyrighted property of [employer]  or Shravan Palacios  The above information is an  only  It is not intended as medical advice for individual conditions or treatments  Talk to your doctor, nurse or pharmacist before following any medical regimen to see if it is safe and effective for you

## 2021-04-27 NOTE — BH TRANSITION RECORD
Contact Information: If you have any questions, concerns, pended studies, tests and/or procedures, or emergencies regarding your inpatient behavioral health visit  Please contact [facility] unit [phone] and ask to speak to a , nurse or physician  A contact is available 24 hours/ 7 days a week at this number  Summary of Procedures Performed During your Stay:  Below is a list of major procedures performed during your hospital stay and a summary of results:  - No major procedures performed  Pending Studies (From admission, onward)    None        If studies are pending at discharge, follow up with your PCP and/or referring provider
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
## 2021-04-27 NOTE — DISCHARGE INSTR - OTHER ORDERS
Claudia Hunter is a confidential 7 days/week telephone support service manned by trained mental health consumers  [facility] operates daily but is not able to accept calls between 2AM-6AM    [facility] provides support, a listening ear and can provide information about available services  [facility] specializes in the concerns of mental health consumers, their families and friends  However, we are also here for anyone who has a mental health concern, is confused about or just doesn't know anything about mental health or where to get information  To reach Claudia Hunter, call [phone] accepts calls between 6:00 AM to 10:00 AM and from 4:00 PM to 12:00 AM      Text CONNECT to [id number] from anywhere in the [place], anytime, about any type of crisis  A live, trained Crisis Counselor receives the text and lets you know that they are here to listen  The volunteer Crisis Counselor will help you move from a hot moment to a cool moment  [facility] ([facility]) AT [facility] - licensed telephone and mobile crisis services that provide mental health assessments to all age groups regardless of income or insurance  [facility] operates 24-hour/7 days a week  [address] [place] [address] assists consumer who are experiencing a mental health emergency and lack the resources to assist themselves  Immediate intervention for suicidal and depressed individuals with home visits/outreach being top priority  Crisis can be contacted at [phone]  The [facility] Mental Illness [facility]) offers various education & support groups for you & your family  For more information visit their website at http://Imagimod/     Dial 2-1-1 to get connected/get help  Free, confidential information & referral available 24/7: Aging Services, Child & Youth Services, Counseling, Education/Training, Food/Shelter/Clothing, Health Services, Parenting, Substance Abuse, Support Groups, Volunteer Opportunities, & much more    Phone: 2-1-1 or [phone], Web: [facility] [email or web address] GARRY, Email: [email or web address]    [facility]  The [facility] ([place] [address], [facility], [address] [place] [address]) offers persons with a mental illness a safe, healing environment to explore their personal and vocational potential and receive support in achieving their goals  Instead of traditional therapy, the work of the house is the rehabilitation  As members contribute meaningful work to the house, they build confidence and a sense of purpose  Be a Member - Its Free  Membership in the [facility] is open to any [facility] resident who is 25 or older and has a history of mental illness  Membership is free for life  [place] [address], [facility], [address] [place] [address]  Hours: Monday - Friday: 8 a m  - 4 p m  With varying hours on holidays   ([address] ([address]) provides a stress-free atmosphere for persons 18 and older who have experienced mental health issues  What The [address]  · Games  · Outings  · Holiday gatherings  Recovery  · Employment  · Education  · [employer]  Empowerment  · Helps participants achieve their goals  · Enhances quality of life  · Encourages leadership    The [address]  Hours: Monday through Friday: 4 p m  - 9 p m  Saturday: 2 p m  - 8 p m  Closed Sundays  Varying hours on holidays            Contact [phone]        What you need to know aboutcoronavirus disease 2019 (COVID-19)     What is coronavirus disease 2019 (COVID-19)? Coronavirus disease 2019 (COVID-19) is a respiratory illness that can spread from person to person  The virus that causes COVID-19 is a novel coronavirus that was first identified during an investigation into an outbreak in [place], [place]  Can people in the [place]  get COVID-19? Yes   COVID-19 is spreading from person to person in parts of the [place]  Risk of infection with COVID-19 is higher for people who are close contacts of someone known to have COVID-19, for example healthcare workers, or household members  Other people at higher risk for infection are those who live in or have recently been in an area with ongoing spread of COVID-19  Learn more about places with ongoing spread at   [facility]  html#geographic  Have there been cases of COVID-19 in the [place] ?   Yes  The first case of COVID-19 in the [place] was reported on January 21, 2020  The current count of cases of COVID-19 in the [place] is available on [employer] at [facility]  How does COVID-19 spread? The virus that causes COVID-19 probably emerged from an animal source, but is now spreading from person to person  The virus is thought to spread mainly between people who are in close contact with one another (within about 6 feet) through respiratory droplets produced when an infected person coughs or sneezes  It also may be possible that a person can get COVID-19 by touching a surface or object that has the virus on it and then touching their own mouth, nose, or possibly their eyes, but this is not thought to be the main way the virus spreads  Learn what is known about the spread of newly emerged coronaviruses at [facility]  What are the symptoms of COVID-19? Patients with COVID-19 have had mild to severe respiratory illness with symptoms of   fever   cough   shortness of breath  What are severe complications from this virus? Some patients have pneumonia in both lungs, multi-organ failure and in some cases death  How can I help protect myself? People can help protect themselves from respiratory illness with everyday preventive actions      Avoid close contact with people who are sick  Avoid touching your eyes, nose, and mouth withunwashed hands  Wash your hands often with soap and water for at least 20 seconds  Use an alcohol-based hand  that contains at least 60% alcohol if soap and water are not available  If you are sick, to keep from spreading respiratory illness to others, you should   Stay home when you are sick  Cover your cough or sneeze with a tissue, then throw the tissue in the trash  Clean and disinfect frequently touched objectsand surfaces  What should I do if I recently traveled from an area with ongoing spread of COVID-19? If you have traveled from an affected area, there may be restrictions on your movements for up to 2 weeks  If you develop symptoms during that period (fever, cough, trouble breathing), seek medical advice  Call the office of your health care provider before you go, and tell them about your travel and your symptoms  They will give you instructions on how to get care without exposing other people to your illness  While sick, avoid contact with people, don't go out and delay any travel to reduce the possibility of spreading illness to others  Is there a treatment? There is no specific antiviral treatment for COVID-19  People with COVID-19 can seek medical care to helprelieve symptoms  For more information: www cdc gov/IXJNK75IG 472078-L 03/03/2020       What to do if you are sick withcoronavirus disease 2019 (COVID-19)     If you are sick with COVID-19 or suspect you are infected with the virus that causes COVID-19, follow the steps below to help prevent the disease from spreading to people in your home and community  Stay home except to get medical care   You should restrict activities outside your home, except for getting medical care  Do not go to work, school, or public areas  Avoid using public transportation, ride-sharing, or taxis    Separate yourself from other people and animals inyour home  People: As much as possible, you should stay in a specific room and away from other people in your home  Also, you should use a separate bathroom, if available  Animals: Do not handle pets or other animals while sick  See COVID-19 and Animals for more information  Call ahead before visiting your doctor   If you have a medical appointment, call the healthcare provider and tell them that you have or may have COVID-19  This will help the healthcare provider's office take steps to keep other people from getting infected or exposed  Wear a facemask  You should wear a facemask when you are around other people (e g , sharing a room or vehicle) or pets and before you enter a healthcare provider's office  If you are not able to wear a facemask (for example, because it causes trouble breathing), then people who live with you should not stay in the same room with you, or they should wear a facemask if they enteryour room  Cover your coughs and sneezes   Cover your mouth and nose with a tissue when you cough or sneeze  Throw used tissues in a lined trash can; immediately wash your hands with soap and water for at least 20 seconds or clean your hands with an alcohol-based hand  that contains at least 60 to 95% alcohol, covering all surfaces of your hands and rubbing them together until they feel dry  Soap and water should be used preferentially if hands are visibly dirty  Avoid sharing personal household items   You should not share dishes, drinking glasses, cups, eating utensils, towels, or bedding with other people or pets in your home  After using these items, they should be washed thoroughly with soap and water  Clean your hands often  Wash your hands often with soap and water for at least 20 seconds  If soap and water are not available, clean your hands with an alcohol-based hand  that contains at least 60% alcohol, covering all surfaces of your hands and rubbing them together until they feel dry   Soap and water should be used preferentially if hands are visibly dirty  Avoid touching your eyes, nose, and mouth with unwashed hands  Clean all "high-touch" surfaces every day  High touch surfaces include counters, tabletops, doorknobs, bathroom fixtures, toilets, phones, keyboards, tablets, and bedside tables  Also, clean any surfaces that may have blood, stool, or body fluids on them  Use a household cleaning spray or wipe, according to the label instructions  Labels contain instructions for safe and effective use of the cleaning product including precautions you should take when applying the product, such as wearing gloves and making sure you have good ventilation during use of the product  Monitor your symptoms  Seek prompt medical attention if your illness is worsening (e g , difficulty breathing)  Before seeking care, call your healthcare provider and tell them that you have, or are being evaluated for, COVID-19  Put on a facemask before you enter the facility  These steps will help the healthcare provider's office to keep other people in the office or waiting room from getting infectedor exposed  Ask your healthcare provider to call the local or [facility] health department  Persons who are placed under active monitoring or facilitated self-monitoring should follow instructions provided by their local health department or occupational health professionals, as appropriate  If you have a medical emergency and need to call 911, notify the dispatch personnel that you have, or are being evaluated for COVID-19  If possible, put on a facemask before emergency medical services arrive  Discontinuing home isolation  Patients with confirmed COVID-19 should remain under home isolation precautions until the risk of secondary transmission to others is thought to be low   The decision to discontinue home isolation precautions should be made on a case-by-case basis, in consultation with healthcare providers and [facility] and local health departments  For more information: www cdc gov/LLIVJ23SM 630347-E 02/24/2020       Stay home when you are sick,except to get medical care  Wash your hands often with soap and water for at least 20 seconds  Cover your cough or sneeze with a tissue, then throw the tissue in the trash  Clean and disinfect frequently touched objects and surfaces  Avoid touching your eyes, nose, and mouth  STOP THE SPREAD OF GERMS  For more information: www cdc gov/COVID19 Avoid close contact with people who are sick  Help prevent the spread of respiratory diseases like COVID-19

## 2021-04-28 NOTE — CASE MANAGEMENT
CM met with Pt who verbalized readiness for discharge  CM reviewed Pt's appointments & encouraged her to contact her ICM when she gets home & she agreed  Pt had no questions about her discharge plans

## 2021-06-08 ENCOUNTER — HOSPITAL ENCOUNTER (EMERGENCY)
Facility: HOSPITAL | Age: 41
End: 2021-06-08
Attending: EMERGENCY MEDICINE | Admitting: EMERGENCY MEDICINE
Payer: COMMERCIAL

## 2021-06-08 VITALS
OXYGEN SATURATION: 100 % | HEART RATE: 87 BPM | TEMPERATURE: 98.7 F | BODY MASS INDEX: 25.61 KG/M2 | RESPIRATION RATE: 18 BRPM | DIASTOLIC BLOOD PRESSURE: 67 MMHG | SYSTOLIC BLOOD PRESSURE: 93 MMHG | WEIGHT: 140 LBS

## 2021-06-08 DIAGNOSIS — F32.A DEPRESSION, UNSPECIFIED DEPRESSION TYPE: Primary | ICD-10-CM

## 2021-06-08 DIAGNOSIS — F14.10 COCAINE ABUSE (HCC): ICD-10-CM

## 2021-06-08 DIAGNOSIS — Z00.8 ENCOUNTER FOR PSYCHOLOGICAL EVALUATION: ICD-10-CM

## 2021-06-08 LAB
ALBUMIN SERPL BCP-MCNC: 2.7 G/DL (ref 3.5–5)
ALP SERPL-CCNC: 62 U/L (ref 46–116)
ALT SERPL W P-5'-P-CCNC: 14 U/L (ref 12–78)
AMPHETAMINES SERPL QL SCN: NEGATIVE
ANION GAP SERPL CALCULATED.3IONS-SCNC: 4 MMOL/L (ref 4–13)
AST SERPL W P-5'-P-CCNC: 21 U/L (ref 5–45)
BARBITURATES UR QL: NEGATIVE
BASOPHILS # BLD AUTO: 0.04 THOUSANDS/ΜL (ref 0–0.1)
BASOPHILS NFR BLD AUTO: 1 % (ref 0–1)
BENZODIAZ UR QL: NEGATIVE
BILIRUB SERPL-MCNC: 0.2 MG/DL (ref 0.2–1)
BUN SERPL-MCNC: 10 MG/DL (ref 5–25)
CALCIUM ALBUM COR SERPL-MCNC: 9.4 MG/DL (ref 8.3–10.1)
CALCIUM SERPL-MCNC: 8.4 MG/DL (ref 8.3–10.1)
CHLORIDE SERPL-SCNC: 108 MMOL/L (ref 100–108)
CO2 SERPL-SCNC: 31 MMOL/L (ref 21–32)
COCAINE UR QL: POSITIVE
CREAT SERPL-MCNC: 0.69 MG/DL (ref 0.6–1.3)
EOSINOPHIL # BLD AUTO: 0.01 THOUSAND/ΜL (ref 0–0.61)
EOSINOPHIL NFR BLD AUTO: 0 % (ref 0–6)
ERYTHROCYTE [DISTWIDTH] IN BLOOD BY AUTOMATED COUNT: 17.2 % (ref 11.6–15.1)
ETHANOL EXG-MCNC: 0 MG/DL
EXT PREG TEST URINE: NEGATIVE
EXT. CONTROL ED NAV: NORMAL
GFR SERPL CREATININE-BSD FRML MDRD: 128 ML/MIN/1.73SQ M
GLUCOSE SERPL-MCNC: 99 MG/DL (ref 65–140)
HCT VFR BLD AUTO: 38.4 % (ref 34.8–46.1)
HGB BLD-MCNC: 11.1 G/DL (ref 11.5–15.4)
IMM GRANULOCYTES # BLD AUTO: 0.01 THOUSAND/UL (ref 0–0.2)
IMM GRANULOCYTES NFR BLD AUTO: 0 % (ref 0–2)
LYMPHOCYTES # BLD AUTO: 1.63 THOUSANDS/ΜL (ref 0.6–4.47)
LYMPHOCYTES NFR BLD AUTO: 35 % (ref 14–44)
MCH RBC QN AUTO: 21.7 PG (ref 26.8–34.3)
MCHC RBC AUTO-ENTMCNC: 28.9 G/DL (ref 31.4–37.4)
MCV RBC AUTO: 75 FL (ref 82–98)
METHADONE UR QL: NEGATIVE
MONOCYTES # BLD AUTO: 0.66 THOUSAND/ΜL (ref 0.17–1.22)
MONOCYTES NFR BLD AUTO: 14 % (ref 4–12)
NEUTROPHILS # BLD AUTO: 2.37 THOUSANDS/ΜL (ref 1.85–7.62)
NEUTS SEG NFR BLD AUTO: 50 % (ref 43–75)
NRBC BLD AUTO-RTO: 0 /100 WBCS
OPIATES UR QL SCN: NEGATIVE
OXYCODONE+OXYMORPHONE UR QL SCN: NEGATIVE
PCP UR QL: NEGATIVE
PLATELET # BLD AUTO: 290 THOUSANDS/UL (ref 149–390)
PMV BLD AUTO: 10.7 FL (ref 8.9–12.7)
POTASSIUM SERPL-SCNC: 3.9 MMOL/L (ref 3.5–5.3)
PROT SERPL-MCNC: 6 G/DL (ref 6.4–8.2)
RBC # BLD AUTO: 5.12 MILLION/UL (ref 3.81–5.12)
SARS-COV-2 RNA RESP QL NAA+PROBE: NEGATIVE
SODIUM SERPL-SCNC: 143 MMOL/L (ref 136–145)
THC UR QL: NEGATIVE
WBC # BLD AUTO: 4.72 THOUSAND/UL (ref 4.31–10.16)

## 2021-06-08 PROCEDURE — 36415 COLL VENOUS BLD VENIPUNCTURE: CPT | Performed by: PHYSICIAN ASSISTANT

## 2021-06-08 PROCEDURE — 81025 URINE PREGNANCY TEST: CPT | Performed by: PHYSICIAN ASSISTANT

## 2021-06-08 PROCEDURE — 99285 EMERGENCY DEPT VISIT HI MDM: CPT

## 2021-06-08 PROCEDURE — 82075 ASSAY OF BREATH ETHANOL: CPT | Performed by: PHYSICIAN ASSISTANT

## 2021-06-08 PROCEDURE — 80307 DRUG TEST PRSMV CHEM ANLYZR: CPT | Performed by: PHYSICIAN ASSISTANT

## 2021-06-08 PROCEDURE — 85025 COMPLETE CBC W/AUTO DIFF WBC: CPT | Performed by: PHYSICIAN ASSISTANT

## 2021-06-08 PROCEDURE — 96372 THER/PROPH/DIAG INJ SC/IM: CPT

## 2021-06-08 PROCEDURE — 80053 COMPREHEN METABOLIC PANEL: CPT | Performed by: PHYSICIAN ASSISTANT

## 2021-06-08 PROCEDURE — 99285 EMERGENCY DEPT VISIT HI MDM: CPT | Performed by: PHYSICIAN ASSISTANT

## 2021-06-08 PROCEDURE — U0005 INFEC AGEN DETEC AMPLI PROBE: HCPCS | Performed by: PHYSICIAN ASSISTANT

## 2021-06-08 PROCEDURE — U0003 INFECTIOUS AGENT DETECTION BY NUCLEIC ACID (DNA OR RNA); SEVERE ACUTE RESPIRATORY SYNDROME CORONAVIRUS 2 (SARS-COV-2) (CORONAVIRUS DISEASE [COVID-19]), AMPLIFIED PROBE TECHNIQUE, MAKING USE OF HIGH THROUGHPUT TECHNOLOGIES AS DESCRIBED BY CMS-2020-01-R: HCPCS | Performed by: PHYSICIAN ASSISTANT

## 2021-06-08 RX ORDER — LORAZEPAM 2 MG/ML
2 INJECTION INTRAMUSCULAR ONCE
Status: COMPLETED | OUTPATIENT
Start: 2021-06-08 | End: 2021-06-08

## 2021-06-08 RX ORDER — LORAZEPAM 2 MG/ML
INJECTION INTRAMUSCULAR
Status: COMPLETED
Start: 2021-06-08 | End: 2021-06-08

## 2021-06-08 RX ORDER — HALOPERIDOL 5 MG/ML
5 INJECTION INTRAMUSCULAR ONCE
Status: COMPLETED | OUTPATIENT
Start: 2021-06-08 | End: 2021-06-08

## 2021-06-08 RX ADMIN — HALOPERIDOL LACTATE 5 MG: 5 INJECTION, SOLUTION INTRAMUSCULAR at 01:20

## 2021-06-08 RX ADMIN — LORAZEPAM 2 MG: 2 INJECTION INTRAMUSCULAR; INTRAVENOUS at 00:56

## 2021-06-08 RX ADMIN — LORAZEPAM 2 MG: 2 INJECTION INTRAMUSCULAR at 00:56

## 2021-06-08 NOTE — ED PROVIDER NOTES
History  Chief Complaint   Patient presents with    Psychiatric Evaluation     patient states, "i keep relapsing and every time i relapse i get really depressed"  states she is SI  denies HI/AH/VH  Kimmyzeenat Pickard is a 46 yo F, history of bipolar, substance abuse presenting for psychiatric evaluation after she reports "I keep using crack and it makes me really depressed"  She reports getting into a verbal altercation with her boyfriend as well this evening, and states she has had increasing suicidal ideations today  She denies suicide attempt or specific plan today  She reports she is supposed to be on maintenance medications for bipolar but she has not been taking them  Reports last use of cocaine several hours prior to arrival  She denies alcohol use or use of other illicit substances  She denies homicidal ideations  Denies auditory or visual hallucinations  History provided by:  Patient  History limited by:  Psychiatric disorder   used: No    Psychiatric Evaluation  Presenting symptoms: agitation, bizarre behavior, depression and suicidal thoughts    Presenting symptoms: no hallucinations and no self-mutilation    Timing:  Constant  Progression:  Worsening  Chronicity:  New  Context: drug abuse and noncompliance    Context: not alcohol use    Treatment compliance:  Untreated  Relieved by:  None tried  Worsened by:  Drugs  Associated symptoms: anxiety    Associated symptoms: no abdominal pain, no chest pain and no headaches    Risk factors: hx of mental illness and recent psychiatric admission        Prior to Admission Medications   Prescriptions Last Dose Informant Patient Reported? Taking?    FLUoxetine (PROzac) 20 mg capsule More than a month at Unknown time  No No   Sig: Take 1 capsule (20 mg total) by mouth daily At 9am   Patient not taking: Reported on 6/8/2021   OLANZapine (ZyPREXA) 15 mg tablet More than a month at Unknown time  No No   Sig: Take 1 tablet (15 mg total) by mouth daily at bedtime   OLANZapine (ZyPREXA) 5 mg tablet More than a month at Unknown time  No No   Sig: Take 1 tablet (5 mg total) by mouth daily At 9am   benztropine (COGENTIN) 1 mg tablet More than a month at Unknown time  No No   Sig: Take 1 tablet (1 mg total) by mouth 2 (two) times a day At 9am and 6pm   Patient not taking: Reported on 2021   gabapentin (NEURONTIN) 400 mg capsule More than a month at Unknown time  No No   Sig: Take 2 capsules (800 mg total) by mouth 3 (three) times a day At 9am, 4pm, 9pm   Patient not taking: Reported on 2021   lactase (LACTAID) 3,000 units tablet More than a month at Unknown time  No No   Sig: Take 2 tablets (6,000 Units total) by mouth 3 (three) times a day with meals   melatonin 3 mg More than a month at Unknown time  No No   Sig: Take 1 tablet (3 mg total) by mouth daily at bedtime      Facility-Administered Medications: None       Past Medical History:   Diagnosis Date    [facility]     Anemia     Anxiety     Depression     Drug abuse [facility]     Hallucination     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[facility]     Paranoia (psychosis) ([facility]     Psychiatric disorder     Psychiatric illness     PTSD (post-traumatic stress disorder)     Schizo-affective type schizophrenia, chronic state ([facility]     Self-injurious behavior     Suicidal behavior     Suicide attempt ([facility]     Withdrawal symptoms, drug or narcotic ([facility] )        Past Surgical History:   Procedure Laterality Date     SECTION         Family History   Problem Relation Age of Onset    No Known Problems Mother     No Known Problems Father      I have reviewed and agree with the history as documented      E-Cigarette/Vaping    E-Cigarette Use Never User      E-Cigarette/Vaping Substances     Social History     Tobacco Use    Smoking status: Current Every Day Smoker     Packs/day: 0 50     Years: 0 00     Pack years: 0 00     Types: Cigarettes    Smokeless tobacco: Never Used   Substance Use Topics  Alcohol use: Not Currently     Comment: Pt stated "I don't drink"    Drug use: Yes     Types: "Crack" cocaine, Marijuana       Review of Systems   Unable to perform ROS: Psychiatric disorder   Constitutional: Negative for fever  HENT: Negative for sore throat  Respiratory: Negative for shortness of breath  Cardiovascular: Negative for chest pain and palpitations  Gastrointestinal: Negative for abdominal pain and nausea  Musculoskeletal: Negative for back pain and neck pain  Skin: Negative for rash and wound  Neurological: Negative for dizziness, numbness and headaches  Psychiatric/Behavioral: Positive for agitation and suicidal ideas  Negative for hallucinations and self-injury  The patient is nervous/anxious  Physical Exam  Physical Exam  Constitutional:       General: She is not in acute distress  Appearance: She is well-developed  She is not diaphoretic  HENT:      Head: Normocephalic and atraumatic  Right Ear: External ear normal       Left Ear: External ear normal    Eyes:      Conjunctiva/sclera: Conjunctivae normal       Pupils: Pupils are equal, round, and reactive to light  Neck:      Musculoskeletal: Normal range of motion and neck supple  Cardiovascular:      Rate and Rhythm: Normal rate and regular rhythm  Heart sounds: Normal heart sounds  No murmur  No friction rub  No gallop  Pulmonary:      Effort: Pulmonary effort is normal  No respiratory distress  Breath sounds: Normal breath sounds  No wheezing  Abdominal:      General: There is no distension  Palpations: Abdomen is soft  Tenderness: There is no abdominal tenderness  Lymphadenopathy:      Cervical: No cervical adenopathy  Skin:     General: Skin is warm and dry  Capillary Refill: Capillary refill takes less than 2 seconds  Findings: No erythema or rash  Neurological:      Mental Status: She is alert and oriented to person, place, and time        Motor: No abnormal muscle tone  Coordination: Coordination normal    Psychiatric:         Mood and Affect: Mood is anxious  Affect is labile  Speech: Speech is rapid and pressured  Behavior: Behavior is agitated and hyperactive  Thought Content: Thought content includes suicidal ideation  Thought content does not include homicidal ideation  Thought content does not include homicidal or suicidal plan  Vital Signs  ED Triage Vitals   Temperature Pulse Respirations Blood Pressure SpO2   06/08/21 0139 06/08/21 0136 06/08/21 0136 06/08/21 0136 06/08/21 0136   97 8 °F (36 6 °C) 74 20 97/54 97 %      Temp Source Heart Rate Source Patient Position - Orthostatic VS BP Location FiO2 (%)   06/08/21 0139 06/08/21 0136 06/08/21 0136 06/08/21 0136 --   Oral Monitor Lying Right arm       Pain Score       --                  Vitals:    06/08/21 0429 06/08/21 0800 06/08/21 1200 06/08/21 1600   BP: 108/64 (!) 141/113 (!) 146/106 94/62   Pulse: 70 70 96 68   Patient Position - Orthostatic VS: Lying Sitting Sitting Lying         Visual Acuity      ED Medications  Medications   LORazepam (ATIVAN) injection 2 mg (2 mg Intramuscular Given 6/8/21 0056)   haloperidol lactate (HALDOL) injection 5 mg (5 mg Intramuscular Given 6/8/21 0120)       Diagnostic Studies  Results Reviewed     Procedure Component Value Units Date/Time    Rapid drug screen, urine [[id number]]  (Abnormal) Collected: 06/08/21 1449    Lab Status: Final result Specimen: Urine, Clean Catch Updated: 06/08/21 1512     Amph/Meth UR Negative     Barbiturate Ur Negative     Benzodiazepine Urine Negative     Cocaine Urine Positive     Methadone Urine Negative     Opiate Urine Negative     PCP Ur Negative     THC Urine Negative     Oxycodone Urine Negative    Narrative:      Presumptive report  If requested, specimen will be sent to reference lab for confirmation  FOR MEDICAL PURPOSES ONLY     IF CONFIRMATION NEEDED PLEASE CONTACT THE LAB WITHIN 5 DAYS     Drug Screen Cutoff Levels:  AMPHETAMINE/METHAMPHETAMINES  1000 ng/mL  BARBITURATES     200 ng/mL  BENZODIAZEPINES     200 ng/mL  COCAINE      300 ng/mL  METHADONE      300 ng/mL  OPIATES      300 ng/mL  PHENCYCLIDINE     25 ng/mL  THC       50 ng/mL  OXYCODONE      100 ng/mL    Novel Coronavirus (Covid-19),PCR [facility] - 2 Hour Stat [[id number]]  (Normal) Collected: 06/08/21 0133    Lab Status: Final result Specimen: Nares from Nose Updated: 06/08/21 0242     SARS-CoV-2 Negative    Narrative: The specimen collection materials, transport medium, and/or testing methodology utilized in the production of these test results have been proven to be reliable in a limited validation with an abbreviated program under the Emergency Utilization Authorization provided by the FDA  Testing reported as "Presumptive positive" will be confirmed with secondary testing to ensure result accuracy  Clinical caution and judgement should be used with the interpretation of these results with consideration of the clinical impression and other laboratory testing  Testing reported as "Positive" or "Negative" has been proven to be accurate according to standard laboratory validation requirements  All testing is performed with control materials showing appropriate reactivity at standard intervals  POCT alcohol breath test [[id number]]  (Normal) Resulted: 06/08/21 0121    Lab Status: Final result Updated: 06/08/21 0121     EXTBreath Alcohol 0 000                 No orders to display              Procedures  Procedures         ED Course  ED Course as of Jun 08 1857 Tue Jun 08, 2021   0052 Pt is very agitated, upset and crying loudly  Rocking back in forth in bed, shouting "I don't want to be this person any more, I want help"  Will provide Ativan here for agitation, reassess  0118 Patient with continued rocking in bed, shouting, crying uncontrollably  Continues to be agitated here   Will provide haldol IM       0150 Patient sleeping comfortably at this time  Easily aroused but reports she feels very tired and unwilling to talk at this time  1367 Patient signed out to Mark Gonzalez PA-C pending crisis evaluation  SBIRT 22yo+      Most Recent Value   SBIRT (22 yo +)   In order to provide better care to our patients, we are screening all of our patients for alcohol and drug use  Would it be okay to ask you these screening questions? Unable to answer at this time Filed at: 06/08/2021 0103                    MDM  Number of Diagnoses or Management Options  Diagnosis management comments: Agitation, anxiety, depressive thoughts, and suicidal ideations without specific plan after cocaine use earlier today  Extensive psychiatric history of bipolar, substance abuse, prior inpatient psychiatric admissions  Will treat agitation, check UDS, COVID-19 testing for screening, alcohol  Disposition  Final diagnoses:   Encounter for psychological evaluation   Cocaine abuse ([facility] [address] )     Time reflects when diagnosis was documented in both MDM as applicable and the Disposition within this note     Time User Action Codes Description Comment    6/8/2021  4:42 PM Sarita Du Add [Z00 8] Encounter for psychological evaluation     6/8/2021  4:42 PM Dermelissaa Du Add [F14 10] Cocaine abuse [facility])       ED Disposition     ED Disposition Condition Date/Time Comment    Transfer to [address] [place] [address] Jun 8, 2021  5:07 PM         MD Documentation      Most Recent Value   Sending MD Dr Terrie Lemon    None         Patient's Medications   Discharge Prescriptions    No medications on file     No discharge procedures on file      PDMP Review     None          ED Provider  Electronically Signed by           Tsering Waterman PA-C  06/08/21 5501

## 2021-06-08 NOTE — ED NOTES
Call from Radha at [facility], requesting labs (CBC, CMP and Preg) for them to proceed and review for admission  Once completed and resulted, crisis will fax for [facility] to review  Rn Han aware

## 2021-06-08 NOTE — ED NOTES
RN attempted to wake pt up, pt moves and swats RN away   Provider made aware      Kassy Mendez RN  06/08/21 2251

## 2021-06-08 NOTE — ED CARE HANDOFF
Emergency Department Sign Out Note        Sign out and transfer of care from [place], [place]  See Separate Emergency Department note  The patient, Hafsa Velasquez, was evaluated by the previous provider for psychiatric evaluation  Patient reports worsening depression, crack cocaine relapse        Workup Completed:  Results Reviewed     Procedure Component Value Units Date/Time    Comprehensive metabolic panel [[id number]]  (Abnormal) Collected: 06/08/21 1954    Lab Status: Final result Specimen: Blood from Arm, Left Updated: 06/08/21 2021     Sodium 143 mmol/L      Potassium 3 9 mmol/L      Chloride 108 mmol/L      CO2 31 mmol/L      ANION GAP 4 mmol/L      BUN 10 mg/dL      Creatinine 0 69 mg/dL      Glucose 99 mg/dL      Calcium 8 4 mg/dL      Corrected Calcium 9 4 mg/dL      AST 21 U/L      ALT 14 U/L      Alkaline Phosphatase 62 U/L      Total Protein 6 0 g/dL      Albumin 2 7 g/dL      Total Bilirubin 0 20 mg/dL      eGFR 128 ml/min/1 73sq m     Narrative:      [place] guidelines for Chronic Kidney Disease (CKD):     Stage 1 with normal or high GFR (GFR > 90 mL/min/1 73 square meters)    Stage 2 Mild CKD (GFR = 60-89 mL/min/1 73 square meters)    Stage 3A Moderate CKD (GFR = 45-59 mL/min/1 73 square meters)    Stage 3B Moderate CKD (GFR = 30-44 mL/min/1 73 square meters)    Stage 4 Severe CKD (GFR = 15-29 mL/min/1 73 square meters)    Stage 5 End Stage CKD (GFR <15 mL/min/1 73 square meters)  Note: GFR calculation is accurate only with a steady state creatinine    CBC and differential [[id number]]  (Abnormal) Collected: 06/08/21 1954    Lab Status: Final result Specimen: Blood from Arm, Left Updated: 06/08/21 2004     WBC 4 72 Thousand/uL      RBC 5 12 Million/uL      Hemoglobin 11 1 g/dL      Hematocrit 38 4 %      MCV 75 fL      MCH 21 7 pg      MCHC 28 9 g/dL      RDW 17 2 %      MPV 10 7 fL      Platelets 568 Thousands/uL      nRBC 0 /100 WBCs      Neutrophils Relative 50 %      Immat GRANS % 0 %      Lymphocytes Relative 35 %      Monocytes Relative 14 %      Eosinophils Relative 0 %      Basophils Relative 1 %      Neutrophils Absolute 2 37 Thousands/µL      Immature Grans Absolute 0 01 Thousand/uL      Lymphocytes Absolute 1 63 Thousands/µL      Monocytes Absolute 0 66 Thousand/µL      Eosinophils Absolute 0 01 Thousand/µL      Basophils Absolute 0 04 Thousands/µL     POCT pregnancy, urine [[id number]]  (Normal) Resulted: 06/08/21 1945    Lab Status: Final result Updated: 06/08/21 1945     EXT PREG TEST UR (Ref: Negative) negative     Control valid    Rapid drug screen, urine [[id number]]  (Abnormal) Collected: 06/08/21 1449    Lab Status: Final result Specimen: Urine, Clean Catch Updated: 06/08/21 1512     Amph/Meth UR Negative     Barbiturate Ur Negative     Benzodiazepine Urine Negative     Cocaine Urine Positive     Methadone Urine Negative     Opiate Urine Negative     PCP Ur Negative     THC Urine Negative     Oxycodone Urine Negative    Narrative:      Presumptive report  If requested, specimen will be sent to reference lab for confirmation  FOR MEDICAL PURPOSES ONLY  IF CONFIRMATION NEEDED PLEASE CONTACT THE LAB WITHIN 5 DAYS  Drug Screen Cutoff Levels:  AMPHETAMINE/METHAMPHETAMINES  1000 ng/mL  BARBITURATES     200 ng/mL  BENZODIAZEPINES     200 ng/mL  COCAINE      300 ng/mL  METHADONE      300 ng/mL  OPIATES      300 ng/mL  PHENCYCLIDINE     25 ng/mL  THC       50 ng/mL  OXYCODONE      100 ng/mL    Novel Coronavirus (Covid-19),PCR [facility] - 2 Hour Stat [[id number]]  (Normal) Collected: 06/08/21 0133    Lab Status: Final result Specimen: Nares from Nose Updated: 06/08/21 0242     SARS-CoV-2 Negative    Narrative:       The specimen collection materials, transport medium, and/or testing methodology utilized in the production of these test results have been proven to be reliable in a limited validation with an abbreviated program under the Emergency Utilization Authorization provided by the FDA  Testing reported as "Presumptive positive" will be confirmed with secondary testing to ensure result accuracy  Clinical caution and judgement should be used with the interpretation of these results with consideration of the clinical impression and other laboratory testing  Testing reported as "Positive" or "Negative" has been proven to be accurate according to standard laboratory validation requirements  All testing is performed with control materials showing appropriate reactivity at standard intervals  POCT alcohol breath test [[id number]]  (Normal) Resulted: 06/08/21 0121    Lab Status: Final result Updated: 06/08/21 0121     EXTBreath Alcohol 0 000            ED Course / Workup Pending (followup):   Crisis met with patient  Patient denies any SI presently  Patient has a history of multiple Si attempts in the past     Patient signed 12  ED Course as of Jun 08 2235   Tue Jun 08, 2021   1515 COCAINE URINE(!): Positive   1515 Remainder of UDS negative   AMPH/METH: Negative   1531 Sign out from [place], [place]   07JS with hx schizophrenia presents with worsening agitation  Patient became agitated overnight and given ativan and haldol  Medically cleared to see crisis  [address] evaluated patient; patient reports worsening depression; not on medications  Patient willing to sign 201  Bed search to begin        [address] reviewing patient; requesting urine pregnancy, CBC, CMP      1948 PREGNANCY TEST URINE: negative   2020 WBC: 4 72   2020 Hemoglobin(!): 11 1   2020 HCT: 38 4   2020 Platelet Count: 661   2021 Sodium: 143   2021 Creatinine: 0 69   2021 Glucose, Random: 99   2021 CMP noted, WNL      2134 Notified by crisis that patient accepted at [facility]; DINA completed        Procedures  MDM    Disposition  Final diagnoses:   Encounter for psychological evaluation   Cocaine abuse ([facility] [address] )   Depression, unspecified depression type     Time reflects when diagnosis was documented in both MDM as applicable and the Disposition within this note     Time User Action Codes Description Comment    6/8/2021  4:42 PM Trg Jo 4, 18[facility] [Z00 8] Encounter for psychological evaluation     6/8/2021  4:42 PM Edouard Velasco Add [F14 10] Cocaine abuse ([address] )     6/8/2021  9:35 PM [place] [F32 9] Depression, unspecified depression type     6/8/2021  9:35 PM Trg Revolucmt 4, 211 [facility] [Z00 8] Encounter for psychological evaluation     6/8/2021  9:35 PM Elyn Racer [F32 9] Depression, unspecified depression type       ED Disposition     ED Disposition Condition Date/Time Comment    Transfer to [address] [place] [address] Jun 8, 2021  9:35 PM Patient accepted at [facility] ASHLEY AUGUSTIN Documentation      Most Recent Value   Patient Condition  The patient has been stabilized such that within reasonable medical probability, no material deterioration of the patient condition or the condition of the unborn child(nasima) is likely to result from the transfer   Reason for Transfer  Level of Care needed not available at this facility, No bed available at level of patient's needs   Benefits of Transfer  Continuity of care   Risks of Transfer  Other: (Include comment)__________________________ Reather Economy   Accepting Physician  Dr Jerri Diaz Name, [address] [address]    (Name & Tel number)  Mary Anne Torres [phone]   Transported by (Company and Unit #)  [facility]   Sending MD Dr Jocelyne Rojas   Provider Certification  The patient is stable for psychiatric transfer because they are medically stable, and is protected from harming him/herself or others during transport, General risk, such as traffic hazards, adverse weather conditions, rough terrain or turbulence, possible failure of equipment (including vehicle or aircraft), or consequences of actions of persons outside the control of the transport personnel      RN Documentation      Most [address] Name, [address] [address]    (Name & Tel number)  Angel Cox [phone]   Medications Reviewed with Next Provider of Service  Yes   Transport Mode  Ambulance   Transported by [employer] and Unit #)  [facility]   Level of Care  Basic life support   Copies of Medical Records Sent  Progress note   Patient Belongings Disposition  Sent with patient      Follow-up Information    None       Discharge Medication List as of 6/8/2021 10:30 PM      CONTINUE these medications which have NOT CHANGED    Details   benztropine (COGENTIN) 1 mg tablet Take 1 tablet (1 mg total) by mouth 2 (two) times a day At 9am and 6pm, Starting Tue 4/27/2021, Print      FLUoxetine (PROzac) 20 mg capsule Take 1 capsule (20 mg total) by mouth daily At 9am, Starting Wed 4/28/2021, Print      gabapentin (NEURONTIN) 400 mg capsule Take 2 capsules (800 mg total) by mouth 3 (three) times a day At 9am, 4pm, 9pm, Starting Tue 4/27/2021, Print      lactase (LACTAID) 3,000 units tablet Take 2 tablets (6,000 Units total) by mouth 3 (three) times a day with meals, Starting Tue 4/27/2021, Print      melatonin 3 mg Take 1 tablet (3 mg total) by mouth daily at bedtime, Starting Tue 4/27/2021, Print      !! OLANZapine (ZyPREXA) 15 mg tablet Take 1 tablet (15 mg total) by mouth daily at bedtime, Starting Tue 4/27/2021, Print      !! OLANZapine (ZyPREXA) 5 mg tablet Take 1 tablet (5 mg total) by mouth daily At 9am, Starting Wed 4/28/2021, Print       !! - Potential duplicate medications found  Please discuss with provider  No discharge procedures on file         ED Provider  Electronically Signed by     Luma English PA-C  06/08/21 2299

## 2021-06-08 NOTE — ED CARE HANDOFF
Emergency Department Sign Out Note        Sign out and transfer of care from [facility]  See Separate Emergency Department note  The patient, Damion Jin, was evaluated by the previous provider for psychiatric evaluation  Workup Completed:  COVID negative    ED Course / Workup Pending (followup): Awaiting UDS, Crisis evaluation                                  ED Course as of Jun 11 1606   Tue Jun 08, 2021   4863 Patient still sleeping from meds, Crisis awaiting her to be more alert before evaluating      1440 Patient giving urine at this time  12 I messaged the crisis worker to come and see the patient          Procedures  MDM    Disposition  Final diagnoses:   Encounter for psychological evaluation   Cocaine abuse ([email or web address] )   Depression, unspecified depression type     Time reflects when diagnosis was documented in both MDM as applicable and the Disposition within this note     Time User Action Codes Description Comment    6/8/2021  4:42 PM [place] Vieyra Add [Z00 8] Encounter for psychological evaluation     6/8/2021  4:42 PM [place] Vieyra Add [F14 10] Cocaine abuse ([email or web address] )     6/8/2021  9:35 PM Jamaica Champagne [F32 9] Depression, unspecified depression type     6/8/2021  9:35 PM Jil Osorio 4, [address] [facility] [Z00 8] Encounter for psychological evaluation     6/8/2021  9:35 PM Ranjana Anderson [F32 9] Depression, unspecified depression type       ED Disposition     ED Disposition Condition Date/Time Comment    Transfer to [address] [place] [address] Jun 8, 2021  9:35 PM Patient accepted at [facility] MD Documentation      Most Recent Value   Patient Condition  The patient has been stabilized such that within reasonable medical probability, no material deterioration of the patient condition or the condition of the unborn child(nasima) is likely to result from the transfer   Reason for Transfer  Level of Care needed not available at this facility, No bed available at level of patient's needs   Benefits of Transfer  Continuity of care   Risks of Transfer  Other: (Include comment)__________________________ Salena Corral   Accepting Physician  Dr Megan Lehman Name, [address] [address]    (Name & Tel number)  Calvin Sacks [phone]   Transported by (Company and Unit #)  [facility]   Sending MD Dr Priscilla Hernandez   Provider Certification  The patient is stable for psychiatric transfer because they are medically stable, and is protected from harming him/herself or others during transport, General risk, such as traffic hazards, adverse weather conditions, rough terrain or turbulence, possible failure of equipment (including vehicle or aircraft), or consequences of actions of persons outside the control of the transport personnel      RN Documentation      [address] Name, [address] [place] [address]    (Name & Tel number)  Calvin Sacks [phone]   Medications Reviewed with Next Provider of Service  Yes   Transport Mode  Ambulance   Transported by [employer] and Unit #)  [facility]   Level of Care  Basic life support   Copies of Medical Records Sent  Progress note   Patient Belongings Disposition  Sent with patient      Follow-up Information    None       Discharge Medication List as of 6/8/2021 10:30 PM      CONTINUE these medications which have NOT CHANGED    Details   benztropine (COGENTIN) 1 mg tablet Take 1 tablet (1 mg total) by mouth 2 (two) times a day At 9am and 6pm, Starting Tue 4/27/2021, Print      FLUoxetine (PROzac) 20 mg capsule Take 1 capsule (20 mg total) by mouth daily At 9am, Starting Wed 4/28/2021, Print      gabapentin (NEURONTIN) 400 mg capsule Take 2 capsules (800 mg total) by mouth 3 (three) times a day At 9am, 4pm, 9pm, Starting Tue 4/27/2021, Print      lactase (LACTAID) 3,000 units tablet Take 2 tablets (6,000 Units total) by mouth 3 (three) times a day with meals, Starting Tue 4/27/2021, Print      melatonin 3 mg Take 1 tablet (3 mg total) by mouth daily at bedtime, Starting Tue 4/27/2021, Print      !! OLANZapine (ZyPREXA) 15 mg tablet Take 1 tablet (15 mg total) by mouth daily at bedtime, Starting Tue 4/27/2021, Print      !! OLANZapine (ZyPREXA) 5 mg tablet Take 1 tablet (5 mg total) by mouth daily At 9am, Starting Wed 4/28/2021, Print       !! - Potential duplicate medications found  Please discuss with provider  No discharge procedures on file         ED Provider  Electronically Signed by     Anirudh Caldwell PA-C  06/11/21 9733
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
## 2021-06-08 NOTE — ED NOTES
Insurance Authorization for admission:   Phone call placed to [facility] number: [phone]    Spoke to [employer]     4 days approved  Level of care: MELANIE BH; 201  Authorization # to be obtained on her arrival to accepting facility       EVS (Eligibility Verification System) called - [phone]  Automated system indicates:  Active with [place] Medicaid and managed by [facility] for Transportation:    [employer], not necessary with [facility]

## 2021-06-08 NOTE — ED NOTES
Attempted to assess patient  She is unable to participate in assessment and keeps dozing off  She slurs words and cannot stay awake at this time

## 2021-06-08 NOTE — ED NOTES
Assumed pt care at this time  Pt sleeping  1:1 continual observation paper charting being completed at this time        Michael aMta RN  06/08/21 6591

## 2021-06-09 NOTE — ED NOTES
Patient is accepted at [facility]  Patient is accepted by Dr Mary Grace Apodaca per [address] is arranged with TBD ([facility] to coordinate with [facility])   Transportation is scheduled for TBD  Patient may go to the floor after 2330      No Rn report necessary prior to patient transfer

## 2021-06-09 NOTE — EMTALA/ACUTE CARE TRANSFER
[facility]  [address] [facility] [address]  Dept: [phone]      EMTALA TRANSFER CONSENT    NAME iSena Jain                                         1980                              MRN [id number]    I have been informed of my rights regarding examination, treatment, and transfer   by Dr Lawrence Lee DO    Benefits: Continuity of care    Risks: Other: (Include comment)__________________________(Behavioral Health Patient)      Consent for Transfer:  I acknowledge that my medical condition has been evaluated and explained to me by the emergency department physician or other qualified medical person and/or my attending physician, who has recommended that I be transferred to the service of  Accepting Physician: Dr Luis Yebaoh at [address] [place] [address] Name, [address] : Kayli Concepcion [address] [place] [address]  The above potential benefits of such transfer, the potential risks associated with such transfer, and the probable risks of not being transferred have been explained to me, and I fully understand them  The doctor has explained that, in my case, the benefits of transfer outweigh the risks  I agree to be transferred  I authorize the performance of emergency medical procedures and treatments upon me in both transit and upon arrival at the receiving facility  Additionally, I authorize the release of any and all medical records to the receiving facility and request they be transported with me, if possible  I understand that the safest mode of transportation during a medical emergency is an ambulance and that the Hospital advocates the use of this mode of transport  Risks of traveling to the receiving facility by car, including absence of medical control, life sustaining equipment, such as oxygen, and medical personnel has been explained to me and I fully understand them      (BERNABE CORRECT BOX BELOW)  [  ]  I consent to the stated transfer and to be transported by ambulance/helicopter  [  ]  I consent to the stated transfer, but refuse transportation by ambulance and accept full responsibility for my transportation by car  I understand the risks of non-ambulance transfers and I exonerate the Hospital and its staff from any deterioration in my condition that results from this refusal     X___________________________________________    DATE  21  TIME________  Signature of patient or legally responsible individual signing on patient behalf           RELATIONSHIP TO PATIENT_________________________          Provider Certification    NAME Siena Jain                                         1980                              MRN [id number]    A medical screening exam was performed on the above named patient  Based on the examination:    Condition Necessitating Transfer The primary encounter diagnosis was Depression, unspecified depression type  Diagnoses of Encounter for psychological evaluation and Cocaine abuse ([facility] [address] ) were also pertinent to this visit      Patient Condition: The patient has been stabilized such that within reasonable medical probability, no material deterioration of the patient condition or the condition of the unborn child(nasima) is likely to result from the transfer    Reason for Transfer: Level of Care needed not available at this facility, No bed available at level of patient's needs    Transfer Requirements: [address] [place] [address]   · Space available and qualified personnel available for treatment as acknowledged by [facility] [phone]  · Agreed to accept transfer and to provide appropriate medical treatment as acknowledged by       Dr Luis Yeboah  · Appropriate medical records of the examination and treatment of the patient are provided at the time of transfer   [address], [address] _______  · Transfer will be performed by qualified personnel from [facility]  and appropriate transfer equipment as required, including the use of necessary and appropriate life support measures  Provider Certification: I have examined the patient and explained the following risks and benefits of being transferred/refusing transfer to the patient/family:  The patient is stable for psychiatric transfer because they are medically stable, and is protected from harming him/herself or others during transport, General risk, such as traffic hazards, adverse weather conditions, rough terrain or turbulence, possible failure of equipment (including vehicle or aircraft), or consequences of actions of persons outside the control of the transport personnel      Based on these reasonable risks and benefits to the patient and/or the unborn child(nasima), and based upon the information available at the time of the patients examination, I certify that the medical benefits reasonably to be expected from the provision of appropriate medical treatments at another medical facility outweigh the increasing risks, if any, to the individuals medical condition, and in the case of labor to the unborn child, from effecting the transfer      X____________________________________________ DATE 06/08/21        TIME_______      ORIGINAL - SEND TO MEDICAL RECORDS   COPY - SEND WITH PATIENT DURING TRANSFER

## 2021-06-09 NOTE — EMTALA/ACUTE CARE TRANSFER
[facility] [address]  [address] [id number]  Dept: [phone]      EMTALA TRANSFER CONSENT    NAME Merna Singh                                         1980                              MRN [id number]    I have been informed of my rights regarding examination, treatment, and transfer   by Dr Denis Thomas DO    Benefits: Continuity of care    Risks: Other: (Include comment)__________________________(Behavioral Health Patient)      Transfer Request   I acknowledge that my medical condition has been evaluated and explained to me by the emergency department physician or other qualified medical person and/or my attending physician who has recommended and offered to me further medical examination and treatment  I understand the Hospital's obligation with respect to the treatment and stabilization of my emergency medical condition  I nevertheless request to be transferred  I release the Hospital, the doctor, and any other persons caring for me from all responsibility or liability for any injury or ill effects that may result from my transfer and agree to accept all responsibility for the consequences of my choice to transfer, rather than receive stabilizing treatment at the Hospital  I understand that because the transfer is my request, my insurance may not provide reimbursement for the services  The Hospital will assist and direct me and my family in how to make arrangements for transfer, but the hospital is not liable for any fees charged by the transport service  In spite of this understanding, I refuse to consent to further medical examination and treatment which has been offered to me, and request transfer to [address] Name, [address] : Demetrio Bragg [address] , [employer]  I authorize the performance of emergency medical procedures and treatments upon me in both transit and upon arrival at the receiving facility    Additionally, I authorize the release of any and all medical records to the receiving facility and request they be transported with me, if possible  I authorize the performance of emergency medical procedures and treatments upon me in both transit and upon arrival at the receiving facility  Additionally, I authorize the release of any and all medical records to the receiving facility and request they be transported with me, if possible  I understand that the safest mode of transportation during a medical emergency is an ambulance and that the Hospital advocates the use of this mode of transport  Risks of traveling to the receiving facility by car, including absence of medical control, life sustaining equipment, such as oxygen, and medical personnel has been explained to me and I fully understand them  (BERNABE CORRECT BOX BELOW)  [  ]  I consent to the stated transfer and to be transported by ambulance/helicopter  [  ]  I consent to the stated transfer, but refuse transportation by ambulance and accept full responsibility for my transportation by car  I understand the risks of non-ambulance transfers and I exonerate the Hospital and its staff from any deterioration in my condition that results from this refusal     X___________________________________________    DATE  21  TIME________  Signature of patient or legally responsible individual signing on patient behalf           RELATIONSHIP TO PATIENT_________________________          Provider Certification    NAME Jace GODOY 1980                              MRN [id number]    A medical screening exam was performed on the above named patient  Based on the examination:    Condition Necessitating Transfer The primary encounter diagnosis was Depression, unspecified depression type  Diagnoses of Encounter for psychological evaluation and Cocaine abuse ([facility] [address] ) were also pertinent to this visit  Patient Condition:  The patient has been stabilized such that within reasonable medical probability, no material deterioration of the patient condition or the condition of the unborn child(nasima) is likely to result from the transfer    Reason for Transfer: Level of Care needed not available at this facility, No bed available at level of patient's needs    Transfer Requirements: [address] [facility] [address] [place], [place]   · Space available and qualified personnel available for treatment as acknowledged by Christine Marcus [phone]  · Agreed to accept transfer and to provide appropriate medical treatment as acknowledged by       Dr Melvin Yen  · Appropriate medical records of the examination and treatment of the patient are provided at the time of transfer   [address] [facility], [address] _______  · Transfer will be performed by qualified personnel from [facility]  and appropriate transfer equipment as required, including the use of necessary and appropriate life support measures      Provider Certification: I have examined the patient and explained the following risks and benefits of being transferred/refusing transfer to the patient/family:  The patient is stable for psychiatric transfer because they are medically stable, and is protected from harming him/herself or others during transport, General risk, such as traffic hazards, adverse weather conditions, rough terrain or turbulence, possible failure of equipment (including vehicle or aircraft), or consequences of actions of persons outside the control of the transport personnel      Based on these reasonable risks and benefits to the patient and/or the unborn child(nasima), and based upon the information available at the time of the patients examination, I certify that the medical benefits reasonably to be expected from the provision of appropriate medical treatments at another medical facility outweigh the increasing risks, if any, to the individuals medical condition, and in the case of labor to the unborn child, from effecting the transfer      X____________________________________________ DATE 06/08/21        TIME_______      ORIGINAL - SEND TO MEDICAL RECORDS   COPY - SEND WITH PATIENT DURING TRANSFER

## 2021-08-03 ENCOUNTER — HOSPITAL ENCOUNTER (EMERGENCY)
Facility: HOSPITAL | Age: 41
Discharge: OTHER INSTITUTION | End: 2021-08-04
Attending: EMERGENCY MEDICINE | Admitting: EMERGENCY MEDICINE
Payer: COMMERCIAL

## 2021-08-03 DIAGNOSIS — R45.851 DEPRESSION WITH SUICIDAL IDEATION: Primary | ICD-10-CM

## 2021-08-03 DIAGNOSIS — F32.A DEPRESSION WITH SUICIDAL IDEATION: Primary | ICD-10-CM

## 2021-08-03 LAB
ALBUMIN SERPL BCP-MCNC: 3.9 G/DL (ref 3.5–5)
ALP SERPL-CCNC: 77 U/L (ref 46–116)
ALT SERPL W P-5'-P-CCNC: 14 U/L (ref 12–78)
AMPHETAMINES SERPL QL SCN: NEGATIVE
ANION GAP SERPL CALCULATED.3IONS-SCNC: 8 MMOL/L (ref 4–13)
AST SERPL W P-5'-P-CCNC: 13 U/L (ref 5–45)
BARBITURATES UR QL: NEGATIVE
BASOPHILS # BLD AUTO: 0.06 THOUSANDS/ΜL (ref 0–0.1)
BASOPHILS NFR BLD AUTO: 1 % (ref 0–1)
BENZODIAZ UR QL: NEGATIVE
BILIRUB SERPL-MCNC: 0.4 MG/DL (ref 0.2–1)
BUN SERPL-MCNC: 9 MG/DL (ref 5–25)
CALCIUM SERPL-MCNC: 8.6 MG/DL (ref 8.3–10.1)
CHLORIDE SERPL-SCNC: 105 MMOL/L (ref 100–108)
CO2 SERPL-SCNC: 26 MMOL/L (ref 21–32)
COCAINE UR QL: NEGATIVE
CREAT SERPL-MCNC: 0.68 MG/DL (ref 0.6–1.3)
EOSINOPHIL # BLD AUTO: 0.06 THOUSAND/ΜL (ref 0–0.61)
EOSINOPHIL NFR BLD AUTO: 1 % (ref 0–6)
ERYTHROCYTE [DISTWIDTH] IN BLOOD BY AUTOMATED COUNT: 19.7 % (ref 11.6–15.1)
ETHANOL EXG-MCNC: 0 MG/DL
EXT PREG TEST URINE: NEGATIVE
EXT. CONTROL ED NAV: NORMAL
GFR SERPL CREATININE-BSD FRML MDRD: 128 ML/MIN/1.73SQ M
GLUCOSE SERPL-MCNC: 112 MG/DL (ref 65–140)
HCT VFR BLD AUTO: 40.5 % (ref 34.8–46.1)
HGB BLD-MCNC: 12.2 G/DL (ref 11.5–15.4)
IMM GRANULOCYTES # BLD AUTO: 0.02 THOUSAND/UL (ref 0–0.2)
IMM GRANULOCYTES NFR BLD AUTO: 0 % (ref 0–2)
LYMPHOCYTES # BLD AUTO: 2.44 THOUSANDS/ΜL (ref 0.6–4.47)
LYMPHOCYTES NFR BLD AUTO: 32 % (ref 14–44)
MCH RBC QN AUTO: 22.4 PG (ref 26.8–34.3)
MCHC RBC AUTO-ENTMCNC: 30.1 G/DL (ref 31.4–37.4)
MCV RBC AUTO: 74 FL (ref 82–98)
METHADONE UR QL: NEGATIVE
MONOCYTES # BLD AUTO: 0.85 THOUSAND/ΜL (ref 0.17–1.22)
MONOCYTES NFR BLD AUTO: 11 % (ref 4–12)
NEUTROPHILS # BLD AUTO: 4.2 THOUSANDS/ΜL (ref 1.85–7.62)
NEUTS SEG NFR BLD AUTO: 55 % (ref 43–75)
NRBC BLD AUTO-RTO: 0 /100 WBCS
OPIATES UR QL SCN: NEGATIVE
OXYCODONE+OXYMORPHONE UR QL SCN: NEGATIVE
PCP UR QL: NEGATIVE
PLATELET # BLD AUTO: 221 THOUSANDS/UL (ref 149–390)
POTASSIUM SERPL-SCNC: 3.9 MMOL/L (ref 3.5–5.3)
PROT SERPL-MCNC: 7.6 G/DL (ref 6.4–8.2)
RBC # BLD AUTO: 5.45 MILLION/UL (ref 3.81–5.12)
SARS-COV-2 RNA RESP QL NAA+PROBE: NEGATIVE
SODIUM SERPL-SCNC: 139 MMOL/L (ref 136–145)
THC UR QL: NEGATIVE
WBC # BLD AUTO: 7.63 THOUSAND/UL (ref 4.31–10.16)

## 2021-08-03 PROCEDURE — U0003 INFECTIOUS AGENT DETECTION BY NUCLEIC ACID (DNA OR RNA); SEVERE ACUTE RESPIRATORY SYNDROME CORONAVIRUS 2 (SARS-COV-2) (CORONAVIRUS DISEASE [COVID-19]), AMPLIFIED PROBE TECHNIQUE, MAKING USE OF HIGH THROUGHPUT TECHNOLOGIES AS DESCRIBED BY CMS-2020-01-R: HCPCS | Performed by: PHYSICIAN ASSISTANT

## 2021-08-03 PROCEDURE — 99285 EMERGENCY DEPT VISIT HI MDM: CPT

## 2021-08-03 PROCEDURE — U0005 INFEC AGEN DETEC AMPLI PROBE: HCPCS | Performed by: PHYSICIAN ASSISTANT

## 2021-08-03 PROCEDURE — 99285 EMERGENCY DEPT VISIT HI MDM: CPT | Performed by: PHYSICIAN ASSISTANT

## 2021-08-03 PROCEDURE — 82075 ASSAY OF BREATH ETHANOL: CPT | Performed by: PHYSICIAN ASSISTANT

## 2021-08-03 PROCEDURE — 80307 DRUG TEST PRSMV CHEM ANLYZR: CPT | Performed by: PHYSICIAN ASSISTANT

## 2021-08-03 PROCEDURE — 81025 URINE PREGNANCY TEST: CPT | Performed by: PHYSICIAN ASSISTANT

## 2021-08-03 PROCEDURE — 80053 COMPREHEN METABOLIC PANEL: CPT | Performed by: PHYSICIAN ASSISTANT

## 2021-08-03 PROCEDURE — 85025 COMPLETE CBC W/AUTO DIFF WBC: CPT | Performed by: PHYSICIAN ASSISTANT

## 2021-08-03 PROCEDURE — 36415 COLL VENOUS BLD VENIPUNCTURE: CPT | Performed by: PHYSICIAN ASSISTANT

## 2021-08-03 RX ORDER — LORAZEPAM 1 MG/1
1 TABLET ORAL ONCE
Status: COMPLETED | OUTPATIENT
Start: 2021-08-03 | End: 2021-08-03

## 2021-08-03 RX ADMIN — LORAZEPAM 1 MG: 1 TABLET ORAL at 16:10

## 2021-08-03 RX ADMIN — LORAZEPAM 1 MG: 1 TABLET ORAL at 22:07

## 2021-08-03 RX ADMIN — NICOTINE 7 MG/24 HR DAILY TRANSDERMAL PATCH 7 MG: at 16:09

## 2021-08-03 NOTE — ED PROVIDER NOTES
History  Chief Complaint   Patient presents with    Psychiatric Evaluation     pt states hse missed dr rueda and didnt get refills to medications  Pt having suicidal thoughts and is having auditory hallucinations     Patient is a 48 y/o F with h/o Schizoaffective d/o, substance abuse that presents to the ED with worsening depression and SI with a plan to jump in front of a tractor trailor or take sleeping pills  She states she was admitted to [facility] 2 months ago and was given a month of her medications, but ran out because she missed her appointment with her psychiatrist   She is not sure how long she has been off her medications  She denies recent substance abuse  No HI  She has been hallucinating  Patient states she has been having more frequent panic attacks  History provided by:  Patient  Psychiatric Evaluation  Presenting symptoms: depression, hallucinations and suicidal thoughts    Presenting symptoms: no homicidal ideas    Degree of incapacity (severity): Moderate  Onset quality:  Gradual  Duration:  1 week  Timing:  Constant  Progression:  Worsening  Chronicity:  Recurrent  Context: noncompliance    Relieved by:  Nothing  Worsened by:  Nothing  Ineffective treatments:  None tried  Associated symptoms: anxiety and decreased need for sleep    Associated symptoms: no abdominal pain and no chest pain    Risk factors: hx of mental illness        Prior to Admission Medications   Prescriptions Last Dose Informant Patient Reported? Taking?    FLUoxetine (PROzac) 20 mg capsule   No No   Sig: Take 1 capsule (20 mg total) by mouth daily At 9am   Patient not taking: Reported on 6/8/2021   OLANZapine (ZyPREXA) 15 mg tablet   No No   Sig: Take 1 tablet (15 mg total) by mouth daily at bedtime   OLANZapine (ZyPREXA) 5 mg tablet   No No   Sig: Take 1 tablet (5 mg total) by mouth daily At 9am   benztropine (COGENTIN) 1 mg tablet   No No   Sig: Take 1 tablet (1 mg total) by mouth 2 (two) times a day At 9am and 6pm   Patient not taking: Reported on 2021   gabapentin (NEURONTIN) 400 mg capsule   No No   Sig: Take 2 capsules (800 mg total) by mouth 3 (three) times a day At 9am, 4pm, 9pm   Patient not taking: Reported on 2021   lactase (LACTAID) 3,000 units tablet   No No   Sig: Take 2 tablets (6,000 Units total) by mouth 3 (three) times a day with meals   melatonin 3 mg   No No   Sig: Take 1 tablet (3 mg total) by mouth daily at bedtime      Facility-Administered Medications: None       Past Medical History:   Diagnosis Date    Addiction to drug ([facility] )     Anemia     Anxiety     Depression     [facility]     Hallucination     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[facility] )     Paranoia (psychosis) ([facility] )     Psychiatric disorder     Psychiatric illness     PTSD (post-traumatic stress disorder)     Schizo-affective type schizophrenia, chronic state ([facility] )     Self-injurious behavior     Suicidal behavior     Suicide attempt ([facility] )     Withdrawal symptoms, drug or narcotic ([place] [address] )        Past Surgical History:   Procedure Laterality Date     SECTION         Family History   Problem Relation Age of Onset    No Known Problems Mother     No Known Problems Father      I have reviewed and agree with the history as documented  E-Cigarette/Vaping    E-Cigarette Use Never User      E-Cigarette/Vaping Substances     Social History     Tobacco Use    Smoking status: Current Every Day Smoker     Packs/day: 0 50     Years: 0 00     Pack years: 0 00     Types: Cigarettes    Smokeless tobacco: Never Used   Vaping Use    Vaping Use: Never used   Substance Use Topics    Alcohol use: Not Currently     Comment: Pt stated "I don't drink"    Drug use: Yes     Types: "Crack" cocaine, Marijuana       Review of Systems   Constitutional: Negative for chills and fever  HENT: Negative  Respiratory: Negative for cough and shortness of breath  Cardiovascular: Negative for chest pain     Gastrointestinal: Negative for abdominal pain  Genitourinary: Negative for dysuria  Skin: Negative for color change and rash  Neurological: Negative for dizziness, weakness and light-headedness  Psychiatric/Behavioral: Positive for hallucinations and suicidal ideas  Negative for homicidal ideas  The patient is nervous/anxious  All other systems reviewed and are negative  Physical Exam  Physical Exam  Vitals and nursing note reviewed  Constitutional:       General: She is not in acute distress  Appearance: Normal appearance  She is well-developed and well-groomed  She is not ill-appearing or diaphoretic  HENT:      Head: Normocephalic and atraumatic  Right Ear: External ear normal       Left Ear: External ear normal       Nose: Nose normal    Eyes:      Conjunctiva/sclera: Conjunctivae normal    Cardiovascular:      Rate and Rhythm: Regular rhythm  Tachycardia present  Heart sounds: Normal heart sounds  Pulmonary:      Effort: Pulmonary effort is normal       Breath sounds: Normal breath sounds  No wheezing, rhonchi or rales  Abdominal:      General: Abdomen is flat  Bowel sounds are normal       Palpations: Abdomen is soft  Tenderness: There is no abdominal tenderness  Musculoskeletal:         General: Normal range of motion  Cervical back: Normal range of motion  Right lower leg: No edema  Left lower leg: No edema  Skin:     General: Skin is warm and dry  Coloration: Skin is not pale  Findings: No rash  Neurological:      General: No focal deficit present  Mental Status: She is alert  Sensory: Sensation is intact  Motor: Motor function is intact  Gait: Gait is intact  Psychiatric:         Mood and Affect: Mood is anxious and depressed  Speech: Speech is rapid and pressured  Behavior: Behavior is hyperactive  Behavior is cooperative  Thought Content: Thought content includes suicidal ideation   Thought content does not include homicidal ideation  Thought content includes suicidal plan           Vital Signs  ED Triage Vitals   Temperature Pulse Respirations Blood Pressure SpO2   08/03/21 1359 08/03/21 1359 08/03/21 1359 08/03/21 1359 08/03/21 1359   98 2 °F (36 8 °C) (!) 130 (!) 97 110/84 99 %      Temp Source Heart Rate Source Patient Position - Orthostatic VS BP Location FiO2 (%)   08/03/21 1853 08/03/21 1359 08/03/21 1359 08/03/21 1359 --   Tympanic Monitor Sitting Right arm       Pain Score       --                  Vitals:    08/03/21 1359 08/03/21 1853   BP: 110/84 144/89   Pulse: (!) 130 98   Patient Position - Orthostatic VS: Sitting Lying         Visual Acuity      ED Medications  Medications   nicotine (NICODERM CQ) 7 mg/24hr TD 24 hr patch 7 mg (7 mg Transdermal Medication Applied 8/3/21 1609)   LORazepam (ATIVAN) tablet 1 mg (1 mg Oral Given 8/3/21 1610)       Diagnostic Studies  Results Reviewed     Procedure Component Value Units Date/Time    Comprehensive metabolic panel [[id number]] Collected: 08/03/21 1423    Lab Status: Final result Specimen: Blood from Arm, Left Updated: 08/03/21 1621     Sodium 139 mmol/L      Potassium 3 9 mmol/L      Chloride 105 mmol/L      CO2 26 mmol/L      ANION GAP 8 mmol/L      BUN 9 mg/dL      Creatinine 0 68 mg/dL      Glucose 112 mg/dL      Calcium 8 6 mg/dL      AST 13 U/L      ALT 14 U/L      Alkaline Phosphatase 77 U/L      Total Protein 7 6 g/dL      Albumin 3 9 g/dL      Total Bilirubin 0 40 mg/dL      eGFR 128 ml/min/1 73sq m     Narrative:      Magno guidelines for Chronic Kidney Disease (CKD):     Stage 1 with normal or high GFR (GFR > 90 mL/min/1 73 square meters)    Stage 2 Mild CKD (GFR = 60-89 mL/min/1 73 square meters)    Stage 3A Moderate CKD (GFR = 45-59 mL/min/1 73 square meters)    Stage 3B Moderate CKD (GFR = 30-44 mL/min/1 73 square meters)    Stage 4 Severe CKD (GFR = 15-29 mL/min/1 73 square meters)    Stage 5 End Stage CKD (GFR <15 mL/min/1 73 square meters)  Note: GFR calculation is accurate only with a steady state creatinine    Novel Coronavirus (Covid-19),PCR [facility] - 2 Hour Stat [[id number]]  (Normal) Collected: 08/03/21 1418    Lab Status: Final result Specimen: Nares from Nose Updated: 08/03/21 1532     SARS-CoV-2 Negative    Narrative: The specimen collection materials, transport medium, and/or testing methodology utilized in the production of these test results have been proven to be reliable in a limited validation with an abbreviated program under the Emergency Utilization Authorization provided by the FDA  Testing reported as "Presumptive positive" will be confirmed with secondary testing to ensure result accuracy  Clinical caution and judgement should be used with the interpretation of these results with consideration of the clinical impression and other laboratory testing  Testing reported as "Positive" or "Negative" has been proven to be accurate according to standard laboratory validation requirements  All testing is performed with control materials showing appropriate reactivity at standard intervals  Rapid drug screen, urine [[id number]]  (Normal) Collected: 08/03/21 1419    Lab Status: Final result Specimen: Urine, Clean Catch Updated: 08/03/21 1438     Amph/Meth UR Negative     Barbiturate Ur Negative     Benzodiazepine Urine Negative     Cocaine Urine Negative     Methadone Urine Negative     Opiate Urine Negative     PCP Ur Negative     THC Urine Negative     Oxycodone Urine Negative    Narrative:      FOR MEDICAL PURPOSES ONLY  IF CONFIRMATION NEEDED PLEASE CONTACT THE LAB WITHIN 5 DAYS      Drug Screen Cutoff Levels:  AMPHETAMINE/METHAMPHETAMINES  1000 ng/mL  BARBITURATES     200 ng/mL  BENZODIAZEPINES     200 ng/mL  COCAINE      300 ng/mL  METHADONE      300 ng/mL  OPIATES      300 ng/mL  PHENCYCLIDINE     25 ng/mL  THC       50 ng/mL  OXYCODONE      100 ng/mL    CBC and differential [[id number]]  (Abnormal) Collected: 08/03/21 1423    Lab Status: Final result Specimen: Blood from Arm, Left Updated: 08/03/21 1431     WBC 7 63 Thousand/uL      RBC 5 45 Million/uL      Hemoglobin 12 2 g/dL      Hematocrit 40 5 %      MCV 74 fL      MCH 22 4 pg      MCHC 30 1 g/dL      RDW 19 7 %      Platelets 859 Thousands/uL      nRBC 0 /100 WBCs      Neutrophils Relative 55 %      Immat GRANS % 0 %      Lymphocytes Relative 32 %      Monocytes Relative 11 %      Eosinophils Relative 1 %      Basophils Relative 1 %      Neutrophils Absolute 4 20 Thousands/µL      Immature Grans Absolute 0 02 Thousand/uL      Lymphocytes Absolute 2 44 Thousands/µL      Monocytes Absolute 0 85 Thousand/µL      Eosinophils Absolute 0 06 Thousand/µL      Basophils Absolute 0 06 Thousands/µL     POCT pregnancy, urine [[id number]]  (Normal) Resulted: 08/03/21 1417    Lab Status: Final result Updated: 08/03/21 1417     EXT PREG TEST UR (Ref: Negative) negative     Control valid    POCT alcohol breath test [[id number]]  (Normal) Resulted: 08/03/21 1415    Lab Status: Final result Updated: 08/03/21 1415     EXTBreath Alcohol 0 000                 No orders to display              Procedures  Procedures         ED Course  ED Course as of Aug 03 2029   Tue Aug 03, 2021   1458 Patient medically cleared, crisis contacted  [facility] with Kristin Young, she states she will let the next shift know about patient  1525 Patient requesting something for anxiety  1827 Patient signed [address] and it was faxed back to crisis  2027 Care transferred to Dr Castellanos Ulh                                                 MDM    Disposition  Final diagnoses:   Depression with suicidal ideation     Time reflects when diagnosis was documented in both MDM as applicable and the Disposition within this note     Time User Action Codes Description Comment    8/3/2021  8:28 PM Liana Guerrero Add [F32 9,  R46 851] Depression with suicidal ideation       ED Disposition     ED Disposition Condition Date/Time Comment    Transfer to [facility] Aug 3, 2021  2:58 PM [place] should be transferred out to and has been medically cleared  MD Documentation      Most Recent Value   Sending MD  Dr Carleen Schwartz    None         Patient's Medications   Discharge Prescriptions    No medications on file     No discharge procedures on file      PDMP Review     None          ED Provider  Electronically Signed by           Matthew Morris PA-C  08/03/21 2029

## 2021-08-03 NOTE — ED NOTES
Pt has been unable to get refills on medications  Pt states her anxiety is really bad   Pt states she watches TV or will hear a noise and she thinks about harming herself     Patrick Christie RN  08/03/21 5614

## 2021-08-03 NOTE — ED NOTES
Pt is a 47 y o  female who was brought to the ED with increased suicidal ideations and auditory hallucinations  Patient states that she has been having suicidal thoughts since Thursday but at the time felt she could handle them  She reached out to her  through [facility] and was told to come to the hospital for treatment  However, over the last several days her thoughts have become more frequent and intense and therefore came in for treatment  Patient reports multiple plans including overdosing, jumping in front of a truck, or CO poisoning  Patient reports a prior suicide attempt via overdose in April  Patient identifies the primary stressor being out of her medications for over a month  She states that after her last inpatient admission at [facility], she was only giving one month worth of medications  Patient was scheduled to start with outpatient at [facility], however, missed that appointment and therefore ran out of medications  Patient does feel that the medications she was prescribed was making her feel more depressed, however, does recognize she needs to be back on her medications  Patient reports auditory hallucinations, and then described them as multiple personalities  Her speech was manic throughout assessment, therfore she had trouble describing it in depth  She does report one voice that tells her to hurt herself, another that is very anxious, and another that is always demeaning and mean to her  Patient denies visual hallucinations and homicidal ideations  Patient does feel unsafe at home when she is there alone, and struggles when her boyfriend is gone for long work days  Patient has trouble with sleep, stating sometimes she sleeps a lot and other times she struggles to sleep because of nightmares  Patient does continue to endorse suicidal ideaitons and would like to sign in for treatment  Patient does not wish to be referred to [facility]      Chief Complaint   Patient presents with  Psychiatric Evaluation     pt states hse missed dr rueda and didnt get refills to medications   Pt having suicidal thoughts and is having auditory hallucinations     Intake Assessment completed, Safety risk Assessment completed    LASHAWN Andrea  08/03/21   1098

## 2021-08-03 NOTE — ED NOTES
This tech currently sitting on 1:1  Pt displaying anxious behavior such as shaking and moving head side to side repeatedly  All BH orders completed and will continue to monitor as well as offer comfort measures for the pt        Shine Rank  08/03/21 6415

## 2021-08-03 NOTE — ED NOTES
Bed Search efforts:     [facility] - No beds per admissions  [facility] - Per Kaya, no adult beds  [facility] - Per Jamil, they have one bed and will review, but will also consider for a dc bed for tomorrow  [facility] - Per Enrico, no beds   [facility] - Per Rose Warren, no beds  [facility] - Per Mukund, they have beds and will review  [facility]- Per Macario Tobias, they will review  [facility] - No beds per Alisia Holman, Intake     Crisis to f/u

## 2021-08-04 VITALS
BODY MASS INDEX: 25.61 KG/M2 | DIASTOLIC BLOOD PRESSURE: 78 MMHG | RESPIRATION RATE: 18 BRPM | OXYGEN SATURATION: 98 % | TEMPERATURE: 98.9 F | SYSTOLIC BLOOD PRESSURE: 138 MMHG | HEART RATE: 88 BPM | WEIGHT: 140 LBS

## 2021-08-04 RX ORDER — LORAZEPAM 1 MG/1
1 TABLET ORAL ONCE
Status: COMPLETED | OUTPATIENT
Start: 2021-08-04 | End: 2021-08-04

## 2021-08-04 RX ADMIN — LORAZEPAM 1 MG: 1 TABLET ORAL at 01:14

## 2021-08-04 NOTE — ED NOTES
Insurance Authorization for admission:   Phone call placed to [facility]  Phone number: [phone]  Spoke to Clemente Ocasio  3 days approved  Level of care: Inpatient Psych 201  Review on 8/6  Authorization # provided upon arrival to treating facility  EVS (Eligibility Verification System) called - [phone]  Automated system indicates: active with [place], [place] [address] [facility] for Transportation:  Not needed if transport is arranged with [facility]  Spoke with Michael at [facility] admissions to provide precert information  Transport time constraints are before 4AM or after 7:30AM arrival  Spoke with Scout at [facility] regarding [facility] available  [facility] will follow up with crisis once a time is provided, most likely will be AM transport  [facility] admissions to be updated on ETA

## 2021-08-04 NOTE — ED NOTES
Spoke with Scout from [facility]  [facility] will transport Pt to [facility] at [address] at [facility] with ETA

## 2021-08-04 NOTE — ED NOTES
Patient is accepted at [facility]  Patient is accepted by Dr Raphael Nicholson per admissions  Transportation is arranged with TBD  Transportation is scheduled for TBD  Patient may go to the floor at D once insurance precert is completed  Nurse report is not needed prior to transfer

## 2021-08-04 NOTE — EMTALA/ACUTE CARE TRANSFER
[facility] EMERGENCY DEPARTMENT  [address]  [facility] [address]  Dept: [phone]      CDXAQA TRANSFER CONSENT    NAME Lorenzo Garcia                                         1980                              MRN [id number]    I have been informed of my rights regarding examination, treatment, and transfer   by Dr Margarita Qiu DO    Benefits: Specialized equipment and/or services available at the receiving facility (Include comment)________________________    Risks: Potential for delay in receiving treatment      Consent for Transfer:  I acknowledge that my medical condition has been evaluated and explained to me by the emergency department physician or other qualified medical person and/or my attending physician, who has recommended that I be transferred to the service of  Accepting Physician: Dr Bertha Kennedy at [address], [address] : [facility]  The above potential benefits of such transfer, the potential risks associated with such transfer, and the probable risks of not being transferred have been explained to me, and I fully understand them  The doctor has explained that, in my case, the benefits of transfer outweigh the risks  I agree to be transferred  I authorize the performance of emergency medical procedures and treatments upon me in both transit and upon arrival at the receiving facility  Additionally, I authorize the release of any and all medical records to the receiving facility and request they be transported with me, if possible  I understand that the safest mode of transportation during a medical emergency is an ambulance and that the Hospital advocates the use of this mode of transport  Risks of traveling to the receiving facility by car, including absence of medical control, life sustaining equipment, such as oxygen, and medical personnel has been explained to me and I fully understand them      ([place] BELOW)  [  ]  I consent to the stated transfer and to be transported by ambulance/helicopter  [  ]  I consent to the stated transfer, but refuse transportation by ambulance and accept full responsibility for my transportation by car  I understand the risks of non-ambulance transfers and I exonerate the Hospital and its staff from any deterioration in my condition that results from this refusal     X___________________________________________    DATE  21  TIME________  Signature of patient or legally responsible individual signing on patient behalf           RELATIONSHIP TO PATIENT_________________________          Provider Certification    NAME Bianca Gastelum                                         1980                              MRN [id number]    A medical screening exam was performed on the above named patient  Based on the examination:    Condition Necessitating Transfer The encounter diagnosis was Depression with suicidal ideation  Patient Condition: The patient has been stabilized such that within reasonable medical probability, no material deterioration of the patient condition or the condition of the unborn child(nasima) is likely to result from the transfer    Reason for Transfer: Level of Care needed not available at this facility    Transfer Requirements: [address]   · Space available and qualified personnel available for treatment as acknowledged by finesse  · Agreed to accept transfer and to provide appropriate medical treatment as acknowledged by       Dr Fox Valle  · Appropriate medical records of the examination and treatment of the patient are provided at the time of transfer   [address] [facility], [address] _______  · Transfer will be performed by qualified personnel from [address] [place] [address]  and appropriate transfer equipment as required, including the use of necessary and appropriate life support measures      Provider Certification: I have examined the patient and explained the following risks and benefits of being transferred/refusing transfer to the patient/family:  General risk, such as traffic hazards, adverse weather conditions, rough terrain or turbulence, possible failure of equipment (including vehicle or aircraft), or consequences of actions of persons outside the control of the transport personnel, Unanticipated needs of medical equipment and personnel during transport      Based on these reasonable risks and benefits to the patient and/or the unborn child(nasima), and based upon the information available at the time of the patients examination, I certify that the medical benefits reasonably to be expected from the provision of appropriate medical treatments at another medical facility outweigh the increasing risks, if any, to the individuals medical condition, and in the case of labor to the unborn child, from effecting the transfer      X____________________________________________ DATE 08/03/21        TIME_______      ORIGINAL - SEND TO MEDICAL RECORDS   COPY - SEND WITH PATIENT DURING TRANSFER

## 2021-08-04 NOTE — ED CARE HANDOFF
Emergency Department Sign Out Note        Sign out and transfer of care from Dr Marcellus Dickinson  See Separate Emergency Department note  The patient, Yahir Mitchell, was evaluated by the previous provider for schizophrenia, medicatiion noncompliance  Workup Completed:  Medically cleared  Interviewed by [employer]  Signed 201  Bed obtained  ED Course / Workup Pending (followup): Await transfer  Will monitor  Procedures  MDM  Number of Diagnoses or Management Options  Depression with suicidal ideation: established and worsening     Amount and/or Complexity of Data Reviewed  Clinical lab tests: reviewed  Discuss the patient with other providers: yes        Disposition  Final diagnoses:   Depression with suicidal ideation     Time reflects when diagnosis was documented in both MDM as applicable and the Disposition within this note     Time User Action Codes Description Comment    8/3/2021  8:28 PM Murali Speer Add [F32 9, X00 452] Depression with suicidal ideation       ED Disposition     ED Disposition Condition Date/Time Comment    Transfer to [address] [place] [address] Aug 3, 2021  2:58 PM Yahir Mitchell should be transferred out to and has been medically cleared           MD Documentation      Most Recent Value   Patient Condition  The patient has been stabilized such that within reasonable medical probability, no material deterioration of the patient condition or the condition of the unborn child(nasima) is likely to result from the transfer   Reason for Transfer  Level of Care needed not available at this facility   Benefits of Transfer  Specialized equipment and/or services available at the receiving facility (Include comment)________________________   Risks of Transfer  Potential for delay in receiving treatment   Accepting Physician  Dr Bola Rushing Name, [facility]     (Name & Tel number)  [employer] Transported by [employer] and Unit #)  CTS   Sending MD Dr Simran Butler   Provider Certification  General risk, such as traffic hazards, adverse weather conditions, rough terrain or turbulence, possible failure of equipment (including vehicle or aircraft), or consequences of actions of persons outside the control of the transport personnel, Unanticipated needs of medical equipment and personnel during transport      RN Documentation      [facility] [address] [facility] (Name & Tel number)  slets   Transported by [employer] and Unit #)  CTS      Follow-up Information    None       Discharge Medication List as of 8/4/2021 10:55 AM      CONTINUE these medications which have NOT CHANGED    Details   benztropine (COGENTIN) 1 mg tablet Take 1 tablet (1 mg total) by mouth 2 (two) times a day At 9am and 6pm, Starting Tue 4/27/2021, Print      FLUoxetine (PROzac) 20 mg capsule Take 1 capsule (20 mg total) by mouth daily At 9am, Starting Wed 4/28/2021, Print      gabapentin (NEURONTIN) 400 mg capsule Take 2 capsules (800 mg total) by mouth 3 (three) times a day At 9am, 4pm, 9pm, Starting Tue 4/27/2021, Print      lactase (LACTAID) 3,000 units tablet Take 2 tablets (6,000 Units total) by mouth 3 (three) times a day with meals, Starting Tue 4/27/2021, Print      melatonin 3 mg Take 1 tablet (3 mg total) by mouth daily at bedtime, Starting Tue 4/27/2021, Print      !! OLANZapine (ZyPREXA) 15 mg tablet Take 1 tablet (15 mg total) by mouth daily at bedtime, Starting Tue 4/27/2021, Print      !! OLANZapine (ZyPREXA) 5 mg tablet Take 1 tablet (5 mg total) by mouth daily At 9am, Starting Wed 4/28/2021, Print       !! - Potential duplicate medications found  Please discuss with provider  No discharge procedures on file         ED Provider  Electronically Signed by     Bertha Siddiqui DO  08/04/21 5299

## 2021-11-09 ENCOUNTER — HOSPITAL ENCOUNTER (INPATIENT)
Facility: HOSPITAL | Age: 41
LOS: 3 days | Discharge: HOME | DRG: 720 | End: 2021-11-12
Attending: EMERGENCY MEDICINE | Admitting: HOSPITALIST
Payer: MEDICARE

## 2021-11-09 ENCOUNTER — APPOINTMENT (EMERGENCY)
Dept: RADIOLOGY | Facility: HOSPITAL | Age: 41
DRG: 720 | End: 2021-11-09
Payer: MEDICARE

## 2021-11-09 DIAGNOSIS — F31.4 BIPOLAR 1 DISORDER, DEPRESSED, SEVERE (HCC): ICD-10-CM

## 2021-11-09 DIAGNOSIS — T14.91XA SUICIDAL BEHAVIOR WITH ATTEMPTED SELF-INJURY (HCC): Primary | ICD-10-CM

## 2021-11-09 DIAGNOSIS — S60.812A ABRASION OF LEFT WRIST, INITIAL ENCOUNTER: ICD-10-CM

## 2021-11-09 DIAGNOSIS — R45.851 SUICIDAL INTENT: ICD-10-CM

## 2021-11-09 DIAGNOSIS — F14.90 COCAINE USE: ICD-10-CM

## 2021-11-09 DIAGNOSIS — R65.20 SEVERE SEPSIS (HCC): ICD-10-CM

## 2021-11-09 DIAGNOSIS — A41.9 SEVERE SEPSIS (HCC): ICD-10-CM

## 2021-11-09 DIAGNOSIS — J18.9 PNEUMONIA: ICD-10-CM

## 2021-11-09 DIAGNOSIS — S60.811A ABRASION OF RIGHT WRIST, INITIAL ENCOUNTER: ICD-10-CM

## 2021-11-09 DIAGNOSIS — G47.00 INSOMNIA: ICD-10-CM

## 2021-11-09 DIAGNOSIS — F41.9 ANXIETY: ICD-10-CM

## 2021-11-09 LAB
ALBUMIN SERPL BCP-MCNC: 4.1 G/DL (ref 3.5–5)
ALP SERPL-CCNC: 74 U/L (ref 46–116)
ALT SERPL W P-5'-P-CCNC: 30 U/L (ref 12–78)
AMPHETAMINES SERPL QL SCN: NEGATIVE
ANION GAP SERPL CALCULATED.3IONS-SCNC: 17 MMOL/L (ref 4–13)
AST SERPL W P-5'-P-CCNC: 52 U/L (ref 5–45)
BARBITURATES UR QL: NEGATIVE
BASOPHILS # BLD AUTO: 0.07 THOUSANDS/ΜL (ref 0–0.1)
BASOPHILS NFR BLD AUTO: 0 % (ref 0–1)
BENZODIAZ UR QL: NEGATIVE
BILIRUB SERPL-MCNC: 1 MG/DL (ref 0.2–1)
BUN SERPL-MCNC: 12 MG/DL (ref 5–25)
CALCIUM SERPL-MCNC: 9.4 MG/DL (ref 8.3–10.1)
CHLORIDE SERPL-SCNC: 99 MMOL/L (ref 100–108)
CO2 SERPL-SCNC: 21 MMOL/L (ref 21–32)
COCAINE UR QL: POSITIVE
CREAT SERPL-MCNC: 0.93 MG/DL (ref 0.6–1.3)
EOSINOPHIL # BLD AUTO: 0.08 THOUSAND/ΜL (ref 0–0.61)
EOSINOPHIL NFR BLD AUTO: 0 % (ref 0–6)
ERYTHROCYTE [DISTWIDTH] IN BLOOD BY AUTOMATED COUNT: 17.4 % (ref 11.6–15.1)
ETHANOL EXG-MCNC: 0 MG/DL
ETHANOL SERPL-MCNC: <3 MG/DL (ref 0–3)
EXT PREG TEST URINE: NEGATIVE
EXT. CONTROL ED NAV: NORMAL
FLUAV RNA RESP QL NAA+PROBE: NEGATIVE
FLUBV RNA RESP QL NAA+PROBE: NEGATIVE
GFR SERPL CREATININE-BSD FRML MDRD: 90 ML/MIN/1.73SQ M
GLUCOSE SERPL-MCNC: 58 MG/DL (ref 65–140)
GLUCOSE SERPL-MCNC: 59 MG/DL (ref 65–140)
GLUCOSE SERPL-MCNC: 98 MG/DL (ref 65–140)
HCT VFR BLD AUTO: 40.4 % (ref 34.8–46.1)
HGB BLD-MCNC: 12.3 G/DL (ref 11.5–15.4)
IMM GRANULOCYTES # BLD AUTO: 0.07 THOUSAND/UL (ref 0–0.2)
IMM GRANULOCYTES NFR BLD AUTO: 0 % (ref 0–2)
LACTATE SERPL-SCNC: 1.1 MMOL/L (ref 0.5–2)
LYMPHOCYTES # BLD AUTO: 2.36 THOUSANDS/ΜL (ref 0.6–4.47)
LYMPHOCYTES NFR BLD AUTO: 13 % (ref 14–44)
MCH RBC QN AUTO: 22.7 PG (ref 26.8–34.3)
MCHC RBC AUTO-ENTMCNC: 30.4 G/DL (ref 31.4–37.4)
MCV RBC AUTO: 74 FL (ref 82–98)
METHADONE UR QL: NEGATIVE
MONOCYTES # BLD AUTO: 1.04 THOUSAND/ΜL (ref 0.17–1.22)
MONOCYTES NFR BLD AUTO: 6 % (ref 4–12)
NEUTROPHILS # BLD AUTO: 14.37 THOUSANDS/ΜL (ref 1.85–7.62)
NEUTS SEG NFR BLD AUTO: 81 % (ref 43–75)
NRBC BLD AUTO-RTO: 0 /100 WBCS
OPIATES UR QL SCN: NEGATIVE
OXYCODONE+OXYMORPHONE UR QL SCN: NEGATIVE
PCP UR QL: NEGATIVE
PLATELET # BLD AUTO: 281 THOUSANDS/UL (ref 149–390)
PMV BLD AUTO: 12 FL (ref 8.9–12.7)
POTASSIUM SERPL-SCNC: 3.6 MMOL/L (ref 3.5–5.3)
PROT SERPL-MCNC: 8.1 G/DL (ref 6.4–8.2)
RBC # BLD AUTO: 5.43 MILLION/UL (ref 3.81–5.12)
RSV RNA RESP QL NAA+PROBE: NEGATIVE
SARS-COV-2 RNA RESP QL NAA+PROBE: NEGATIVE
SODIUM SERPL-SCNC: 137 MMOL/L (ref 136–145)
THC UR QL: NEGATIVE
WBC # BLD AUTO: 17.99 THOUSAND/UL (ref 4.31–10.16)

## 2021-11-09 PROCEDURE — 82948 REAGENT STRIP/BLOOD GLUCOSE: CPT

## 2021-11-09 PROCEDURE — 80307 DRUG TEST PRSMV CHEM ANLYZR: CPT | Performed by: EMERGENCY MEDICINE

## 2021-11-09 PROCEDURE — 36415 COLL VENOUS BLD VENIPUNCTURE: CPT | Performed by: EMERGENCY MEDICINE

## 2021-11-09 PROCEDURE — 96375 TX/PRO/DX INJ NEW DRUG ADDON: CPT

## 2021-11-09 PROCEDURE — 71045 X-RAY EXAM CHEST 1 VIEW: CPT

## 2021-11-09 PROCEDURE — 0241U HB NFCT DS VIR RESP RNA 4 TRGT: CPT

## 2021-11-09 PROCEDURE — 99285 EMERGENCY DEPT VISIT HI MDM: CPT | Performed by: EMERGENCY MEDICINE

## 2021-11-09 PROCEDURE — 96361 HYDRATE IV INFUSION ADD-ON: CPT

## 2021-11-09 PROCEDURE — 96365 THER/PROPH/DIAG IV INF INIT: CPT

## 2021-11-09 PROCEDURE — 99223 1ST HOSP IP/OBS HIGH 75: CPT | Performed by: INTERNAL MEDICINE

## 2021-11-09 PROCEDURE — 85025 COMPLETE CBC W/AUTO DIFF WBC: CPT | Performed by: EMERGENCY MEDICINE

## 2021-11-09 PROCEDURE — 82075 ASSAY OF BREATH ETHANOL: CPT | Performed by: EMERGENCY MEDICINE

## 2021-11-09 PROCEDURE — 81025 URINE PREGNANCY TEST: CPT | Performed by: EMERGENCY MEDICINE

## 2021-11-09 PROCEDURE — 99285 EMERGENCY DEPT VISIT HI MDM: CPT

## 2021-11-09 PROCEDURE — 82077 ASSAY SPEC XCP UR&BREATH IA: CPT | Performed by: EMERGENCY MEDICINE

## 2021-11-09 PROCEDURE — 93005 ELECTROCARDIOGRAM TRACING: CPT

## 2021-11-09 PROCEDURE — 84145 PROCALCITONIN (PCT): CPT | Performed by: EMERGENCY MEDICINE

## 2021-11-09 PROCEDURE — 87040 BLOOD CULTURE FOR BACTERIA: CPT | Performed by: EMERGENCY MEDICINE

## 2021-11-09 PROCEDURE — 80053 COMPREHEN METABOLIC PANEL: CPT | Performed by: EMERGENCY MEDICINE

## 2021-11-09 PROCEDURE — 83605 ASSAY OF LACTIC ACID: CPT | Performed by: EMERGENCY MEDICINE

## 2021-11-09 PROCEDURE — 96372 THER/PROPH/DIAG INJ SC/IM: CPT

## 2021-11-09 RX ORDER — LORAZEPAM 2 MG/ML
1 INJECTION INTRAMUSCULAR ONCE
Status: COMPLETED | OUTPATIENT
Start: 2021-11-09 | End: 2021-11-09

## 2021-11-09 RX ORDER — OLANZAPINE 10 MG/1
2.5 INJECTION, POWDER, LYOPHILIZED, FOR SOLUTION INTRAMUSCULAR EVERY 6 HOURS PRN
Status: DISCONTINUED | OUTPATIENT
Start: 2021-11-09 | End: 2021-11-11

## 2021-11-09 RX ORDER — DEXTROSE MONOHYDRATE 25 G/50ML
25 INJECTION, SOLUTION INTRAVENOUS ONCE
Status: COMPLETED | OUTPATIENT
Start: 2021-11-09 | End: 2021-11-09

## 2021-11-09 RX ORDER — CEFTRIAXONE 2 G/50ML
2000 INJECTION, SOLUTION INTRAVENOUS ONCE
Status: COMPLETED | OUTPATIENT
Start: 2021-11-09 | End: 2021-11-09

## 2021-11-09 RX ORDER — OLANZAPINE 10 MG/1
10 INJECTION, POWDER, LYOPHILIZED, FOR SOLUTION INTRAMUSCULAR ONCE
Status: COMPLETED | OUTPATIENT
Start: 2021-11-09 | End: 2021-11-09

## 2021-11-09 RX ORDER — CEFTRIAXONE 1 G/50ML
1000 INJECTION, SOLUTION INTRAVENOUS EVERY 24 HOURS
Status: DISCONTINUED | OUTPATIENT
Start: 2021-11-10 | End: 2021-11-12

## 2021-11-09 RX ORDER — NICOTINE 21 MG/24HR
1 PATCH, TRANSDERMAL 24 HOURS TRANSDERMAL DAILY
Status: DISCONTINUED | OUTPATIENT
Start: 2021-11-10 | End: 2021-11-12 | Stop reason: HOSPADM

## 2021-11-09 RX ORDER — ACETAMINOPHEN 325 MG/1
650 TABLET ORAL EVERY 6 HOURS PRN
Status: DISCONTINUED | OUTPATIENT
Start: 2021-11-09 | End: 2021-11-12 | Stop reason: HOSPADM

## 2021-11-09 RX ADMIN — OLANZAPINE 10 MG: 10 INJECTION, POWDER, FOR SOLUTION INTRAMUSCULAR at 19:10

## 2021-11-09 RX ADMIN — LORAZEPAM 1 MG: 2 INJECTION INTRAMUSCULAR; INTRAVENOUS at 20:58

## 2021-11-09 RX ADMIN — CEFTRIAXONE 2000 MG: 2 INJECTION, SOLUTION INTRAVENOUS at 20:59

## 2021-11-09 RX ADMIN — SODIUM CHLORIDE 1000 ML: 0.9 INJECTION, SOLUTION INTRAVENOUS at 19:07

## 2021-11-09 RX ADMIN — DEXTROSE MONOHYDRATE 25 ML: 500 INJECTION PARENTERAL at 20:59

## 2021-11-10 LAB
ANION GAP SERPL CALCULATED.3IONS-SCNC: 10 MMOL/L (ref 4–13)
ATRIAL RATE: 111 BPM
BASOPHILS # BLD AUTO: 0.03 THOUSANDS/ΜL (ref 0–0.1)
BASOPHILS NFR BLD AUTO: 0 % (ref 0–1)
BILIRUB UR QL STRIP: NEGATIVE
BUN SERPL-MCNC: 10 MG/DL (ref 5–25)
CALCIUM SERPL-MCNC: 8.3 MG/DL (ref 8.3–10.1)
CHLORIDE SERPL-SCNC: 107 MMOL/L (ref 100–108)
CLARITY UR: CLEAR
CO2 SERPL-SCNC: 23 MMOL/L (ref 21–32)
COLOR UR: YELLOW
CREAT SERPL-MCNC: 0.65 MG/DL (ref 0.6–1.3)
EOSINOPHIL # BLD AUTO: 0.07 THOUSAND/ΜL (ref 0–0.61)
EOSINOPHIL NFR BLD AUTO: 1 % (ref 0–6)
ERYTHROCYTE [DISTWIDTH] IN BLOOD BY AUTOMATED COUNT: 17.3 % (ref 11.6–15.1)
GFR SERPL CREATININE-BSD FRML MDRD: 130 ML/MIN/1.73SQ M
GLUCOSE SERPL-MCNC: 91 MG/DL (ref 65–140)
GLUCOSE UR STRIP-MCNC: NEGATIVE MG/DL
HCT VFR BLD AUTO: 35 % (ref 34.8–46.1)
HGB BLD-MCNC: 10.6 G/DL (ref 11.5–15.4)
HGB UR QL STRIP.AUTO: NEGATIVE
IMM GRANULOCYTES # BLD AUTO: 0.03 THOUSAND/UL (ref 0–0.2)
IMM GRANULOCYTES NFR BLD AUTO: 0 % (ref 0–2)
KETONES UR STRIP-MCNC: ABNORMAL MG/DL
L PNEUMO1 AG UR QL IA.RAPID: NEGATIVE
LEUKOCYTE ESTERASE UR QL STRIP: NEGATIVE
LYMPHOCYTES # BLD AUTO: 1.75 THOUSANDS/ΜL (ref 0.6–4.47)
LYMPHOCYTES NFR BLD AUTO: 22 % (ref 14–44)
MCH RBC QN AUTO: 22.7 PG (ref 26.8–34.3)
MCHC RBC AUTO-ENTMCNC: 30.3 G/DL (ref 31.4–37.4)
MCV RBC AUTO: 75 FL (ref 82–98)
MONOCYTES # BLD AUTO: 0.69 THOUSAND/ΜL (ref 0.17–1.22)
MONOCYTES NFR BLD AUTO: 9 % (ref 4–12)
NEUTROPHILS # BLD AUTO: 5.32 THOUSANDS/ΜL (ref 1.85–7.62)
NEUTS SEG NFR BLD AUTO: 68 % (ref 43–75)
NITRITE UR QL STRIP: NEGATIVE
NRBC BLD AUTO-RTO: 0 /100 WBCS
P AXIS: 72 DEGREES
PH UR STRIP.AUTO: 5.5 [PH]
PLATELET # BLD AUTO: 211 THOUSANDS/UL (ref 149–390)
PMV BLD AUTO: 12.2 FL (ref 8.9–12.7)
POTASSIUM SERPL-SCNC: 4.4 MMOL/L (ref 3.5–5.3)
PR INTERVAL: 128 MS
PROCALCITONIN SERPL-MCNC: 1.72 NG/ML
PROCALCITONIN SERPL-MCNC: 2.23 NG/ML
PROT UR STRIP-MCNC: NEGATIVE MG/DL
QRS AXIS: 64 DEGREES
QRSD INTERVAL: 74 MS
QT INTERVAL: 348 MS
QTC INTERVAL: 473 MS
RBC # BLD AUTO: 4.67 MILLION/UL (ref 3.81–5.12)
S PNEUM AG UR QL: NEGATIVE
SODIUM SERPL-SCNC: 140 MMOL/L (ref 136–145)
SP GR UR STRIP.AUTO: 1.02 (ref 1–1.03)
T WAVE AXIS: 70 DEGREES
UROBILINOGEN UR QL STRIP.AUTO: 0.2 E.U./DL
VENTRICULAR RATE: 111 BPM
WBC # BLD AUTO: 7.89 THOUSAND/UL (ref 4.31–10.16)

## 2021-11-10 PROCEDURE — 87449 NOS EACH ORGANISM AG IA: CPT | Performed by: INTERNAL MEDICINE

## 2021-11-10 PROCEDURE — 36415 COLL VENOUS BLD VENIPUNCTURE: CPT | Performed by: INTERNAL MEDICINE

## 2021-11-10 PROCEDURE — 99232 SBSQ HOSP IP/OBS MODERATE 35: CPT | Performed by: HOSPITALIST

## 2021-11-10 PROCEDURE — 93010 ELECTROCARDIOGRAM REPORT: CPT | Performed by: INTERNAL MEDICINE

## 2021-11-10 PROCEDURE — 84145 PROCALCITONIN (PCT): CPT | Performed by: EMERGENCY MEDICINE

## 2021-11-10 PROCEDURE — 85025 COMPLETE CBC W/AUTO DIFF WBC: CPT | Performed by: INTERNAL MEDICINE

## 2021-11-10 PROCEDURE — 80048 BASIC METABOLIC PNL TOTAL CA: CPT | Performed by: INTERNAL MEDICINE

## 2021-11-10 PROCEDURE — 81003 URINALYSIS AUTO W/O SCOPE: CPT | Performed by: EMERGENCY MEDICINE

## 2021-11-10 PROCEDURE — 94664 DEMO&/EVAL PT USE INHALER: CPT

## 2021-11-10 RX ADMIN — NICOTINE 1 PATCH: 14 PATCH, EXTENDED RELEASE TRANSDERMAL at 16:35

## 2021-11-10 RX ADMIN — CEFTRIAXONE 1000 MG: 1 INJECTION, SOLUTION INTRAVENOUS at 21:43

## 2021-11-10 RX ADMIN — OLANZAPINE 2.5 MG: 10 INJECTION, POWDER, LYOPHILIZED, FOR SOLUTION INTRAMUSCULAR at 16:37

## 2021-11-10 RX ADMIN — SODIUM CHLORIDE 500 ML: 0.9 INJECTION, SOLUTION INTRAVENOUS at 06:40

## 2021-11-10 RX ADMIN — SODIUM CHLORIDE 1000 ML: 0.9 INJECTION, SOLUTION INTRAVENOUS at 02:03

## 2021-11-11 VITALS
OXYGEN SATURATION: 98 % | HEART RATE: 68 BPM | SYSTOLIC BLOOD PRESSURE: 123 MMHG | RESPIRATION RATE: 18 BRPM | TEMPERATURE: 98.3 F | BODY MASS INDEX: 25.76 KG/M2 | HEIGHT: 62 IN | DIASTOLIC BLOOD PRESSURE: 69 MMHG | WEIGHT: 140 LBS

## 2021-11-11 PROCEDURE — 99232 SBSQ HOSP IP/OBS MODERATE 35: CPT | Performed by: PHYSICIAN ASSISTANT

## 2021-11-11 PROCEDURE — 99254 IP/OBS CNSLTJ NEW/EST MOD 60: CPT | Performed by: PHYSICIAN ASSISTANT

## 2021-11-11 RX ORDER — BENZTROPINE MESYLATE 1 MG/1
0.5 TABLET ORAL 2 TIMES DAILY
Status: DISCONTINUED | OUTPATIENT
Start: 2021-11-11 | End: 2021-11-12

## 2021-11-11 RX ORDER — OLANZAPINE 5 MG/1
5 TABLET ORAL 2 TIMES DAILY
Status: DISCONTINUED | OUTPATIENT
Start: 2021-11-11 | End: 2021-11-12 | Stop reason: HOSPADM

## 2021-11-11 RX ORDER — OLANZAPINE 10 MG/1
5 INJECTION, POWDER, LYOPHILIZED, FOR SOLUTION INTRAMUSCULAR EVERY 4 HOURS PRN
Status: DISCONTINUED | OUTPATIENT
Start: 2021-11-11 | End: 2021-11-12 | Stop reason: HOSPADM

## 2021-11-11 RX ORDER — OLANZAPINE 5 MG/1
5 TABLET ORAL EVERY 4 HOURS PRN
Status: DISCONTINUED | OUTPATIENT
Start: 2021-11-11 | End: 2021-11-12 | Stop reason: HOSPADM

## 2021-11-11 RX ORDER — GABAPENTIN 300 MG/1
300 CAPSULE ORAL 3 TIMES DAILY
Status: DISCONTINUED | OUTPATIENT
Start: 2021-11-11 | End: 2021-11-12

## 2021-11-11 RX ORDER — LANOLIN ALCOHOL/MO/W.PET/CERES
3 CREAM (GRAM) TOPICAL
Status: DISCONTINUED | OUTPATIENT
Start: 2021-11-11 | End: 2021-11-12 | Stop reason: HOSPADM

## 2021-11-11 RX ORDER — HYDROXYZINE HYDROCHLORIDE 25 MG/1
25 TABLET, FILM COATED ORAL EVERY 6 HOURS PRN
Status: DISCONTINUED | OUTPATIENT
Start: 2021-11-11 | End: 2021-11-12 | Stop reason: HOSPADM

## 2021-11-11 RX ADMIN — OLANZAPINE 2.5 MG: 10 INJECTION, POWDER, LYOPHILIZED, FOR SOLUTION INTRAMUSCULAR at 07:56

## 2021-11-11 RX ADMIN — HYDROXYZINE HYDROCHLORIDE 25 MG: 25 TABLET ORAL at 16:14

## 2021-11-11 RX ADMIN — GABAPENTIN 300 MG: 300 CAPSULE ORAL at 21:44

## 2021-11-11 RX ADMIN — GABAPENTIN 300 MG: 300 CAPSULE ORAL at 16:14

## 2021-11-11 RX ADMIN — ACETAMINOPHEN 650 MG: 325 TABLET, FILM COATED ORAL at 11:11

## 2021-11-11 RX ADMIN — OLANZAPINE 5 MG: 5 TABLET, FILM COATED ORAL at 18:52

## 2021-11-11 RX ADMIN — OLANZAPINE 2.5 MG: 10 INJECTION, POWDER, LYOPHILIZED, FOR SOLUTION INTRAMUSCULAR at 00:30

## 2021-11-11 RX ADMIN — Medication 3 MG: at 21:44

## 2021-11-11 RX ADMIN — BENZTROPINE MESYLATE 0.5 MG: 1 TABLET ORAL at 18:53

## 2021-11-11 RX ADMIN — CEFTRIAXONE 1000 MG: 1 INJECTION, SOLUTION INTRAVENOUS at 21:51

## 2021-11-12 PROCEDURE — 99232 SBSQ HOSP IP/OBS MODERATE 35: CPT | Performed by: PHYSICIAN ASSISTANT

## 2021-11-12 PROCEDURE — 99239 HOSP IP/OBS DSCHRG MGMT >30: CPT | Performed by: STUDENT IN AN ORGANIZED HEALTH CARE EDUCATION/TRAINING PROGRAM

## 2021-11-12 PROCEDURE — RECHECK: Performed by: STUDENT IN AN ORGANIZED HEALTH CARE EDUCATION/TRAINING PROGRAM

## 2021-11-12 RX ORDER — CEFUROXIME AXETIL 250 MG/1
500 TABLET ORAL EVERY 12 HOURS SCHEDULED
Status: DISCONTINUED | OUTPATIENT
Start: 2021-11-12 | End: 2021-11-12 | Stop reason: HOSPADM

## 2021-11-12 RX ORDER — GABAPENTIN 400 MG/1
400 CAPSULE ORAL 3 TIMES DAILY
Qty: 180 CAPSULE | Refills: 0 | Status: SHIPPED | OUTPATIENT
Start: 2021-11-12

## 2021-11-12 RX ORDER — GABAPENTIN 400 MG/1
400 CAPSULE ORAL 3 TIMES DAILY
Status: DISCONTINUED | OUTPATIENT
Start: 2021-11-12 | End: 2021-11-12 | Stop reason: HOSPADM

## 2021-11-12 RX ORDER — BENZTROPINE MESYLATE 1 MG/1
1 TABLET ORAL 2 TIMES DAILY
Qty: 60 TABLET | Refills: 0 | Status: SHIPPED | OUTPATIENT
Start: 2021-11-12

## 2021-11-12 RX ORDER — OLANZAPINE 5 MG/1
5 TABLET ORAL DAILY
Qty: 30 TABLET | Refills: 0 | Status: SHIPPED | OUTPATIENT
Start: 2021-11-12

## 2021-11-12 RX ORDER — GABAPENTIN 400 MG/1
400 CAPSULE ORAL 3 TIMES DAILY
Qty: 180 CAPSULE | Refills: 0 | Status: SHIPPED | OUTPATIENT
Start: 2021-11-12 | End: 2021-11-12 | Stop reason: SDUPTHER

## 2021-11-12 RX ORDER — CEFUROXIME AXETIL 500 MG/1
500 TABLET ORAL EVERY 12 HOURS SCHEDULED
Qty: 10 TABLET | Refills: 0 | Status: SHIPPED | OUTPATIENT
Start: 2021-11-12 | End: 2021-11-17

## 2021-11-12 RX ORDER — OLANZAPINE 15 MG/1
15 TABLET ORAL
Qty: 30 TABLET | Refills: 0 | Status: SHIPPED | OUTPATIENT
Start: 2021-11-12

## 2021-11-12 RX ORDER — BENZTROPINE MESYLATE 1 MG/1
1 TABLET ORAL 2 TIMES DAILY
Status: DISCONTINUED | OUTPATIENT
Start: 2021-11-12 | End: 2021-11-12 | Stop reason: HOSPADM

## 2021-11-12 RX ORDER — CEFUROXIME AXETIL 500 MG/1
500 TABLET ORAL EVERY 12 HOURS SCHEDULED
Qty: 10 TABLET | Refills: 0 | Status: SHIPPED | OUTPATIENT
Start: 2021-11-12 | End: 2021-11-12

## 2021-11-12 RX ADMIN — GABAPENTIN 300 MG: 300 CAPSULE ORAL at 09:54

## 2021-11-12 RX ADMIN — CEFUROXIME AXETIL 500 MG: 250 TABLET, FILM COATED ORAL at 09:53

## 2021-11-12 RX ADMIN — BENZTROPINE MESYLATE 0.5 MG: 1 TABLET ORAL at 09:54

## 2021-11-12 RX ADMIN — OLANZAPINE 5 MG: 5 TABLET, FILM COATED ORAL at 09:54

## 2021-11-15 ENCOUNTER — TRANSITIONAL CARE MANAGEMENT (OUTPATIENT)
Dept: FAMILY MEDICINE CLINIC | Facility: CLINIC | Age: 41
End: 2021-11-15

## 2021-11-15 LAB
BACTERIA BLD CULT: NORMAL
BACTERIA BLD CULT: NORMAL

## 2022-04-13 NOTE — ED NOTES
Dr Jenelle Farmer at pts bedside for evaluation        Ciera Casanova, RN  03/12/19 8764
Patient states she is no longer on any daily medications  States the last time she was on any medications was her last admission in either October or November  Pt denies being SI or HI  States she recently relapsed on cocaine 2 weeks ago, last use was this morning        Chiara Beaulieu RN  03/12/19 5970
Pt seen leaving bed and walking down the hallway  Asked patient where she was going and if I could help her with any thing  Pt states "I saw the doctor, now I want something to drink" Explained to patient that I will get her something to drink and if she could please have a seat back on the bed  Pt states "no, I'm not waiting  I'm just gonna leave" Pt remains walking toward exit door  Dr Melissa Staton aware of situation        Chiara Beaulieu RN  03/12/19 3666
no

## 2022-09-09 ENCOUNTER — APPOINTMENT (OUTPATIENT)
Dept: RADIOLOGY | Facility: HOSPITAL | Age: 42
End: 2022-09-09
Payer: COMMERCIAL

## 2022-09-09 ENCOUNTER — HOSPITAL ENCOUNTER (EMERGENCY)
Facility: HOSPITAL | Age: 42
Discharge: HOME | End: 2022-09-09
Attending: EMERGENCY MEDICINE
Payer: COMMERCIAL

## 2022-09-09 VITALS
DIASTOLIC BLOOD PRESSURE: 59 MMHG | TEMPERATURE: 97.5 F | HEART RATE: 97 BPM | RESPIRATION RATE: 20 BRPM | OXYGEN SATURATION: 99 % | BODY MASS INDEX: 28.72 KG/M2 | WEIGHT: 157 LBS | SYSTOLIC BLOOD PRESSURE: 138 MMHG

## 2022-09-09 DIAGNOSIS — R09.81 NASAL SINUS CONGESTION: ICD-10-CM

## 2022-09-09 DIAGNOSIS — R05.9 COUGH: Primary | ICD-10-CM

## 2022-09-09 PROCEDURE — 99282 EMERGENCY DEPT VISIT SF MDM: CPT

## 2022-09-09 PROCEDURE — 99283 EMERGENCY DEPT VISIT LOW MDM: CPT

## 2022-09-09 PROCEDURE — 71046 X-RAY EXAM CHEST 2 VIEWS: CPT

## 2022-09-09 RX ORDER — FLUTICASONE PROPIONATE 50 MCG
1 SPRAY, SUSPENSION (ML) NASAL DAILY
Qty: 16 G | Refills: 0 | Status: SHIPPED | OUTPATIENT
Start: 2022-09-09

## 2022-09-09 RX ORDER — LORATADINE 10 MG/1
10 TABLET ORAL DAILY
Qty: 20 TABLET | Refills: 0 | Status: SHIPPED | OUTPATIENT
Start: 2022-09-09

## 2022-09-09 NOTE — DISCHARGE INSTRUCTIONS
Inform the rehab of your concern  Take medications as prescribed  Follow up with PCP, allergy  Return to ED for new or worsening symptoms as discussed

## 2022-09-09 NOTE — ED PROVIDER NOTES
History  Chief Complaint   Patient presents with    Cough     Pt c/o coughing/congestion for a month  States she "googled it" and thinks she was exposed to "black mold"     72-year-old F with past medical history of anemia, psychiatric disorders, substance abuse presents to emergency department complaining of cough and congestion x1 month  She reports she googled her symptoms and was found that they are the same as black mold toxicity  Smokes 1/2 pack per day since she was 8  History provided by:  Patient  Cough  Cough characteristics:  Non-productive  Sputum characteristics:  Nondescript  Duration:  4 weeks  Timing:  Constant  Progression:  Unchanged  Chronicity:  New  Smoker: yes    Context comment:  States she thinks there is mold downstairs in the basement at the rehab she is staying in  Relieved by:  Nothing  Worsened by:  Smoking  Ineffective treatments:  None tried  Associated symptoms: sinus congestion    Associated symptoms: no chest pain, no chills, no diaphoresis, no ear fullness, no ear pain, no eye discharge, no fever, no headaches, no myalgias, no rash, no rhinorrhea, no shortness of breath, no sore throat, no weight loss and no wheezing    Risk factors: no chemical exposure, no recent infection and no recent travel        Prior to Admission Medications   Prescriptions Last Dose Informant Patient Reported? Taking?    OLANZapine (ZyPREXA) 15 mg tablet   No No   Sig: Take 1 tablet (15 mg total) by mouth daily at bedtime   OLANZapine (ZyPREXA) 5 mg tablet   No No   Sig: Take 1 tablet (5 mg total) by mouth daily At 9am   benztropine (COGENTIN) 1 mg tablet   No No   Sig: Take 1 tablet (1 mg total) by mouth 2 (two) times a day At 9am and 6pm   gabapentin (NEURONTIN) 400 mg capsule   No No   Sig: Take 1 capsule (400 mg total) by mouth 3 (three) times a day At 9am, 4pm, 9pm   lactase (LACTAID) 3,000 units tablet   No No   Sig: Take 2 tablets (6,000 Units total) by mouth 3 (three) times a day with meals      Facility-Administered Medications: None       Past Medical History:   Diagnosis Date    Addiction to drug ([place] [address] )     Anemia     Anxiety     Depression     Drug abuse ([place] [address] )     Hallucination     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[address] )     Paranoia (psychosis) ([place] [address] )     Psychiatric disorder     Psychiatric illness     PTSD (post-traumatic stress disorder)     Schizo-affective type schizophrenia, chronic state ([place] [address] )     Self-injurious behavior     Suicidal behavior     Suicide attempt ([place] [address] )     Withdrawal symptoms, drug or narcotic ([place] [address] )        Past Surgical History:   Procedure Laterality Date     SECTION         Family History   Problem Relation Age of Onset    No Known Problems Mother     No Known Problems Father      I have reviewed and agree with the history as documented  E-Cigarette/Vaping    E-Cigarette Use Never User      E-Cigarette/Vaping Substances     Social History     Tobacco Use    Smoking status: Current Every Day Smoker     Packs/day: 0 50     Years: 0 00     Pack years: 0 00     Types: Cigarettes    Smokeless tobacco: Never Used   Vaping Use    Vaping Use: Never used   Substance Use Topics    Alcohol use: Not Currently     Comment: Pt stated "I don't drink"    Drug use: Not Currently     Types: "Crack" cocaine, Marijuana       Review of Systems   Constitutional: Negative for chills, diaphoresis, fever, unexpected weight change and weight loss  HENT: Positive for congestion  Negative for ear pain, rhinorrhea, sore throat, trouble swallowing and voice change  Eyes: Negative for discharge, redness and visual disturbance  Respiratory: Positive for cough  Negative for chest tightness, shortness of breath and wheezing  No hemoptysis    Cardiovascular: Negative for chest pain, palpitations and leg swelling  Gastrointestinal: Negative for abdominal pain, nausea and vomiting  Genitourinary: Negative for decreased urine volume and dysuria  Musculoskeletal: Negative for arthralgias, myalgias and neck stiffness  Skin: Negative for color change and rash  Neurological: Negative for seizures, syncope, weakness, light-headedness, numbness and headaches  Psychiatric/Behavioral: Negative for confusion  All other systems reviewed and are negative  Physical Exam  Physical Exam  Vitals and nursing note reviewed  Constitutional:       General: She is awake  She is not in acute distress  Appearance: Normal appearance  She is not ill-appearing, toxic-appearing or diaphoretic  HENT:      Head: Normocephalic and atraumatic  Jaw: No swelling  Right Ear: External ear normal       Left Ear: External ear normal       Nose: Congestion present  Mouth/Throat:      Lips: Pink  Mouth: Mucous membranes are moist       Pharynx: Oropharynx is clear  Comments: Patient maintaining airway and secretions  No stridor   No brawniness under tongue  Eyes:      General: Lids are normal  Vision grossly intact  Right eye: No discharge  Left eye: No discharge  Extraocular Movements:      Right eye: No nystagmus  Left eye: No nystagmus  Conjunctiva/sclera: Conjunctivae normal       Pupils: Pupils are equal, round, and reactive to light  Cardiovascular:      Rate and Rhythm: Normal rate and regular rhythm  Heart sounds: Normal heart sounds  Pulmonary:      Effort: Pulmonary effort is normal  No respiratory distress  Breath sounds: Normal breath sounds  No wheezing, rhonchi or rales  Comments: Patient in no respiratory distress, speaking in full sentences, managing oral secretions without difficulty, no accessory muscle use, retractions, or belly breathing noted, no adventitious lung sounds auscultated bilaterally  Abdominal:      General: There is no distension  Musculoskeletal:      Cervical back: Neck supple  Lymphadenopathy:      Cervical: No cervical adenopathy     Skin: General: Skin is warm and dry  Capillary Refill: Capillary refill takes less than 2 seconds  Coloration: Skin is not jaundiced or pale  Findings: No rash  Neurological:      Mental Status: She is alert and oriented to person, place, and time  GCS: GCS eye subscore is 4  GCS verbal subscore is 5  GCS motor subscore is 6  Sensory: No sensory deficit  Motor: No weakness  Gait: Gait normal    Psychiatric:         Mood and Affect: Affect normal          Speech: Speech normal          Behavior: Behavior is cooperative  Vital Signs  ED Triage Vitals [09/09/22 1411]   Temperature Pulse Respirations Blood Pressure SpO2   97 5 °F (36 4 °C) 97 20 138/59 99 %      Temp Source Heart Rate Source Patient Position - Orthostatic VS BP Location FiO2 (%)   Tympanic Monitor Sitting Left arm --      Pain Score       --           Vitals:    09/09/22 1411   BP: 138/59   Pulse: 97   Patient Position - Orthostatic VS: Sitting         Visual Acuity      ED Medications  Medications - No data to display    Diagnostic Studies  Results Reviewed     None                 XR chest 2 views   Final Result by Primitivo Tomlinson MD (09/09 1437)      No acute cardiopulmonary disease  Findings are stable            Workstation performed: [id number]                    Procedures  Procedures         ED Course                                             MDM  Number of Diagnoses or Management Options  Cough  Nasal sinus congestion  Diagnosis management comments: Patient is nontoxic-appearing  Vital signs stable  Afebrile  chronic cough and congestion  No chest pain or shortness of breath  No acute respiratory distress  No adventitious lung sounds auscultated  Swollen turbinates noted  Otherwise benign exam   Chest x-ray without acute cardiopulmonary disease  Patient concern for environmental exposure, discussed discontinuation of exposure if concerned, informed to follow-up with diarrhea and PCP  Patient is also a smoker, encouraged to follow-up with PCP  All imaging and/or lab testing discussed with patient, strict return to ED precautions discussed  Patient recommended to follow up promptly with appropriate outpatient provider  Patient and/or family members verbalizes understanding and agrees with plan  Patient and/or family members were given opportunity to ask questions, all questions were answered at this time  Patient is stable for discharge      Portions of the record may have been created with voice recognition software  Occasional wrong word or "sound a like" substitutions may have occurred due to the inherent limitations of voice recognition software  Read the chart carefully and recognize, using context, where substitutions have occurred  Amount and/or Complexity of Data Reviewed  Tests in the radiology section of CPT®: ordered and reviewed        Disposition  Final diagnoses:   Cough   Nasal sinus congestion     Time reflects when diagnosis was documented in both MDM as applicable and the Disposition within this note     Time User Action Codes Description Comment    9/9/2022  2:40 PM Umana Dense Add [R05 9] Cough     9/9/2022  2:41 PM Umana Dense Add [R09 81] Nasal sinus congestion       ED Disposition     ED Disposition   Discharge    Condition   Stable    Date/Time   Fri Sep 9, 2022  2:42 PM    Comment   Gladis Walker discharge to home/self care                 Follow-up Information     Follow up With Specialties Details Why Contact Info Additional Information    Alfonso Valenzuela MD Family Medicine Schedule an appointment as soon as possible for a visit  For follow up regarding your symptoms [address] [facility] [address]        [facility] [facility] Allergy Schedule an appointment as soon as possible for a visit  For follow up regarding your symptoms [address] [facility] [facility]  [place]  [address] [facility], [address] [place] [address], [facility], [address]          Discharge Medication List as of 9/9/2022  2:44 PM      START taking these medications    Details   fluticasone (FLONASE) 50 mcg/act nasal spray 1 spray into each nostril daily, Starting Fri 9/9/2022, Normal      loratadine (CLARITIN) 10 mg tablet Take 1 tablet (10 mg total) by mouth daily, Starting Fri 9/9/2022, Normal         CONTINUE these medications which have NOT CHANGED    Details   benztropine (COGENTIN) 1 mg tablet Take 1 tablet (1 mg total) by mouth 2 (two) times a day At 9am and 6pm, Starting Fri 11/12/2021, Normal      gabapentin (NEURONTIN) 400 mg capsule Take 1 capsule (400 mg total) by mouth 3 (three) times a day At 9am, 4pm, 9pm, Starting Fri 11/12/2021, Normal      lactase (LACTAID) 3,000 units tablet Take 2 tablets (6,000 Units total) by mouth 3 (three) times a day with meals, Starting Tue 4/27/2021, Print      !! OLANZapine (ZyPREXA) 15 mg tablet Take 1 tablet (15 mg total) by mouth daily at bedtime, Starting Fri 11/12/2021, Normal      !! OLANZapine (ZyPREXA) 5 mg tablet Take 1 tablet (5 mg total) by mouth daily At 9am, Starting Fri 11/12/2021, Normal       !! - Potential duplicate medications found  Please discuss with provider  No discharge procedures on file      PDMP Review     None          ED Provider  Electronically Signed by           Monica Robison PA-C  09/09/22 5200

## 2022-10-12 PROBLEM — J18.9 SEPSIS DUE TO PNEUMONIA (HCC): Status: RESOLVED | Noted: 2021-11-09 | Resolved: 2022-10-12

## 2022-10-12 PROBLEM — A41.9 SEPSIS DUE TO PNEUMONIA (HCC): Status: RESOLVED | Noted: 2021-11-09 | Resolved: 2022-10-12

## 2022-11-29 ENCOUNTER — TELEPHONE (OUTPATIENT)
Dept: FAMILY MEDICINE CLINIC | Facility: CLINIC | Age: 42
End: 2022-11-29

## 2022-11-29 NOTE — TELEPHONE ENCOUNTER
I have attempted to contact this patient by phone with the following results: left message to return my call on answering machine  Calling patient to confirm appointment for 11/30

## 2023-02-10 ENCOUNTER — OFFICE VISIT (OUTPATIENT)
Dept: FAMILY MEDICINE CLINIC | Facility: CLINIC | Age: 43
End: 2023-02-10

## 2023-02-10 VITALS
OXYGEN SATURATION: 99 % | TEMPERATURE: 97.6 F | RESPIRATION RATE: 18 BRPM | HEIGHT: 62 IN | DIASTOLIC BLOOD PRESSURE: 80 MMHG | WEIGHT: 168 LBS | HEART RATE: 109 BPM | BODY MASS INDEX: 30.91 KG/M2 | SYSTOLIC BLOOD PRESSURE: 118 MMHG

## 2023-02-10 DIAGNOSIS — F41.9 ANXIETY: ICD-10-CM

## 2023-02-10 DIAGNOSIS — E66.09 CLASS 1 OBESITY DUE TO EXCESS CALORIES WITHOUT SERIOUS COMORBIDITY WITH BODY MASS INDEX (BMI) OF 30.0 TO 30.9 IN ADULT: ICD-10-CM

## 2023-02-10 DIAGNOSIS — F10.11 HISTORY OF ALCOHOL ABUSE: ICD-10-CM

## 2023-02-10 DIAGNOSIS — Z02.4 DRIVER'S PERMIT PHYSICAL EXAMINATION: ICD-10-CM

## 2023-02-10 DIAGNOSIS — G47.00 INSOMNIA, UNSPECIFIED TYPE: ICD-10-CM

## 2023-02-10 DIAGNOSIS — Z02.1 PRE-EMPLOYMENT EXAMINATION: ICD-10-CM

## 2023-02-10 DIAGNOSIS — E55.9 VITAMIN D DEFICIENCY: ICD-10-CM

## 2023-02-10 DIAGNOSIS — Z72.0 TOBACCO ABUSE: ICD-10-CM

## 2023-02-10 DIAGNOSIS — F25.1 SCHIZOAFFECTIVE DISORDER, DEPRESSIVE TYPE (HCC): ICD-10-CM

## 2023-02-10 DIAGNOSIS — Z28.21 IMMUNIZATION DECLINED: ICD-10-CM

## 2023-02-10 DIAGNOSIS — D50.9 MICROCYTIC ANEMIA: ICD-10-CM

## 2023-02-10 DIAGNOSIS — Z00.00 ANNUAL PHYSICAL EXAM: ICD-10-CM

## 2023-02-10 DIAGNOSIS — F14.11 HISTORY OF COCAINE ABUSE (HCC): ICD-10-CM

## 2023-02-10 DIAGNOSIS — Z76.89 ENCOUNTER TO ESTABLISH CARE: Primary | ICD-10-CM

## 2023-02-10 DIAGNOSIS — Z01.20 DENTAL EXAMINATION: ICD-10-CM

## 2023-02-10 DIAGNOSIS — F31.4 BIPOLAR 1 DISORDER, DEPRESSED, SEVERE (HCC): ICD-10-CM

## 2023-02-10 PROBLEM — F32.A DEPRESSION: Chronic | Status: RESOLVED | Noted: 2018-10-16 | Resolved: 2023-02-10

## 2023-02-10 PROBLEM — F39 MOOD DISORDER (HCC): Status: RESOLVED | Noted: 2019-05-15 | Resolved: 2023-02-10

## 2023-02-10 PROBLEM — Z00.8 MEDICAL CLEARANCE FOR PSYCHIATRIC ADMISSION: Status: RESOLVED | Noted: 2019-10-27 | Resolved: 2023-02-10

## 2023-02-10 PROBLEM — F33.3 SEVERE EPISODE OF RECURRENT MAJOR DEPRESSIVE DISORDER, WITH PSYCHOTIC FEATURES (HCC): Status: ACTIVE | Noted: 2021-08-04

## 2023-02-10 PROBLEM — F33.3 SEVERE EPISODE OF RECURRENT MAJOR DEPRESSIVE DISORDER, WITH PSYCHOTIC FEATURES (HCC): Status: RESOLVED | Noted: 2021-08-04 | Resolved: 2023-02-10

## 2023-02-10 PROBLEM — J36 PERITONSILLAR ABSCESS: Status: ACTIVE | Noted: 2022-05-24

## 2023-02-10 PROBLEM — J36 PERITONSILLAR ABSCESS: Status: RESOLVED | Noted: 2022-05-24 | Resolved: 2023-02-10

## 2023-02-10 PROBLEM — R45.851 SUICIDAL INTENT: Status: RESOLVED | Noted: 2021-11-09 | Resolved: 2023-02-10

## 2023-02-10 PROBLEM — R82.81 PYURIA: Status: RESOLVED | Noted: 2018-10-16 | Resolved: 2023-02-10

## 2023-02-10 PROBLEM — F25.9 SCHIZOAFFECTIVE DISORDER (HCC): Status: ACTIVE | Noted: 2022-05-25

## 2023-02-10 PROBLEM — G25.9 MOVEMENT DISORDER: Status: RESOLVED | Noted: 2019-10-29 | Resolved: 2023-02-10

## 2023-02-10 RX ORDER — HYDROXYZINE 50 MG/1
50 TABLET, FILM COATED ORAL
Qty: 30 TABLET | Refills: 2 | Status: SHIPPED | OUTPATIENT
Start: 2023-02-10

## 2023-02-10 NOTE — PROGRESS NOTES
[address] [facility]    NAME: Kelsea Quesada  AGE: 38 y o  SEX: female  : 1980     DATE: 2/10/2023     Assessment and Plan:     Problem List Items Addressed This Visit        Other    Microcytic anemia     - CBC, iron panel         Relevant Orders    CBC and differential    Comprehensive metabolic panel    Iron Panel (Includes Ferritin, Iron Sat%, Iron, and TIBC)    Tobacco abuse     Tobacco Cessation Counseling: Tobacco cessation counseling was provided  The patient is sincerely urged to quit consumption of tobacco  She is not ready to quit tobacco           Bipolar 1 disorder, depressed, severe ([facility] [address] )     No current mental health medications  Feels "content," denies symptoms and declines medications at this time  Working with  through [facility]  to establish with a psychiatrist    - Continue current plan  Monitor  Relevant Medications    hydrOXYzine HCL (ATARAX) 50 mg tablet    Vitamin D deficiency     - Vitamin D level         Relevant Orders    Vitamin D 25 hydroxy    Cocaine abuse ([facility] [address] )     Reports last use May 30, 2022  Receiving substance abuse treatment through [facility]  - Continue current plan  Avoid "people and places" that are known triggers  - Urine tox screen to complete forms  History of alcohol abuse     Denies current use  Working with [facility] for substance abuse treatment  - Validated efforts  Continue to avoid alcohol  Anxiety    Schizoaffective disorder (HCC)    Relevant Medications    hydrOXYzine HCL (ATARAX) 50 mg tablet    Pre-employment examination     - PPD placement today  Relevant Orders    TB Skin Test (Completed)    Toxicology screen, urine (Completed)    Insomnia     Has tried and failed several medications  - Hydroxyzine 50 mg HS PRN           Relevant Medications    hydrOXYzine HCL (ATARAX) 50 mg tablet    Class 1 obesity due to excess calories without serious comorbidity with body mass index (BMI) of 30 0 to 30 9 in adult     BMI Counseling: Body mass index is 30 73 kg/m²  The BMI is above normal  Nutrition recommendations include decreasing portion sizes, encouraging healthy choices of fruits and vegetables, limiting drinks that contain sugar and moderation in carbohydrate intake  Exercise recommendations include moderate physical activity 150 minutes/week  Rationale for BMI follow-up plan is due to patient being overweight or obese  Relevant Orders    CBC and differential    Comprehensive metabolic panel    Hemoglobin A1C    Lipid panel    Immunization declined     Pt declines offered COVID and influenza vaccines at this time despite discussion of risks versus benefits of immunization  's permit physical examination     Pt with no hx of neurological disorders or seizures, uncontrolled DM, HTN, CV disorders, or cognitive impairments  In-office vision screening passed and no exam findings today that would preclude safe operation of a vehicle  - History of drug and alcohol abuse  Urine tox screen today, form to be completed pending negative results  - Pt counseled regarding safe driving habits  Other Visit Diagnoses     Encounter to establish care    -  Primary    Annual physical exam        Dental examination        Relevant Orders    Ambulatory Referral to Dentistry          Immunizations and preventive care screenings were discussed with patient today  Appropriate education was printed on patient's after visit summary  Counseling:  Dental Health: discussed importance of regular tooth brushing, flossing, and dental visits  Referred to [facility]   · Exercise: the importance of regular exercise/physical activity was discussed  Recommend exercise 3-5 times per week for at least 30 minutes  BMI Counseling: Body mass index is 30 73 kg/m²   The BMI is above normal  Nutrition recommendations include decreasing portion sizes, encouraging healthy choices of fruits and vegetables, limiting drinks that contain sugar and moderation in carbohydrate intake  Exercise recommendations include moderate physical activity 150 minutes/week  Rationale for BMI follow-up plan is due to patient being overweight or obese  Tobacco Cessation Counseling: Tobacco cessation counseling was provided  The patient is sincerely urged to quit consumption of tobacco  She is not ready to quit tobacco          Return in about 3 months (around 5/10/2023) for Follow up insomnia/psych  Chief Complaint:     Chief Complaint   Patient presents with   • New Patient Visit   • Anxiety      History of Present Illness:     Adult Annual Physical   Amy Tompkins presents to the office to establish care, for a comprehensive physical exam, and completion of DMV and employment forms  Pt has had no recent primary care  Pt's PMH is significant for alcohol and crack cocaine abuse, mental health disorders, and self-harm behaviors  Pt states she has been clean since 5/30/22, "because it ruined my life " Pt has services in place through [employer] [facility] [employer] [facility]   Diet and Physical Activity  · Diet/Nutrition: poor diet and limited fruits/vegetables  · Exercise: no formal exercise  Depression Screening  PHQ-2/9 Depression Screening    Little interest or pleasure in doing things: 0 - not at all  Feeling down, depressed, or hopeless: 0 - not at all        General Health  · Sleep: gets 7-8 hours of sleep on average  · Hearing: normal - bilateral   · Vision: most recent eye exam >1 year ago and wears glasses for reading  · Dental: no dental visits for >1 year  /GYN Health  · Patient is: premenopausal  · Last menstrual period: 2/3/2023  · Contraceptive method: none  Declines STD screening  Review of Systems:     Review of Systems   Constitutional: Negative for fatigue, fever and unexpected weight change     HENT: Negative for congestion, sore throat and trouble swallowing  Eyes: Negative for visual disturbance  Respiratory: Negative for cough, chest tightness, shortness of breath and wheezing  Cardiovascular: Negative for chest pain, palpitations and leg swelling  Gastrointestinal: Negative for abdominal pain, constipation, diarrhea, nausea and vomiting  Endocrine: Negative for polyuria  Genitourinary: Negative for decreased urine volume and dysuria  Neurological: Negative for dizziness, seizures, syncope, weakness, light-headedness, numbness and headaches  Psychiatric/Behavioral: Positive for sleep disturbance  Negative for dysphoric mood, hallucinations, self-injury and suicidal ideas  The patient is nervous/anxious  All other systems reviewed and are negative       Past Medical History:     Past Medical History:   Diagnosis Date   • Addiction to drug [facility])    • Anemia    • Anxiety    • Depression    • Depression with suicidal ideation    • Drug abuse ([facility] [address] )    • Hallucination    • Mood disorder ([facility] [address] ) 5/15/2019    R/O BIPOLAR DISORDER   • Movement disorder 10/29/2019   • 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[facility] [address] )    • Paranoia (psychosis) ([facility] [address] )    • Peritonsillar abscess 2022   • Psychiatric disorder    • Psychiatric illness    • PTSD (post-traumatic stress disorder)    • Pyuria 10/16/2018   • Schizo-affective type schizophrenia, chronic state ([facility] [address] )    • Schizo-affective type schizophrenia, chronic state ([facility] [address] )    • Self-injurious behavior    • Severe episode of recurrent major depressive disorder, with psychotic features ([facility] [address] ) 2021   • Suicidal behavior    • Suicidal intent 2021   • Suicide attempt ([facility] [address] )    • Withdrawal symptoms, drug or narcotic ([facility] [address] )       Past Surgical History:     Past Surgical History:   Procedure Laterality Date   •  SECTION      x2      Social History:     Social History     Socioeconomic History   • Marital status: Single     Spouse name: None   • Number of children: 5   • Years of education: None   • Highest education level: None   Occupational History   • None   Tobacco Use   • Smoking status: Every Day     Packs/day: 0 50     Years: 0 00     Pack years: 0 00     Types: Cigarettes     Start date: 1993   • Smokeless tobacco: Never   Vaping Use   • Vaping Use: Never used   Substance and Sexual Activity   • Alcohol use: Not Currently     Comment: Pt stated "I don't drink"   • Drug use: Not Currently     Types: "Crack" cocaine, Marijuana     Comment: 2/10/23: reports last use May 30, 2022  • Sexual activity: Not Currently   Other Topics Concern   • None   Social History Narrative              Social Determinants of Health     Financial Resource Strain: Low Risk    • Difficulty of Paying Living Expenses: Not hard at all   Food Insecurity: No Food Insecurity   • Worried About Running Out of Food in the Last Year: Never true   • Ran Out of Food in the Last Year: Never true   Transportation Needs: No Transportation Needs   • Lack of Transportation (Medical): No   • Lack of Transportation (Non-Medical): No   Physical Activity: Not on file   Stress: Not on file   Social Connections: Not on file   Intimate Partner Violence: Not on file   Housing Stability: Not on file      Family History:     Family History   Problem Relation Age of Onset   • Chiari malformation Mother    • Heart disease Father         pacemaker   • Brain cancer Sister    • Lung cancer Maternal Grandmother       Current Medications:     Current Outpatient Medications   Medication Sig Dispense Refill   • hydrOXYzine HCL (ATARAX) 50 mg tablet Take 1 tablet (50 mg total) by mouth daily at bedtime as needed (insomnia) 30 tablet 2     No current facility-administered medications for this visit        Allergies:     No Known Allergies   Physical Exam:     /80 (BP Location: Left arm, Patient Position: Sitting, Cuff Size: Standard)   Pulse (!) 109   Temp 97 6 °F (36 4 °C) (Temporal)   Resp 18   Ht 5' 2" (1 575 m)   Wt 76 2 kg (168 lb)   LMP 02/01/2023 (Approximate)   SpO2 99%   BMI 30 73 kg/m²     Physical Exam  Vitals reviewed  Constitutional:       General: She is not in acute distress  Appearance: She is obese  She is not ill-appearing or diaphoretic  HENT:      Head: Normocephalic and atraumatic  Right Ear: Tympanic membrane, ear canal and external ear normal       Left Ear: Tympanic membrane, ear canal and external ear normal       Nose: Nose normal       Mouth/Throat:      Mouth: Mucous membranes are moist       Pharynx: Oropharynx is clear  Eyes:      General: Lids are normal       Extraocular Movements: Extraocular movements intact  Conjunctiva/sclera: Conjunctivae normal       Pupils: Pupils are equal, round, and reactive to light  Neck:      Thyroid: No thyromegaly or thyroid tenderness  Cardiovascular:      Rate and Rhythm: Normal rate and regular rhythm  Pulses: Normal pulses  Heart sounds: Normal heart sounds  No murmur heard  Pulmonary:      Effort: Pulmonary effort is normal  No tachypnea  Breath sounds: Normal breath sounds  No decreased breath sounds, wheezing or rales  Abdominal:      General: Bowel sounds are normal  There is no distension  Palpations: Abdomen is soft  Tenderness: There is no abdominal tenderness  There is no guarding  Musculoskeletal:      Cervical back: Neck supple  Right lower leg: No edema  Left lower leg: No edema  Lymphadenopathy:      Cervical: No cervical adenopathy  Skin:     General: Skin is warm and dry  Comments: Forearms scarred from past cutting  Neurological:      Mental Status: She is alert and oriented to person, place, and time  Cranial Nerves: No cranial nerve deficit, dysarthria or facial asymmetry  Motor: Motor function is intact  No weakness  Gait: Gait is intact  Psychiatric:         Attention and Perception: Attention normal          Mood and Affect: Affect normal  Mood is anxious (rocking)  Speech: Speech normal          Behavior: Behavior normal          Thought Content:  Thought content normal           Jeremías Reynolds, [address] [facility]

## 2023-02-11 LAB
AMPHETAMINES UR QL SCN: NEGATIVE NG/ML
BARBITURATES UR QL SCN: NEGATIVE NG/ML
BENZODIAZ UR QL: NEGATIVE NG/ML
BZE UR QL: NEGATIVE NG/ML
CANNABINOIDS UR QL SCN: NEGATIVE NG/ML
METHADONE UR QL SCN: NEGATIVE NG/ML
OPIATES UR QL: NEGATIVE NG/ML
PCP UR QL: NEGATIVE NG/ML
PROPOXYPH UR QL SCN: NEGATIVE NG/ML

## 2023-02-13 ENCOUNTER — CLINICAL SUPPORT (OUTPATIENT)
Dept: FAMILY MEDICINE CLINIC | Facility: CLINIC | Age: 43
End: 2023-02-13

## 2023-02-13 DIAGNOSIS — Z11.1 ENCOUNTER FOR PPD SKIN TEST READING: Primary | ICD-10-CM

## 2023-02-13 LAB
INDURATION: 0 MM
TB SKIN TEST: NEGATIVE

## 2023-02-15 PROBLEM — G47.00 INSOMNIA: Status: ACTIVE | Noted: 2023-02-15

## 2023-02-15 PROBLEM — Z02.1 PRE-EMPLOYMENT EXAMINATION: Status: ACTIVE | Noted: 2023-02-15

## 2023-02-15 PROBLEM — Z28.21 IMMUNIZATION DECLINED: Status: ACTIVE | Noted: 2023-02-15

## 2023-02-15 PROBLEM — E66.09 CLASS 1 OBESITY DUE TO EXCESS CALORIES WITHOUT SERIOUS COMORBIDITY WITH BODY MASS INDEX (BMI) OF 30.0 TO 30.9 IN ADULT: Status: ACTIVE | Noted: 2023-02-15

## 2023-02-15 PROBLEM — F10.11 HISTORY OF ALCOHOL ABUSE: Status: ACTIVE | Noted: 2019-05-15

## 2023-02-15 PROBLEM — Z02.4 DRIVER'S PERMIT PHYSICAL EXAMINATION: Status: ACTIVE | Noted: 2023-02-15

## 2023-02-15 NOTE — ASSESSMENT & PLAN NOTE
Reports last use May 30, 2022  Receiving substance abuse treatment through [facility]  - Continue current plan  Avoid "people and places" that are known triggers  - Urine tox screen to complete forms

## 2023-02-15 NOTE — ASSESSMENT & PLAN NOTE
Pt declines offered COVID and influenza vaccines at this time despite discussion of risks versus benefits of immunization

## 2023-02-15 NOTE — ASSESSMENT & PLAN NOTE
Tobacco Cessation Counseling: Tobacco cessation counseling was provided   The patient is sincerely urged to quit consumption of tobacco  She is not ready to quit tobacco

## 2023-02-15 NOTE — ASSESSMENT & PLAN NOTE
No current mental health medications  Feels "content," denies symptoms and declines medications at this time  Working with  through [facility] to establish with a psychiatrist    - Continue current plan  Monitor

## 2023-02-15 NOTE — ASSESSMENT & PLAN NOTE
Denies current use  Working with [facility] for substance abuse treatment  - Validated efforts  Continue to avoid alcohol

## 2023-02-15 NOTE — ASSESSMENT & PLAN NOTE
Pt with no hx of neurological disorders or seizures, uncontrolled DM, HTN, CV disorders, or cognitive impairments  In-office vision screening passed and no exam findings today that would preclude safe operation of a vehicle  - History of drug and alcohol abuse  Urine tox screen today, form to be completed pending negative results  - Pt counseled regarding safe driving habits

## 2023-04-16 PROBLEM — Z02.4 DRIVER'S PERMIT PHYSICAL EXAMINATION: Status: RESOLVED | Noted: 2023-02-15 | Resolved: 2023-04-16

## 2023-07-19 ENCOUNTER — VBI (OUTPATIENT)
Dept: ADMINISTRATIVE | Facility: OTHER | Age: 43
End: 2023-07-19

## 2023-11-01 ENCOUNTER — TELEPHONE (OUTPATIENT)
Dept: FAMILY MEDICINE CLINIC | Facility: CLINIC | Age: 43
End: 2023-11-01

## 2023-11-01 NOTE — TELEPHONE ENCOUNTER
My name is Vickie Layne. My birthday is [birth year]. I'm calling to schedule an appointment. I need blood work done. Please call me back [phone] [id number] Puneet Cameron Thank you. Tried calling back, voicemail was full.

## 2024-01-02 ENCOUNTER — TELEPHONE (OUTPATIENT)
Dept: FAMILY MEDICINE CLINIC | Facility: CLINIC | Age: 44
End: 2024-01-02

## 2024-01-02 NOTE — TELEPHONE ENCOUNTER
Good morning. My name is Brandy and I'm calling from [facility]. I am a  for a female is going to be discharging next week and we want to make sure we set her up with a family officials and doctor for a physical. If you can, please give me a call back at [phone], extension number [phone]. Patient's date of birth is [birth year]. Last name is Alex. Thank you. Bye, bye.      Left message for Brandy to return our call

## 2024-01-16 ENCOUNTER — TELEPHONE (OUTPATIENT)
Dept: FAMILY MEDICINE CLINIC | Facility: CLINIC | Age: 44
End: 2024-01-16

## 2024-01-16 NOTE — TELEPHONE ENCOUNTER
first attempt to contact patient. message left to return my call with Mother.       Rescheduling 1/16/24 appt

## 2024-03-19 ENCOUNTER — HOSPITAL ENCOUNTER (INPATIENT)
Facility: HOSPITAL | Age: 44
LOS: 1 days | Discharge: HOME | DRG: 720 | End: 2024-03-21
Attending: EMERGENCY MEDICINE | Admitting: FAMILY MEDICINE
Payer: COMMERCIAL

## 2024-03-19 ENCOUNTER — APPOINTMENT (EMERGENCY)
Dept: RADIOLOGY | Facility: HOSPITAL | Age: 44
DRG: 720 | End: 2024-03-19
Payer: COMMERCIAL

## 2024-03-19 DIAGNOSIS — F14.10 COCAINE ABUSE (HCC): ICD-10-CM

## 2024-03-19 DIAGNOSIS — F10.11 HISTORY OF ALCOHOL ABUSE: ICD-10-CM

## 2024-03-19 DIAGNOSIS — J02.0 STREP PHARYNGITIS: ICD-10-CM

## 2024-03-19 DIAGNOSIS — R13.10 DYSPHAGIA: ICD-10-CM

## 2024-03-19 DIAGNOSIS — E43 SEVERE PROTEIN-CALORIE MALNUTRITION (HCC): ICD-10-CM

## 2024-03-19 DIAGNOSIS — A41.9 SEPSIS (HCC): ICD-10-CM

## 2024-03-19 DIAGNOSIS — J36 PERITONSILLAR ABSCESS: Primary | ICD-10-CM

## 2024-03-19 DIAGNOSIS — J03.00 STREPTOCOCCAL ADENOTONSILLITIS: ICD-10-CM

## 2024-03-19 DIAGNOSIS — N39.0 UTI (URINARY TRACT INFECTION): ICD-10-CM

## 2024-03-19 LAB
BASOPHILS # BLD AUTO: 0.08 THOUSANDS/ÂΜL (ref 0–0.1)
BASOPHILS NFR BLD AUTO: 1 % (ref 0–1)
EOSINOPHIL # BLD AUTO: 0.06 THOUSAND/ÂΜL (ref 0–0.61)
EOSINOPHIL NFR BLD AUTO: 0 % (ref 0–6)
ERYTHROCYTE [DISTWIDTH] IN BLOOD BY AUTOMATED COUNT: 19 % (ref 11.6–15.1)
EXT PREGNANCY TEST URINE: NEGATIVE
EXT. CONTROL: NORMAL
HCT VFR BLD AUTO: 45.9 % (ref 34.8–46.1)
HGB BLD-MCNC: 14 G/DL (ref 11.5–15.4)
IMM GRANULOCYTES # BLD AUTO: 0.06 THOUSAND/UL (ref 0–0.2)
IMM GRANULOCYTES NFR BLD AUTO: 0 % (ref 0–2)
LYMPHOCYTES # BLD AUTO: 2.3 THOUSANDS/ÂΜL (ref 0.6–4.47)
LYMPHOCYTES NFR BLD AUTO: 14 % (ref 14–44)
MCH RBC QN AUTO: 23.4 PG (ref 26.8–34.3)
MCHC RBC AUTO-ENTMCNC: 30.5 G/DL (ref 31.4–37.4)
MCV RBC AUTO: 77 FL (ref 82–98)
MONOCYTES # BLD AUTO: 1.22 THOUSAND/ÂΜL (ref 0.17–1.22)
MONOCYTES NFR BLD AUTO: 8 % (ref 4–12)
NEUTROPHILS # BLD AUTO: 12.64 THOUSANDS/ÂΜL (ref 1.85–7.62)
NEUTS SEG NFR BLD AUTO: 77 % (ref 43–75)
NRBC BLD AUTO-RTO: 0 /100 WBCS
PLATELET # BLD AUTO: 304 THOUSANDS/UL (ref 149–390)
PMV BLD AUTO: 10.3 FL (ref 8.9–12.7)
RBC # BLD AUTO: 5.99 MILLION/UL (ref 3.81–5.12)
S PYO DNA THROAT QL NAA+PROBE: DETECTED
WBC # BLD AUTO: 16.36 THOUSAND/UL (ref 4.31–10.16)

## 2024-03-19 PROCEDURE — 87186 SC STD MICRODIL/AGAR DIL: CPT

## 2024-03-19 PROCEDURE — 36415 COLL VENOUS BLD VENIPUNCTURE: CPT

## 2024-03-19 PROCEDURE — 85610 PROTHROMBIN TIME: CPT

## 2024-03-19 PROCEDURE — 71045 X-RAY EXAM CHEST 1 VIEW: CPT

## 2024-03-19 PROCEDURE — 81001 URINALYSIS AUTO W/SCOPE: CPT

## 2024-03-19 PROCEDURE — 99285 EMERGENCY DEPT VISIT HI MDM: CPT | Performed by: EMERGENCY MEDICINE

## 2024-03-19 PROCEDURE — 81025 URINE PREGNANCY TEST: CPT

## 2024-03-19 PROCEDURE — 96365 THER/PROPH/DIAG IV INF INIT: CPT

## 2024-03-19 PROCEDURE — 80307 DRUG TEST PRSMV CHEM ANLYZR: CPT

## 2024-03-19 PROCEDURE — 85730 THROMBOPLASTIN TIME PARTIAL: CPT

## 2024-03-19 PROCEDURE — 83605 ASSAY OF LACTIC ACID: CPT

## 2024-03-19 PROCEDURE — 87040 BLOOD CULTURE FOR BACTERIA: CPT

## 2024-03-19 PROCEDURE — 80053 COMPREHEN METABOLIC PANEL: CPT

## 2024-03-19 PROCEDURE — 84145 PROCALCITONIN (PCT): CPT

## 2024-03-19 PROCEDURE — 87077 CULTURE AEROBIC IDENTIFY: CPT

## 2024-03-19 PROCEDURE — 99285 EMERGENCY DEPT VISIT HI MDM: CPT

## 2024-03-19 PROCEDURE — 42700 I&D ABSCESS PERITONSILLAR: CPT | Performed by: EMERGENCY MEDICINE

## 2024-03-19 PROCEDURE — 81003 URINALYSIS AUTO W/O SCOPE: CPT

## 2024-03-19 PROCEDURE — 96375 TX/PRO/DX INJ NEW DRUG ADDON: CPT

## 2024-03-19 PROCEDURE — 93005 ELECTROCARDIOGRAM TRACING: CPT

## 2024-03-19 PROCEDURE — 87651 STREP A DNA AMP PROBE: CPT | Performed by: EMERGENCY MEDICINE

## 2024-03-19 PROCEDURE — 87086 URINE CULTURE/COLONY COUNT: CPT

## 2024-03-19 PROCEDURE — 85025 COMPLETE CBC W/AUTO DIFF WBC: CPT

## 2024-03-19 PROCEDURE — 96361 HYDRATE IV INFUSION ADD-ON: CPT

## 2024-03-19 RX ORDER — CEFTRIAXONE 1 G/50ML
1000 INJECTION, SOLUTION INTRAVENOUS ONCE
Status: COMPLETED | OUTPATIENT
Start: 2024-03-19 | End: 2024-03-19

## 2024-03-19 RX ORDER — METRONIDAZOLE 500 MG/100ML
500 INJECTION, SOLUTION INTRAVENOUS EVERY 8 HOURS
Status: DISCONTINUED | OUTPATIENT
Start: 2024-03-19 | End: 2024-03-20

## 2024-03-19 RX ADMIN — CEFTRIAXONE 1000 MG: 1 INJECTION, SOLUTION INTRAVENOUS at 23:49

## 2024-03-19 RX ADMIN — METRONIDAZOLE 500 MG: 5 INJECTION, SOLUTION INTRAVENOUS at 23:56

## 2024-03-19 RX ADMIN — SODIUM CHLORIDE 1000 ML: 0.9 INJECTION, SOLUTION INTRAVENOUS at 23:48

## 2024-03-20 ENCOUNTER — APPOINTMENT (EMERGENCY)
Dept: CT IMAGING | Facility: HOSPITAL | Age: 44
DRG: 720 | End: 2024-03-20
Payer: COMMERCIAL

## 2024-03-20 PROBLEM — N39.0 UTI (URINARY TRACT INFECTION): Status: ACTIVE | Noted: 2024-03-20

## 2024-03-20 PROBLEM — E43 SEVERE PROTEIN-CALORIE MALNUTRITION (HCC): Status: ACTIVE | Noted: 2024-03-20

## 2024-03-20 PROBLEM — A41.9 SEPSIS (HCC): Status: ACTIVE | Noted: 2024-03-20

## 2024-03-20 LAB
ALBUMIN SERPL BCP-MCNC: 4.2 G/DL (ref 3.5–5)
ALP SERPL-CCNC: 73 U/L (ref 34–104)
ALT SERPL W P-5'-P-CCNC: 13 U/L (ref 7–52)
AMPHETAMINES SERPL QL SCN: NEGATIVE
ANION GAP SERPL CALCULATED.3IONS-SCNC: 11 MMOL/L (ref 4–13)
APTT PPP: 40 SECONDS (ref 23–37)
AST SERPL W P-5'-P-CCNC: 20 U/L (ref 13–39)
ATRIAL RATE: 79 BPM
BACTERIA UR QL AUTO: ABNORMAL /HPF
BARBITURATES UR QL: NEGATIVE
BENZODIAZ UR QL: NEGATIVE
BILIRUB SERPL-MCNC: 0.99 MG/DL (ref 0.2–1)
BILIRUB UR QL STRIP: NEGATIVE
BUN SERPL-MCNC: 6 MG/DL (ref 5–25)
CALCIUM SERPL-MCNC: 9.8 MG/DL (ref 8.4–10.2)
CHLORIDE SERPL-SCNC: 96 MMOL/L (ref 96–108)
CLARITY UR: ABNORMAL
CO2 SERPL-SCNC: 29 MMOL/L (ref 21–32)
COCAINE UR QL: POSITIVE
COLOR UR: ABNORMAL
CREAT SERPL-MCNC: 0.58 MG/DL (ref 0.6–1.3)
GFR SERPL CREATININE-BSD FRML MDRD: 114 ML/MIN/1.73SQ M
GLUCOSE SERPL-MCNC: 68 MG/DL (ref 65–140)
GLUCOSE UR STRIP-MCNC: NEGATIVE MG/DL
HGB UR QL STRIP.AUTO: 250
INR PPP: 1.05 (ref 0.84–1.19)
KETONES UR STRIP-MCNC: ABNORMAL MG/DL
LACTATE SERPL-SCNC: 1.1 MMOL/L (ref 0.5–2)
LEUKOCYTE ESTERASE UR QL STRIP: 100
METHADONE UR QL: NEGATIVE
MUCOUS THREADS UR QL AUTO: ABNORMAL
NITRITE UR QL STRIP: NEGATIVE
NON-SQ EPI CELLS URNS QL MICRO: ABNORMAL /HPF
OPIATES UR QL SCN: NEGATIVE
OXYCODONE+OXYMORPHONE UR QL SCN: NEGATIVE
P AXIS: 67 DEGREES
PCP UR QL: NEGATIVE
PH UR STRIP.AUTO: 6 [PH]
POTASSIUM SERPL-SCNC: 3.9 MMOL/L (ref 3.5–5.3)
PR INTERVAL: 132 MS
PROCALCITONIN SERPL-MCNC: 0.14 NG/ML
PROT SERPL-MCNC: 8 G/DL (ref 6.4–8.4)
PROT UR STRIP-MCNC: ABNORMAL MG/DL
PROTHROMBIN TIME: 14.1 SECONDS (ref 11.6–14.5)
QRS AXIS: 67 DEGREES
QRSD INTERVAL: 82 MS
QT INTERVAL: 370 MS
QTC INTERVAL: 424 MS
RBC #/AREA URNS AUTO: ABNORMAL /HPF
SODIUM SERPL-SCNC: 136 MMOL/L (ref 135–147)
SP GR UR STRIP.AUTO: 1.02 (ref 1–1.04)
T WAVE AXIS: 73 DEGREES
THC UR QL: NEGATIVE
UROBILINOGEN UA: NEGATIVE MG/DL
VENTRICULAR RATE: 79 BPM
WBC #/AREA URNS AUTO: ABNORMAL /HPF

## 2024-03-20 PROCEDURE — 70491 CT SOFT TISSUE NECK W/DYE: CPT

## 2024-03-20 PROCEDURE — 93010 ELECTROCARDIOGRAM REPORT: CPT | Performed by: INTERNAL MEDICINE

## 2024-03-20 PROCEDURE — 99255 IP/OBS CONSLTJ NEW/EST HI 80: CPT | Performed by: FAMILY MEDICINE

## 2024-03-20 PROCEDURE — 0C9P3ZZ DRAINAGE OF TONSILS, PERCUTANEOUS APPROACH: ICD-10-PCS

## 2024-03-20 RX ORDER — IBUPROFEN 400 MG/1
400 TABLET ORAL EVERY 6 HOURS PRN
Status: DISCONTINUED | OUTPATIENT
Start: 2024-03-20 | End: 2024-03-21 | Stop reason: HOSPADM

## 2024-03-20 RX ORDER — SODIUM CHLORIDE 9 MG/ML
100 INJECTION, SOLUTION INTRAVENOUS CONTINUOUS
Status: DISCONTINUED | OUTPATIENT
Start: 2024-03-20 | End: 2024-03-21

## 2024-03-20 RX ORDER — HYDROXYZINE 50 MG/1
50 TABLET, FILM COATED ORAL
Status: DISCONTINUED | OUTPATIENT
Start: 2024-03-20 | End: 2024-03-21 | Stop reason: HOSPADM

## 2024-03-20 RX ADMIN — HYDROXYZINE HYDROCHLORIDE 50 MG: 50 TABLET, FILM COATED ORAL at 19:37

## 2024-03-20 RX ADMIN — AMPICILLIN AND SULBACTAM 3 G: 2; 1 INJECTION, POWDER, FOR SOLUTION INTRAVENOUS at 07:44

## 2024-03-20 RX ADMIN — SODIUM CHLORIDE 100 ML/HR: 0.9 INJECTION, SOLUTION INTRAVENOUS at 04:36

## 2024-03-20 RX ADMIN — IOHEXOL 85 ML: 350 INJECTION, SOLUTION INTRAVENOUS at 00:53

## 2024-03-20 RX ADMIN — IBUPROFEN 400 MG: 400 TABLET, FILM COATED ORAL at 21:38

## 2024-03-20 RX ADMIN — AMPICILLIN AND SULBACTAM 3 G: 2; 1 INJECTION, POWDER, FOR SOLUTION INTRAVENOUS at 13:08

## 2024-03-20 RX ADMIN — SODIUM CHLORIDE 100 ML/HR: 0.9 INJECTION, SOLUTION INTRAVENOUS at 13:08

## 2024-03-20 RX ADMIN — SODIUM CHLORIDE 500 ML: 0.9 INJECTION, SOLUTION INTRAVENOUS at 05:21

## 2024-03-20 RX ADMIN — AMPICILLIN AND SULBACTAM 3 G: 2; 1 INJECTION, POWDER, FOR SOLUTION INTRAVENOUS at 19:30

## 2024-03-20 NOTE — CASE MANAGEMENT
Case Management Assessment & Discharge Planning Note    Patient name Elida Johnson  Location [facility] MRN [id number]  : 1980 Date 3/20/2024       Current Admission Date: 3/19/2024  Current Admission Diagnosis:Peritonsillar abscess   Patient Active Problem List    Diagnosis Date Noted    Sepsis (HCC) 2024    UTI (urinary tract infection) 2024    Severe protein-calorie malnutrition (HCC) 2024    Pre-employment examination 02/15/2023    Insomnia 02/15/2023    Class 1 obesity due to excess calories without serious comorbidity with body mass index (BMI) of 30.0 to 30.9 in adult 02/15/2023    Immunization declined 02/15/2023    Schizoaffective disorder (HCC) 2022    Peritonsillar abscess 2022    Chronic post-traumatic stress disorder (PTSD) 10/29/2019    Anxiety     Cocaine abuse (HCC) 05/15/2019    History of alcohol abuse 05/15/2019    Vitamin D deficiency 10/18/2018    Bipolar 1 disorder, depressed, severe (HCC) 10/17/2018    Microcytic anemia 10/16/2018    Tobacco abuse 10/16/2018    Elevated platelet count 10/16/2018      LOS (days): 0  Geometric Mean LOS (GMLOS) (days):   Days to GMLOS:     OBJECTIVE:    Risk of Unplanned Readmission Score: 9.82         Current admission status: Inpatient       Preferred Pharmacy:    [facility] [place] [address]  Phone: [phone] Fax: [phone]    Primary Care Provider: ALLAN Bradley    Primary Insurance: [employer]  Secondary Insurance:     ASSESSMENT:  Active Health Care Proxies    There are no active Health Care Proxies on file.                 Readmission Root Cause  30 Day Readmission: No    Patient Information  Admitted from:: Home  Mental Status: Alert  During Assessment patient was accompanied by: Not accompanied during assessment  Assessment information provided by:: Patient  Primary Caregiver: Self  Support Systems: Family  "members  [facility] of Residence: [place]  What [facility] do you live in?: [place]  Home entry access options. Select all that apply.: Stairs  Number of steps to enter home.: 3  Type of Current Residence: Apartment  Floor Level: 3  Upon entering residence, is there a bedroom on the main floor (no further steps)?: Yes  Upon entering residence, is there a bathroom on the main floor (no further steps)?: Yes  Living Arrangements: Lives Alone  Is patient a ?: No    Activities of Daily Living Prior to Admission  Functional Status: Independent  Completes ADLs independently?: Yes  Ambulates independently?: Yes  Does patient use assisted devices?: No  Does patient currently own DME?: No  Does patient have a history of Outpatient Therapy (PT/OT)?: No  Does the patient have a history of Short-Term Rehab?: No  Does patient have a history of HHC?: No  Does patient currently have HHC?: No         Patient Information Continued  Income Source: SSI/SSD  Does patient have prescription coverage?: Yes  Does patient receive dialysis treatments?: No  Does patient have a history of substance abuse?: Yes  Historical substance use preference: \"Crack\" cocaine  Is patient currently in treatment for substance abuse?: Yes         Means of Transportation  Means of Transport to Appts:: Public Transportation - Bus      Social Determinants of Health (SDOH)      Flowsheet Row Most Recent Value   Housing Stability    In the last 12 months, was there a time when you were not able to pay the mortgage or rent on time? N   In the last 12 months, how many places have you lived? 1   In the last 12 months, was there a time when you did not have a steady place to sleep or slept in a shelter (including now)? N   Transportation Needs    In the past 12 months, has lack of transportation kept you from medical appointments or from getting medications? no   In the past 12 months, has lack of transportation kept you from meetings, work, or from getting things needed " for daily living? No   Food Insecurity    Within the past 12 months, you worried that your food would run out before you got the money to buy more. Never true   Within the past 12 months, the food you bought just didn't last and you didn't have money to get more. Never true   Utilities    In the past 12 months has the electric, gas, oil, or water company threatened to shut off services in your home? No            DISCHARGE DETAILS:    Discharge planning discussed with:: Patient                      Contacts  Patient Contacts: ish gann (Significant Other)  [phone] (Mobile)  Relationship to Patient:: Family  Reason/Outcome: Emergency Contact    Requested Home Health Care         Is the patient interested in HHC at discharge?: No    DME Referral Provided  Referral made for DME?: No          Additional Comments:  spoke with patient bedside. She states she is fully independent and takes public transportation for medical appointments. She states she is in outpatient treatment for substance use currently and declined need for HOST. treatment team recommendations to be determined

## 2024-03-20 NOTE — ASSESSMENT & PLAN NOTE
1g ceftriaxone in ED, fully treated  Denies urinary symptoms  >100,000 cfu/ml Gram Negative [facility]

## 2024-03-20 NOTE — QUICK NOTE
Patient was seen and examined at bedside with attending and senior resident.  Patient denied headache, chest pain, shortness of breath, n/v/d/c she denied pain in mouth/neck, she has potato voice, denied urinary symptoms.  She would like to eat she is hungry, she passed bedside swallow evaluation.    Physical Exam  Vitals reviewed.   Constitutional:       General: She is not in acute distress.     Appearance: Normal appearance. She is not ill-appearing, toxic-appearing or diaphoretic.   HENT:      Head: Normocephalic and atraumatic.      Nose: Nose normal. No congestion or rhinorrhea.      Mouth/Throat:      Mouth: Mucous membranes are moist.      Pharynx: Oropharyngeal exudate and posterior oropharyngeal erythema present.      Comments: S/p needle drainage of abscess  Blood tinged  Right sided swelling, redness  Right cervical enlarged lymph node- mobile, nontender  Eyes:      Extraocular Movements: Extraocular movements intact.      Conjunctiva/sclera: Conjunctivae normal.      Pupils: Pupils are equal, round, and reactive to light.   Cardiovascular:      Rate and Rhythm: Normal rate and regular rhythm.      Pulses: Normal pulses.      Heart sounds: Normal heart sounds.   Pulmonary:      Effort: Pulmonary effort is normal.      Breath sounds: Normal breath sounds.   Abdominal:      General: Abdomen is flat.      Palpations: Abdomen is soft.   Musculoskeletal:         General: Normal range of motion.      Cervical back: Normal range of motion and neck supple.   Skin:     General: Skin is warm.      Capillary Refill: Capillary refill takes less than 2 seconds.   Neurological:      General: No focal deficit present.      Mental Status: She is alert and oriented to person, place, and time.   Psychiatric:         Mood and Affect: Mood normal.         Behavior: Behavior normal.          PLAN:  Advance diet GI mechanical soft  Continue IV antibiotics Amp/Sulb 3gr  Cont pulse ox  mIV fluids 100 ml/hr  Pending PT/OT  eval

## 2024-03-20 NOTE — PLAN OF CARE
Problem: PAIN - ADULT  Goal: Verbalizes/displays adequate comfort level or baseline comfort level  Description: Interventions:  - Encourage patient to monitor pain and request assistance  - Assess pain using appropriate pain scale  - Administer analgesics based on type and severity of pain and evaluate response  - Implement non-pharmacological measures as appropriate and evaluate response  - Consider cultural and social influences on pain and pain management  - Notify physician/advanced practitioner if interventions unsuccessful or patient reports new pain  Outcome: Progressing     Problem: INFECTION - ADULT  Goal: Absence or prevention of progression during hospitalization  Description: INTERVENTIONS:  - Assess and monitor for signs and symptoms of infection  - Monitor lab/diagnostic results  - Monitor all insertion sites, i.e. indwelling lines, tubes, and drains  - Monitor endotracheal if appropriate and nasal secretions for changes in amount and color  - [place] appropriate cooling/warming therapies per order  - Administer medications as ordered  - Instruct and encourage patient and family to use good hand hygiene technique  - Identify and instruct in appropriate isolation precautions for identified infection/condition  Outcome: Progressing     Problem: RESPIRATORY - ADULT  Goal: Achieves optimal ventilation and oxygenation  Description: INTERVENTIONS:  - Assess for changes in respiratory status  - Assess for changes in mentation and behavior  - Position to facilitate oxygenation and minimize respiratory effort  - Oxygen administered by appropriate delivery if ordered  - Initiate smoking cessation education as indicated  - Encourage broncho-pulmonary hygiene including cough, deep breathe, Incentive Spirometry  - Assess the need for suctioning and aspirate as needed  - Assess and instruct to report SOB or any respiratory difficulty  - Respiratory Therapy support as indicated  Outcome: Progressing     Problem:  GASTROINTESTINAL - ADULT  Goal: Oral mucous membranes remain intact  Description: INTERVENTIONS  - Assess oral mucosa and hygiene practices  - Implement preventative oral hygiene regimen  - Implement oral medicated treatments as ordered  - Initiate Nutrition services referral as needed  Outcome: Progressing      02-Mar-2024

## 2024-03-20 NOTE — ASSESSMENT & PLAN NOTE
Malnutrition Findings:   Adult Malnutrition type: Chronic illness  Adult Degree of Malnutrition: Other severe protein calorie malnutrition  Malnutrition Characteristics: Muscle loss, Fat loss, Weight loss             360 Statement: related to inadequate energy intake as evidenced by 7.9% weight loss 12/14/23-3/19/24, 32% weight loss 2/10/23-3/19/24,  hollowing of supraclavicular space, wasted interosseous, sunken orbital. Treatment: DIet texture per SLP assessment and pt tolerance, ensure plus high protein TID.    BMI Findings:           Body mass index is 20.69 kg/m².     PLAN:  Follow up with PCP

## 2024-03-20 NOTE — ED ATTENDING ATTESTATION
3/19/2024  I, Adrien Isaac DO, saw and evaluated the patient. I have discussed the patient with the resident/non-physician practitioner and agree with the resident's/non-physician practitioner's findings, Plan of Care, and MDM as documented in the resident's/non-physician practitioner's note, except where noted. All available labs and Radiology studies were reviewed.  I was present for key portions of any procedure(s) performed by the resident/non-physician practitioner and I was immediately available to provide assistance.       At this point I agree with the current assessment done in the Emergency Department.  I have conducted an independent evaluation of this patient a history and physical is as follows:    43-year-old female presents with complaint of sore throat and difficulty swallowing.  She reports that she has had worsening symptoms for the past several days.  She reports having fevers at home.  She does have a past history of peritonsillar abscess with somewhat similar presentation.  She currently denies any difficulty breathing.  Plan to check labs, provide antibiotics, check a CT scan.    CT confirms PTA which was successfully drained.  Admitted for parenteral abx and ENT eval.    ED Course         Critical Care Time  Procedures

## 2024-03-20 NOTE — H&P
History and Physical - [facility]    Patient Information: Elida Johnson 43 y.o. female MRN: [id number]  Unit/Bed#: 7[facility] 713-01 Encounter: [id number]  Admitting Physician: Myrtle Colon MD  PCP: ALLAN Bradley  Date of Admission:  03/20/24    Assessment and Plan    * Peritonsillar abscess  Assessment & Plan  Strep A PCR positive  S/p needle aspiration w/ return of 3-4 cc  No acute respiratory distress, drooling, or trismus, however neck tenderness and difficulty swallowing remain    Continuous pulse ox  mIVF @100 mL/h  n.p.o. ; advance diet as tolerated  Ampicillin-sulbactam IV 3g q6h (Day 1); transition to Amxoicillin clavulanate PO 875mg q12h for completion of 10 day course upon discharge    Sepsis (HCC)  Assessment & Plan  , WBC 16.36, UTI and Strep A PCR positive.   Lactic acid and procalcitonin normal    500 mL NS bolus now  mIVF @ 100 mL/h  Pending blood culture    UTI (urinary tract infection)  Assessment & Plan   s/p 1g ceftriaxone in ED, fully treated although coverage continued with Unasyn    Pending urine culture        VTE Prophylaxis: VTE Score: 2 Low Risk (Score 0-2) - Encourage Ambulation.  Code Status: Level 1 - Full Code  Anticipated Length of Stay:  Patient will be admitted on an Inpatient basis with an anticipated length of stay of  less than 2 midnights.     Justification for Hospital Stay: sepsis and airway observation s/p needle aspiration of peritonsillar abscess.  Total Time for Visit, including Counseling / Coordination of Care: 60 mins. Greater than 50% of this total time spent on direct patient counseling and coordination of care.      Chief Complaint:     Chief Complaint   Patient presents with    Sore Throat     Pt reports sore throat x2 days, no meds pta. Reports difficulty swallowing. Pt noted to be placing paper towels in mouth to remove secretions. Throat noted to be red. Pt lung sounds clear and equal bilat     History of Present  Illness:    Elida Johnson is a 43 y.o. female pmh bipolar disorder, polysubstance abuse, and schizophrenia who presents with sore throat and difficulty swallowing for the past 2 days. Patient placed paper towels for removal of secretions, no meds were taken prior to arrival. Patient reported ED visit one year ago for similar clinical picture. No known allergies or new introduction of foods. Denies sob, chest pain, lightheadedness.    Patient tachycardic on presentation, now resolved. otherwise hemodynamically stable, afebrile. Labs significant for UTI, nitrates negative. Leukocytosis (16.36) neutrophil predominant (12.64). CMP WNL. Strep a PCR positive.  CT neck showed acute pharyngitis with right peritonsillar retropharyngeal inflammation.  ENT consulted, no need for transfer as long as abscess is successfully drained. Abscess was aspirated with removal of 3-4 cc, patient tolerated well, however still remains uncomfortable with tenderness to palpation of the neck.  1 g Rocephin, 500 mg metronidazole, and 1 L normal saline bolus administered.    She will be admitted for sepsis and airway observation s/p needle aspiration of peritonsillar abscess.    Review of Systems:  Review of Systems   Constitutional:  Negative for chills, fatigue and fever.   HENT:  Positive for sore throat and trouble swallowing.    Respiratory:  Negative for chest tightness and shortness of breath.    Cardiovascular:  Negative for chest pain and palpitations.   Gastrointestinal:  Negative for abdominal pain, nausea and vomiting.   Genitourinary:  Negative for dysuria.   Musculoskeletal:  Negative for joint swelling.   Psychiatric/Behavioral:  Positive for agitation.        Past Medical and Surgical History:   Past Medical History:   Diagnosis Date    Addiction to drug ([facility])     Anemia     Anxiety     Depression     Depression with suicidal ideation     Drug abuse ([facility])     Hallucination     Mood disorder ([facility]) 5/15/2019    R/O BIPOLAR  "DISORDER    Movement disorder 10/29/2019    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HCC)     Paranoia (psychosis) ([facility])     Peritonsillar abscess 2022    Psychiatric disorder     Psychiatric illness     PTSD (post-traumatic stress disorder)     Pyuria 10/16/2018    Schizo-affective type schizophrenia, chronic state ([facility])     Schizo-affective type schizophrenia, chronic state ([facility])     Self-injurious behavior     Severe episode of recurrent major depressive disorder, with psychotic features ([facility]) 2021    Suicidal behavior     Suicidal intent 2021    Suicide attempt ([facility])     Withdrawal symptoms, drug or narcotic ([facility])      Past Surgical History:   Procedure Laterality Date     SECTION      x2     Meds/Allergies:  Allergies: No Known Allergies  Prior to Admission Medications   Prescriptions Last Dose Informant Patient Reported? Taking?   hydrOXYzine HCL (ATARAX) 50 mg tablet Not Taking  No No   Sig: Take 1 tablet (50 mg total) by mouth daily at bedtime as needed (insomnia)   Patient not taking: Reported on 3/20/2024      Facility-Administered Medications: None     Social History:     Social History     Socioeconomic History    Marital status: Single     Spouse name: Not on file    Number of children: 5    Years of education: Not on file    Highest education level: Not on file   Occupational History    Not on file   Tobacco Use    Smoking status: Every Day     Current packs/day: 0.50     Average packs/day: 0.5 packs/day for 31.2 years (15.6 ttl pk-yrs)     Types: Cigarettes     Start date:     Smokeless tobacco: Never   Vaping Use    Vaping status: Never Used   Substance and Sexual Activity    Alcohol use: Not Currently     Comment: Pt stated \"I don't drink\"    Drug use: Not Currently     Types: \"Crack\" cocaine, Marijuana     Comment: 2/10/23: reports last use May 30, 2022.    Sexual activity: Not Currently   Other Topics Concern    Not on file   Social History Narrative              Social Determinants of " Health     Financial Resource Strain: Low Risk  (2/10/2023)    Overall Financial Resource Strain (CARDIA)     Difficulty of Paying Living Expenses: Not hard at all   Food Insecurity: Patient Declined (3/20/2024)    Hunger Vital Sign     Worried About Running Out of Food in the Last Year: Patient declined     Ran Out of Food in the Last Year: Patient declined   Transportation Needs: Patient Declined (3/20/2024)    PRAPARE - Transportation     Lack of Transportation (Medical): Patient declined     Lack of Transportation (Non-Medical): Patient declined   Physical Activity: Not on file   Stress: Stress Concern Present (8/4/2021)    Received from [facility] [address] [facility]    [employer] - Occupational Stress Questionnaire     Feeling of Stress : Very much   Social Connections: Unknown (8/5/2021)    Received from [facility] [address]    Social Connection and Isolation Panel [NHANES]     Frequency of Communication with Friends and Family: Patient declined     Frequency of Social Gatherings with Friends and Family: Patient declined     Attends [employer] Services: Patient declined     Active Member of Clubs or Organizations: Patient declined     Attends Club or Organization Meetings: Patient declined     Marital Status: Patient declined   Intimate Partner Violence: Unknown (8/3/2021)    Received from [facility] [address] [facility]    Intimate Partner Violence     Within the last year, have you been afraid of your partner, ex-partner or family member?: Not on file     Within the last year, have you been humiliated or emotionally abused in other ways by your partner, ex-partner or family member?: Not on file     Within the last year, have you been kicked, hit, slapped, or otherwise physically hurt by your partner, ex-partner or family member?: Not on file     Within the last year, have you been raped or forced to have any kind of sexual activity by your partner, ex-partner or family member?: Not on  file   Housing Stability: Patient Declined (3/20/2024)    Housing Stability Vital Sign     Unable to Pay for Housing in the Last Year: Patient declined     Number of Places Lived in the Last Year: Not on file     Unstable Housing in the Last Year: Patient declined     Patient Pre-hospital Living Situation: self  Patient Pre-hospital Level of Mobility: mobile  Patient Pre-hospital Diet Restrictions: none    Family History:  Family History   Problem Relation Age of Onset    Chiari malformation Mother     Heart disease Father         pacemaker    Brain cancer Sister     Lung cancer Maternal Grandmother        Physical Exam:   Vitals:   Blood Pressure: 118/81 (03/20/24 0430)  Pulse: 92 (03/20/24 0430)  Temperature: 98.6 °F (37 °C) (03/20/24 0430)  Temp Source: Temporal (03/20/24 0430)  Respirations: 18 (03/20/24 0430)  Weight - Scale: 51.3 kg (113 lb 1.5 oz) (03/19/24 2305)  SpO2: 96 % (03/20/24 0430)    Physical Exam  Constitutional:       General: She is in acute distress.   HENT:      Nose: Nose normal. No congestion or rhinorrhea.      Mouth/Throat:      Mouth: Mucous membranes are moist.      Comments: Blood tinged posterior pharynx s/p abscess drainage with mild swelling. Patient is mouth breathing.  Cardiovascular:      Rate and Rhythm: Normal rate and regular rhythm.      Pulses: Normal pulses.      Heart sounds: Normal heart sounds.   Pulmonary:      Effort: No respiratory distress.   Chest:      Chest wall: No tenderness.   Abdominal:      General: Bowel sounds are normal. There is no distension.      Palpations: Abdomen is soft.      Tenderness: There is no abdominal tenderness.   Musculoskeletal:      Cervical back: Tenderness present. No rigidity.      Right lower leg: No edema.      Left lower leg: No edema.   Lymphadenopathy:      Cervical: Cervical adenopathy (single posterior cervical) present.   Skin:     General: Skin is warm.      Capillary Refill: Capillary refill takes less than 2 seconds.       Findings: Bruising present.      Comments: Bruise on medial left leg (unknown origin)         Lab Results: I have personally reviewed pertinent reports.    Results from last 7 days   Lab Units 03/19/24  2335   WBC Thousand/uL 16.36*   HEMOGLOBIN g/dL 14.0   HEMATOCRIT % 45.9   PLATELETS Thousands/uL 304   NEUTROS PCT % 77*   LYMPHS PCT % 14   MONOS PCT % 8   EOS PCT % 0     Results from last 7 days   Lab Units 03/19/24  2335   POTASSIUM mmol/L 3.9   CHLORIDE mmol/L 96   CO2 mmol/L 29   BUN mg/dL 6   CREATININE mg/dL 0.58*   CALCIUM mg/dL 9.8   ALK PHOS U/L 73   ALT U/L 13   AST U/L 20   EGFR ml/min/1.73sq m 114     Results from last 7 days   Lab Units 03/19/24  2335   INR  1.05         Results from last 7 days   Lab Units 03/19/24  2335   LACTIC ACID mmol/L 1.1              Results from last 7 days   Lab Units 03/19/24  2353   COLOR UA  Sammie*   CLARITY UA  Cloudy*   SPEC GRAV UA  1.020   PH UA  6.0   LEUKOCYTES UA  100.0*   NITRITE UA  Negative   GLUCOSE UA mg/dl Negative   KETONES UA mg/dl >=150 (3+)*   BILIRUBIN UA  Negative   BLOOD UA  250.0*      Results from last 7 days   Lab Units 03/19/24  2353   RBC UA /hpf 10-20*   WBC UA /hpf 10-20*   EPITHELIAL CELLS WET PREP /hpf Moderate*   BACTERIA UA /hpf Innumerable*        Imaging: I have personally reviewed pertinent reports.    CT soft tissue neck w contrast    Result Date: 3/20/2024  Narrative: CT NECK WITH CONTRAST INDICATION:   Neck pain and swelling. COMPARISON:  None. TECHNIQUE:  Axial, sagittal, and coronal 2D reformatted images were created from the axial source data and submitted for interpretation. Radiation dose length product (DLP) for this visit:  204 mGy-cm .  This examination, like all CT scans performed in the [facility], was performed utilizing techniques to minimize radiation dose exposure, including the use of iterative reconstruction and automated exposure control. IV Contrast:  85 mL of iohexol (OMNIPAQUE) IMAGE QUALITY:   Diagnostic. FINDINGS: VISUALIZED BRAIN PARENCHYMA: Normal enhancement. VISUALIZED ORBITS: Intact. PARANASAL SINUSES: Mucosal thickening in the right maxillary sinus. NASAL CAVITY AND NASOPHARYNX: Right nasopharyngeal soft tissue inflammation. Significant inflammation in the soft palate and uvula. SUPRAHYOID NECK: Right peritonsillar fluid collection measuring 2 x 3.3 x 2.3 cm. Inflammation in the right parapharyngeal soft tissues. Enlargement of the right greater than left palatine tonsils and enlarged lingual tonsils. Inflammation in the right hypopharyngeal space effacing the right piriform sinus. Mild retropharyngeal inflammation. No fluid collection INFRAHYOID NECK:  Aryepiglottic mild inflammation in the right epiglottic fold. No epiglottic inflammation. Intact glottic and subglottic soft tissues. THYROID GLAND: Normal enhancement. PAROTID AND SUBMANDIBULAR GLANDS: No sialadenitis. LYMPH NODES: Enlarged right level 2 lymph nodes, likely reactive. VASCULAR STRUCTURES:  Normal enhancement of the cervical vasculature. THORACIC INLET:  Lung apices and upper mediastinum are unremarkable. BONY STRUCTURES: No lytic or blastic lesion.     Impression: 1. Findings consistent with acute pharyngitis. Right peritonsillar 2 x 3.3 x 2.3 cm abscess. 2. Mild retropharyngeal inflammation. The study was marked in EPIC for immediate notification. Workstation performed: [id number]       EKG, Pathology, and Other Studies Reviewed on Admission:   EKG  Result Date: 03/19/24  Impression:  NSR    Entire H&P was discussed with Dr. Corea who agreed to what is noted above    Myrtle Colon MD  03/20/24  5:26 AM

## 2024-03-20 NOTE — ED NOTES
Patient sleeping and in no respiratory distress. No drolling.     Cass Castro, RN  03/20/24 0117

## 2024-03-20 NOTE — ED NOTES
Presently resting quietly - no drolling noted but patient does at times wipes her mouth out - sometimes she leaves her mouth open - sometimes she closes it.     Cass Castro RN  03/20/24 0033

## 2024-03-20 NOTE — ED PROVIDER NOTES
History  Chief Complaint   Patient presents with    Sore Throat     Pt reports sore throat x2 days, no meds pta. Reports difficulty swallowing. Pt noted to be placing paper towels in mouth to remove secretions. Throat noted to be red. Pt lung sounds clear and equal bilat     43-year-old female presents with difficulty swallowing and throat swelling.  She states she had something similar little over a year ago.  She is not having difficulty breathing per her, she does note fevers prior to arrival.  She also has upper chest  tightness.  Denies shortness of breath, nausea/vomiting.  She does state is difficult to swallow.        Prior to Admission Medications   Prescriptions Last Dose Informant Patient Reported? Taking?   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[facility])     Paranoia (psychosis) ([facility])     Peritonsillar abscess 2022    Psychiatric disorder     Psychiatric illness     PTSD (post-traumatic stress disorder)     Pyuria 10/16/2018    Schizo-affective type schizophrenia, chronic state ([facility])     Schizo-affective type schizophrenia, chronic state ([facility])     Self-injurious behavior     Severe episode of recurrent major depressive disorder, with psychotic features ([facility]) 2021    Suicidal behavior     Suicidal intent 2021    Suicide attempt ([facility])     Withdrawal symptoms, drug or narcotic ([facility])        Past Surgical History:   Procedure Laterality Date     SECTION      x2       Family History   Problem Relation Age of  "Onset    Chiari malformation Mother     Heart disease Father         pacemaker    Brain cancer Sister     Lung cancer Maternal Grandmother      I have reviewed and agree with the history as documented.    E-Cigarette/Vaping    E-Cigarette Use Never User      E-Cigarette/Vaping Substances     Social History     Tobacco Use    Smoking status: Every Day     Current packs/day: 0.50     Average packs/day: 0.5 packs/day for 31.2 years (15.6 ttl pk-yrs)     Types: Cigarettes     Start date: 1993    Smokeless tobacco: Never   Vaping Use    Vaping status: Never Used   Substance Use Topics    Alcohol use: Not Currently     Comment: Pt stated \"I don't drink\"    Drug use: Not Currently     Types: \"Crack\" cocaine, Marijuana     Comment: 2/10/23: reports last use May 30, 2022.        Review of Systems   Constitutional:  Positive for fever. Negative for chills.   HENT:  Positive for sore throat and trouble swallowing.    Respiratory:  Positive for chest tightness. Negative for shortness of breath.    Cardiovascular:  Negative for chest pain.   Gastrointestinal:  Negative for abdominal pain, constipation, diarrhea, nausea and vomiting.   Genitourinary:  Negative for difficulty urinating and hematuria.   Musculoskeletal:  Negative for arthralgias.   Neurological:  Positive for headaches.       Physical Exam  ED Triage Vitals [03/19/24 2305]   Temperature Pulse Respirations Blood Pressure SpO2   100 °F (37.8 °C) (!) 123 20 157/91 100 %      Temp Source Heart Rate Source Patient Position - Orthostatic VS BP Location FiO2 (%)   Oral Monitor Sitting Left arm --      Pain Score       --             Orthostatic Vital Signs  Vitals:    03/20/24 0130 03/20/24 0145 03/20/24 0200 03/20/24 0300   BP: 141/95 165/93 127/86 129/78   Pulse: 82 97 91 88   Patient Position - Orthostatic VS: Lying Lying Lying Lying       Physical Exam  Vitals and nursing note reviewed.   Constitutional:       General: She is not in acute distress.     Appearance: She " is ill-appearing.      Comments: Patient presents to the ED with mouth open and obvious swelling in her oropharynx.  Moving good air on exam, saturating well.   HENT:      Head: Normocephalic and atraumatic.      Nose: No congestion or rhinorrhea.      Mouth/Throat:      Mouth: Mucous membranes are dry.      Tongue: No lesions. Tongue does not deviate from midline.      Pharynx: Pharyngeal swelling and posterior oropharyngeal erythema present. No oropharyngeal exudate.      Tonsils: No tonsillar exudate.   Eyes:      Conjunctiva/sclera: Conjunctivae normal.   Cardiovascular:      Rate and Rhythm: Regular rhythm. Tachycardia present.   Pulmonary:      Effort: Respiratory distress (Mildly tachypneic) present.      Breath sounds: Normal breath sounds. No wheezing.   Abdominal:      Palpations: Abdomen is soft.      Tenderness: There is no abdominal tenderness.   Musculoskeletal:         General: Normal range of motion.      Cervical back: Tenderness (Tender bilaterally.) present.   Lymphadenopathy:      Cervical: Cervical adenopathy present.   Skin:     General: Skin is warm and dry.   Neurological:      General: No focal deficit present.      Mental Status: She is alert and oriented to person, place, and time.   Psychiatric:         Mood and Affect: Mood normal.         ED Medications  Medications   metroNIDAZOLE (FLAGYL) IVPB (premix) 500 mg 100 mL (0 mg Intravenous Stopped 3/20/24 0030)   sodium chloride 0.9 % bolus 1,000 mL (0 mL Intravenous Stopped 3/20/24 0159)   cefTRIAXone (ROCEPHIN) IVPB (premix in dextrose) 1,000 mg 50 mL (0 mg Intravenous Stopped 3/19/24 2356)   iohexol (OMNIPAQUE) 350 MG/ML injection (SINGLE-DOSE) 85 mL (85 mL Intravenous Given 3/20/24 0053)       Diagnostic Studies  Results Reviewed       Procedure Component Value Units Date/Time    Urine Microscopic [[id number]]  (Abnormal) Collected: 03/19/24 2353    Lab Status: Final result Specimen: Urine, Other Updated: 03/20/24 0026     RBC, UA 10-20  /hpf      WBC, UA 10-20 /hpf      Epithelial Cells Moderate /hpf      Bacteria, UA Innumerable /hpf      MUCUS THREADS Moderate    Urine culture [[id number]] Collected: 03/19/24 2353    Lab Status: In process Specimen: Urine, Other Updated: 03/20/24 0025    UA w Reflex to Microscopic w Reflex to Culture [[id number]]  (Abnormal) Collected: 03/19/24 2353    Lab Status: Final result Specimen: Urine, Other Updated: 03/20/24 0025     Color, UA Sammie     Clarity, UA Cloudy     Specific Gravity, UA 1.020     pH, UA 6.0     Leukocytes, .0     Nitrite, UA Negative     Protein, UA 30 (1+) mg/dl      Glucose, UA Negative mg/dl      Ketones, UA >=150 (3+) mg/dl      Bilirubin, UA Negative     Occult Blood, .0     UROBILINOGEN UA Negative mg/dL     Procalcitonin [[id number]]  (Normal) Collected: 03/19/24 2335    Lab Status: Final result Specimen: Blood from Arm, Right Updated: 03/20/24 0014     Procalcitonin 0.14 ng/ml     Protime-INR [[id number]]  (Normal) Collected: 03/19/24 2335    Lab Status: Final result Specimen: Blood from Arm, Right Updated: 03/20/24 0013     Protime 14.1 seconds      INR 1.05    APTT [[id number]]  (Abnormal) Collected: 03/19/24 2335    Lab Status: Final result Specimen: Blood from Arm, Right Updated: 03/20/24 0013     PTT 40 seconds     Comprehensive metabolic panel [[id number]]  (Abnormal) Collected: 03/19/24 2335    Lab Status: Final result Specimen: Blood from Arm, Right Updated: 03/20/24 0007     Sodium 136 mmol/L      Potassium 3.9 mmol/L      Chloride 96 mmol/L      CO2 29 mmol/L      ANION GAP 11 mmol/L      BUN 6 mg/dL      Creatinine 0.58 mg/dL      Glucose 68 mg/dL      Calcium 9.8 mg/dL      AST 20 U/L      ALT 13 U/L      Alkaline Phosphatase 73 U/L      Total Protein 8.0 g/dL      Albumin 4.2 g/dL      Total Bilirubin 0.99 mg/dL      eGFR 114 ml/min/1.73sq m     Narrative:      National Kid[employer] guidelines for Chronic Kidney Disease (CKD):     Stage 1 with normal or  high GFR (GFR > 90 mL/min/1.73 square meters)    Stage 2 Mild CKD (GFR = 60-89 mL/min/1.73 square meters)    Stage 3A Moderate CKD (GFR = 45-59 mL/min/1.73 square meters)    Stage 3B Moderate CKD (GFR = 30-44 mL/min/1.73 square meters)    Stage 4 Severe CKD (GFR = 15-29 mL/min/1.73 square meters)    Stage 5 End Stage CKD (GFR <15 mL/min/1.73 square meters)  Note: GFR calculation is accurate only with a steady state creatinine    Lactic acid [[id number]]  (Normal) Collected: 03/19/24 2335    Lab Status: Final result Specimen: Blood from Arm, Right Updated: 03/20/24 0007     LACTIC ACID 1.1 mmol/L     Narrative:      Result may be elevated if tourniquet was used during collection.    Blood culture #2 [[id number]] Collected: 03/19/24 2346    Lab Status: In process Specimen: Blood from Arm, Left Updated: 03/20/24 0001    POCT pregnancy, urine [[id number]]  (Normal) Resulted: 03/19/24 2357    Lab Status: Final result Updated: 03/19/24 2358     EXT Preg Test, Ur Negative     Control Valid    CBC and differential [[id number]]  (Abnormal) Collected: 03/19/24 2335    Lab Status: Final result Specimen: Blood from Arm, Right Updated: 03/19/24 2347     WBC 16.36 Thousand/uL      RBC 5.99 Million/uL      Hemoglobin 14.0 g/dL      Hematocrit 45.9 %      MCV 77 fL      MCH 23.4 pg      MCHC 30.5 g/dL      RDW 19.0 %      MPV 10.3 fL      Platelets 304 Thousands/uL      nRBC 0 /100 WBCs      Neutrophils Relative 77 %      Immature Grans % 0 %      Lymphocytes Relative 14 %      Monocytes Relative 8 %      Eosinophils Relative 0 %      Basophils Relative 1 %      Neutrophils Absolute 12.64 Thousands/µL      Absolute Immature Grans 0.06 Thousand/uL      Absolute Lymphocytes 2.30 Thousands/µL      Absolute Monocytes 1.22 Thousand/µL      Eosinophils Absolute 0.06 Thousand/µL      Basophils Absolute 0.08 Thousands/µL     Blood culture #1 [[id number]] Collected: 03/19/24 2335    Lab Status: In process Specimen: Blood from Arm, Right Updated:  03/19/24 2345    Strep A PCR [[id number]]  (Abnormal) Collected: 03/19/24 2312    Lab Status: Final result Specimen: Throat Updated: 03/19/24 2344     STREP A PCR Detected                   CT soft tissue neck w contrast   Final Result by Georgi Leon MD (03/20 0155)         1. Findings consistent with acute pharyngitis. Right peritonsillar 2 x 3.3 x 2.3 cm abscess.   2. Mild retropharyngeal inflammation.      The study was marked in EPIC for immediate notification.            Workstation performed: [id number]         XR chest 1 view portable   ED Interpretation by Juan Costello DO (03/20 0053)   Per my independent interpretation, no acute cardiopulmonary disease              Procedures  ECG 12 Lead Documentation Only    Date/Time: 3/19/2024 11:58 PM    Performed by: Juan Costello DO  Authorized by: Juan Costello DO    Indications / Diagnosis:  Chest tightness  Patient location:  ED  Interpretation:     Interpretation: normal    Rate:     ECG rate:  79    ECG rate assessment: normal    Rhythm:     Rhythm: sinus rhythm    Ectopy:     Ectopy: none    QRS:     QRS axis:  Normal    QRS intervals:  Normal  Conduction:     Conduction: normal    ST segments:     ST segments:  Normal  T waves:     T waves: normal    Incision and drain    Date/Time: 3/20/2024 3:24 AM    Performed by: Juan Costello DO  Authorized by: Juan Costello DO  [place] Protocol:  Procedure performed by: (Dr. Ferrer)  Consent given by: patient  Patient identity confirmed: verbally with patient and arm band    Patient location:  ED  Location:     Type:  Abscess    Size:  3    Location:  Mouth    Location Detail:  Intraoral    Mouth location:  Peritonsillar  Anesthesia (see MAR for exact dosages):     Anesthesia method:  Topical application    Topical anesthetic:  Benzocaine gel  Procedure details:     Complexity:  Simple    Needle aspiration: yes      Needle size:  18 G    Aspiration type:  puncture aspiration      Drainage:  Bloody and purulent    Drainage amount:  Moderate  Post-procedure details:     Patient tolerance of procedure:  Tolerated well, no immediate complications  Comments:      3-4cc of purulent drainage         ED Course  ED Course as of 03/20/24 0349   Tue Mar 19, 2024   2353 CBC and differential(!)  Leukocytosis otherwise within normal limits.   2353 STREP A PCR(!): Detected   Wed Mar 20, 2024   0015 Comprehensive metabolic panel(!)  wnl   0016 PREGNANCY TEST URINE: Negative   0027 Urine Microscopic(!)  Bacteriuria, pyuria, hematuria.  Already started on antibiotics.  No need to change coverage.   0036 Urine Microscopic(!)                          Initial Sepsis Screening       Row Name 03/20/24 0016                Is the patient's history suggestive of a new or worsening infection? Yes (Proceed)  -PG        Suspected source of infection soft tissue  -PG        Indicate SIRS criteria Tachycardia > 90 bpm;Tachypnea > 20 resp per min  -PG        Are two or more of the above signs & symptoms of infection both present and new to the patient? Yes (Proceed)  -PG        Assess for evidence of organ dysfunction: Are any of the below criteria present within 6 hours of suspected infection and SIRS criteria that are NOT considered to be chronic conditions? --                  User Key  (r) = Recorded By, (t) = Taken By, (c) = Cosigned By      Initials Name Provider Type    PG Juan Costello,  Resident                    SBIRT 20yo+      Flowsheet Row Most Recent Value   Initial Alcohol Screen: US AUDIT-C     1. How often do you have a drink containing alcohol? 0 Filed at: 03/19/2024 2306   2. How many drinks containing alcohol do you have on a typical day you are drinking?  0 Filed at: 03/19/2024 2306   3b. FEMALE Any Age, or MALE 65+: How often do you have 4 or more drinks on one occassion? 0 Filed at: 03/19/2024 2306   Audit-C Score 0 Filed at: 03/19/2024 2306   LYNETTE: How many  "times in the past year have you...    Used an illegal drug or used a prescription medication for non-medical reasons? Daily or Almost Daily Filed at: 03/19/2024 2306   DAST-10: In the past 12 months...    1. Have you used drugs other than those required for medical reasons? 1 Filed at: 03/19/2024 2306   2. Do you use more than one drug at a time? 0 Filed at: 03/19/2024 2306   3. Have you had medical problems as a result of your drug use (e.g., memory loss, hepatitis, convulsions, bleeding, etc.)? 0 Filed at: 03/19/2024 2306   4. Have you had \"blackouts\" or \"flashbacks\" as a result of drug use?YesNo 0 Filed at: 03/19/2024 2306   5. Do you ever feel bad or guilty about your drug use? 0 Filed at: 03/19/2024 2306   6. Does your spouse (or parent) ever complain about your involvement with drugs? 0 Filed at: 03/19/2024 2306   7. Have you neglected your family because of your use of drugs? 0 Filed at: 03/19/2024 2306   8. Have you engaged in illegal activities in order to obtain drugs? 0 Filed at: 03/19/2024 2306   9. Have you ever experienced withdrawal symptoms (felt sick) when you stopped taking drugs? 0 Filed at: 03/19/2024 2306   10. Are you always able to stop using drugs when you want to? 0 Filed at: 03/19/2024 2306   DAST-10 Score 1 Filed at: 03/19/2024 2306                  Medical Decision Making  Elida came to the ED due to difficulty swallowing and swelling in her mouth.  She was found to have a peritonsillar abscess on exam and CT confirmed.  No signs of retropharyngeal abscess.  She did have significant inflammation throughout the oropharynx and retropharyngeal area.  Patient was still able to swallow, however painful.  Discussed the patient with the ENT on-call, they recommended needle aspiration left up to her discretion for admission versus discharge.  Needle aspiration was performed with a moderate amount of purulent drainage return, see above procedure note for details.  Patient was also found to have " a UTI and leukocytosis on lab work, otherwise mostly unremarkable.  Patient given a dose of Rocephin and Flagyl down here for coverage.  Due to patient still being uncomfortable and in need of IV antibiotics continued, she was discussed with family medicine residency for admission to the hospital for further IV antibiotics and airway watch.  Family medicine resident came and evaluated the patient and accepted patient to their service for further evaluation and treatment.  Patient admitted in stable condition.    Amount and/or Complexity of Data Reviewed  Labs: ordered. Decision-making details documented in ED Course.  Radiology: ordered and independent interpretation performed.    Risk  Prescription drug management.          Disposition  Final diagnoses:   Strep pharyngitis   Peritonsillar abscess   Dysphagia   UTI (urinary tract infection)     Time reflects when diagnosis was documented in both MDM as applicable and the Disposition within this note       Time User Action Codes Description Comment    3/20/2024  3:42 AM Juan Costello Add [J02.0] Strep pharyngitis     3/20/2024  3:42 AM Juan Costello Add [J36] Peritonsillar abscess     3/20/2024  3:42 AM Juan Costello Modify [J02.0] Strep pharyngitis     3/20/2024  3:42 AM Juan Costello Modify [J36] Peritonsillar abscess     3/20/2024  3:43 AM Juan Costello Add [R13.10] Dysphagia     3/20/2024  3:43 AM Juan Costello Add [N39.0] UTI (urinary tract infection)           ED Disposition       ED Disposition   Admit    Condition   Stable    Date/Time   Wed Mar 20, 2024 0342    Comment   Case was discussed with Family Medicine and the patient's admission status was agreed to be Admission Status: inpatient status to the service of Dr. Corea .               Follow-up Information    None         Patient's Medications   Discharge Prescriptions    No medications on file     No discharge procedures on file.    PDMP Review       None             ED  Provider  Attending physically available and evaluated Elida Johnson. I managed the patient along with the ED Attending.    Electronically Signed by           Juan Costello DO  03/20/24 6973

## 2024-03-20 NOTE — ASSESSMENT & PLAN NOTE
Strep A PCR positive  S/p needle aspiration w/ return of 3-4 cc   No acute respiratory distress, drooling, or trismus  Passed swallow evaluation , ok to return to regular diet  Received Ampicillin-sulbactam IV 3g q6h while admitted    Regular diet   Amxoicillin clavulanate PO 875mg q12h for 7 days

## 2024-03-20 NOTE — SEPSIS NOTE
Sepsis Note   Elida Johnson 43 y.o. female MRN: [id number]  Unit/Bed#: ED 03 Encounter: [id number]       Initial Sepsis Screening       Row Name 03/20/24 0016                Is the patient's history suggestive of a new or worsening infection? Yes (Proceed)  -PG        Suspected source of infection soft tissue  -PG        Indicate SIRS criteria Tachycardia > 90 bpm;Tachypnea > 20 resp per min  -PG        Are two or more of the above signs & symptoms of infection both present and new to the patient? Yes (Proceed)  -PG        Assess for evidence of organ dysfunction: Are any of the below criteria present within 6 hours of suspected infection and SIRS criteria that are NOT considered to be chronic conditions? --                  User Key  (r) = Recorded By, (t) = Taken By, (c) = Cosigned By      Initials Name Provider Type    PG Juan Costello DO Resident                        Body mass index is 20.69 kg/m².  Wt Readings from Last 1 Encounters:   03/19/24 51.3 kg (113 lb 1.5 oz)        Ideal body weight: 50.1 kg (110 lb 7.2 oz)  Adjusted ideal body weight: 50.6 kg (111 lb 8.1 oz)

## 2024-03-20 NOTE — MALNUTRITION/BMI
This medical record reflects one or more clinical indicators suggestive of malnutrition.    Malnutrition Findings:   Adult Malnutrition type: Chronic illness  Adult Degree of Malnutrition: Other severe protein calorie malnutrition  Malnutrition Characteristics: Muscle loss, Fat loss, Weight loss                  360 Statement: related to inadequate energy intake as evidenced by 7.9% weight loss 12/14/23-3/19/24, 32% weight loss 2/10/23-3/19/24,  hollowing of supraclavicular space, wasted interosseous, sunken orbital. Treatment: DIet texture per SLP assessment and pt tolerance, ensure plus high protein TID.    BMI Findings:           Body mass index is 20.69 kg/m².     See Nutrition note dated 3/20/2024 for additional details.  Completed nutrition assessment is viewable in the nutrition documentation.

## 2024-03-20 NOTE — ASSESSMENT & PLAN NOTE
On admission met criteria for sepsis , WBC 16.36, UTI and Strep A PCR positive.   Lactic acid and procalcitonin normal  Received 1.5L bolus + mIV fluids rate 100cc/hr  RESOLVED

## 2024-03-21 ENCOUNTER — TELEPHONE (OUTPATIENT)
Age: 44
End: 2024-03-21

## 2024-03-21 VITALS
WEIGHT: 113.1 LBS | HEIGHT: 62 IN | BODY MASS INDEX: 20.81 KG/M2 | RESPIRATION RATE: 16 BRPM | DIASTOLIC BLOOD PRESSURE: 69 MMHG | TEMPERATURE: 97.7 F | OXYGEN SATURATION: 98 % | SYSTOLIC BLOOD PRESSURE: 102 MMHG | HEART RATE: 64 BPM

## 2024-03-21 PROBLEM — N39.0 UTI (URINARY TRACT INFECTION): Status: RESOLVED | Noted: 2024-03-20 | Resolved: 2024-03-21

## 2024-03-21 PROBLEM — J03.00 STREPTOCOCCAL ADENOTONSILLITIS: Status: ACTIVE | Noted: 2024-03-21

## 2024-03-21 PROBLEM — J36 PERITONSILLAR ABSCESS: Status: RESOLVED | Noted: 2022-05-24 | Resolved: 2024-03-21

## 2024-03-21 PROBLEM — A41.9 SEPSIS (HCC): Status: RESOLVED | Noted: 2024-03-20 | Resolved: 2024-03-21

## 2024-03-21 PROCEDURE — 99238 HOSP IP/OBS DSCHRG MGMT 30/<: CPT | Performed by: FAMILY MEDICINE

## 2024-03-21 PROCEDURE — 92610 EVALUATE SWALLOWING FUNCTION: CPT

## 2024-03-21 RX ORDER — HYDROXYZINE 50 MG/1
50 TABLET, FILM COATED ORAL ONCE
Status: COMPLETED | OUTPATIENT
Start: 2024-03-21 | End: 2024-03-21

## 2024-03-21 RX ORDER — AMOXICILLIN AND CLAVULANATE POTASSIUM 875; 125 MG/1; MG/1
1 TABLET, FILM COATED ORAL EVERY 12 HOURS SCHEDULED
Qty: 14 TABLET | Refills: 0 | Status: SHIPPED | OUTPATIENT
Start: 2024-03-21 | End: 2024-03-28

## 2024-03-21 RX ORDER — IBUPROFEN 400 MG/1
400 TABLET ORAL EVERY 6 HOURS PRN
Qty: 30 TABLET | Refills: 0 | Status: SHIPPED | OUTPATIENT
Start: 2024-03-21

## 2024-03-21 RX ADMIN — SODIUM CHLORIDE 100 ML/HR: 0.9 INJECTION, SOLUTION INTRAVENOUS at 03:10

## 2024-03-21 RX ADMIN — AMPICILLIN AND SULBACTAM 3 G: 2; 1 INJECTION, POWDER, FOR SOLUTION INTRAVENOUS at 01:22

## 2024-03-21 RX ADMIN — AMPICILLIN AND SULBACTAM 3 G: 2; 1 INJECTION, POWDER, FOR SOLUTION INTRAVENOUS at 08:22

## 2024-03-21 RX ADMIN — HYDROXYZINE HYDROCHLORIDE 50 MG: 50 TABLET, FILM COATED ORAL at 02:30

## 2024-03-21 NOTE — UTILIZATION REVIEW
NOTIFICATION OF INPATIENT MEDICAL ADMISSION   AUTHORIZATION REQUEST   SERVICING FACILITY:   [address] [address]  Tax ID: [id number]  NPI: [id number] ATTENDING PROVIDER:  Attending Name and NPI#: Fernando Corea Md [[id number]]  Address: [address] [place] [address]  Phone: [phone]     ADMISSION INFORMATION:  Place of Service: Inpatient [facility]  Place of Service Code: 21  Inpatient Admission Date/Time: 3/20/24  3:44 AM  Discharge Date/Time: No discharge date for patient encounter.  Admitting Diagnosis Code/Description:  Peritonsillar abscess [J36]  UTI (urinary tract infection) [N39.0]  Sore throat [J02.9]  Strep pharyngitis [J02.0]  Dysphagia [R13.10]     UTILIZATION REVIEW CONTACT:  Cass Gibson, Utilization   Network Utilization Review Department  Phone: [phone]  Fax [phone]  Email: [email or web address]  Contact for approvals/pending authorizations, clinical reviews, and discharge.     PHYSICIAN ADVISORY SERVICES:  Medical Necessity Denial & Zzrh-sn-Svly Review  Phone: [phone]  Fax: [phone]  Email: [email or web address]     DISCHARGE SUPPORT TEAM:  For Patients Discharge Needs & Updates  Phone: [phone] opt. 2 Fax: [phone]  Email: [email or web address]

## 2024-03-21 NOTE — SPEECH THERAPY NOTE
"  Speech Pathology Bedside Swallow Evaluation:                    SLP RECOMMENDATIONS:         Diet: Regular consistency         Liquids: Thin liquids         Medications: as best tolerated                Summary:  Pt seen for bedside swallow evaluation. Pt reports having some discomfort with swallowing yesterday, but feels this has improved following fine needle aspiration of peritonsillar abscess. Pt is currently on a Level 2 [facility] soft/thin diet and requesting diet advancement.   Pt's oral mech/CN exam was grossly WNL. Pt trialed with regular solids with functional mastication/manipulation of bolus. Pt trialed with thin liquids via straw sips with no overt s/s aspiration or apparent difficulty. Bite-strength appears adequate. Pt is on RA and CXR from 3/20 showed no acute process.  Pt presents with no s/s suggestive of oral or pharyngeal dysphagia. Recommend diet advancement to Regular/thin liquids. ST services not indicated at this time. Please re-consult with any new concerns.       Eval only, No f/u tx indicated.     Vitals:    03/20/24 2123 03/21/24 0100 03/21/24 0500 03/21/24 0737   BP: 113/74   102/69   BP Location: Left arm   Left arm   Pulse: 88   64   Resp: 18   16   Temp: 97.9 °F (36.6 °C)   97.7 °F (36.5 °C)   TempSrc: Temporal   Temporal   SpO2: 99% 100% 98% 99%   Weight:       Height:         Lab Results   Component Value Date    WBC 16.36 (H) 03/19/2024    HGB 14.0 03/19/2024    HCT 45.9 03/19/2024    MCV 77 (L) 03/19/2024     03/19/2024             H&P/Admit info/ pertinent provider notes: (PMH noted above)  Per ED notes:  \"43-year-old female presents with difficulty swallowing and throat swelling. She states she had something similar little over a year ago. She is not having difficulty breathing per her, she does note fevers prior to arrival. She also has upper chest tightness. Denies shortness of breath, nausea/vomiting. She does state is difficult to swallow \"      Special Studies:  CXR " 3/20/24  IMPRESSION:  No acute cardiopulmonary disease.      Previous VBS:  none    Patient's goal: to advance diet     Did the pt report pain? No   If yes, was nursing notified/was it addressed?    Reason for consult:  R/o aspiration  Determine safest and least restrictive diet        Food allergies:  No Known Allergies     Current diet:  Regular/thin    Premorbid diet:  Regular/thin    O2 requirements:  RA   Voice/Speech:  WNL   Social:  independent   Follows commands:  Intact    Cognitive status:  Alert and oriented        Results d/w:  Pt, nursing, physician

## 2024-03-21 NOTE — UTILIZATION REVIEW
Initial Clinical Review    Admission: Date/Time/Statement:   Admission Orders (From admission, onward)       Ordered        03/20/24 0343  INPATIENT ADMISSION  Once                          Orders Placed This Encounter   Procedures    INPATIENT ADMISSION     Standing Status:   Standing     Number of Occurrences:   1     Order Specific Question:   Level of Care     Answer:   Med Surg [16]     Order Specific Question:   Estimated length of stay     Answer:   More than 2 Midnights     Order Specific Question:   Certification     Answer:   I certify that inpatient services are medically necessary for this patient for a duration of greater than two midnights. See H&P and MD Progress Notes for additional information about the patient's course of treatment.     ED Arrival Information       Expected   -    Arrival   3/19/2024 22:57    Acuity   Urgent              Means of arrival   Walk-In    Escorted by   Self    Service   Family Medicine    Admission type   Emergency              Arrival complaint   Allergic Reaction             Chief Complaint   Patient presents with    Sore Throat     Pt reports sore throat x2 days, no meds pta. Reports difficulty swallowing. Pt noted to be placing paper towels in mouth to remove secretions. Throat noted to be red. Pt lung sounds clear and equal bilat       Initial Presentation: 43 y.o. female to ED as a walk-in Admitted Inpatient to [facility] Unit with a Peritonsillar Abscess.  Pt presented with c/o sore throat and difficulty swallowing for past 2 days. Denies sob.   PMHx: bipolar disorder, polysubstance abuse, and schizophrenia.  On presentation tachycardic, WBC's 16.36, strep PCR pos. CT neck +  acute pharyngitis with right peritonsillar retropharyngeal inflammation.  1 g  Rocephin, 500 mg metronidazole, and 1 L NSS given in ED.  ENT consulted --Abscess was aspirated with removal of 3-4 cc, pt tolerated well, however still remains uncomfortable with tenderness to palpation of the neck.    Continue IV abx. Cont Pox. Advance diet to mechanical soft. IVFs @ 100 ml/hr. Nutrition consulted.    Date: 3/21   Day 2: Strep A PCR positive, s/p needle aspiration w/ return of 3-4 cc. No acute respiratory distress, drooling, or trismus. Passed swallow evaluation, ok to return to regular diet. Pain controlled. Ok to d/c home on po abx.        ED Triage Vitals   Temperature Pulse Respirations Blood Pressure SpO2   03/19/24 2305 03/19/24 2305 03/19/24 2305 03/19/24 2305 03/19/24 2305   100 °F (37.8 °C) (!) 123 20 157/91 100 %      Temp Source Heart Rate Source Patient Position - Orthostatic VS BP Location FiO2 (%)   03/19/24 2305 03/19/24 2305 03/19/24 2305 03/19/24 2305 --   Oral Monitor Sitting Left arm       Pain Score       03/20/24 0452       4          Wt Readings from Last 1 Encounters:   03/20/24 51.3 kg (113 lb 1.5 oz)     Additional Vital Signs:   Date/Time Temp Pulse Resp BP MAP (mmHg) SpO2 O2 Device   03/21/24 0737 97.7 °F (36.5 °C) 64 16 102/69 76 99 % None (Room air)   03/21/24 0500 -- -- -- -- -- 98 % None (Room air)   03/21/24 0100 -- -- -- -- -- 100 % None (Room air)   03/20/24 2123 97.9 °F (36.6 °C) 88 18 113/74 -- 99 % None (Room air)   03/20/24 1629 98 °F (36.7 °C) 94 18 117/90  95 99 % None (Room air)   BP: Cantl sit still. at 03/20/24 1629   03/20/24 1200 98.2 °F (36.8 °C) 84 20 120/73 80 97 % None (Room air)   03/20/24 0800 -- -- -- -- -- 100 % None (Room air)   03/20/24 0700 98.9 °F (37.2 °C) 70 18 124/75 -- 99 % None (Room air)   03/20/24 0430 98.6 °F (37 °C) 92 18 118/81 87 96 % None (Room air)   03/20/24 0400 -- 84 18 122/87 -- 98 % None (Room air)   03/20/24 0300 -- 88 18 129/78 -- 98 % None (Room air)   03/20/24 0200 -- 91 18 127/86 -- 98 % None (Room air)   03/20/24 0145 -- 97 18 165/93 -- 98 % --   03/20/24 0130 -- 82 18 141/95 -- 100 % None (Room air)   03/20/24 0115 -- 80 18 156/99 -- 97 % None (Room air)   03/20/24 0104 -- 81 18 144/93 -- 97 % None (Room air)   03/20/24 0015 -- 83  18 140/93 -- 99 % None (Room air)   03/20/24 0000 -- 85 18 134/81 -- 98 % None (Room air)   03/19/24 2354 -- 91 20 128/90 -- 97 % None (Room air)   03/19/24 2350 -- -- -- -- -- -- None (Room air)   03/19/24 2305 100 °F (37.8 °C) 123 Abnormal  20 157/91 -- 100 % None (Room air)     Pertinent Labs/Diagnostic Test Results:   CT soft tissue neck w contrast   Final Result by Georgi Leon MD (03/20 0155)      1. Findings consistent with acute pharyngitis. Right peritonsillar 2 x 3.3 x 2.3 cm abscess.   2. Mild retropharyngeal inflammation.         XR chest 1 view portable   ED Interpretation by Juan Costello DO (03/20 0053)   Per my independent interpretation, no acute cardiopulmonary disease     Final Result by Graham Gamino MD (03/20 1109)      No acute cardiopulmonary disease.          Results from last 7 days   Lab Units 03/19/24  2335   WBC Thousand/uL 16.36*   HEMOGLOBIN g/dL 14.0   HEMATOCRIT % 45.9   PLATELETS Thousands/uL 304   NEUTROS ABS Thousands/µL 12.64*     Results from last 7 days   Lab Units 03/19/24  2335   SODIUM mmol/L 136   POTASSIUM mmol/L 3.9   CHLORIDE mmol/L 96   CO2 mmol/L 29   ANION GAP mmol/L 11   BUN mg/dL 6   CREATININE mg/dL 0.58*   EGFR ml/min/1.73sq m 114   CALCIUM mg/dL 9.8     Results from last 7 days   Lab Units 03/19/24  2335   AST U/L 20   ALT U/L 13   ALK PHOS U/L 73   TOTAL PROTEIN g/dL 8.0   ALBUMIN g/dL 4.2   TOTAL BILIRUBIN mg/dL 0.99     Results from last 7 days   Lab Units 03/19/24  2335   GLUCOSE RANDOM mg/dL 68     Results from last 7 days   Lab Units 03/19/24  2335   PROTIME seconds 14.1   INR  1.05   PTT seconds 40*     Results from last 7 days   Lab Units 03/19/24  2335   PROCALCITONIN ng/ml 0.14     Results from last 7 days   Lab Units 03/19/24  2335   LACTIC ACID mmol/L 1.1     Results from last 7 days   Lab Units 03/19/24  2353   CLARITY UA  Cloudy*   COLOR UA  Sammie*   SPEC GRAV UA  1.020   PH UA  6.0   GLUCOSE UA mg/dl Negative   KETONES UA mg/dl  >=150 (3+)*   BLOOD UA  250.0*   PROTEIN UA mg/dl 30 (1+)*   NITRITE UA  Negative   BILIRUBIN UA  Negative   UROBILINOGEN UA mg/dL Negative   LEUKOCYTES UA  100.0*   WBC UA /hpf 10-20*   RBC UA /hpf 10-20*   BACTERIA UA /hpf Innumerable*   EPITHELIAL CELLS WET PREP /hpf Moderate*   MUCUS THREADS  Moderate*     Results from last 7 days   Lab Units 03/19/24  2353   AMPH/METH  Negative   BARBITURATE UR  Negative   BENZODIAZEPINE UR  Negative   COCAINE UR  Positive*   METHADONE URINE  Negative   OPIATE UR  Negative   PCP UR  Negative   THC UR  Negative       Results from last 7 days   Lab Units 03/19/24  2353 03/19/24  2346 03/19/24  2335   BLOOD CULTURE   --  No Growth at 24 hrs. No Growth at 24 hrs.   URINE CULTURE  >100,000 cfu/ml Gram Negative Aristides Enteric Like*  --   --      ED Treatment:   Medication Administration from 03/19/2024 2257 to 03/20/2024 0423         Date/Time Order Dose Route Action     03/19/2024 2348 EDT sodium chloride 0.9 % bolus 1,000 mL 1,000 mL Intravenous New Bag     03/19/2024 2349 EDT cefTRIAXone (ROCEPHIN) IVPB (premix in dextrose) 1,000 mg 50 mL 1,000 mg Intravenous New Bag     03/19/2024 2356 EDT metroNIDAZOLE (FLAGYL) IVPB (premix) 500 mg 100 mL 500 mg Intravenous New Bag       Past Medical History:   Diagnosis Date    Addiction to drug (Formerly [facility])     Anemia     Anxiety     Depression     Depression with suicidal ideation     Drug abuse (Formerly [facility])     Hallucination     Mood disorder (Formerly [facility]) 5/15/2019    R/O BIPOLAR DISORDER    Movement disorder 10/29/2019    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[facility])     Paranoia (psychosis) (Formerly [facility])     Peritonsillar abscess 5/24/2022    Psychiatric disorder     Psychiatric illness     PTSD (post-traumatic stress disorder)     Pyuria 10/16/2018    Schizo-affective type schizophrenia, chronic state (Formerly [facility])     Schizo-affective type schizophrenia, chronic state (Formerly [facility])     Self-injurious behavior     Severe episode of recurrent major depressive disorder, with psychotic features (Formerly [facility])  8/4/2021    Suicidal behavior     Suicidal intent 11/9/2021    Suicide attempt (HCC)     Withdrawal symptoms, drug or narcotic (HCC)      Present on Admission:   Peritonsillar abscess   Sepsis (HCC)   UTI (urinary tract infection)      Admitting Diagnosis: Peritonsillar abscess [J36]  UTI (urinary tract infection) [N39.0]  Sore throat [J02.9]  Strep pharyngitis [J02.0]  Dysphagia [R13.10]  Age/Sex: 43 y.o. female  Admission Orders:  Scheduled Medications:  ampicillin-sulbactam, 3 g, Intravenous, Q6H    Continuous IV Infusions:    sodium chloride 0.9 % infusion  Rate: 100 mL/hr Dose: 100 mL/hr  Freq: Continuous Route: IV  Indications of Use: IV Hydration  Last Dose: 100 mL/hr (03/21/24 0310)  Start: 03/20/24 0430 End: 03/21/24 0635     PRN Meds:  hydrOXYzine HCL, 50 mg, Oral, HS PRN 3/20 x1  ibuprofen, 400 mg, Oral, Q6H PRN 3/20 x1  phenol, 1 spray, Mouth/Throat, Q2H PRN          Network Utilization Review Department  ATTENTION: Please call with any questions or concerns to [phone] and carefully listen to the prompts so that you are directed to the right person. All voicemails are confidential.   For Discharge needs, contact Care Management DC Support Team at [phone] opt. 2  Send all requests for admission clinical reviews, approved or denied determinations and any other requests to dedicated fax number below belonging to the campus where the patient is receiving treatment. List of dedicated fax numbers for the Facilities:  FACILITY NAME UR FAX NUMBER   ADMISSION DENIALS (Administrative/Medical Necessity) [phone]   DISCHARGE SUPPORT TEAM (NETWORK) [phone]   PARENT CHILD HEALTH (Maternity/NICU/Pediatrics) [phone]   [facility] [phone]   [facility] [phone]   Critical access hospital [phone]   [facility] [phone]   Critical access hospital [phone]   [facility] [phone]   [facility] [phone]   [facility] [phone]   [facility] [phone]   [facility] [phone]   [facility] [phone]   [facility] [phone]

## 2024-03-21 NOTE — PLAN OF CARE
Problem: PAIN - ADULT  Goal: Verbalizes/displays adequate comfort level or baseline comfort level  Description: Interventions:  - Encourage patient to monitor pain and request assistance  - Assess pain using appropriate pain scale  - Administer analgesics based on type and severity of pain and evaluate response  - Implement non-pharmacological measures as appropriate and evaluate response  - Consider cultural and social influences on pain and pain management  - Notify physician/advanced practitioner if interventions unsuccessful or patient reports new pain  Outcome: Progressing     Problem: INFECTION - ADULT  Goal: Absence or prevention of progression during hospitalization  Description: INTERVENTIONS:  - Assess and monitor for signs and symptoms of infection  - Monitor lab/diagnostic results  - Monitor all insertion sites, i.e. indwelling lines, tubes, and drains  - Monitor endotracheal if appropriate and nasal secretions for changes in amount and color  - [place] appropriate cooling/warming therapies per order  - Administer medications as ordered  - Instruct and encourage patient and family to use good hand hygiene technique  - Identify and instruct in appropriate isolation precautions for identified infection/condition  Outcome: Progressing  Goal: Absence of fever/infection during neutropenic period  Description: INTERVENTIONS:  - Monitor WBC    Outcome: Progressing     Problem: SAFETY ADULT  Goal: Patient will remain free of falls  Description: INTERVENTIONS:  - Educate patient/family on patient safety including physical limitations  - Instruct patient to call for assistance with activity   - Consult OT/PT to assist with strengthening/mobility   - Keep Call bell within reach  - Keep bed low and locked with side rails adjusted as appropriate  - Keep care items and personal belongings within reach  - Initiate and maintain comfort rounds  - Make Fall Risk Sign visible to staff  - Offer Toileting every 2 Hours,  in advance of need  - Initiate/Maintain bed alarm  - Obtain necessary fall risk management equipment: bed alarm  - Apply yellow socks and bracelet for high fall risk patients  - Consider moving patient to room near nurses station  Outcome: Progressing     Problem: DISCHARGE PLANNING  Goal: Discharge to home or other facility with appropriate resources  Description: INTERVENTIONS:  - Identify barriers to discharge w/patient and caregiver  - Arrange for needed discharge resources and transportation as appropriate  - Identify discharge learning needs (meds, wound care, etc.)  - Arrange for interpretive services to assist at discharge as needed  - Refer to Case Management Department for coordinating discharge planning if the patient needs post-hospital services based on physician/advanced practitioner order or complex needs related to functional status, cognitive ability, or social support system  Outcome: Progressing     Problem: Knowledge Deficit  Goal: Patient/family/caregiver demonstrates understanding of disease process, treatment plan, medications, and discharge instructions  Description: Complete learning assessment and assess knowledge base.  Interventions:  - Provide teaching at level of understanding  - Provide teaching via preferred learning methods  Outcome: Progressing     Problem: RESPIRATORY - ADULT  Goal: Achieves optimal ventilation and oxygenation  Description: INTERVENTIONS:  - Assess for changes in respiratory status  - Assess for changes in mentation and behavior  - Position to facilitate oxygenation and minimize respiratory effort  - Oxygen administered by appropriate delivery if ordered  - Initiate smoking cessation education as indicated  - Encourage broncho-pulmonary hygiene including cough, deep breathe, Incentive Spirometry  - Assess the need for suctioning and aspirate as needed  - Assess and instruct to report SOB or any respiratory difficulty  - Respiratory Therapy support as  indicated  Outcome: Progressing     Problem: GASTROINTESTINAL - ADULT  Goal: Oral mucous membranes remain intact  Description: INTERVENTIONS  - Assess oral mucosa and hygiene practices  - Implement preventative oral hygiene regimen  - Implement oral medicated treatments as ordered  - Initiate Nutrition services referral as needed  Outcome: Progressing     Problem: Nutrition/Hydration-ADULT  Goal: Nutrient/Hydration intake appropriate for improving, restoring or maintaining nutritional needs  Description: Monitor and assess patient's nutrition/hydration status for malnutrition. Collaborate with interdisciplinary team and initiate plan and interventions as ordered.  Monitor patient's weight and dietary intake as ordered or per policy. Utilize nutrition screening tool and intervene as necessary. Determine patient's food preferences and provide high-protein, high-caloric foods as appropriate.     INTERVENTIONS:  - Monitor oral intake, urinary output, labs, and treatment plans  - Assess nutrition and hydration status and recommend course of action  - Evaluate amount of meals eaten  - Assist patient with eating if necessary   - Allow adequate time for meals  - Recommend/ encourage appropriate diets, oral nutritional supplements, and vitamin/mineral supplements  - Order, calculate, and assess calorie counts as needed  - Recommend, monitor, and adjust tube feedings and TPN/PPN based on assessed needs  - Assess need for intravenous fluids  - Provide specific nutrition/hydration education as appropriate  - Include patient/family/caregiver in decisions related to nutrition  Outcome: Progressing

## 2024-03-21 NOTE — PLAN OF CARE
Problem: INFECTION - ADULT  Goal: Absence or prevention of progression during hospitalization  Description: INTERVENTIONS:  - Assess and monitor for signs and symptoms of infection  - Monitor lab/diagnostic results  - Monitor all insertion sites, i.e. indwelling lines, tubes, and drains  - Monitor endotracheal if appropriate and nasal secretions for changes in amount and color  - [place] appropriate cooling/warming therapies per order  - Administer medications as ordered  - Instruct and encourage patient and family to use good hand hygiene technique  - Identify and instruct in appropriate isolation precautions for identified infection/condition  Outcome: Progressing     Problem: GASTROINTESTINAL - ADULT  Goal: Oral mucous membranes remain intact  Description: INTERVENTIONS  - Assess oral mucosa and hygiene practices  - Implement preventative oral hygiene regimen  - Implement oral medicated treatments as ordered  - Initiate Nutrition services referral as needed  Outcome: Progressing     Problem: RESPIRATORY - ADULT  Goal: Achieves optimal ventilation and oxygenation  Description: INTERVENTIONS:  - Assess for changes in respiratory status  - Assess for changes in mentation and behavior  - Position to facilitate oxygenation and minimize respiratory effort  - Oxygen administered by appropriate delivery if ordered  - Initiate smoking cessation education as indicated  - Encourage broncho-pulmonary hygiene including cough, deep breathe, Incentive Spirometry  - Assess the need for suctioning and aspirate as needed  - Assess and instruct to report SOB or any respiratory difficulty  - Respiratory Therapy support as indicated  Outcome: Progressing     Problem: Knowledge Deficit  Goal: Patient/family/caregiver demonstrates understanding of disease process, treatment plan, medications, and discharge instructions  Description: Complete learning assessment and assess knowledge base.  Interventions:  - Provide teaching at level  of understanding  - Provide teaching via preferred learning methods  Outcome: Progressing     Problem: DISCHARGE PLANNING  Goal: Discharge to home or other facility with appropriate resources  Description: INTERVENTIONS:  - Identify barriers to discharge w/patient and caregiver  - Arrange for needed discharge resources and transportation as appropriate  - Identify discharge learning needs (meds, wound care, etc.)  - Arrange for interpretive services to assist at discharge as needed  - Refer to Case Management Department for coordinating discharge planning if the patient needs post-hospital services based on physician/advanced practitioner order or complex needs related to functional status, cognitive ability, or social support system  Outcome: Progressing

## 2024-03-21 NOTE — NURSING NOTE
PT was D/C to home D/C instruction was Given to PT and PT verbalized understanding of D/C instruction.  All personal belonging was given to PT.  IV was D/C no distress noted.  7 t staff accompany PT to lobby.

## 2024-03-21 NOTE — DISCHARGE SUMMARY
Discharge Summary - [facility]    Patient Information: Elida Johnson 43 y.o. female MRN: [id number]  Unit/Bed#: 7T [facility] 713-01 Encounter: [id number]    Discharging Physician / Practitioner: Fernando Corea MD  PCP: ALLAN Bradley  Admission Date:   Admission Orders (From admission, onward)       Ordered        03/20/24 0343  INPATIENT ADMISSION  Once                          Discharge Date: 3/21/24    Reason for Admission: Peritonsillar abscess    Discharge Diagnoses:     Principal Problem:    Peritonsillar abscess  Active Problems:    Severe protein-calorie malnutrition (HCC)    Streptococcal adenotonsillitis  Resolved Problems:    Sepsis (HCC)    UTI (urinary tract infection)        * Peritonsillar abscess  Assessment & Plan  Strep A PCR positive  S/p needle aspiration w/ return of 3-4 cc   No acute respiratory distress, drooling, or trismus  Passed swallow evaluation , ok to return to regular diet  Received Ampicillin-sulbactam IV 3g q6h while admitted    Regular diet   Amxoicillin clavulanate PO 875mg q12h for 7 days    Streptococcal adenotonsillitis  Assessment & Plan  Strep A+     PLAN:  Amoxicillin clavulanic acid 875/125 for 7 days    Severe protein-calorie malnutrition (HCC)  Assessment & Plan  Malnutrition Findings:   Adult Malnutrition type: Chronic illness  Adult Degree of Malnutrition: Other severe protein calorie malnutrition  Malnutrition Characteristics: Muscle loss, Fat loss, Weight loss             360 Statement: related to inadequate energy intake as evidenced by 7.9% weight loss 12/14/23-3/19/24, 32% weight loss 2/10/23-3/19/24,  hollowing of supraclavicular space, wasted interosseous, sunken orbital. Treatment: DIet texture per SLP assessment and pt tolerance, ensure plus high protein TID.    BMI Findings:           Body mass index is 20.69 kg/m².     PLAN:  Follow up with PCP      UTI (urinary tract infection)-resolved as of 3/21/2024  Assessment & Plan   1g  ceftriaxone in ED, fully treated  Denies urinary symptoms  >100,000 cfu/ml Gram Negative Aristides Enteric Like         Sepsis (HCC)-resolved as of 3/21/2024  Assessment & Plan  On admission met criteria for sepsis , WBC 16.36, UTI and Strep A PCR positive.   Lactic acid and procalcitonin normal  Received 1.5L bolus + mIV fluids rate 100cc/hr  RESOLVED           Consultations During Hospital Stay:  Curbsided ENT      Procedures Performed:   Needle aspiration peritonsillar abscess    Significant Findings / Test Results:   Urine culture>100,000 cfu/ml Gram Negative Aristides Enteric Like   Strep A +  UDS + cocaine  CBC WBC 15    Incidental Findings:   N/a     Test Results Pending at Discharge (will require follow up):   N/a     Outpatient Tests Requested:  CBC in 1 week    Outpatient follow-up Requested:  PCP  ENT  Complications:  n/a    Hospital Course:     Elida Johnson is a 43 y.o. female with PMH bipolar disorder, polysubstance abuse, and schizophrenia  who originally presented to the hospital on 3/19/2024 due to sore throat and difficulty swallowing .  On ED: tachycardic 123, leukocytosis 16.36 + UTI+ + strep A + met sepsis criteria. CT showed acute pharyngitis with right peritonsillar abscess retropharyngeal inflammation.  ENT consulted - they suggested needle aspiration. Abscess was aspirated with removal 3-4cc, patient tolerated well , however she remained c/o tenderness , trouble swallowing so she was admitted to the hospital floors.  During hospitalization she received unasyn 3gr q6, she passed swallow evaluation, pain was well controlled, she tolerated meals well. Patient c/o anxiety and worried about withdrawing from cocaine, UDS ordered + for cocaine, she got  1 dose of hydroxyzine with improvement on anxiety. She also was seen by nutritionist due to low BMI - severe malnutrition wich recommended dietary supplementation  On day of discharge, patient is stable, no shortness of breath, trouble swallowing or  "trismus. She does have sore throat. She will be discharged with oral antibiotics Amoxicillin clavulanic acid for 7 days     Review of Systems    Condition at Discharge: stable     Discharge Day Visit / Exam:     Vitals: Blood Pressure: 102/69 (03/21/24 0737)  Pulse: 64 (03/21/24 0737)  Temperature: 97.7 °F (36.5 °C) (03/21/24 0737)  Temp Source: Temporal (03/21/24 0737)  Respirations: 16 (03/21/24 0737)  Height: 5' 2\" (157.5 cm) (03/20/24 0800)  Weight - Scale: 51.3 kg (113 lb 1.5 oz) (03/20/24 0800)  SpO2: 99 % (03/21/24 0737)  Exam:   Physical Exam  Constitutional:       Appearance: Normal appearance.   HENT:      Head: Normocephalic.      Nose: Nose normal.      Mouth/Throat:      Mouth: Mucous membranes are moist.      Pharynx: Posterior oropharyngeal erythema present.   Cardiovascular:      Rate and Rhythm: Normal rate and regular rhythm.      Pulses: Normal pulses.      Heart sounds: Normal heart sounds.   Pulmonary:      Effort: Pulmonary effort is normal.      Breath sounds: Normal breath sounds.   Abdominal:      General: Bowel sounds are normal. There is no distension.      Palpations: Abdomen is soft.      Tenderness: There is no abdominal tenderness. There is no guarding or rebound.   Musculoskeletal:         General: Normal range of motion.      Cervical back: Normal range of motion.   Skin:     General: Skin is warm.      Capillary Refill: Capillary refill takes less than 2 seconds.   Neurological:      General: No focal deficit present.      Mental Status: She is alert and oriented to person, place, and time.   Psychiatric:         Mood and Affect: Mood normal.         Behavior: Behavior normal.           Discussion with Family: n/a  Patient Information Sharing: Elida Johnson does not wish inpatient team to notify their loved ones of their condition and current plan.     Discharge instructions/Information to patient and family:   See after visit summary for information provided to patient and " family.      Discharge Medications:    Prior to Admission Medications  Prescriptions Last Dose Informant Patient Reported? Taking?   hydrOXYzine HCL (ATARAX) 50 mg tablet Not Taking   No No   Sig: Take 1 tablet (50 mg total) by mouth daily at bedtime as needed (insomnia)   Patient not taking: Reported on 3/20/2024   Facility-Administered Medications: None      Provisions for Follow-Up Care:  See after visit summary for information related to follow-up care and any pertinent home health orders.      Disposition:     Home    For Discharges to [facility]:   Not Applicable to this Patient - Not Applicable to this Patient    Planned Readmission: N/a     Discharge Statement:  I spent 30 minutes discharging the patient. This time was spent on the day of discharge. I had direct contact with the patient on the day of discharge. Greater than 50% of the total time was spent examining patient, answering all patient questions, arranging and discussing plan of care with patient as well as directly providing post-discharge instructions.  Additional time then spent on discharge activities.    ** Please Note: This note has been constructed using a voice recognition system **    Uyen Barrett MD  03/21/24  9:58 AM

## 2024-03-21 NOTE — TELEPHONE ENCOUNTER
Referral request for hospital follow-up for peritonsillar abscess. Patient is still inpatient. Please call patient with appointment date/time.

## 2024-03-21 NOTE — CASE MANAGEMENT
Case Management Discharge Planning Note    Patient name Elida Johnson  Location 7T [facility] 713/7T [facility] 713-01 MRN [id number]  : 1980 Date 3/21/2024       Current Admission Date: 3/19/2024  Current Admission Diagnosis:Severe protein-calorie malnutrition (HCC)   Patient Active Problem List    Diagnosis Date Noted    Streptococcal adenotonsillitis 2024    Severe protein-calorie malnutrition (HCC) 2024    Pre-employment examination 02/15/2023    Insomnia 02/15/2023    Class 1 obesity due to excess calories without serious comorbidity with body mass index (BMI) of 30.0 to 30.9 in adult 02/15/2023    Immunization declined 02/15/2023    Schizoaffective disorder (HCC) 2022    Chronic post-traumatic stress disorder (PTSD) 10/29/2019    Anxiety     Cocaine abuse (HCC) 05/15/2019    History of alcohol abuse 05/15/2019    Vitamin D deficiency 10/18/2018    Bipolar 1 disorder, depressed, severe (HCC) 10/17/2018    Microcytic anemia 10/16/2018    Tobacco abuse 10/16/2018    Elevated platelet count 10/16/2018      LOS (days): 1  Geometric Mean LOS (GMLOS) (days):   Days to GMLOS:     OBJECTIVE:  Risk of Unplanned Readmission Score: 7.41         Current admission status: Inpatient   Preferred Pharmacy:    [facility] [place] [address]  Phone: [phone] Fax: [phone]    Primary Care Provider: ALLAN Bradley    Primary Insurance: [employer]  Secondary Insurance:     DISCHARGE DETAILS:    Discharge planning discussed with:: Patient                                               Treatment Team Recommendation: Home  Discharge Destination Plan:: Home  Transport at Discharge : Ride Share  Dispatcher Contacted: Yes  Number/Name of Dispatcher: Round Trip  Transported by (Company and Unit #): [employer]  ETA of Transport (Date): 24  ETA of Transport (Time): 1235     Transfer Mode: Ambulate  Accompanied by: Alone

## 2024-03-22 ENCOUNTER — TRANSITIONAL CARE MANAGEMENT (OUTPATIENT)
Dept: FAMILY MEDICINE CLINIC | Facility: CLINIC | Age: 44
End: 2024-03-22

## 2024-03-22 LAB
BACTERIA UR CULT: ABNORMAL
BACTERIA UR CULT: ABNORMAL

## 2024-03-22 NOTE — UTILIZATION REVIEW
NOTIFICATION OF ADMISSION DISCHARGE   This is a Notification of Discharge from [facility]. Please be advised that this patient has been discharge from our facility. Below you will find the admission and discharge date and time including the patient’s disposition.   UTILIZATION REVIEW CONTACT:  Cass Gibson MA  Utilization   Network Utilization Review Department  Phone: [phone] x carefully listen to the prompts. All voicemails are confidential.  Email: [email or web address]     ADMISSION INFORMATION  PRESENTATION DATE: 3/19/2024 10:59 PM  OBERVATION ADMISSION DATE:   INPATIENT ADMISSION DATE: 3/20/24  3:44 AM   DISCHARGE DATE: 3/21/2024  2:03 PM   DISPOSITION:Home/Self Care    Network Utilization Review Department  ATTENTION: Please call with any questions or concerns to [phone] and carefully listen to the prompts so that you are directed to the right person. All voicemails are confidential.   For Discharge needs, contact Care Management DC Support Team at [phone] opt. 2  Send all requests for admission clinical reviews, approved or denied determinations and any other requests to dedicated fax number below belonging to the campus where the patient is receiving treatment. List of dedicated fax numbers for the Facilities:  FACILITY NAME UR FAX NUMBER   ADMISSION DENIALS (Administrative/Medical Necessity) [phone]   DISCHARGE SUPPORT TEAM ([facility]) [phone]   PARENT CHILD HEALTH (Maternity/NICU/Pediatrics) [phone]   [facility] [phone]   [facility] [phone]   [facility] [phone]   [facility] [phone]   [facility] [phone]   [facility] [phone]   [facility] [phone]   [facility]  [phone]   [facility] [phone]   [facility] [phone]   [facility] [phone]   [facility] [phone]

## 2024-03-24 LAB
BACTERIA BLD CULT: NORMAL
BACTERIA BLD CULT: NORMAL

## 2024-03-25 LAB
BACTERIA BLD CULT: NORMAL
BACTERIA BLD CULT: NORMAL

## 2024-03-25 RX ORDER — SULFAMETHOXAZOLE AND TRIMETHOPRIM 800; 160 MG/1; MG/1
1 TABLET ORAL 2 TIMES DAILY
Qty: 14 TABLET | Refills: 0 | Status: SHIPPED | OUTPATIENT
Start: 2024-03-25 | End: 2024-04-01

## 2024-04-02 ENCOUNTER — TELEPHONE (OUTPATIENT)
Dept: FAMILY MEDICINE CLINIC | Facility: CLINIC | Age: 44
End: 2024-04-02

## 2024-04-02 NOTE — TELEPHONE ENCOUNTER
Patients mother answered she said she is not sure if patient took antibiotics but she saaid she did. Cbc pending, she agreed to take her to the lab today or tomorrow to follow up on CBC

## 2024-05-16 NOTE — ASSESSMENT & PLAN NOTE
BMI Counseling: Body mass index is 30 73 kg/m²  The BMI is above normal  Nutrition recommendations include decreasing portion sizes, encouraging healthy choices of fruits and vegetables, limiting drinks that contain sugar and moderation in carbohydrate intake  Exercise recommendations include moderate physical activity 150 minutes/week  Rationale for BMI follow-up plan is due to patient being overweight or obese  Detail Level: Detailed Biopsy Photograph Reviewed: Yes Number Of Curettages: 3 Size Of Lesion In Cm: 0.6 Size Of Lesion After Curettage: 0.8 Add Intralesional Injection: No Total Volume (Ccs): 1 Anesthesia Type: 1% lidocaine without epinephrine Cautery Type: electrodesiccation What Was Performed First?: Curettage Final Size Statement: The size of the lesion after curettage was Additional Information: (Optional): The wound was cleaned, and a pressure dressing was applied.  The patient received detailed post-op instructions. Consent was obtained from the patient. The risks, benefits and alternatives to therapy were discussed in detail. Specifically, the risks of infection, scarring, bleeding, prolonged wound healing, nerve injury, incomplete removal, allergy to anesthesia and recurrence were addressed. Alternatives to ED&C, such as: surgical removal and XRT were also discussed.  Prior to the procedure, the treatment site was clearly identified and confirmed by the patient. All components of Universal Protocol/PAUSE Rule completed. Post-Care Instructions: I reviewed with the patient in detail post-care instructions. Patient is to keep the area dry for 48 hours, and not to engage in any swimming until the area is healed. Should the patient develop any fevers, chills, bleeding, severe pain patient will contact the office immediately. Bill As A Line Item Or As Units: Line Item

## 2024-06-14 ENCOUNTER — VBI (OUTPATIENT)
Dept: ADMINISTRATIVE | Facility: OTHER | Age: 44
End: 2024-06-14

## 2024-06-14 NOTE — TELEPHONE ENCOUNTER
06/14/24 3:04 PM     Chart reviewed for Pap Smear (HPV) aka Cervical Cancer Screening ; nothing is submitted to the patient's insurance at this time.     Tiesha Murillo   [facility]